# Patient Record
Sex: MALE | Race: WHITE | NOT HISPANIC OR LATINO | Employment: UNEMPLOYED | URBAN - METROPOLITAN AREA
[De-identification: names, ages, dates, MRNs, and addresses within clinical notes are randomized per-mention and may not be internally consistent; named-entity substitution may affect disease eponyms.]

---

## 2018-08-17 ENCOUNTER — HOSPITAL ENCOUNTER (INPATIENT)
Facility: HOSPITAL | Age: 53
LOS: 35 days | Discharge: HOME HEALTH | DRG: 116 | End: 2018-09-21
Payer: MEDICAID

## 2018-08-17 ENCOUNTER — OPHTH EXAM (OUTPATIENT)
Dept: OPHTHALMOLOGY | Facility: HOSPITAL | Age: 53
End: 2018-08-17

## 2018-08-17 DIAGNOSIS — A41.02 SEPSIS DUE TO METHICILLIN RESISTANT STAPHYLOCOCCUS AUREUS (MRSA): ICD-10-CM

## 2018-08-17 DIAGNOSIS — M54.9 CHRONIC BILATERAL BACK PAIN, UNSPECIFIED BACK LOCATION: ICD-10-CM

## 2018-08-17 DIAGNOSIS — H53.8 BLURRED VISION, RIGHT EYE: ICD-10-CM

## 2018-08-17 DIAGNOSIS — B95.62 MRSA BACTEREMIA: ICD-10-CM

## 2018-08-17 DIAGNOSIS — F41.9 ANXIETY: ICD-10-CM

## 2018-08-17 DIAGNOSIS — G89.29 CHRONIC BILATERAL BACK PAIN, UNSPECIFIED BACK LOCATION: ICD-10-CM

## 2018-08-17 DIAGNOSIS — Z79.4 TYPE 2 DIABETES MELLITUS WITH CHRONIC KIDNEY DISEASE ON CHRONIC DIALYSIS, WITH LONG-TERM CURRENT USE OF INSULIN: ICD-10-CM

## 2018-08-17 DIAGNOSIS — Z91.89 AT RISK FOR LONG QT SYNDROME: ICD-10-CM

## 2018-08-17 DIAGNOSIS — Z99.2 ESRD ON HEMODIALYSIS: ICD-10-CM

## 2018-08-17 DIAGNOSIS — L02.91 ABSCESS: ICD-10-CM

## 2018-08-17 DIAGNOSIS — I48.91 ATRIAL FIBRILLATION: ICD-10-CM

## 2018-08-17 DIAGNOSIS — I20.0 UNSTABLE ANGINA: ICD-10-CM

## 2018-08-17 DIAGNOSIS — R78.81 MRSA BACTEREMIA: ICD-10-CM

## 2018-08-17 DIAGNOSIS — M86.9 OSTEOMYELITIS, UNSPECIFIED SITE, UNSPECIFIED TYPE: ICD-10-CM

## 2018-08-17 DIAGNOSIS — E11.22 TYPE 2 DIABETES MELLITUS WITH CHRONIC KIDNEY DISEASE ON CHRONIC DIALYSIS, WITH LONG-TERM CURRENT USE OF INSULIN: ICD-10-CM

## 2018-08-17 DIAGNOSIS — R06.03 ACUTE RESPIRATORY DISTRESS: ICD-10-CM

## 2018-08-17 DIAGNOSIS — I21.4 NSTEMI (NON-ST ELEVATED MYOCARDIAL INFARCTION): ICD-10-CM

## 2018-08-17 DIAGNOSIS — I48.20 CHRONIC ATRIAL FIBRILLATION: ICD-10-CM

## 2018-08-17 DIAGNOSIS — Z99.2 TYPE 2 DIABETES MELLITUS WITH CHRONIC KIDNEY DISEASE ON CHRONIC DIALYSIS, WITH LONG-TERM CURRENT USE OF INSULIN: ICD-10-CM

## 2018-08-17 DIAGNOSIS — H33.001 MACULA-OFF RHEGMATOGENOUS RETINAL DETACHMENT, RIGHT: Primary | ICD-10-CM

## 2018-08-17 DIAGNOSIS — N18.6 TYPE 2 DIABETES MELLITUS WITH CHRONIC KIDNEY DISEASE ON CHRONIC DIALYSIS, WITH LONG-TERM CURRENT USE OF INSULIN: ICD-10-CM

## 2018-08-17 DIAGNOSIS — E87.5 HYPERKALEMIA: ICD-10-CM

## 2018-08-17 DIAGNOSIS — R07.9 CHEST PAIN: ICD-10-CM

## 2018-08-17 DIAGNOSIS — F11.90 CHRONIC, CONTINUOUS USE OF OPIOIDS: ICD-10-CM

## 2018-08-17 DIAGNOSIS — N18.6 ESRD ON HEMODIALYSIS: ICD-10-CM

## 2018-08-17 DIAGNOSIS — F11.20 METHADONE DEPENDENCE: ICD-10-CM

## 2018-08-17 DIAGNOSIS — R53.81 DEBILITY: ICD-10-CM

## 2018-08-17 DIAGNOSIS — I15.0 RENOVASCULAR HYPERTENSION: ICD-10-CM

## 2018-08-17 DIAGNOSIS — M86.072 ACUTE HEMATOGENOUS OSTEOMYELITIS OF LEFT FOOT: ICD-10-CM

## 2018-08-17 PROBLEM — I15.2 HYPERTENSION DUE TO ENDOCRINE DISORDER: Status: ACTIVE | Noted: 2018-08-17

## 2018-08-17 LAB
ABO + RH BLD: NORMAL
ALBUMIN SERPL BCP-MCNC: 2.1 G/DL
ALP SERPL-CCNC: 198 U/L
ALT SERPL W/O P-5'-P-CCNC: 20 U/L
ANION GAP SERPL CALC-SCNC: 9 MMOL/L
AST SERPL-CCNC: 30 U/L
BASOPHILS # BLD AUTO: 0.03 K/UL
BASOPHILS NFR BLD: 0.3 %
BILIRUB SERPL-MCNC: 0.7 MG/DL
BLD GP AB SCN CELLS X3 SERPL QL: NORMAL
BUN SERPL-MCNC: 47 MG/DL
CALCIUM SERPL-MCNC: 7.7 MG/DL
CHLORIDE SERPL-SCNC: 101 MMOL/L
CO2 SERPL-SCNC: 21 MMOL/L
CREAT SERPL-MCNC: 5.6 MG/DL
CRP SERPL-MCNC: 69 MG/L
DIFFERENTIAL METHOD: ABNORMAL
EOSINOPHIL # BLD AUTO: 0.7 K/UL
EOSINOPHIL NFR BLD: 6.2 %
ERYTHROCYTE [DISTWIDTH] IN BLOOD BY AUTOMATED COUNT: 16.1 %
ERYTHROCYTE [SEDIMENTATION RATE] IN BLOOD BY WESTERGREN METHOD: 58 MM/HR
EST. GFR  (AFRICAN AMERICAN): 12.3 ML/MIN/1.73 M^2
EST. GFR  (NON AFRICAN AMERICAN): 10.7 ML/MIN/1.73 M^2
ESTIMATED AVG GLUCOSE: 189 MG/DL
GLUCOSE SERPL-MCNC: 140 MG/DL
HBA1C MFR BLD HPLC: 8.2 %
HCT VFR BLD AUTO: 28.7 %
HGB BLD-MCNC: 9.2 G/DL
IMM GRANULOCYTES # BLD AUTO: 0.56 K/UL
IMM GRANULOCYTES NFR BLD AUTO: 4.8 %
INR PPP: 1
LACTATE SERPL-SCNC: 1.1 MMOL/L
LACTATE SERPL-SCNC: 1.1 MMOL/L
LYMPHOCYTES # BLD AUTO: 1.7 K/UL
LYMPHOCYTES NFR BLD: 14.3 %
MCH RBC QN AUTO: 29 PG
MCHC RBC AUTO-ENTMCNC: 32.1 G/DL
MCV RBC AUTO: 91 FL
MONOCYTES # BLD AUTO: 0.7 K/UL
MONOCYTES NFR BLD: 6.1 %
NEUTROPHILS # BLD AUTO: 8.1 K/UL
NEUTROPHILS NFR BLD: 68.3 %
NRBC BLD-RTO: 0 /100 WBC
PLATELET # BLD AUTO: 57 K/UL
PMV BLD AUTO: 12.9 FL
POCT GLUCOSE: 154 MG/DL (ref 70–110)
POTASSIUM SERPL-SCNC: 4.9 MMOL/L
PROCALCITONIN SERPL IA-MCNC: 22.6 NG/ML
PROT SERPL-MCNC: 5.8 G/DL
PROTHROMBIN TIME: 10.6 SEC
RBC # BLD AUTO: 3.17 M/UL
SODIUM SERPL-SCNC: 131 MMOL/L
WBC # BLD AUTO: 11.78 K/UL

## 2018-08-17 PROCEDURE — 96372 THER/PROPH/DIAG INJ SC/IM: CPT

## 2018-08-17 PROCEDURE — 25000003 PHARM REV CODE 250: Performed by: STUDENT IN AN ORGANIZED HEALTH CARE EDUCATION/TRAINING PROGRAM

## 2018-08-17 PROCEDURE — 83605 ASSAY OF LACTIC ACID: CPT

## 2018-08-17 PROCEDURE — 84145 PROCALCITONIN (PCT): CPT

## 2018-08-17 PROCEDURE — 86140 C-REACTIVE PROTEIN: CPT

## 2018-08-17 PROCEDURE — 85025 COMPLETE CBC W/AUTO DIFF WBC: CPT

## 2018-08-17 PROCEDURE — 82962 GLUCOSE BLOOD TEST: CPT

## 2018-08-17 PROCEDURE — 99285 EMERGENCY DEPT VISIT HI MDM: CPT | Mod: ,,, | Performed by: PHYSICIAN ASSISTANT

## 2018-08-17 PROCEDURE — 86850 RBC ANTIBODY SCREEN: CPT

## 2018-08-17 PROCEDURE — 99285 EMERGENCY DEPT VISIT HI MDM: CPT | Mod: 25

## 2018-08-17 PROCEDURE — 80053 COMPREHEN METABOLIC PANEL: CPT

## 2018-08-17 PROCEDURE — 83036 HEMOGLOBIN GLYCOSYLATED A1C: CPT

## 2018-08-17 PROCEDURE — 63600175 PHARM REV CODE 636 W HCPCS: Performed by: STUDENT IN AN ORGANIZED HEALTH CARE EDUCATION/TRAINING PROGRAM

## 2018-08-17 PROCEDURE — A4216 STERILE WATER/SALINE, 10 ML: HCPCS | Performed by: STUDENT IN AN ORGANIZED HEALTH CARE EDUCATION/TRAINING PROGRAM

## 2018-08-17 PROCEDURE — 85610 PROTHROMBIN TIME: CPT

## 2018-08-17 PROCEDURE — 63600175 PHARM REV CODE 636 W HCPCS: Mod: JG | Performed by: STUDENT IN AN ORGANIZED HEALTH CARE EDUCATION/TRAINING PROGRAM

## 2018-08-17 PROCEDURE — 12000002 HC ACUTE/MED SURGE SEMI-PRIVATE ROOM

## 2018-08-17 PROCEDURE — 96365 THER/PROPH/DIAG IV INF INIT: CPT

## 2018-08-17 PROCEDURE — 85652 RBC SED RATE AUTOMATED: CPT

## 2018-08-17 PROCEDURE — 87040 BLOOD CULTURE FOR BACTERIA: CPT | Mod: 59

## 2018-08-17 RX ORDER — RAMELTEON 8 MG/1
8 TABLET ORAL NIGHTLY PRN
Status: DISCONTINUED | OUTPATIENT
Start: 2018-08-17 | End: 2018-09-05

## 2018-08-17 RX ORDER — POLYETHYLENE GLYCOL 3350 17 G/17G
17 POWDER, FOR SOLUTION ORAL DAILY
Status: DISCONTINUED | OUTPATIENT
Start: 2018-08-18 | End: 2018-08-24

## 2018-08-17 RX ORDER — LOSARTAN POTASSIUM 100 MG/1
100 TABLET ORAL DAILY
Status: ON HOLD | COMMUNITY
End: 2018-09-05 | Stop reason: HOSPADM

## 2018-08-17 RX ORDER — GLUCAGON 1 MG
1 KIT INJECTION
Status: DISCONTINUED | OUTPATIENT
Start: 2018-08-17 | End: 2018-08-25

## 2018-08-17 RX ORDER — HEPARIN SODIUM 5000 [USP'U]/ML
5000 INJECTION, SOLUTION INTRAVENOUS; SUBCUTANEOUS EVERY 8 HOURS
Status: DISCONTINUED | OUTPATIENT
Start: 2018-08-17 | End: 2018-09-17

## 2018-08-17 RX ORDER — SODIUM CHLORIDE 0.9 % (FLUSH) 0.9 %
5 SYRINGE (ML) INJECTION
Status: DISCONTINUED | OUTPATIENT
Start: 2018-08-17 | End: 2018-09-05

## 2018-08-17 RX ORDER — IBUPROFEN 200 MG
24 TABLET ORAL
Status: DISCONTINUED | OUTPATIENT
Start: 2018-08-17 | End: 2018-08-25

## 2018-08-17 RX ORDER — HYDROCODONE BITARTRATE AND ACETAMINOPHEN 7.5; 325 MG/1; MG/1
1 TABLET ORAL EVERY 6 HOURS PRN
Status: ON HOLD | COMMUNITY
End: 2018-08-30 | Stop reason: HOSPADM

## 2018-08-17 RX ORDER — CALCIUM ACETATE 667 MG/1
667 CAPSULE ORAL
Status: DISCONTINUED | OUTPATIENT
Start: 2018-08-18 | End: 2018-08-20

## 2018-08-17 RX ORDER — METHADONE HYDROCHLORIDE 10 MG/1
10 TABLET ORAL 2 TIMES DAILY
Status: ON HOLD | COMMUNITY
End: 2018-08-30 | Stop reason: HOSPADM

## 2018-08-17 RX ORDER — DILTIAZEM HYDROCHLORIDE 300 MG/1
300 CAPSULE, COATED, EXTENDED RELEASE ORAL DAILY
Status: DISCONTINUED | OUTPATIENT
Start: 2018-08-18 | End: 2018-08-18

## 2018-08-17 RX ORDER — ACETAMINOPHEN 325 MG/1
650 TABLET ORAL EVERY 4 HOURS PRN
Status: DISCONTINUED | OUTPATIENT
Start: 2018-08-17 | End: 2018-09-18 | Stop reason: SDUPTHER

## 2018-08-17 RX ORDER — CLONAZEPAM 0.5 MG/1
0.5 TABLET ORAL 2 TIMES DAILY PRN
COMMUNITY

## 2018-08-17 RX ORDER — ONDANSETRON 2 MG/ML
4 INJECTION INTRAMUSCULAR; INTRAVENOUS EVERY 8 HOURS PRN
Status: DISCONTINUED | OUTPATIENT
Start: 2018-08-17 | End: 2018-09-21 | Stop reason: HOSPADM

## 2018-08-17 RX ORDER — CLONAZEPAM 0.5 MG/1
0.5 TABLET ORAL 2 TIMES DAILY PRN
Status: DISCONTINUED | OUTPATIENT
Start: 2018-08-17 | End: 2018-09-21 | Stop reason: HOSPADM

## 2018-08-17 RX ORDER — METHADONE HYDROCHLORIDE 10 MG/1
10 TABLET ORAL 2 TIMES DAILY
Status: DISCONTINUED | OUTPATIENT
Start: 2018-08-17 | End: 2018-09-21 | Stop reason: HOSPADM

## 2018-08-17 RX ORDER — IPRATROPIUM BROMIDE AND ALBUTEROL SULFATE 2.5; .5 MG/3ML; MG/3ML
3 SOLUTION RESPIRATORY (INHALATION) EVERY 4 HOURS PRN
Status: DISCONTINUED | OUTPATIENT
Start: 2018-08-17 | End: 2018-09-21 | Stop reason: HOSPADM

## 2018-08-17 RX ORDER — DILTIAZEM HYDROCHLORIDE 300 MG/1
300 CAPSULE, COATED, EXTENDED RELEASE ORAL DAILY
Status: ON HOLD | COMMUNITY
End: 2018-09-05 | Stop reason: HOSPADM

## 2018-08-17 RX ORDER — HYDROCODONE BITARTRATE AND ACETAMINOPHEN 7.5; 325 MG/1; MG/1
1 TABLET ORAL EVERY 6 HOURS PRN
Status: DISCONTINUED | OUTPATIENT
Start: 2018-08-17 | End: 2018-09-21 | Stop reason: HOSPADM

## 2018-08-17 RX ORDER — IBUPROFEN 200 MG
16 TABLET ORAL
Status: DISCONTINUED | OUTPATIENT
Start: 2018-08-17 | End: 2018-08-25

## 2018-08-17 RX ORDER — OMEPRAZOLE 20 MG/1
20 CAPSULE, DELAYED RELEASE ORAL DAILY
COMMUNITY

## 2018-08-17 RX ORDER — LOSARTAN POTASSIUM 50 MG/1
100 TABLET ORAL DAILY
Status: DISCONTINUED | OUTPATIENT
Start: 2018-08-18 | End: 2018-08-28

## 2018-08-17 RX ORDER — SERTRALINE HYDROCHLORIDE 25 MG/1
25 TABLET, FILM COATED ORAL DAILY
Status: DISCONTINUED | OUTPATIENT
Start: 2018-08-18 | End: 2018-09-21 | Stop reason: HOSPADM

## 2018-08-17 RX ORDER — INSULIN GLARGINE 100 [IU]/ML
40 INJECTION, SOLUTION SUBCUTANEOUS DAILY
Status: ON HOLD | COMMUNITY
End: 2018-08-30 | Stop reason: SDUPTHER

## 2018-08-17 RX ORDER — CALCIUM ACETATE 667 MG/1
667 CAPSULE ORAL
COMMUNITY

## 2018-08-17 RX ORDER — NALOXONE HCL 0.4 MG/ML
0.4 VIAL (ML) INJECTION
Status: DISCONTINUED | OUTPATIENT
Start: 2018-08-17 | End: 2018-09-21 | Stop reason: HOSPADM

## 2018-08-17 RX ORDER — DILTIAZEM HYDROCHLORIDE 180 MG/1
180 CAPSULE, COATED, EXTENDED RELEASE ORAL DAILY
COMMUNITY
End: 2018-08-17 | Stop reason: CLARIF

## 2018-08-17 RX ORDER — ONDANSETRON 8 MG/1
8 TABLET, ORALLY DISINTEGRATING ORAL EVERY 8 HOURS PRN
Status: DISCONTINUED | OUTPATIENT
Start: 2018-08-17 | End: 2018-09-21 | Stop reason: HOSPADM

## 2018-08-17 RX ORDER — INSULIN ASPART 100 [IU]/ML
0-5 INJECTION, SOLUTION INTRAVENOUS; SUBCUTANEOUS
Status: DISCONTINUED | OUTPATIENT
Start: 2018-08-17 | End: 2018-08-20

## 2018-08-17 RX ORDER — SERTRALINE HYDROCHLORIDE 25 MG/1
25 TABLET, FILM COATED ORAL NIGHTLY
COMMUNITY

## 2018-08-17 RX ADMIN — CEFTAZIDIME 6.75 MG: 100 INJECTION, POWDER, FOR SOLUTION INTRAMUSCULAR; INTRAVENOUS at 09:08

## 2018-08-17 RX ADMIN — HEPARIN SODIUM 5000 UNITS: 5000 INJECTION, SOLUTION INTRAVENOUS; SUBCUTANEOUS at 10:08

## 2018-08-17 RX ADMIN — VANCOMYCIN HYDROCHLORIDE 3 MG: 500 INJECTION, POWDER, LYOPHILIZED, FOR SOLUTION INTRAVENOUS at 09:08

## 2018-08-17 RX ADMIN — METHADONE HYDROCHLORIDE 10 MG: 5 TABLET ORAL at 10:08

## 2018-08-17 RX ADMIN — DAPTOMYCIN 905 MG: 500 INJECTION, POWDER, LYOPHILIZED, FOR SOLUTION INTRAVENOUS at 10:08
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
## 2018-08-17 NOTE — ED NOTES
Patient transferred from [facility] for ophthalmology consult after c/o right eye pain and visual changes that started about 2-3 days ago.  Patient has wound to left foot/ankle. Had surgery for I&D on Tuesday for wound to left ankle and had wound vac placed.  Wound vac in place upon arrival.
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
## 2018-08-18 PROBLEM — I15.0 RENOVASCULAR HYPERTENSION: Status: ACTIVE | Noted: 2018-08-17

## 2018-08-18 PROBLEM — F11.20 METHADONE DEPENDENCE: Status: ACTIVE | Noted: 2018-08-18

## 2018-08-18 PROBLEM — M86.072 ACUTE HEMATOGENOUS OSTEOMYELITIS OF LEFT FOOT: Status: ACTIVE | Noted: 2018-08-17

## 2018-08-18 PROBLEM — Z99.2 ESRD ON HEMODIALYSIS: Status: ACTIVE | Noted: 2018-08-17

## 2018-08-18 PROBLEM — A41.02 SEPSIS DUE TO METHICILLIN RESISTANT STAPHYLOCOCCUS AUREUS (MRSA): Status: ACTIVE | Noted: 2018-08-17

## 2018-08-18 LAB
ALBUMIN SERPL BCP-MCNC: 2.1 G/DL
ALP SERPL-CCNC: 296 U/L
ALT SERPL W/O P-5'-P-CCNC: 43 U/L
ANION GAP SERPL CALC-SCNC: 10 MMOL/L
AST SERPL-CCNC: 68 U/L
BILIRUB SERPL-MCNC: 0.8 MG/DL
BUN SERPL-MCNC: 54 MG/DL
CALCIUM SERPL-MCNC: 7.7 MG/DL
CHLORIDE SERPL-SCNC: 98 MMOL/L
CHOLEST SERPL-MCNC: 126 MG/DL
CHOLEST/HDLC SERPL: 7.9 {RATIO}
CO2 SERPL-SCNC: 21 MMOL/L
CREAT SERPL-MCNC: 6.3 MG/DL
EST. GFR  (AFRICAN AMERICAN): 10.7 ML/MIN/1.73 M^2
EST. GFR  (NON AFRICAN AMERICAN): 9.2 ML/MIN/1.73 M^2
GLUCOSE SERPL-MCNC: 227 MG/DL
HDLC SERPL-MCNC: 16 MG/DL
HDLC SERPL: 12.7 %
LDLC SERPL CALC-MCNC: 64 MG/DL
MAGNESIUM SERPL-MCNC: 1.7 MG/DL
NONHDLC SERPL-MCNC: 110 MG/DL
PHOSPHATE SERPL-MCNC: 3.8 MG/DL
POCT GLUCOSE: 265 MG/DL (ref 70–110)
POCT GLUCOSE: 395 MG/DL (ref 70–110)
POCT GLUCOSE: 420 MG/DL (ref 70–110)
POTASSIUM SERPL-SCNC: 4.2 MMOL/L
PROT SERPL-MCNC: 5.4 G/DL
SODIUM SERPL-SCNC: 129 MMOL/L
TRIGL SERPL-MCNC: 230 MG/DL

## 2018-08-18 PROCEDURE — 36415 COLL VENOUS BLD VENIPUNCTURE: CPT

## 2018-08-18 PROCEDURE — 84100 ASSAY OF PHOSPHORUS: CPT

## 2018-08-18 PROCEDURE — 25000003 PHARM REV CODE 250: Performed by: STUDENT IN AN ORGANIZED HEALTH CARE EDUCATION/TRAINING PROGRAM

## 2018-08-18 PROCEDURE — 99233 SBSQ HOSP IP/OBS HIGH 50: CPT | Mod: ,,, | Performed by: INTERNAL MEDICINE

## 2018-08-18 PROCEDURE — 99233 SBSQ HOSP IP/OBS HIGH 50: CPT | Mod: ,,, | Performed by: PODIATRIST

## 2018-08-18 PROCEDURE — 80061 LIPID PANEL: CPT

## 2018-08-18 PROCEDURE — 83735 ASSAY OF MAGNESIUM: CPT

## 2018-08-18 PROCEDURE — 63600175 PHARM REV CODE 636 W HCPCS: Performed by: STUDENT IN AN ORGANIZED HEALTH CARE EDUCATION/TRAINING PROGRAM

## 2018-08-18 PROCEDURE — 80053 COMPREHEN METABOLIC PANEL: CPT

## 2018-08-18 PROCEDURE — 11000001 HC ACUTE MED/SURG PRIVATE ROOM

## 2018-08-18 PROCEDURE — 99223 1ST HOSP IP/OBS HIGH 75: CPT | Mod: ,,, | Performed by: INTERNAL MEDICINE

## 2018-08-18 RX ORDER — PREDNISOLONE ACETATE 10 MG/ML
1 SUSPENSION/ DROPS OPHTHALMIC 4 TIMES DAILY
Status: DISCONTINUED | OUTPATIENT
Start: 2018-08-18 | End: 2018-09-21 | Stop reason: HOSPADM

## 2018-08-18 RX ORDER — HYDROXYZINE HYDROCHLORIDE 25 MG/1
25 TABLET, FILM COATED ORAL 3 TIMES DAILY PRN
Status: DISCONTINUED | OUTPATIENT
Start: 2018-08-18 | End: 2018-09-21 | Stop reason: HOSPADM

## 2018-08-18 RX ADMIN — SERTRALINE HYDROCHLORIDE 25 MG: 25 TABLET ORAL at 09:08

## 2018-08-18 RX ADMIN — HEPARIN SODIUM 5000 UNITS: 5000 INJECTION, SOLUTION INTRAVENOUS; SUBCUTANEOUS at 03:08

## 2018-08-18 RX ADMIN — INSULIN DETEMIR 15 UNITS: 100 INJECTION, SOLUTION SUBCUTANEOUS at 10:08

## 2018-08-18 RX ADMIN — PREDNISOLONE ACETATE 1 DROP: 10 SUSPENSION/ DROPS OPHTHALMIC at 06:08

## 2018-08-18 RX ADMIN — HYDROXYZINE HYDROCHLORIDE 25 MG: 25 TABLET, FILM COATED ORAL at 04:08

## 2018-08-18 RX ADMIN — DILTIAZEM HYDROCHLORIDE 300 MG: 300 CAPSULE, COATED, EXTENDED RELEASE ORAL at 09:08

## 2018-08-18 RX ADMIN — LOSARTAN POTASSIUM 100 MG: 50 TABLET, FILM COATED ORAL at 09:08

## 2018-08-18 RX ADMIN — PREDNISOLONE ACETATE 1 DROP: 10 SUSPENSION/ DROPS OPHTHALMIC at 03:08

## 2018-08-18 RX ADMIN — INSULIN DETEMIR 15 UNITS: 100 INJECTION, SOLUTION SUBCUTANEOUS at 09:08

## 2018-08-18 RX ADMIN — CLONAZEPAM 0.5 MG: 0.5 TABLET ORAL at 04:08

## 2018-08-18 RX ADMIN — INSULIN ASPART 1 UNITS: 100 INJECTION, SOLUTION INTRAVENOUS; SUBCUTANEOUS at 10:08

## 2018-08-18 RX ADMIN — METHADONE HYDROCHLORIDE 10 MG: 5 TABLET ORAL at 09:08

## 2018-08-18 RX ADMIN — METHADONE HYDROCHLORIDE 10 MG: 5 TABLET ORAL at 10:08

## 2018-08-18 NOTE — PHARMACY MED REC
"Admission Medication Reconciliation - Pharmacy Consult Note    The home medication history was taken by Calista Taylor, Pharmacist.  Based on information gathered and subsequent review by the clinical pharmacist, the items below may need attention.     You may go to "Admission" then "Reconcile Home Medications" tabs to review and/or act upon these items.       Potential issues to be addressed PRIOR TO DISCHARGE  o Patient prescribed insulin aspart 8 units three times daily with meals per outside hospital records but has not filled since May 2018.     Please address this information as you see fit.  Feel free to contact us if you have any questions or require assistance.    Calista Taylor, PharmD, PGY-1 Pharmacy Resident  EXT: [id number]            .    .            "

## 2018-08-18 NOTE — ASSESSMENT & PLAN NOTE
54 yo monocular M with osteomyelitis transferred for possible subretinal abscess    - VA 20/100 OD  - DFE remarkable for elevated, white lesion concerning for abscess  - intravitreal vancomycin and ceftazidime injection given in the ED  - ID consult  - will follow pt closely for further management    Seen with Dr. Corrales    Update 8/18/18  - mild improvement. Lesion with more defined border and less overlying haze  - pred-forte QID OD  - antibiotics per ID recommendations  - guarded prognosis. Will follow pt closely

## 2018-08-18 NOTE — ED NOTES
Charge nurse on 11th floor made aware of unsuccessful attempts to call report; will have nurse call me

## 2018-08-18 NOTE — SUBJECTIVE & OBJECTIVE
Scheduled Meds:   calcium acetate  667 mg Oral TID WM    DAPTOmycin (CUBICIN)  IV  8 mg/kg Intravenous Q48H    diltiaZEM  300 mg Oral Daily    heparin (porcine)  5,000 Units Subcutaneous Q8H    insulin detemir U-100  15 Units Subcutaneous BID    losartan  100 mg Oral Daily    methadone  10 mg Oral BID    polyethylene glycol  17 g Oral Daily    sertraline  25 mg Oral Daily     Continuous Infusions:  PRN Meds:acetaminophen, albuterol-ipratropium, clonazePAM, dextrose 50%, dextrose 50%, glucagon (human recombinant), glucose, glucose, HYDROcodone-acetaminophen, hydrOXYzine HCl, insulin aspart U-100, naloxone, ondansetron, ondansetron, ramelteon, sodium chloride 0.9%    Review of patient's allergies indicates:   Allergen Reactions    Vancomycin analogues Rash     Red Man Syndrome    Penicillins         Past Medical History:   Diagnosis Date    Arthritis     Blind left eye     Charcot's joint of foot     Diabetes mellitus     GERD (gastroesophageal reflux disease)     Glaucoma     Hypertension     MRSA (methicillin resistant staph aureus) culture positive     Renal disorder      Past Surgical History:   Procedure Laterality Date    AVF left upper arm      left ankle surgery      lumbar back surgery         Family History     Problem Relation (Age of Onset)    Pneumonia Mother        Tobacco Use    Smoking status: Never Smoker   Substance and Sexual Activity    Alcohol use: No     Frequency: Never    Drug use: No    Sexual activity: Not Currently     Review of Systems   Constitutional: Negative for chills and fever.   Gastrointestinal: Negative for nausea and vomiting.   Musculoskeletal:        Left ankle deformity.   Skin: Positive for color change and wound (Surgical incisions to the medial and lateral ankle).     Objective:     Vital Signs (Most Recent):  Temp: 98.2 °F (36.8 °C) (08/18/18 0300)  Pulse: 98 (08/18/18 0830)  Resp: 12 (08/18/18 0830)  BP: (!) 164/77 (08/18/18 0830)  SpO2: 96 %  (08/18/18 0830) Vital Signs (24h Range):  Temp:  [97.8 °F (36.6 °C)-98.2 °F (36.8 °C)] 98.2 °F (36.8 °C)  Pulse:  [68-98] 98  Resp:  [7-20] 12  SpO2:  [91 %-98 %] 96 %  BP: (147-189)/(69-86) 164/77     Weight: 113.3 kg (249 lb 12.5 oz)  Body mass index is 34.84 kg/m².    Foot Exam    General  General Appearance: appears stated age and healthy   Orientation: alert and oriented to person, place, and time       Left Foot/Ankle      Inspection and Palpation  Swelling: (Diffuse edema to the left LE, non pitting.)  Skin Exam: erythema (To the medial ankle incision); skin not intact (Surgical incisions to the medial and lateral ankle.)     Neurovascular  Dorsalis pedis: absent  Posterior tibial: absent  Saphenous nerve sensation: diminished  Tibial nerve sensation: diminished  Superficial peroneal nerve sensation: diminished  Deep peroneal nerve sensation: diminished  Sural nerve sensation: diminished    Comments  Ortho: Severe non reducible varus deformity of the left ankle.    Lateral Left ankle: Incision present with sutures in tact. No signs of acute infection. Skin edges well coapted.     Medial Left ankle: Surgical incision left open with granular wound beds to edges. Mild erythema, no drainage no purulence, no malodor, no streaking. Positive pain to palpation.     See clinic images below.      Laboratory:  A1C:   Recent Labs   Lab  08/17/18 2159   HGBA1C  8.2*     Blood Cultures:   Recent Labs   Lab  08/17/18 2201   LABBLOO  No Growth to date  No Growth to date     CBC:   Recent Labs   Lab  08/17/18 2159   WBC  11.78   RBC  3.17*   HGB  9.2*   HCT  28.7*   PLT  57*   MCV  91   MCH  29.0   MCHC  32.1     CMP:   Recent Labs   Lab  08/18/18   0613   GLU  227*   CALCIUM  7.7*   ALBUMIN  2.1*   PROT  5.4*   NA  129*   K  4.2   CO2  21*   CL  98   BUN  54*   CREATININE  6.3*   ALKPHOS  296*   ALT  43   AST  68*   BILITOT  0.8     CRP:   Recent Labs   Lab  08/17/18 2159   CRP  69.0*     ESR:   Recent Labs   Lab   08/17/18 2159   SEDRATE  58*     Microbiology Results (last 7 days)     Procedure Component Value Units Date/Time    Blood culture (site 1) [[id number]] Collected:  08/17/18 2201    Order Status:  Completed Specimen:  Blood from Peripheral, Hand, Right Updated:  08/18/18 0545     Blood Culture, Routine No Growth to date    Narrative:       Site # 1, aerobic and anaerobic    Blood culture (site 2) [[id number]] Collected:  08/17/18 2201    Order Status:  Completed Specimen:  Blood from Peripheral, Antecubital, Right Updated:  08/18/18 0545     Blood Culture, Routine No Growth to date    Narrative:       Site # 2, aerobic only    Culture, Anaerobe [[id number]]     Order Status:  No result Specimen:  Eye from Conjunctiva, Right     Aerobic culture [[id number]]     Order Status:  No result Specimen:  Eye from Conjunctiva, Right     Gram stain [[id number]]     Order Status:  No result Specimen:  Eye from Conjunctiva, Right     Blood culture #2 **CANNOT BE ORDERED STAT** [[id number]]     Order Status:  Canceled Specimen:  Blood     Blood culture #1 **CANNOT BE ORDERED STAT** [[id number]]     Order Status:  Canceled Specimen:  Blood         Specimen (12h ago, onward)    None          Diagnostic Results:  X-Ray: I have reviewed all pertinent results/findings within the past 24 hours.  Ordered

## 2018-08-18 NOTE — ASSESSMENT & PLAN NOTE
52 yo monocular M with osteomyelitis transferred for possible subretinal abscess    - VA 20/100 OD  - DFE remarkable for elevated, white lesion concerning for abscess  - intravitreal vancomycin and ceftazidime injection given in the ED  - ID consult  - will follow pt closely for further management    Seen with Dr. Corrales
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
## 2018-08-18 NOTE — ASSESSMENT & PLAN NOTE
-patient with diabetic nephropathy and concomitant ESRD, HD Monday Wednesday Friday last dialyzed today, 8/17  -CMP drawn in ED, no major electrolyte derangements or need for emergent dialysis  -nephrology consultation services notified and will see patient in the morning, likely next dialysis session to be Monday
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
## 2018-08-18 NOTE — ASSESSMENT & PLAN NOTE
-Last A1c reviewed-   Lab Results   Component Value Date    HGBA1C 8.2 (H) 08/17/2018       Home Antihyperglycemic Regiment:  -glargine 30 units p.m. Nightly  -pharmacy reporting patient takes aspirin 8 units t.i.d. however has not filled this prescription since May    Inpatient Antihyperglycemic Regiment:   Antihyperglycemics (From admission, onward)    Start     Stop Route Frequency Ordered    08/17/18 2215  insulin detemir U-100 pen 15 Units      -- SubQ 2 times daily 08/17/18 2119 08/17/18 2213  insulin aspart U-100 pen 0-5 Units      -- SubQ Before meals & nightly PRN 08/17/18 2119        -LSSI  -ACCU Checks ACHS  -NPO at this time, we will initiate Diabetic Diet 2000 patricia if patient does not go to the OR tomorrow  -Diabetic nutritional counseling given     Blood Sugars (AccuCheck):  Recent Labs      08/17/18   2246   POCTGLUCOSE  154*
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
## 2018-08-18 NOTE — PT/OT/SLP PROGRESS
Occupational Therapy      Patient Name:  Mickey Ross   MRN:  [id number]    Patient not seen today secondary to recent eye dilation. Will follow up for evaluation.    DARRYL Pena  8/18/2018  Pager: [phone]
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
## 2018-08-18 NOTE — ASSESSMENT & PLAN NOTE
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
## 2018-08-18 NOTE — PROGRESS NOTES
[facility]  Ophthalmology  Progress Note    Patient Name: Mickey Ross  MRN: [id number]  Admission Date: 8/17/2018  Hospital Length of Stay: 1 days  Attending Provider: Kyle Cruz MD   Primary Care Physician: Prosper Monterroso MD  Principal Problem:Sepsis due to methicillin resistant Staphylococcus aureus (MRSA)    Subjective:     Interval History: Pt thinks there is improvement in pain and vision since the intravitreal injection yesterday.    Base Eye Exam     Visual Acuity (Snellen - Linear)       Right Left    Dist sc 20/100           Tonometry (Applanation, 1:59 PM)       Right Left    Pressure 26           Pupils       Dark Light Shape React    Right 4 3 Round Brisk    Left       monocular           Extraocular Movement       Right Left     Full Full          Neuro/Psych     Oriented x3:  Yes          Dilation     Both eyes:  2.5% Phenylephrine, 1% Mydriacyl @ 2:44 PM            Slit Lamp and Fundus Exam     External Exam       Right Left    External Normal prosthesis          Slit Lamp Exam       Right Left    Lids/Lashes Normal     Conjunctiva/Sclera White and quiet     Cornea Clear     Lens Posterior chamber intraocular lens           Fundus Exam       Right Left    Disc Normal     Macula Normal     Vessels Normal     Periphery ST arcade elevated white lesion with surrounding fluid and thin cuff of SR heme, approx 10 mm, spares central macula               Assessment and Plan:     Subretinal abscess    54 yo monocular M with osteomyelitis transferred for possible subretinal abscess    - VA 20/100 OD  - DFE remarkable for elevated, white lesion concerning for abscess  - intravitreal vancomycin and ceftazidime injection given in the ED  - ID consult  - will follow pt closely for further management    Seen with Dr. Corrales    Update 8/18/18  - mild improvement. Lesion with more defined border and less overlying haze  - pred-forte QID OD  - antibiotics per ID recommendations  - guarded  prognosis. Will follow pt closely        Seen with Dr. Antoni Hemphill MD  Ophthalmology  [facility]
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
## 2018-08-18 NOTE — SUBJECTIVE & OBJECTIVE
Past Medical History:   Diagnosis Date    Arthritis     Blind left eye     Charcot's joint of foot     Diabetes mellitus     GERD (gastroesophageal reflux disease)     Glaucoma     Hypertension     MRSA (methicillin resistant staph aureus) culture positive     Renal disorder        Past Surgical History:   Procedure Laterality Date    AVF left upper arm      left ankle surgery      lumbar back surgery         Review of patient's allergies indicates:   Allergen Reactions    Penicillins     Vancomycin analogues        Medications:  Medications Prior to Admission   Medication Sig    calcium acetate (PHOSLO) 667 mg capsule Take 667 mg by mouth 3 (three) times daily with meals.    clonazePAM (KLONOPIN) 0.5 MG tablet Take 0.5 mg by mouth 2 (two) times daily as needed for Anxiety.    diltiaZEM (CARDIZEM CD) 300 MG 24 hr capsule Take 300 mg by mouth once daily.    HYDROcodone-acetaminophen (NORCO) 7.5-325 mg per tablet Take 1 tablet by mouth every 6 (six) hours as needed for Pain.    insulin glargine (BASAGLAR KWIKPEN U-100 INSULIN) 100 unit/mL (3 mL) InPn pen Inject 40 Units into the skin once daily.    losartan (COZAAR) 100 MG tablet Take 100 mg by mouth once daily.    methadone (DOLOPHINE) 10 MG tablet Take 10 mg by mouth 2 (two) times daily.     omeprazole (PRILOSEC) 20 MG capsule Take 20 mg by mouth once daily.    sertraline (ZOLOFT) 25 MG tablet Take 25 mg by mouth once daily.     Antibiotics (From admission, onward)    Start     Stop Route Frequency Ordered    08/17/18 2139  DAPTOmycin (CUBICIN) 905 mg in sodium chloride 0.9% IVPB      -- IV Every 48 hours (non-standard times) 08/17/18 2142        Antifungals (From admission, onward)    None        Antivirals (From admission, onward)    None             There is no immunization history on file for this patient.    Family History     Problem Relation (Age of Onset)    Pneumonia Mother        Social History     Socioeconomic History    Marital  status: Single     Spouse name: None    Number of children: None    Years of education: None    Highest education level: None   Social Needs    Financial resource strain: None    Food insecurity - worry: None    Food insecurity - inability: None    Transportation needs - medical: None    Transportation needs - non-medical: None   Occupational History    None   Tobacco Use    Smoking status: Never Smoker   Substance and Sexual Activity    Alcohol use: No     Frequency: Never    Drug use: No    Sexual activity: Not Currently   Other Topics Concern    None   Social History Narrative    None     Review of Systems   Constitutional: Negative for chills, diaphoresis, fatigue and fever.   Eyes: Positive for pain, redness and visual disturbance.   Respiratory: Negative for cough and shortness of breath.    Cardiovascular: Negative for chest pain.   Gastrointestinal: Negative for abdominal pain, diarrhea, nausea and vomiting.   Genitourinary: Negative for dysuria.   Skin: Positive for rash and wound.   Neurological: Negative for dizziness and headaches.   All other systems reviewed and are negative.    Objective:     Vital Signs (Most Recent):  Temp: 98.2 °F (36.8 °C) (08/18/18 0300)  Pulse: 98 (08/18/18 0830)  Resp: 12 (08/18/18 0830)  BP: (!) 164/77 (08/18/18 0830)  SpO2: 96 % (08/18/18 0830) Vital Signs (24h Range):  Temp:  [97.8 °F (36.6 °C)-98.2 °F (36.8 °C)] 98.2 °F (36.8 °C)  Pulse:  [68-98] 98  Resp:  [7-20] 12  SpO2:  [91 %-98 %] 96 %  BP: (147-189)/(69-86) 164/77     Weight: 113.3 kg (249 lb 12.5 oz)  Body mass index is 34.84 kg/m².    Estimated Creatinine Clearance: 17.4 mL/min (A) (based on SCr of 6.3 mg/dL (H)).    Physical Exam   Constitutional: He is oriented to person, place, and time. He appears well-developed and well-nourished. No distress.   HENT:   Head: Normocephalic.   Eyes: Right eye exhibits discharge. No scleral icterus.   Left prosthetic eye  Irritation of right eye  +photosensitivty     Cardiovascular: Normal rate, regular rhythm and normal heart sounds. Exam reveals no gallop and no friction rub.   No murmur heard.  Pulmonary/Chest: Effort normal. No stridor. No respiratory distress. He has no wheezes.   Abdominal: Soft. He exhibits no distension and no mass. There is no tenderness. There is no rebound and no guarding.   Musculoskeletal: He exhibits no edema, tenderness or deformity.   LUE AVF   Neurological: He is alert and oriented to person, place, and time.   Skin: Skin is warm and dry. Rash (extremities) noted. He is not diaphoretic. No erythema.   Right foot with deformity. No open wounds or ulcers noted  Left foot dressed at this time   Vitals reviewed.      Significant Labs:   Blood Culture:   Recent Labs   Lab  08/17/18   2201   LABBLOO  No Growth to date  No Growth to date     CBC:   Recent Labs   Lab  08/17/18 2159   WBC  11.78   HGB  9.2*   HCT  28.7*   PLT  57*     CMP:   Recent Labs   Lab  08/17/18 2159 08/18/18   0613   NA  131*  129*   K  4.9  4.2   CL  101  98   CO2  21*  21*   GLU  140*  227*   BUN  47*  54*   CREATININE  5.6*  6.3*   CALCIUM  7.7*  7.7*   PROT  5.8*  5.4*   ALBUMIN  2.1*  2.1*   BILITOT  0.7  0.8   ALKPHOS  198*  296*   AST  30  68*   ALT  20  43   ANIONGAP  9  10   EGFRNONAA  10.7*  9.2*     Wound Culture: No results for input(s): LABAERO in the last 4320 hours.  All pertinent labs within the past 24 hours have been reviewed.    Significant Imaging: I have reviewed all pertinent imaging results/findings within the past 24 hours.

## 2018-08-18 NOTE — ASSESSMENT & PLAN NOTE
iHD TTS  (last HD was yesterday 8/17/18)  [place] at [address]  Followed by Dr. Cruz  Duration 3 hrs with 15 minutes  Accesss on MIESHA AVF  No residual renal function    Plan:  - No need for dialysis treatment today, will anticipate next dialysis treatment on Monday   - Blood pressures: with hypertension which could be started on second hypertensive medication, could start on amlodipine 5 mg q D  - Anemia chronic renal disease: With a Hgb 9.5 which will continue with EPO during dialysis treatment once blood pressures are better controlled.   - Mineral bone disease: with Ca 7.7 corrected with albumin normal at 9.2, PO4 normal range would continue with binders   - Diet: Renal (low sodium, low phosphate and low potassium)

## 2018-08-18 NOTE — ASSESSMENT & PLAN NOTE
-Ankle assessed and examined. Stable incisions with no signs of abscess.  -Wound vac to medial ankle changed today. 125 mmHg medium continuous to continue change every 3 days.   -Discussed options with patient including amputation vs iv antibiotics for 6-8 weeks.  -Patient adamantly refusing amputation at this time.   -He will require picc line and IV antibiotics per ID.  -Follow cultures from [place].  -If new cultures needed, will obtain at next vac change.  -He will require close follow up upon discharged on the [facility] at the Wound clinic.  -X-ray ordered to assess left ankle.   -Podiatry will continue to follow.

## 2018-08-18 NOTE — ASSESSMENT & PLAN NOTE
-osteomyelitis of left 5th metatarsal taken for washout by Orthopedic surgery at [facility] on 08/15  -patient being treated outside facility with Flagyl, linezolid, gentamicin with HD.  -patient repeatedly refusing amputation which would be curative  -after consultation with Infectious Disease specialists, will proceed with daptomycin as patient is allergic to vancomycin currently has vancomycin red man reaction  -repeat cultures drawn  -sed rate elevated at 58 upon admission  -MRI and chart, will need to be delivered to Radiology in the morning to be scanned into system      Antibiotics (From admission, onward)    Start     Stop Route Frequency Ordered    08/17/18 2139  DAPTOmycin (CUBICIN) 905 mg in sodium chloride 0.9% IVPB      -- IV Every 48 hours (non-standard times) 08/17/18 9052
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
## 2018-08-18 NOTE — ASSESSMENT & PLAN NOTE
-patient with many days of positive MRSA blood cultures at [facility], previously being treated with Flagyl, linezolid, and gentamicin with hemodialysis  -likely source related to left foot osteomyelitis of 5th metatarsal  -patient with signs of septic embolization, right retro retinal abscess  -history of epidural abscess and associated IV drug use, reports discontinuation of IV drugs for over 5 years ago  - Repeat cultures drawn, repeat until clearance  -patient with allergy to vancomycin, full body rash with associated pruritus      -spoke with Infectious Disease consultation service, continue with daptomycin    Antibiotics (From admission, onward)    Start     Stop Route Frequency Ordered    08/17/18 2139  DAPTOmycin (CUBICIN) 905 mg in sodium chloride 0.9% IVPB      -- IV Every 48 hours (non-standard times) 08/17/18 6331
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
## 2018-08-18 NOTE — ASSESSMENT & PLAN NOTE
-patient PMPed in the emergency room  -home pain regimen:  Methadone 10 mg b.i.d., hydrocodone 7.5-325 p.r.n. for breakthrough pain t.i.d.  -IV Narcan ordered in chart for p.r.n. use
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
## 2018-08-18 NOTE — ED NOTES
Pt identity confirmed; bed in lowest position & locked; rails up; call-light in reach & pt educated on its use; pt informed to press call if they should need anything, as well as to not get out of bed w/o nurse or tech present; pt verbalized understanding    Pt resting comfortably; no current complaints; pt instructed to call w/ any needs

## 2018-08-18 NOTE — PROGRESS NOTES
Assessment /Plan     For exam results, see Encounter Report.    There are no diagnoses linked to this encounter.    Subretinal abscess OD.  Pt with MRSA bacteremia.  Observed to worsen over 1 day observation on IV Abx at [facility]    Recommend intravit Abx today in ED.  RBA discussed.  Guarded prognosis discussed.  Will need surgery if worsens with current management.  IV Abx per ID/primary team    Patient identified.  Timeout performed.    Risks, benefits, and alternatives to treatment were discussed in detail with the patient, including bleeding/infection (endophthalmitis)/etc.  The patient voiced understanding and wished to proceed with the procedure.  See separate consent form.    Tap and Injection Procedure Note:  Diagnosis: Subretinal abscess Right Eye    Topical Proparacaine drop placed then topical 10% Betadine swabs to lids, lashes, and conjunctiva.  Sterile gloves used, and sterile lid speculum placed.  10% Betadine swabbed at injection site again prior to injection.  4.0 mm post to limbus vit tap performed with 27 ga needle.  Tap was dry.  Vanco 1 mg in 0.1cc and Ceftaz 2.25 mg in 0.1 cc Injected at 6:00 position 3.5-4mm posterior to the limbus.  AC tap performed at limbus with 30 ga needle  Complications: None  Va at least CF at 5 feet post injection.  Retina, ONH, IOP normal after injection.    Followup tomorrow.  Retinal Detachment and Endophthalmitis precautions given.

## 2018-08-19 PROBLEM — L27.0 ALLERGIC DRUG RASH: Status: ACTIVE | Noted: 2018-08-19

## 2018-08-19 LAB
ALBUMIN SERPL BCP-MCNC: 2.1 G/DL
ALP SERPL-CCNC: 315 U/L
ALT SERPL W/O P-5'-P-CCNC: 44 U/L
ANION GAP SERPL CALC-SCNC: 11 MMOL/L
AST SERPL-CCNC: 27 U/L
BASOPHILS # BLD AUTO: 0.01 K/UL
BASOPHILS NFR BLD: 0.1 %
BILIRUB SERPL-MCNC: 0.7 MG/DL
BUN SERPL-MCNC: 68 MG/DL
CALCIUM SERPL-MCNC: 7.4 MG/DL
CHLORIDE SERPL-SCNC: 98 MMOL/L
CK SERPL-CCNC: <7 U/L
CO2 SERPL-SCNC: 20 MMOL/L
CREAT SERPL-MCNC: 8.8 MG/DL
DIASTOLIC DYSFUNCTION: NO
DIFFERENTIAL METHOD: ABNORMAL
EOSINOPHIL # BLD AUTO: 0.6 K/UL
EOSINOPHIL NFR BLD: 5.6 %
ERYTHROCYTE [DISTWIDTH] IN BLOOD BY AUTOMATED COUNT: 16.1 %
EST. GFR  (AFRICAN AMERICAN): 7.1 ML/MIN/1.73 M^2
EST. GFR  (NON AFRICAN AMERICAN): 6.2 ML/MIN/1.73 M^2
ESTIMATED PA SYSTOLIC PRESSURE: 25.28
GLUCOSE SERPL-MCNC: 235 MG/DL
HCT VFR BLD AUTO: 28.1 %
HGB BLD-MCNC: 9 G/DL
IMM GRANULOCYTES # BLD AUTO: 0.49 K/UL
IMM GRANULOCYTES NFR BLD AUTO: 4.9 %
LYMPHOCYTES # BLD AUTO: 1.9 K/UL
LYMPHOCYTES NFR BLD: 18.8 %
MAGNESIUM SERPL-MCNC: 2 MG/DL
MCH RBC QN AUTO: 29.1 PG
MCHC RBC AUTO-ENTMCNC: 32 G/DL
MCV RBC AUTO: 91 FL
MITRAL VALVE REGURGITATION: NORMAL
MONOCYTES # BLD AUTO: 0.5 K/UL
MONOCYTES NFR BLD: 5.3 %
NEUTROPHILS # BLD AUTO: 6.6 K/UL
NEUTROPHILS NFR BLD: 65.3 %
NRBC BLD-RTO: 0 /100 WBC
PHOSPHATE SERPL-MCNC: 5.3 MG/DL
PLATELET # BLD AUTO: 105 K/UL
PMV BLD AUTO: 11.2 FL
POCT GLUCOSE: 173 MG/DL (ref 70–110)
POCT GLUCOSE: 198 MG/DL (ref 70–110)
POCT GLUCOSE: 219 MG/DL (ref 70–110)
POCT GLUCOSE: 245 MG/DL (ref 70–110)
POCT GLUCOSE: 299 MG/DL (ref 70–110)
POTASSIUM SERPL-SCNC: 4.3 MMOL/L
PROT SERPL-MCNC: 5.6 G/DL
RBC # BLD AUTO: 3.09 M/UL
RETIRED EF AND QEF - SEE NOTES: 60 (ref 55–65)
SODIUM SERPL-SCNC: 129 MMOL/L
TRICUSPID VALVE REGURGITATION: NORMAL
WBC # BLD AUTO: 10.07 K/UL

## 2018-08-19 PROCEDURE — 97162 PT EVAL MOD COMPLEX 30 MIN: CPT

## 2018-08-19 PROCEDURE — 25000003 PHARM REV CODE 250: Performed by: STUDENT IN AN ORGANIZED HEALTH CARE EDUCATION/TRAINING PROGRAM

## 2018-08-19 PROCEDURE — 97530 THERAPEUTIC ACTIVITIES: CPT

## 2018-08-19 PROCEDURE — 97165 OT EVAL LOW COMPLEX 30 MIN: CPT

## 2018-08-19 PROCEDURE — 63600175 PHARM REV CODE 636 W HCPCS: Performed by: STUDENT IN AN ORGANIZED HEALTH CARE EDUCATION/TRAINING PROGRAM

## 2018-08-19 PROCEDURE — 99233 SBSQ HOSP IP/OBS HIGH 50: CPT | Mod: ,,, | Performed by: INTERNAL MEDICINE

## 2018-08-19 PROCEDURE — 11000001 HC ACUTE MED/SURG PRIVATE ROOM

## 2018-08-19 PROCEDURE — 93306 TTE W/DOPPLER COMPLETE: CPT | Mod: 26,,, | Performed by: INTERNAL MEDICINE

## 2018-08-19 PROCEDURE — 84100 ASSAY OF PHOSPHORUS: CPT

## 2018-08-19 PROCEDURE — 83735 ASSAY OF MAGNESIUM: CPT

## 2018-08-19 PROCEDURE — G8978 MOBILITY CURRENT STATUS: HCPCS | Mod: CK

## 2018-08-19 PROCEDURE — 97802 MEDICAL NUTRITION INDIV IN: CPT

## 2018-08-19 PROCEDURE — G8979 MOBILITY GOAL STATUS: HCPCS | Mod: CJ

## 2018-08-19 PROCEDURE — 80053 COMPREHEN METABOLIC PANEL: CPT

## 2018-08-19 PROCEDURE — 93306 TTE W/DOPPLER COMPLETE: CPT

## 2018-08-19 PROCEDURE — 82550 ASSAY OF CK (CPK): CPT

## 2018-08-19 PROCEDURE — 85025 COMPLETE CBC W/AUTO DIFF WBC: CPT

## 2018-08-19 PROCEDURE — 36415 COLL VENOUS BLD VENIPUNCTURE: CPT

## 2018-08-19 RX ORDER — SODIUM CHLORIDE 9 MG/ML
INJECTION, SOLUTION INTRAVENOUS
Status: DISCONTINUED | OUTPATIENT
Start: 2018-08-20 | End: 2018-08-29

## 2018-08-19 RX ORDER — DEXAMETHASONE SODIUM PHOSPHATE 4 MG/ML
4 INJECTION, SOLUTION INTRA-ARTICULAR; INTRALESIONAL; INTRAMUSCULAR; INTRAVENOUS; SOFT TISSUE EVERY 8 HOURS
Status: DISCONTINUED | OUTPATIENT
Start: 2018-08-19 | End: 2018-08-20

## 2018-08-19 RX ORDER — INSULIN ASPART 100 [IU]/ML
5 INJECTION, SOLUTION INTRAVENOUS; SUBCUTANEOUS
Status: DISCONTINUED | OUTPATIENT
Start: 2018-08-19 | End: 2018-08-20

## 2018-08-19 RX ORDER — CETIRIZINE HYDROCHLORIDE 10 MG/1
10 TABLET ORAL DAILY
Status: DISCONTINUED | OUTPATIENT
Start: 2018-08-19 | End: 2018-08-25

## 2018-08-19 RX ORDER — SODIUM CHLORIDE 9 MG/ML
INJECTION, SOLUTION INTRAVENOUS ONCE
Status: COMPLETED | OUTPATIENT
Start: 2018-08-20 | End: 2018-08-20

## 2018-08-19 RX ORDER — CETIRIZINE HYDROCHLORIDE 5 MG/1
10 TABLET ORAL DAILY
Status: DISCONTINUED | OUTPATIENT
Start: 2018-08-19 | End: 2018-08-19

## 2018-08-19 RX ADMIN — LOSARTAN POTASSIUM 100 MG: 50 TABLET, FILM COATED ORAL at 09:08

## 2018-08-19 RX ADMIN — DEXAMETHASONE SODIUM PHOSPHATE 4 MG: 4 INJECTION, SOLUTION INTRAMUSCULAR; INTRAVENOUS at 04:08

## 2018-08-19 RX ADMIN — METHADONE HYDROCHLORIDE 10 MG: 5 TABLET ORAL at 10:08

## 2018-08-19 RX ADMIN — PREDNISOLONE ACETATE 1 DROP: 10 SUSPENSION/ DROPS OPHTHALMIC at 04:08

## 2018-08-19 RX ADMIN — PREDNISOLONE ACETATE 1 DROP: 10 SUSPENSION/ DROPS OPHTHALMIC at 09:08

## 2018-08-19 RX ADMIN — HEPARIN SODIUM 5000 UNITS: 5000 INJECTION, SOLUTION INTRAVENOUS; SUBCUTANEOUS at 06:08

## 2018-08-19 RX ADMIN — HEPARIN SODIUM 5000 UNITS: 5000 INJECTION, SOLUTION INTRAVENOUS; SUBCUTANEOUS at 10:08

## 2018-08-19 RX ADMIN — METHADONE HYDROCHLORIDE 10 MG: 5 TABLET ORAL at 09:08

## 2018-08-19 RX ADMIN — INSULIN ASPART 1 UNITS: 100 INJECTION, SOLUTION INTRAVENOUS; SUBCUTANEOUS at 10:08

## 2018-08-19 RX ADMIN — DEXAMETHASONE SODIUM PHOSPHATE 4 MG: 4 INJECTION, SOLUTION INTRAMUSCULAR; INTRAVENOUS at 10:08

## 2018-08-19 RX ADMIN — PREDNISOLONE ACETATE 1 DROP: 10 SUSPENSION/ DROPS OPHTHALMIC at 10:08

## 2018-08-19 RX ADMIN — INSULIN DETEMIR 15 UNITS: 100 INJECTION, SOLUTION SUBCUTANEOUS at 10:08

## 2018-08-19 RX ADMIN — HYDROXYZINE HYDROCHLORIDE 25 MG: 25 TABLET, FILM COATED ORAL at 09:08

## 2018-08-19 RX ADMIN — PREDNISOLONE ACETATE 1 DROP: 10 SUSPENSION/ DROPS OPHTHALMIC at 12:08

## 2018-08-19 RX ADMIN — HEPARIN SODIUM 5000 UNITS: 5000 INJECTION, SOLUTION INTRAVENOUS; SUBCUTANEOUS at 03:08

## 2018-08-19 RX ADMIN — SERTRALINE HYDROCHLORIDE 25 MG: 25 TABLET ORAL at 09:08

## 2018-08-19 RX ADMIN — HEPARIN SODIUM 5000 UNITS: 5000 INJECTION, SOLUTION INTRAVENOUS; SUBCUTANEOUS at 12:08

## 2018-08-19 RX ADMIN — CETIRIZINE HYDROCHLORIDE 10 MG: 10 TABLET, FILM COATED ORAL at 09:08

## 2018-08-19 RX ADMIN — DILTIAZEM HYDROCHLORIDE 300 MG: 300 CAPSULE, COATED, EXTENDED RELEASE ORAL at 09:08

## 2018-08-19 RX ADMIN — INSULIN DETEMIR 15 UNITS: 100 INJECTION, SOLUTION SUBCUTANEOUS at 09:08

## 2018-08-19 RX ADMIN — INSULIN ASPART 5 UNITS: 100 INJECTION, SOLUTION INTRAVENOUS; SUBCUTANEOUS at 05:08

## 2018-08-19 RX ADMIN — HYDROXYZINE HYDROCHLORIDE 25 MG: 25 TABLET, FILM COATED ORAL at 04:08

## 2018-08-19 RX ADMIN — PREDNISOLONE ACETATE 1 DROP: 10 SUSPENSION/ DROPS OPHTHALMIC at 02:08

## 2018-08-19 RX ADMIN — INSULIN ASPART 5 UNITS: 100 INJECTION, SOLUTION INTRAVENOUS; SUBCUTANEOUS at 12:08

## 2018-08-19 RX ADMIN — INSULIN ASPART 5 UNITS: 100 INJECTION, SOLUTION INTRAVENOUS; SUBCUTANEOUS at 09:08
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
## 2018-08-19 NOTE — ASSESSMENT & PLAN NOTE
- Patient reports having an epidural abscess several years ago status post surgical pain  - PT OT ordered however this is a chronic issue

## 2018-08-19 NOTE — PLAN OF CARE
Problem: Patient Care Overview  Goal: Plan of Care Review  Recommendations     Recommendation/Intervention:   1. Continue w/ Renal diet.   2.Encourage PO intake >/= 75% EEN/EPN   3. If PO intake is <50% consider Novasource BID.   4.RD to follow  Goals: nutrient intake >/= 85% EEN/EPN   Nutrition Goal Status: new  Communication of RD Recs: (POC)

## 2018-08-19 NOTE — ASSESSMENT & PLAN NOTE
-Last A1c reviewed-   Lab Results   Component Value Date    HGBA1C 8.2 (H) 08/17/2018       Home Antihyperglycemic Regiment:  -glargine 30 units p.m. Nightly  -pharmacy reporting patient takes aspart 8 units t.i.d. however has not filled this prescription since May    Inpatient Antihyperglycemic Regiment:   Antihyperglycemics (From admission, onward)    Start     Stop Route Frequency Ordered    08/19/18 0815  insulin aspart U-100 pen 5 Units      -- SubQ 3 times daily with meals 08/19/18 0713 08/17/18 2215  insulin detemir U-100 pen 15 Units      -- SubQ 2 times daily 08/17/18 2119 08/17/18 2213  insulin aspart U-100 pen 0-5 Units      -- SubQ Before meals & nightly PRN 08/17/18 2119        -LSSI  -ACCU Checks ACHS  -NPO at this time, we will initiate Diabetic Diet 2000 patricia if patient does not go to the OR tomorrow  -Diabetic nutritional counseling given     Blood Sugars (AccuCheck):    Recent Labs      08/17/18   2246  08/18/18   0831  08/18/18   1231  08/18/18   1720  08/18/18   2248  08/19/18   0744   POCTGLUCOSE  154*  265*  395*  420*  299*  245*

## 2018-08-19 NOTE — PROGRESS NOTES
08/19/18 0100   Vitals   BP (!) 174/80     Notified Yon Mcgrath MD regarding BP. MD states continue to monitor patient. No new orders given. Will continue to monitor patient.
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
## 2018-08-19 NOTE — HOSPITAL COURSE
Opthamology, ID, and nephrology involved in patient's care following his transfer. Opthalmology treated patient with intravitreal vancomycin x 3 with improvement of patient's vision; recommended twice weekly outpatient follow-up upon discharge. ID recommended daptomycin 8mg/kg q 48 hours for his osteomyelitis; was not bacteremic during his admission here. General surgery was consulted on 9/1 who placed Jama catheter on patient for outpatient antibiotics. Of note, patient's glucose was very difficult to control during his admission; reports of dietary indiscretion during his stay here. Endocrinology was consulted for better management. On 9/10, glucose did go >600 but without gap acidosis; insulin gtt was started and he was transitioned back to subq insulin the next morning. Also of note, patient occasionally needed intermittent supplemental oxygen; CXR demonstrable for pulmonary congestion with likely cause as non-cardiogenic pulmonary edema due to ESRD. Following dialysis session on morning of 9/12, back returned back to . Patient with worsening vision overnight from 9/16-9/17. Opthalmology brought patient back into clinic later in the afternoon and found retinal detachment. Repaired on 9/18. Discharged on 9/21.
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
## 2018-08-19 NOTE — CONSULTS
"  [facility]  Adult Nutrition  Consult Note    SUMMARY     Recommendations    Recommendation/Intervention:   1. Continue w/ Renal diet.   2.Encourage PO intake >/= 75% EEN/EPN   3. If PO intake is <50% consider Novasource BID.   4.RD to follow  Goals: nutrient intake >/= 85% EEN/EPN   Nutrition Goal Status: new  Communication of RD Recs: (POC)    Reason for Assessment    Reason for Assessment: consult  Diagnosis: (Sepsis due to MRSA)  Relevant Medical History: T2DM, GERD, HTN, MRSA, Renal disorder  Interdisciplinary Rounds: did not attend  General Information Comments: Pt eating 100 % denies NVD. states wt stable for >1 yr. currently receives HD Tu, Th, Sat.   Nutrition Discharge Planning: Renal diet w/ po intake > 75%    Nutrition Risk Screen    Nutrition Risk Screen: no indicators present    Nutrition/Diet History    Patient Reported Diet/Restrictions/Preferences: renal  Typical Food/Fluid Intake: adequate PTA  Do you have any cultural, spiritual, Bahai conflicts, given your current situation?: none  Food Allergies: NKFA  Factors Affecting Nutritional Intake: None identified at this time    Anthropometrics    Temp: 97.7 °F (36.5 °C)  Height Method: Stated  Height: 5' 11" (180.3 cm)  Height (inches): 71 in  Weight Method: Standard Scale  Weight: 113.3 kg (249 lb 12.5 oz)  Weight (lb): 249.78 lb  Ideal Body Weight (IBW), Male: 172 lb  % Ideal Body Weight, Male (lb): 145.22 lb  BMI (Calculated): 34.9  BMI Grade: 30 - 34.9- obesity - grade I       Lab/Procedures/Meds    Pertinent Labs Reviewed: reviewed  Pertinent Labs Comments: Phos-5.3, Na-129, Glu-235, A1c -8.5, Ca-7.4, Alb-2.1, Alk Phos-315,  GFR-7.1  Pertinent Medications Reviewed: reviewed  Pertinent Medications Comments: heparin, insulin, methadone, prednisolone    Physical Findings/Assessment    Overall Physical Appearance: obese  Oral/Mouth Cavity: WDL  Skin: intact    Estimated/Assessed Needs    Weight Used For Calorie Calculations: " 113.3 kg (249 lb 12.5 oz)  Energy Calorie Requirements (kcal): 2400 kcal/d  Energy Need Method: Cassia-St Jeor(x 1.2)  Protein Requirements: 113-151g/d  (1.5-2.0g/kg IBW) BMI-30-39.9  Weight Used For Protein Calculations: 75.3 kg (166 lb 0.1 oz)     Fluid Need Method: RDA Method(PER MD)  RDA Method (mL): 2400  CHO Requirement: 45-50% total intake      Nutrition Prescription Ordered    Current Diet Order: Renal    Evaluation of Received Nutrient/Fluid Intake    I/O: -100ml since admit  Energy Calories Required: meeting needs  Protein Required: meeting needs  Fluid Required: meeting needs  % Intake of Estimated Energy Needs: 75 - 100 %  % Meal Intake: 75 - 100 %    Nutrition Risk    Level of Risk/Frequency of Follow-up: low     Assessment and Plan  Nutrition Problem  Altered nutritional labs    Related to (etiology):   ESRD on HD w/ T2DM    Signs and Symptoms (as evidenced by):   Phos-5.3, Na-129, Glu-235, A1c -8.5, Ca-7.4, GFR-7.1    Interventions/Recommendations (treatment strategy):  1. Continue w/ Renal diet.   2. Encourage PO intake >/= 75% EEN/EPN   3. If PO intake is <50% consider Novasource BID.   4. RD to follow    Nutrition Diagnosis Status:   New        Monitor and Evaluation    Food and Nutrient Intake: energy intake, food and beverage intake  Food and Nutrient Adminstration: diet order  Physical Activity and Function: nutrition-related ADLs and IADLs  Anthropometric Measurements: weight, weight change  Biochemical Data, Medical Tests and Procedures: (All labs)  Nutrition-Focused Physical Findings: overall appearance     Nutrition Follow-Up    RD Follow-up?: Yes

## 2018-08-19 NOTE — ASSESSMENT & PLAN NOTE
- Home pain regimen:  Methadone 10 mg b.i.d., hydrocodone 7.5-325 p.r.n. for breakthrough pain t.i.d.  - IV Narcan ordered in chart for p.r.n. use

## 2018-08-19 NOTE — PLAN OF CARE
Problem: Occupational Therapy Goal  Goal: Occupational Therapy Goal  Goals to be met by: 9/15    Patient will increase functional independence with ADLs by performing:    UE Dressing with [place].  LE Dressing with [place] and Contact Guard Assistance.  Grooming while seated with Modified [place].  Toileting from toilet with Contact Guard Assistance for hygiene and clothing management.     Outcome: Ongoing (interventions implemented as appropriate)  Evaluation complete and goals set.  Cont with POC  Dominique Mars OT  8/19/2018

## 2018-08-19 NOTE — ASSESSMENT & PLAN NOTE
- Osteomyelitis of left 5th metatarsal taken for washout by Orthopedic surgery at [facility] on 08/15  - Patient being treated outside facility with Flagyl, linezolid, gentamicin with HD.  - Refusing amputation which would be curative. Podiatry consulted. Advised BKA but patient refused. Recommended CAM boot and abx per ID  - Started on Daptomycin per ID  - ESR elevated at 58 upon admission  - X-ray left foot and ankle shows partial amputation the 1st, 2nd, 3rd, and 4th digits with fusion of the 2nd and 3rd MTP joints and throughout the midfoot, severe deformity and joint space loss the tibiotalar joint with a lapse of the talus likely 2/2 to chronic osteomyelitis, arthritis, or chronic Charcot foot.

## 2018-08-19 NOTE — ASSESSMENT & PLAN NOTE
- Patient with diabetic nephropathy and concomitant ESRD, HD (MWF via LUE AVF) last dialyzed 8/17  - CMP drawn in ED show no major electrolyte derangements or need for emergent dialysis  - Nephrology consulted, anticipate next HD on 8/20

## 2018-08-19 NOTE — PLAN OF CARE
"Problem: Physical Therapy Goal  Goal: Physical Therapy Goal  Goals to be met by: 18     Patient will increase functional independence with mobility by performin. Sit to stand transfer with Supervision  2. Bed to chair transfer with Minimal Assistance using Rolling Walker  3. Gait  x 50 feet with Contact Guard Assistance using Rolling Walker.   4. Ascend/Descend 1, 6"  inch curb step with Maximum Assistance using Rolling Walker.    Outcome: Ongoing (interventions implemented as appropriate)  Patient evaluated today. All goals established are appropriate for patient progression at this time.   Naif Brown PT, DPT  2018  Pager: [phone]           "

## 2018-08-19 NOTE — PROGRESS NOTES
[facility]  Infectious Disease  Progress Note    Patient Name: Mickey Ross  MRN: [id number]  Admission Date: 8/17/2018  Length of Stay: 2 days  Attending Physician: Kyle Cruz MD  Primary Care Provider: Prosper Monterroso MD    Isolation Status: No active isolations  Assessment/Plan:      * Sepsis due to methicillin resistant Staphylococcus aureus (MRSA)    54 y/o male with LLE wounds, ESRD ON HD MWF (LUE AVF) prosthetic left eye, DMII, transferred from [facility] for opthalmology evaluation concerning for right retinal abscess.  He was seen at [facility] for fever and left ankle osteomyelitis 2/2 MRSA bacteremia. Per chart review his blood cultures 8/10, 8/15 were positive. He was on vancomycin but developed significant rash. He was placed on flagyl, zyvox, and gentamicin dose with HD. On 8/13 he began to have acute visual changes of his right eye with pain. He was seen by optho and suspected pt to have large retinal mass. He was transferred here for further evaluation. His fevers have resolved. His blood cultures here are NGTD.    1.Continue daptomycin 8mg/kg after dialysis. CPK <7. Follow weekly while on daptomcyin. Discussed with microlab from OSH. Blood cultures 8/10, 8/15 +MRSA daptomycin sensitive.  2.Podiatry following, options for left foot/ankle including amputation (likely BKA) vs long term Abx. He would like to continue IV Abx at this time.   3.Continue medical management per primary team   4.Opthalmolgoy following closely.   4.Repeat blood cultures today are NGTD. 2D echo pending.ID will follow with you                Thank you for your consult. I will follow-up with patient. Please contact us if you have any additional questions.    Pasquale Rothman PA-C  Infectious Disease  [facility]  [phone]    Subjective:     Principal Problem:Sepsis due to methicillin resistant Staphylococcus aureus (MRSA)    HPI: 54 y/o male with LLE wounds, ESRD ON HD MWF (LUE AVF) prosthetic left eye,  DMII, transferred from [facility] for opthalmology evaluation concerning for right retinal abscess.  He was seen at [facility] for fever and left ankle osteomyelitis 2/2 MRSA bacteremia. Per chart review his blood cultures 8/10, 8/15 were positive. He was on vancomycin but developed significant rash. He was placed on flagyl, zyvox, and gentamicin dose with HD. On 8/13 he began to have acute visual changes of his right eye with pain. He was seen by optho and suspected pt to have large retinal mass. He was transferred here for further evaluation. His fevers have resolved. His blood cultures here are NGTD. He was seen by ophthalmology and cultures were obtained and sent.   Interval History:   ROSSY  Continues to have right eye pain   Seen by podiatry, no acute intervention  Ophthalmology following closely  Currently on daptomycin  Rash of extremities improving.     Review of Systems   Constitutional: Negative for chills, diaphoresis, fatigue and fever.   Eyes: Positive for pain, redness and visual disturbance.   Respiratory: Negative for cough and shortness of breath.    Cardiovascular: Negative for chest pain.   Gastrointestinal: Negative for abdominal pain, diarrhea, nausea and vomiting.   Genitourinary: Negative for dysuria.   Skin: Positive for rash and wound.   Neurological: Negative for dizziness and headaches.   All other systems reviewed and are negative.    Objective:     Vital Signs (Most Recent):  Temp: 97.7 °F (36.5 °C) (08/18/18 2241)  Pulse: 86 (08/19/18 0900)  Resp: 17 (08/19/18 0900)  BP: (!) 147/70 (08/19/18 0900)  SpO2: 96 % (08/19/18 0900) Vital Signs (24h Range):  Temp:  [97.7 °F (36.5 °C)-98.3 °F (36.8 °C)] 97.7 °F (36.5 °C)  Pulse:  [74-86] 86  Resp:  [12-21] 17  SpO2:  [92 %-96 %] 96 %  BP: (147-180)/(70-89) 147/70     Weight: 113.3 kg (249 lb 12.5 oz)  Body mass index is 34.84 kg/m².    Estimated Creatinine Clearance: 12.4 mL/min (A) (based on SCr of 8.8 mg/dL (H)).    Physical Exam   Constitutional: He is  oriented to person, place, and time. He appears well-developed and well-nourished. No distress.   HENT:   Head: Normocephalic.   Eyes: Right eye exhibits discharge. No scleral icterus.   Left prosthetic eye  Irritation of right eye  +photosensitivty    Cardiovascular: Normal rate, regular rhythm and normal heart sounds. Exam reveals no gallop and no friction rub.   No murmur heard.  Pulmonary/Chest: Effort normal. No stridor. No respiratory distress. He has no wheezes.   Abdominal: Soft. He exhibits no distension and no mass. There is no tenderness. There is no rebound and no guarding.   Musculoskeletal: He exhibits no edema, tenderness or deformity.   LUE AVF   Neurological: He is alert and oriented to person, place, and time.   Skin: Skin is warm and dry. Rash (extremities) noted. He is not diaphoretic. No erythema.   Right foot with deformity. No open wounds or ulcers noted  Left foot dressed at this time   Vitals reviewed.      Significant Labs:   Blood Culture:   Recent Labs   Lab  08/17/18   2201   LABBLOO  No Growth to date  No Growth to date  No Growth to date  No Growth to date     CBC:   Recent Labs   Lab  08/17/18 2159 08/19/18   0645   WBC  11.78  10.07   HGB  9.2*  9.0*   HCT  28.7*  28.1*   PLT  57*  105*     CMP:   Recent Labs   Lab  08/17/18 2159 08/18/18   0613  08/19/18   0645   NA  131*  129*  129*   K  4.9  4.2  4.3   CL  101  98  98   CO2  21*  21*  20*   GLU  140*  227*  235*   BUN  47*  54*  68*   CREATININE  5.6*  6.3*  8.8*   CALCIUM  7.7*  7.7*  7.4*   PROT  5.8*  5.4*  5.6*   ALBUMIN  2.1*  2.1*  2.1*   BILITOT  0.7  0.8  0.7   ALKPHOS  198*  296*  315*   AST  30  68*  27   ALT  20  43  44   ANIONGAP  9  10  11   EGFRNONAA  10.7*  9.2*  6.2*     Wound Culture: No results for input(s): LABAERO in the last 4320 hours.  All pertinent labs within the past 24 hours have been reviewed.    Significant Imaging: I have reviewed all pertinent imaging results/findings within the past 24  hours.

## 2018-08-19 NOTE — ASSESSMENT & PLAN NOTE
- Patient with many days of positive MRSA blood cultures at [facility], previously being treated with Flagyl, linezolid, and gentamicin with hemodialysis  - Likely source related to left foot osteomyelitis of 5th metatarsal. Will consider US of   - Patient with signs of septic embolization, right retro retinal abscess  - History of epidural abscess and associated IV drug use, reports discontinuation of IV drugs for over 5 years ago  - Repeat cultures drawn in the ED, shows NGTD.  - Patient with allergy to vancomycin, full body rash with associated pruritus. Will start him on Decradron 4mg q6 x 3 days for pruritis.  - ID consulted, started on Daptomycin 8mg/kg to be given after HD. Received first dose at admit, next dose on 8/20  - 2D ECHO pending.  - Will also obtain US LUE AVF to look for an abscess.  - Vitals stable, normal white count. Will continue to monitor.
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
## 2018-08-19 NOTE — PT/OT/SLP EVAL
Physical Therapy Evaluation    Patient Name:  Mickey Ross   MRN:  [id number]    Recommendations:     Discharge Recommendations:  (SS SNF ( possible HH with progress ))   Discharge Equipment Recommendations: walker, rolling   Barriers to discharge: Inaccessible home and Decreased caregiver support    Assessment:     Mickey Ross is a 53 y.o. male admitted with a medical diagnosis of Sepsis due to methicillin resistant Staphylococcus aureus (MRSA).  He presents with the following impairments/functional limitations:  weakness, impaired endurance, gait instability, impaired functional mobilty, decreased lower extremity function, impaired skin, impaired self care skills Patient tolerated evaluation/treatment  fairly well. Patient limited at this time by increased pain during gait trial and impaired vision ( pt states his eyes were recently dialted ).  Patient will continue to require skilled PT services to address the above impairments to return to prior level of function as independent as possible. Discharge recommendation  to [facility] skilled nursing facility  to maximize functional mobility, safety and decrease caregiver burden prior to returning home.     Verbal with MD orders placed as WBAT with cam boot LLE    .    Rehab Prognosis:  good; patient would benefit from acute skilled PT services to address these deficits and reach maximum level of function.      Recent Surgery: * No surgery found *      Plan:     During this hospitalization, patient to be seen 3 x/week to address the above listed problems via gait training, therapeutic activities, therapeutic exercises, neuromuscular re-education  · Plan of Care Expires:  09/18/18   Plan of Care Reviewed with: patient    Subjective     Communicated with RN prior to session.  Patient found supine upon PT entry to room, agreeable to evaluation.      Chief Complaint: pt states that he cant see very well  Patient comments/goals: to return home  Pain/Comfort:  · Pain Rating 1:  "(pain in L foot; not rated)  · Pain Addressed 1: Cessation of Activity  · Pain Rating Post-Intervention 1: (not rated)    Patients cultural, spiritual, Latter-day conflicts given the current situation: none stated    Living Environment:    · patient lives with girlfriend   · Pt lives in 1-story house/ trailer, number of outside stairs: 1, 6" step to enter  ·  Prior to admit patient was independent with ambulation, bathing and hygiene, feeding, continence, grooming, toileting and dressing with No Assistive Device  · Patient was primarily a home ambulatory.  · Equipment owned but not currently used none .   · Work/Hobbies:Part Time,  Not working currently   ·  At return home patient will have assistance ( if needed )from girlfriend .       Objective:     Patient found with: wound vac     General Precautions: Standard, fall   Orthopedic Precautions:(WBAT LLE with cam boot)   Braces: (cam boot)       Exams    Cognition:  Patient is Oriented X 4 and Alert  Patient Follows multistep  commands 100 % of the time.     Coordination  · WFL  · Other noted coordination deficits: none    Sensation  Patient's sensation was Impaired to light touch  left at foot    Skin integrity    · -       Skin integrity: rash at all extremities; L foot wrapped with wound vac     Posture  · -       Rounded shoulders  · -       Forward head    ROM and Strength   LLE MMT RLE MMT   Hip flexion 4/5 4/5   Knee ext 4/5 4/5   Knee flex 4/5 4/5   Ankle DF trace 4/5   Ankle PF 4/5 4/5   Other          LLE ROM RLE ROM   Hip flexion within normal limits within normal limits   Knee ext within normal limits within normal limits   Knee flex within normal limits within normal limits   Ankle PF severely limited within normal limits   Ankle DF severely limited within normal limits     Pt with equina varus postioning of L foot ( pt states this is chronic since Shruti )    Functional Mobilty    Bed Mobility:  Rolling Left:  stand by assistance  Scooting: stand by " assistance  Supine to Sit: stand by assistance  Sit to Supine: stand by assistance  Transfers:  Sit to Stand:  contact guard assistance with rolling walker      Gait    Patient ambulated PWB: left lower extremity 12  feet Moderate Assistance on level tile with Rolling Walker  .   Pdemonstarting a  2-point gait and partial weightbearing with decreased cyrus, increased time in double stance, decreased velocity of limb motion and decreased step length.Impairments contributing to gait deviations include pain and decreased strength    Sitting Balance Static  Dynamic     GOOD+: Takes MAXIMAL challenges from all directions.   GOOD+: Maintains balance through MAXIMAL excursions of active trunk motion   Standing Balance FAIR+: Takes MINIMAL challenges from all directions POOR+: Needs MIN (minimal ) assist during gait       AM-PAC 6 CLICK MOBILITY  Total Score:17       Therapeutic Activities and Exercises:  ·  Whiteboard updated in patients room to current assistance level  · All of patients questions were answered within the scope of PT    Patient education  · Patient educated on the role of PT and POC  · Patient educated on importance  activity while in the hosptial per tolerance for improved endurance and to limit deconditioning   · Patient educated on safe transfers with nursing as appropriate  · Patient educated on energy conservation, pursed lip breathing  · Patient educated on proper transfer mechanics and safety      Patient left HOB elevated with all lines intact and call button in reach.    GOALS:   Multidisciplinary Problems     Physical Therapy Goals        Problem: Physical Therapy Goal    Goal Priority Disciplines Outcome Goal Variances Interventions   Physical Therapy Goal     PT, PT/OT Ongoing (interventions implemented as appropriate)     Description:  Goals to be met by: 18     Patient will increase functional independence with mobility by performin. Sit to stand transfer with Supervision  2.  "Bed to chair transfer with Minimal Assistance using Rolling Walker  3. Gait  x 50 feet with Contact Guard Assistance using Rolling Walker.   4. Ascend/Descend 1, 6"  inch curb step with Maximum Assistance using Rolling Walker.                      History:     Past Medical History:   Diagnosis Date    Arthritis     Blind left eye     Charcot's joint of foot     Diabetes mellitus     GERD (gastroesophageal reflux disease)     Glaucoma     Hypertension     MRSA (methicillin resistant staph aureus) culture positive     Renal disorder        Past Surgical History:   Procedure Laterality Date    AVF left upper arm      left ankle surgery      lumbar back surgery         Clinical Decision Making:     History  Co-morbidities and personal factors that may impact the plan of care Examination  Body Structures and Functions, activity limitations and participation restrictions that may impact the plan of care Clinical Presentation   Decision Making/ Complexity Score   Co-morbidities:   [] Time since onset of injury / illness / exacerbation  [] Status of current condition  []Patient's cognitive status and safety concerns    [] Multiple Medical Problems (see med hx)  Personal Factors:   [] Patient's age  [] Prior Level of function   [] Patient's home situation (environment and family support)  [] Patient's level of motivation  [] Expected progression of patient      HISTORY:(criteria)    [] 35973 - no personal factors/history    [] 25228 - has 1-2 personal factor/comorbidity     [] 13872 - has >3 personal factor/comorbidity     Body Regions:  [] Objective examination findings  [] Head     []  Neck  [] Trunk   [] Upper Extremity  [] Lower Extremity    Body Systems:  [] For communication ability, affect, cognition, language, and learning style: the assessment of the ability to make needs known, consciousness, orientation (person, place, and time), expected emotional /behavioral responses, and learning preferences (eg, " learning barriers, education  needs)  [] For the neuromuscular system: a general assessment of gross coordinated movement (eg, balance, gait, locomotion, transfers, and transitions) and motor function  (motor control and motor learning)  [] For the musculoskeletal system: the assessment of gross symmetry, gross range of motion, gross strength, height, and weight  [] For the integumentary system: the assessment of pliability(texture), presence of scar formation, skin color, and skin integrity  [] For cardiovascular/pulmonary system: the assessment of heart rate, respiratory rate, blood pressure, and edema     Activity limitations:    [] Patient's cognitive status and saf ety concerns          [] Status of current condition      [] Weight bearing restriction  [] Cardiopulmunary Restriction    Participation Restrictions:   [] Goals and goal agreement with the patient     [] Rehab potential (prognosis) and probable outcome      Examination of Body System: (criteria)    [] 99702 - addressing 1-2 elements    [] 66896 - addressing a total of 3 or more elements     [] 14747 -  Addressing a total of 4 or more elements         Clinical Presentation: (criteria)  Choose one     On examination of body system using standardized tests and measures patient presents with (CHOOSE ONE) elements from any of the following: body structures and functions, activity limitations, and/or participation restrictions.  Leading to a clinical presentation that is considered (CHOOSE ONE)                              Clinical Decision Making  (Eval Complexity):  Choose One     Time Tracking:     PT Received On: 08/19/18  PT Start Time: 0849     PT Stop Time: 0913  PT Total Time (min): 24 min     Billable Minutes: Evaluation x10 min and Gait Training x10 min      Naif Brown, PT  08/19/2018

## 2018-08-19 NOTE — SUBJECTIVE & OBJECTIVE
Interval History:   ROSSY  Continues to have right eye pain   Seen by podiatry, no acute intervention  Ophthalmology following closely  Currently on daptomycin  Rash of extremities improving.     Review of Systems   Constitutional: Negative for chills, diaphoresis, fatigue and fever.   Eyes: Positive for pain, redness and visual disturbance.   Respiratory: Negative for cough and shortness of breath.    Cardiovascular: Negative for chest pain.   Gastrointestinal: Negative for abdominal pain, diarrhea, nausea and vomiting.   Genitourinary: Negative for dysuria.   Skin: Positive for rash and wound.   Neurological: Negative for dizziness and headaches.   All other systems reviewed and are negative.    Objective:     Vital Signs (Most Recent):  Temp: 97.7 °F (36.5 °C) (08/18/18 2241)  Pulse: 86 (08/19/18 0900)  Resp: 17 (08/19/18 0900)  BP: (!) 147/70 (08/19/18 0900)  SpO2: 96 % (08/19/18 0900) Vital Signs (24h Range):  Temp:  [97.7 °F (36.5 °C)-98.3 °F (36.8 °C)] 97.7 °F (36.5 °C)  Pulse:  [74-86] 86  Resp:  [12-21] 17  SpO2:  [92 %-96 %] 96 %  BP: (147-180)/(70-89) 147/70     Weight: 113.3 kg (249 lb 12.5 oz)  Body mass index is 34.84 kg/m².    Estimated Creatinine Clearance: 12.4 mL/min (A) (based on SCr of 8.8 mg/dL (H)).    Physical Exam   Constitutional: He is oriented to person, place, and time. He appears well-developed and well-nourished. No distress.   HENT:   Head: Normocephalic.   Eyes: Right eye exhibits discharge. No scleral icterus.   Left prosthetic eye  Irritation of right eye  +photosensitivty    Cardiovascular: Normal rate, regular rhythm and normal heart sounds. Exam reveals no gallop and no friction rub.   No murmur heard.  Pulmonary/Chest: Effort normal. No stridor. No respiratory distress. He has no wheezes.   Abdominal: Soft. He exhibits no distension and no mass. There is no tenderness. There is no rebound and no guarding.   Musculoskeletal: He exhibits no edema, tenderness or deformity.   LESTER  AVF   Neurological: He is alert and oriented to person, place, and time.   Skin: Skin is warm and dry. Rash (extremities) noted. He is not diaphoretic. No erythema.   Right foot with deformity. No open wounds or ulcers noted  Left foot dressed at this time   Vitals reviewed.      Significant Labs:   Blood Culture:   Recent Labs   Lab  08/17/18   2201   LABBLOO  No Growth to date  No Growth to date  No Growth to date  No Growth to date     CBC:   Recent Labs   Lab  08/17/18 2159 08/19/18   0645   WBC  11.78  10.07   HGB  9.2*  9.0*   HCT  28.7*  28.1*   PLT  57*  105*     CMP:   Recent Labs   Lab  08/17/18 2159 08/18/18   0613  08/19/18   0645   NA  131*  129*  129*   K  4.9  4.2  4.3   CL  101  98  98   CO2  21*  21*  20*   GLU  140*  227*  235*   BUN  47*  54*  68*   CREATININE  5.6*  6.3*  8.8*   CALCIUM  7.7*  7.7*  7.4*   PROT  5.8*  5.4*  5.6*   ALBUMIN  2.1*  2.1*  2.1*   BILITOT  0.7  0.8  0.7   ALKPHOS  198*  296*  315*   AST  30  68*  27   ALT  20  43  44   ANIONGAP  9  10  11   EGFRNONAA  10.7*  9.2*  6.2*     Wound Culture: No results for input(s): LABAERO in the last 4320 hours.  All pertinent labs within the past 24 hours have been reviewed.    Significant Imaging: I have reviewed all pertinent imaging results/findings within the past 24 hours.

## 2018-08-19 NOTE — PT/OT/SLP EVAL
Occupational Therapy   Evaluation    Name: Mickey Ross  MRN: [id number]  Admitting Diagnosis:  Sepsis due to methicillin resistant Staphylococcus aureus (MRSA)      Recommendations:     Discharge Recommendations: nursing facility, skilled  Discharge Equipment Recommendations:  none  Barriers to discharge:  None    History:     Occupational Profile:  Living Environment: Pt lives with significant other in 1 story house with no steps to enter   Previous level of function: Pt reports I with self care prior to admission   Roles and Routines:  but unable to work   Equipment Used at Home:  walker, rolling, bedside commode, bath bench  Assistance upon Discharge: significant other available to assist     Past Medical History:   Diagnosis Date    Arthritis     Blind left eye     Charcot's joint of foot     Diabetes mellitus     GERD (gastroesophageal reflux disease)     Glaucoma     Hypertension     MRSA (methicillin resistant staph aureus) culture positive     Renal disorder        Past Surgical History:   Procedure Laterality Date    AVF left upper arm      left ankle surgery      lumbar back surgery         Subjective     Chief Complaint: fearful of mobility   Patient/Family Comments/goals: return to home     Pain/Comfort:  · Pain Rating 1: 0/10    Patients cultural, spiritual, Druze conflicts given the current situation: none stated     Objective:     Communicated with: RN prior to session.  Patient found all lines intact and (lines intact ) upon OT entry to room.    General Precautions: Standard, fall   Orthopedic Precautions:N/A   Braces: N/A     Occupational Performance:    Bed Mobility:    · Pt supervision for bed mobility     Functional Mobility/Transfers:  · Functional Mobility: Pt with CGA for supine to sit and sit to stand with rolling walker     Activities of Daily Living:  · Pt with decreased self care performance due to decreased vision and dilation of eyes    Cognitive/Visual  "Perceptual:  Cognitive/Psychosocial Skills:     -       Oriented to: Person, Place, Time and Situation   -       Follows Commands/attention:Follows two-step commands  -       Communication: clear/fluent  -       Memory: No Deficits noted  -       Safety awareness/insight to disability: intact   -       Mood/Affect/Coping skills/emotional control: Appropriate to situation    Physical Exam:  Upper Extremity Range of Motion:     -       Right Upper Extremity: WFL    -       Left Upper Extremity: WFL      Upper Extremity Strength:    -       Right Upper Extremity: WFL  -       Left Upper Extremity: WFL      AMPAC 6 Click ADL:  AMPAC Total Score: 12    Treatment & Education:  Evaluation complete.  Pt educated on safety, role of OT, importance of increased participation in self care for gains , expectations for participation, expectations for gains, POC, energy conservation, caregiver strain. White board updated.   - max a for lower extremity dressing and donning boot    Education:    Patient left supine with all lines intact    Assessment:     Mickey Ross is a 53 y.o. male with a medical diagnosis of Sepsis due to methicillin resistant Staphylococcus aureus (MRSA).  He presents with the following performance deficits affecting function: weakness, impaired endurance, impaired self care skills, impaired functional mobilty, gait instability, impaired balance, visual deficits, decreased upper extremity function, decreased lower extremity function.      Rehab Prognosis: Fair; patient would benefit from acute skilled OT services to address these deficits and reach maximum level of function.         Clinical Decision Makin.  OT Low:  "Pt evaluation falls under low complexity for evaluation coding due to performance deficits noted in 1-3 areas as stated above and 0 co-morbities affecting current functional status. Data obtained from problem focused assessments. No modifications or assistance was required for completion " "of evaluation. Only brief occupational profile and history review completed."     Plan:     Patient to be seen 4 x/week to address the above listed problems via self-care/home management, therapeutic activities, therapeutic exercises  · Plan of Care Expires: 09/19/18  · Plan of Care Reviewed with: patient    This Plan of care has been discussed with the patient who was involved in its development and understands and is in agreement with the identified goals and treatment plan    GOALS:   Multidisciplinary Problems     Occupational Therapy Goals        Problem: Occupational Therapy Goal    Goal Priority Disciplines Outcome Interventions   Occupational Therapy Goal     OT, PT/OT Ongoing (interventions implemented as appropriate)    Description:  Goals to be met by: 9/15    Patient will increase functional independence with ADLs by performing:    UE Dressing with Crawford.  LE Dressing with Crawford and Contact Guard Assistance.  Grooming while seated with Modified Crawford.  Toileting from toilet with Contact Guard Assistance for hygiene and clothing management.                       Time Tracking:     OT Date of Treatment: 08/19/18  OT Start Time: 0850  OT Stop Time: 0915  OT Total Time (min): 25 min    Billable Minutes:Evaluation 10  Therapeutic Activity 15    Dominique Mars OT  8/19/2018    "

## 2018-08-19 NOTE — PROGRESS NOTES
[facility]  Ophthalmology  Progress Note    Patient Name: Mickey Ross  MRN: [id number]  Admission Date: 8/17/2018  Hospital Length of Stay: 2 days  Attending Provider: Kyle Cruz MD   Primary Care Physician: Prosper Monterroso MD  Principal Problem:Sepsis due to methicillin resistant Staphylococcus aureus (MRSA)    Subjective:     Interval History: Pt reports improvement in pain and vision is about the same.      Base Eye Exam     Visual Acuity (Snellen - Linear)       Right Left    Dist cc 20/100     Near cc 20/100     Correction:  Glasses          Tonometry (8:06 AM)       Right Left    Pressure 24           Extraocular Movement       Right Left     Full Full          Neuro/Psych     Oriented x3:  Yes    Mood/Affect:  Normal          Dilation     Both eyes:  2.5% Phenylephrine, 1.0% Mydriacyl @ 8:03 AM            Slit Lamp and Fundus Exam     External Exam       Right Left    External Normal prosthesis          Slit Lamp Exam       Right Left    Lids/Lashes Normal     Conjunctiva/Sclera White and quiet     Lens Posterior chamber intraocular lens           Fundus Exam       Right Left    Disc Normal     Macula Normal     Vessels Normal     Periphery Stable ST arcade elevated white lesion with surrounding fluid and thin cuff of SR heme, approx 10 mm, spares central macula.      Hazy view              Assessment and Plan:     Subretinal abscess    52 yo monocular M with osteomyelitis transferred for possible subretinal abscess    - VA 20/100 OD  - DFE remarkable for elevated, white lesion concerning for abscess  - intravitreal vancomycin and ceftazidime injection given in the ED  - ID consult  - will follow pt closely for further management    Seen with Dr. Corrales    Update 8/18/18  - mild improvement. Lesion with more defined border and less overlying haze  - pred-forte QID OD  - antibiotics per ID recommendations  - guarded prognosis. Will follow pt closely    Update 8/19/18  - VA 20/100.Exam  stable from yesterday. Hazy view of fundus  - daptomycin as per ID recommendations  - contine pred-forte QID  - will arrange pt to brought up to clinic for further evaluation/imaging            Jose Hemphill MD  Ophthalmology  [facility]

## 2018-08-19 NOTE — ASSESSMENT & PLAN NOTE
54 yo monocular M with osteomyelitis transferred for possible subretinal abscess    - VA 20/100 OD  - DFE remarkable for elevated, white lesion concerning for abscess  - intravitreal vancomycin and ceftazidime injection given in the ED  - ID consult  - will follow pt closely for further management    Seen with Dr. Corrales    Update 8/18/18  - mild improvement. Lesion with more defined border and less overlying haze  - pred-forte QID OD  - antibiotics per ID recommendations  - guarded prognosis. Will follow pt closely    Update 8/19/18  - VA 20/100.Exam stable from yesterday. Hazy view of fundus  - daptomycin as per ID recommendations  - contine pred-forte QID  - will arrange pt to brought up to clinic for further evaluation/imaging

## 2018-08-20 ENCOUNTER — OPHTH EXAM (OUTPATIENT)
Dept: OPHTHALMOLOGY | Facility: CLINIC | Age: 53
DRG: 116 | End: 2018-08-20
Payer: MEDICAID

## 2018-08-20 DIAGNOSIS — L02.91 ABSCESS: Primary | ICD-10-CM

## 2018-08-20 LAB
ALBUMIN SERPL BCP-MCNC: 2.1 G/DL
ALP SERPL-CCNC: 280 U/L
ALT SERPL W/O P-5'-P-CCNC: 33 U/L
ANION GAP SERPL CALC-SCNC: 19 MMOL/L
ANISOCYTOSIS BLD QL SMEAR: SLIGHT
AST SERPL-CCNC: 13 U/L
BASOPHILS # BLD AUTO: ABNORMAL K/UL
BASOPHILS NFR BLD: 0 %
BILIRUB SERPL-MCNC: 0.4 MG/DL
BUN SERPL-MCNC: 88 MG/DL
CALCIUM SERPL-MCNC: 6.9 MG/DL
CHLORIDE SERPL-SCNC: 96 MMOL/L
CO2 SERPL-SCNC: 13 MMOL/L
CREAT SERPL-MCNC: 10.3 MG/DL
DIFFERENTIAL METHOD: ABNORMAL
EOSINOPHIL # BLD AUTO: ABNORMAL K/UL
EOSINOPHIL NFR BLD: 1 %
ERYTHROCYTE [DISTWIDTH] IN BLOOD BY AUTOMATED COUNT: 16.1 %
EST. GFR  (AFRICAN AMERICAN): 5.9 ML/MIN/1.73 M^2
EST. GFR  (NON AFRICAN AMERICAN): 5.1 ML/MIN/1.73 M^2
GLUCOSE SERPL-MCNC: 496 MG/DL
HCT VFR BLD AUTO: 27.4 %
HGB BLD-MCNC: 8.7 G/DL
IMM GRANULOCYTES # BLD AUTO: ABNORMAL K/UL
IMM GRANULOCYTES NFR BLD AUTO: ABNORMAL %
LYMPHOCYTES # BLD AUTO: ABNORMAL K/UL
LYMPHOCYTES NFR BLD: 4 %
MAGNESIUM SERPL-MCNC: 2.1 MG/DL
MCH RBC QN AUTO: 29.1 PG
MCHC RBC AUTO-ENTMCNC: 31.8 G/DL
MCV RBC AUTO: 92 FL
MONOCYTES # BLD AUTO: ABNORMAL K/UL
MONOCYTES NFR BLD: 1 %
MYELOCYTES NFR BLD MANUAL: 3 %
NEUTROPHILS NFR BLD: 91 %
NRBC BLD-RTO: 0 /100 WBC
PHOSPHATE SERPL-MCNC: 8.3 MG/DL
PLATELET # BLD AUTO: 131 K/UL
PLATELET BLD QL SMEAR: ABNORMAL
PMV BLD AUTO: 11.3 FL
POCT GLUCOSE: 393 MG/DL (ref 70–110)
POLYCHROMASIA BLD QL SMEAR: ABNORMAL
POTASSIUM SERPL-SCNC: 5.9 MMOL/L
PROT SERPL-MCNC: 5.9 G/DL
RBC # BLD AUTO: 2.99 M/UL
SODIUM SERPL-SCNC: 128 MMOL/L
WBC # BLD AUTO: 9.5 K/UL

## 2018-08-20 PROCEDURE — 63600175 PHARM REV CODE 636 W HCPCS: Performed by: STUDENT IN AN ORGANIZED HEALTH CARE EDUCATION/TRAINING PROGRAM

## 2018-08-20 PROCEDURE — 80053 COMPREHEN METABOLIC PANEL: CPT

## 2018-08-20 PROCEDURE — 25000003 PHARM REV CODE 250: Performed by: STUDENT IN AN ORGANIZED HEALTH CARE EDUCATION/TRAINING PROGRAM

## 2018-08-20 PROCEDURE — 84100 ASSAY OF PHOSPHORUS: CPT

## 2018-08-20 PROCEDURE — 90935 HEMODIALYSIS ONE EVALUATION: CPT | Mod: ,,, | Performed by: NURSE PRACTITIONER

## 2018-08-20 PROCEDURE — 90935 HEMODIALYSIS ONE EVALUATION: CPT

## 2018-08-20 PROCEDURE — 99233 SBSQ HOSP IP/OBS HIGH 50: CPT | Mod: ,,, | Performed by: INTERNAL MEDICINE

## 2018-08-20 PROCEDURE — 83735 ASSAY OF MAGNESIUM: CPT

## 2018-08-20 PROCEDURE — A4216 STERILE WATER/SALINE, 10 ML: HCPCS | Performed by: STUDENT IN AN ORGANIZED HEALTH CARE EDUCATION/TRAINING PROGRAM

## 2018-08-20 PROCEDURE — 25000003 PHARM REV CODE 250: Performed by: INTERNAL MEDICINE

## 2018-08-20 PROCEDURE — 27000207 HC ISOLATION

## 2018-08-20 PROCEDURE — 11000001 HC ACUTE MED/SURG PRIVATE ROOM

## 2018-08-20 PROCEDURE — 85007 BL SMEAR W/DIFF WBC COUNT: CPT

## 2018-08-20 PROCEDURE — 36415 COLL VENOUS BLD VENIPUNCTURE: CPT

## 2018-08-20 PROCEDURE — 85027 COMPLETE CBC AUTOMATED: CPT

## 2018-08-20 RX ORDER — CALCIUM ACETATE 667 MG/1
1334 CAPSULE ORAL
Status: DISCONTINUED | OUTPATIENT
Start: 2018-08-20 | End: 2018-09-21 | Stop reason: HOSPADM

## 2018-08-20 RX ORDER — INSULIN ASPART 100 [IU]/ML
10 INJECTION, SOLUTION INTRAVENOUS; SUBCUTANEOUS
Status: DISCONTINUED | OUTPATIENT
Start: 2018-08-21 | End: 2018-08-21

## 2018-08-20 RX ORDER — INSULIN ASPART 100 [IU]/ML
1-10 INJECTION, SOLUTION INTRAVENOUS; SUBCUTANEOUS
Status: DISCONTINUED | OUTPATIENT
Start: 2018-08-20 | End: 2018-08-21

## 2018-08-20 RX ADMIN — HEPARIN SODIUM 5000 UNITS: 5000 INJECTION, SOLUTION INTRAVENOUS; SUBCUTANEOUS at 02:08

## 2018-08-20 RX ADMIN — METHADONE HYDROCHLORIDE 10 MG: 5 TABLET ORAL at 08:08

## 2018-08-20 RX ADMIN — METHADONE HYDROCHLORIDE 10 MG: 5 TABLET ORAL at 03:08

## 2018-08-20 RX ADMIN — PREDNISOLONE ACETATE 1 DROP: 10 SUSPENSION/ DROPS OPHTHALMIC at 03:08

## 2018-08-20 RX ADMIN — INSULIN ASPART 5 UNITS: 100 INJECTION, SOLUTION INTRAVENOUS; SUBCUTANEOUS at 08:08

## 2018-08-20 RX ADMIN — HYDROCODONE BITARTRATE AND ACETAMINOPHEN 1 TABLET: 7.5; 325 TABLET ORAL at 03:08

## 2018-08-20 RX ADMIN — HYDROXYZINE HYDROCHLORIDE 25 MG: 25 TABLET, FILM COATED ORAL at 08:08

## 2018-08-20 RX ADMIN — DILTIAZEM HYDROCHLORIDE 300 MG: 300 CAPSULE, COATED, EXTENDED RELEASE ORAL at 03:08

## 2018-08-20 RX ADMIN — WATER 1 MG: 1 INJECTION INTRAMUSCULAR; INTRAVENOUS; SUBCUTANEOUS at 04:08

## 2018-08-20 RX ADMIN — HEPARIN SODIUM 5000 UNITS: 5000 INJECTION, SOLUTION INTRAVENOUS; SUBCUTANEOUS at 06:08

## 2018-08-20 RX ADMIN — LOSARTAN POTASSIUM 100 MG: 50 TABLET, FILM COATED ORAL at 03:08

## 2018-08-20 RX ADMIN — CALCIUM ACETATE 1334 MG: 667 CAPSULE ORAL at 05:08

## 2018-08-20 RX ADMIN — INSULIN DETEMIR 15 UNITS: 100 INJECTION, SOLUTION SUBCUTANEOUS at 09:08

## 2018-08-20 RX ADMIN — INSULIN ASPART 5 UNITS: 100 INJECTION, SOLUTION INTRAVENOUS; SUBCUTANEOUS at 05:08

## 2018-08-20 RX ADMIN — INSULIN ASPART 5 UNITS: 100 INJECTION, SOLUTION INTRAVENOUS; SUBCUTANEOUS at 09:08

## 2018-08-20 RX ADMIN — INSULIN DETEMIR 15 UNITS: 100 INJECTION, SOLUTION SUBCUTANEOUS at 08:08

## 2018-08-20 RX ADMIN — DEXAMETHASONE SODIUM PHOSPHATE 4 MG: 4 INJECTION, SOLUTION INTRAMUSCULAR; INTRAVENOUS at 06:08

## 2018-08-20 RX ADMIN — DAPTOMYCIN 905 MG: 500 INJECTION, POWDER, LYOPHILIZED, FOR SOLUTION INTRAVENOUS at 09:08

## 2018-08-20 RX ADMIN — HYDROCODONE BITARTRATE AND ACETAMINOPHEN 1 TABLET: 7.5; 325 TABLET ORAL at 06:08

## 2018-08-20 RX ADMIN — SODIUM CHLORIDE: 0.9 INJECTION, SOLUTION INTRAVENOUS at 09:08

## 2018-08-20 RX ADMIN — CETIRIZINE HYDROCHLORIDE 10 MG: 10 TABLET, FILM COATED ORAL at 02:08

## 2018-08-20 RX ADMIN — SERTRALINE HYDROCHLORIDE 25 MG: 25 TABLET ORAL at 03:08

## 2018-08-20 RX ADMIN — PREDNISOLONE ACETATE 1 DROP: 10 SUSPENSION/ DROPS OPHTHALMIC at 08:08

## 2018-08-20 NOTE — ASSESSMENT & PLAN NOTE
54 y/o male with chronic LLE wounds/osteo, ESRD ON HD MWF (LUE AVF),  prosthetic left eye, and DMII, who was transferred from [facility] for opthalmology evaluation concerning for right retinal abscess.   He was first seen at [facility] for fevers and recurrent left ankle/5th metatarsal osteo (s/p I&D and wound vac placement by orthopedics) and MRSA bacteremia.   Per  records his blood cultures 8/10, 8/15 were positive with MRSA (daptomycin sensitive).  We do not have wound cultures from .   He was initially on vancomycin but developed significant rash.  On 8/13 he began to have acute visual changes of his right eye with pain. He was seen by opthalmology and suspected  to have large retinal abscess. He was transferred here for further evaluation by Ophthalmology.  His blood cultures here are NGTD.  2D echo negative for vegetation.     Opthalmology following -  Patient given intravitreal ceftazidime and vancomycin. No culture data.  No recent note. ? Appears to be improving, though vision still impaired.    He has been evaluated by Podiatry here and, though BKA advised, he wishes to proceed with IV abx.  Currently on IV daptomycin    1.  Continue daptomycin 8mg/kg q 48 hours while inpatient.   This should also have adequate vitreal penetration.   2.  At discharge, may transition to 10 mg/kg (may round to 1 gram) Daptomycin after each dialysis on M, W, Fri.   3.  Anticipate 6 weeks of IV antibiotics.  Estimated end date 9/28/18  4.  Will need weekly cbc, cmp, crp, esr and cpk and fax results to ID at [phone]  5.  ID follow up 10-14 days.  Coordinate with Podiatry visits if possible   6.  Have tried multiple times to retrieve wound cultures from [facility], but as yet not obtained.  Will continue to try to obtain these.   7.  Will sign off.  Please call or re-consult as needed.     Patient seen by, and plan discussed with, ID staff  Discussed with Primary Team

## 2018-08-20 NOTE — ASSESSMENT & PLAN NOTE
- Patient with many days of positive MRSA blood cultures at [facility], previously being treated with Flagyl, linezolid, and gentamicin with hemodialysis  - Likely source related to left foot osteomyelitis of 5th metatarsal. Will consider US of   - Patient with signs of septic embolization, right retro retinal abscess  - History of epidural abscess and associated IV drug use, reports discontinuation of IV drugs for over 5 years ago  - Repeat cultures drawn in the ED, shows NGTD.  - Patient with allergy to vancomycin, full body rash with associated pruritus. Will start him on Decradron 4mg q6 x 3 days for pruritis.  - ID consulted, started on Daptomycin 8mg/kg to be given after HD. Received first dose at admit, next dose on 8/20  - 2D ECHO pending.  - Will also obtain US LUE AVF to look for an abscess.  - Vitals stable, normal white count. Will continue to monitor.

## 2018-08-20 NOTE — ASSESSMENT & PLAN NOTE
-Last A1c reviewed-   Lab Results   Component Value Date    HGBA1C 8.2 (H) 08/17/2018       Home Antihyperglycemic Regiment:  -glargine 30 units p.m. Nightly  -pharmacy reporting patient takes aspart 8 units t.i.d. however has not filled this prescription since May    Inpatient Antihyperglycemic Regiment:   Antihyperglycemics (From admission, onward)    Start     Stop Route Frequency Ordered    08/19/18 0815  insulin aspart U-100 pen 5 Units      -- SubQ 3 times daily with meals 08/19/18 0713 08/17/18 2215  insulin detemir U-100 pen 15 Units      -- SubQ 2 times daily 08/17/18 2119 08/17/18 2213  insulin aspart U-100 pen 0-5 Units      -- SubQ Before meals & nightly PRN 08/17/18 2119        -LSSI  -ACCU Checks ACHS  -NPO at this time, we will initiate Diabetic Diet 2000 patricia if patient does not go to the OR tomorrow  -Diabetic nutritional counseling given     Blood Sugars (AccuCheck):    Recent Labs      08/18/18   2248  08/19/18   0744  08/19/18   1158  08/19/18   1716  08/19/18   2216  08/20/18   0811   POCTGLUCOSE  299*  245*  173*  198*  219*  393*

## 2018-08-20 NOTE — PROGRESS NOTES
RECEIVED REPORT FOR TIA RN, PT ARRIVED VIA STRETCHER ,AMBULATORY W/ASSISTANCE LEFT WOUND VAC TO FOOT. AAOX4. DENIES PAIN. INOFRMED PT OF GOAL TO REMOVE 2L OF FLUID OVER 3.5HRS OF HD VERBALIZED UNDERSTANDING. BGS CHECKED 393 AWAITING CALL BACK FROM NURSE TO TUBE INSULIN FOR ADMINISTRATION

## 2018-08-20 NOTE — NURSING
Notified IM 2 in regards to patients glucose above 500. Informed MD of administration of 10units novoLog. MD instructed to recheck glucose in 1 hour. Will continue to monitor patient.

## 2018-08-20 NOTE — PLAN OF CARE
ID Follow Up:  Attempted X 3 to see patient today but missed morning and afternoon in room and missed in dialysis -? In Ophthalmology?   2D echo negative for vegetation  Blood cultures here NGTD.   Afebrile, no leukocytosis.     Continue IV daptomycin 8 mg /kg q 48 hours while in patient (administer after dialysis on dialysis days).  Will follow up tomorrow with final recommendations.     Thank you.   Please call for any questions or concerns.  FALLON Stephens, ANP-C  [phone]

## 2018-08-20 NOTE — PROGRESS NOTES
HD completed @1130 blood returned 15g needles removed w/o difficulty, pressuer held until hemostasis achieved, gauze and tape applied. ANTELMO fistula +bruit/thrill. 2l of fluid removed over 3.5hrs, pt tolerated well, BP WNL, pt off unit via wheelchair to eye clnic. Report called to ronak

## 2018-08-20 NOTE — ASSESSMENT & PLAN NOTE
- Osteomyelitis of left 5th metatarsal taken for washout by Orthopedic surgery at [facility] on 08/15  - Patient being treated outside facility with Flagyl, linezolid, gentamicin with HD.  - Refusing amputation which would be curative. Podiatry consulted. Advised BKA but patient refused. Recommended CAM boot and abx per ID  - Started on Daptomycin per ID  - ESR elevated at 58 upon admission  - X-ray left foot and ankle shows partial amputation the 1st, 2nd, 3rd, and 4th digits with fusion of the 2nd and 3rd MTP joints and throughout the midfoot, severe deformity and joint space loss the tibiotalar joint with a lapse of the talus likely 2/2 to chronic osteomyelitis, arthritis, or chronic Charcot foot.

## 2018-08-20 NOTE — SUBJECTIVE & OBJECTIVE
Interval History:  Afebrile.  No leukocytosis.  Blood sugars elevated.   US LUE AVF - negative for fluid collection      Review of Systems   Constitutional: Negative for chills, diaphoresis, fatigue and fever.   Eyes: Positive for pain, redness and visual disturbance.   Respiratory: Negative for cough and shortness of breath.    Cardiovascular: Negative for chest pain.   Gastrointestinal: Negative for abdominal pain, diarrhea, nausea and vomiting.   Genitourinary: Negative for dysuria.   Skin: Positive for rash and wound.   Neurological: Negative for dizziness and headaches.   All other systems reviewed and are negative.    Objective:     Vital Signs (Most Recent):  Temp: 97.7 °F (36.5 °C) (08/20/18 0633)  Pulse: 93 (08/20/18 0414)  Resp: 16 (08/20/18 0414)  BP: (!) 160/78 (08/20/18 0915)  SpO2: 97 % (08/20/18 0414) Vital Signs (24h Range):  Temp:  [97.7 °F (36.5 °C)-97.9 °F (36.6 °C)] 97.7 °F (36.5 °C)  Pulse:  [80-93] 93  Resp:  [6-24] 16  SpO2:  [90 %-97 %] 97 %  BP: (146-166)/(74-85) 160/78     Weight: 113.3 kg (249 lb 12.5 oz)  Body mass index is 34.84 kg/m².    Estimated Creatinine Clearance: 12.4 mL/min (A) (based on SCr of 8.8 mg/dL (H)).    Physical Exam   Constitutional: He is oriented to person, place, and time. He appears well-developed and well-nourished. No distress.   HENT:   Head: Normocephalic.   Eyes: Right eye exhibits discharge. No scleral icterus.   Left prosthetic eye  Irritation of right eye  +photosensitivty    Cardiovascular: Normal rate, regular rhythm and normal heart sounds. Exam reveals no gallop and no friction rub.   No murmur heard.  Pulmonary/Chest: Effort normal. No stridor. No respiratory distress. He has no wheezes.   Abdominal: Soft. He exhibits no distension and no mass. There is no tenderness. There is no rebound and no guarding.   Musculoskeletal: He exhibits no edema, tenderness or deformity.   LUE AVF   Neurological: He is alert and oriented to person, place, and time.    Skin: Skin is warm and dry. Rash (extremities) noted. He is not diaphoretic. No erythema.   Right foot with deformity. No open wounds or ulcers noted  Left foot dressed at this time   Vitals reviewed.      Significant Labs:   Blood Culture:   Recent Labs   Lab  08/17/18   2201   LABBLOO  No Growth to date  No Growth to date  No Growth to date  No Growth to date  No Growth to date  No Growth to date     CBC:   Recent Labs   Lab  08/19/18   0645   WBC  10.07   HGB  9.0*   HCT  28.1*   PLT  105*     CMP:   Recent Labs   Lab  08/19/18   0645   NA  129*   K  4.3   CL  98   CO2  20*   GLU  235*   BUN  68*   CREATININE  8.8*   CALCIUM  7.4*   PROT  5.6*   ALBUMIN  2.1*   BILITOT  0.7   ALKPHOS  315*   AST  27   ALT  44   ANIONGAP  11   EGFRNONAA  6.2*     Wound Culture: No results for input(s): LABAERO in the last 4320 hours.  All pertinent labs within the past 24 hours have been reviewed.    Significant Imaging: I have reviewed all pertinent imaging results/findings within the past 24 hours.

## 2018-08-20 NOTE — SUBJECTIVE & OBJECTIVE
Interval History: NAEON.    Review of Systems   Constitutional: Negative for chills and fever.   HENT: Negative for trouble swallowing.    Eyes: Negative for visual disturbance.   Respiratory: Negative for cough and shortness of breath.    Cardiovascular: Negative for chest pain and leg swelling.   Gastrointestinal: Negative for abdominal distention, abdominal pain, constipation, diarrhea, nausea and vomiting.   Genitourinary: Negative for decreased urine volume, difficulty urinating and dysuria.   Musculoskeletal: Negative for arthralgias.        Left foot deformity.   Skin: Positive for rash.   Neurological: Negative for dizziness, light-headedness and headaches.   Psychiatric/Behavioral: Negative for agitation and confusion.     Objective:     Vital Signs (Most Recent):  Temp: 97.7 °F (36.5 °C) (08/20/18 0633)  Pulse: 93 (08/20/18 0414)  Resp: 16 (08/20/18 0414)  BP: (!) 146/77 (08/20/18 0414)  SpO2: 97 % (08/20/18 0414) Vital Signs (24h Range):  Temp:  [97.7 °F (36.5 °C)-97.9 °F (36.6 °C)] 97.7 °F (36.5 °C)  Pulse:  [80-93] 93  Resp:  [6-24] 16  SpO2:  [90 %-97 %] 97 %  BP: (146-160)/(70-85) 146/77     Weight: 113.3 kg (249 lb 12.5 oz)  Body mass index is 34.84 kg/m².  No intake or output data in the 24 hours ending 08/20/18 0802   Physical Exam   Constitutional: No distress.   HENT:   Head: Normocephalic.   Eyes: EOM are normal. Right eye exhibits no discharge.   Neck: Normal range of motion.   Cardiovascular: Normal rate and regular rhythm.   Pulmonary/Chest: Effort normal and breath sounds normal.   Abdominal: Soft. Bowel sounds are normal.   Musculoskeletal: Normal range of motion. He exhibits no edema.   Neurological: He is alert.   Skin: Rash noted.   Erythematous rash over both UE and torso.   Nursing note and vitals reviewed.      Significant Labs:   A1C:   Recent Labs   Lab  08/17/18   2159   HGBA1C  8.2*     ABGs: No results for input(s): PH, PCO2, HCO3, POCSATURATED, BE, TOTALHB, COHB, METHB, O2HB,  POCFIO2 in the last 48 hours.  Bilirubin:   Recent Labs   Lab  08/17/18   2159  08/18/18   0613  08/19/18   0645   BILITOT  0.7  0.8  0.7     Blood Culture: No results for input(s): LABBLOO in the last 48 hours.  BMP:   Recent Labs   Lab  08/19/18   0645   GLU  235*   NA  129*   K  4.3   CL  98   CO2  20*   BUN  68*   CREATININE  8.8*   CALCIUM  7.4*   MG  2.0     CBC:   Recent Labs   Lab  08/19/18   0645   WBC  10.07   HGB  9.0*   HCT  28.1*   PLT  105*     CMP:   Recent Labs   Lab  08/19/18   0645   NA  129*   K  4.3   CL  98   CO2  20*   GLU  235*   BUN  68*   CREATININE  8.8*   CALCIUM  7.4*   PROT  5.6*   ALBUMIN  2.1*   BILITOT  0.7   ALKPHOS  315*   AST  27   ALT  44   ANIONGAP  11   EGFRNONAA  6.2*     Cardiac Markers: No results for input(s): CKMB, MYOGLOBIN, BNP, TROPISTAT in the last 48 hours.  Coagulation: No results for input(s): PT, INR, APTT in the last 48 hours.  Lactic Acid: No results for input(s): LACTATE in the last 48 hours.  Lipase: No results for input(s): LIPASE in the last 48 hours.  Lipid Panel: No results for input(s): CHOL, HDL, LDLCALC, TRIG, CHOLHDL in the last 48 hours.  Magnesium:   Recent Labs   Lab  08/19/18   0645   MG  2.0     Pathology Results  (Last 10 years)    None        POCT Glucose:   Recent Labs   Lab  08/19/18   1158  08/19/18   1716  08/19/18   2216   POCTGLUCOSE  173*  198*  219*     Prealbumin: No results for input(s): PREALBUMIN in the last 48 hours.  Respiratory Culture: No results for input(s): GSRESP, RESPIRATORYC in the last 48 hours.  Troponin: No results for input(s): TROPONINI in the last 48 hours.  TSH: No results for input(s): TSH in the last 4320 hours.  Urine Culture: No results for input(s): LABURIN in the last 48 hours.  Urine Studies: No results for input(s): COLORU, APPEARANCEUA, PHUR, SPECGRAV, PROTEINUA, GLUCUA, KETONESU, BILIRUBINUA, OCCULTUA, NITRITE, UROBILINOGEN, LEUKOCYTESUR, RBCUA, WBCUA, BACTERIA, SQUAMEPITHEL, HYALINECASTS in the last 48  hours.    Invalid input(s): LISA  All pertinent labs within the past 24 hours have been reviewed.    Significant Imaging: I have reviewed all pertinent imaging results/findings within the past 24 hours.
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
## 2018-08-20 NOTE — PLAN OF CARE
08/20/18 1617   Discharge Assessment   Assessment Type Discharge Planning Assessment   Confirmed/corrected address and phone number on facesheet? Yes   Assessment information obtained from? Patient   Communicated expected length of stay with patient/caregiver no  (MD will discuss )   Prior to hospitilization cognitive status: Alert/Oriented;No Deficits   Prior to hospitalization functional status: Assistive Equipment   Current cognitive status: Alert/Oriented;No Deficits   Current Functional Status: Assistive Equipment   Facility Arrived From: N/A   Lives With other (see comments)   Able to Return to Prior Arrangements unable to determine at this time (comments)   Is patient able to care for self after discharge? Unable to determine at this time (comments)   Who are your caregiver(s) and their phone number(s)? Pat Herber- [place]- [phone]   Patient's perception of discharge disposition home or selfcare   Readmission Within The Last 30 Days no previous admission in last 30 days   Patient currently being followed by outpatient case management? No   Patient currently receives any other outside agency services? No   Equipment Currently Used at Home walker, rolling;glucometer;wheelchair;orthotic device   Do you have any problems affording any of your prescribed medications? No   Is the patient taking medications as prescribed? yes   Does the patient have transportation home? Yes   Transportation Available car;family or friend will provide   Dialysis Name and Scheduled days [facility] - T/TH/Sat   Does the patient receive services at the Coumadin Clinic? No   Discharge Plan A Long-term acute care facility (LTAC)   Discharge Plan B Home Health   Patient/Family In Agreement With Plan yes     On Discharge planning assessment patient is in bed watching tv,  Introduced myself and CM role in patients care plan, patient verbalized understanding.     Pt lives in a 1 story home with 1 large step with no rails to get into the  home, patient lives with his friend joseluis  Who can take him home upon discharge if needed. Patient goes to [facility] in [place] on T/Th/Sat, denies coumadin. Pt has had Home Health in the past when he did infusions at home, but patient does not know the company name. Patient has a wheelchair, rolling walker, glucometer, and ortho boot at home. Verified Pts Address, Emergency Contact, Pharmacy and PCP.     Pt denies any concerns for this visit, CM will continue to follow. CM name and number placed on patients white board and Health Packet given to the patient with a brief overview of the information provided patient verbalized understanding.     Extended Emergency Contact Information  Primary Emergency Contact: HerberKeniay  Address: [address] [place] [address] [place]  Home Phone: [phone]  Mobile Phone: [phone]  Relation: Friend

## 2018-08-20 NOTE — PROGRESS NOTES
[facility] NOTE     Patient currently on hemodialysis for removal of uremic toxins and volume.     Patient seen and evaluated on hemodialysis, tolerating treatment, see HD flowsheet for vitals and assessments.      Ultrafiltration goal is 2L     Labs have been reviewed and the dialysate bath has been adjusted.     Assessment/Plan:  Seen on dialysis this morning, tolerating well.  UF goal of 2L as tolerated  TESS with HD  Renal diet.  Calcium Acetate 1334 mg po TID with meals    FALLON Rojas, FNP-BC  Nephrology  Pager:  [phone]

## 2018-08-20 NOTE — PLAN OF CARE
Problem: Patient Care Overview  Goal: Plan of Care Review  Outcome: Ongoing (interventions implemented as appropriate)  INFORMED OF DURATION OF HD AND AMT OF FLUID TO BE REMOVED AND CHECKING OF BLOOD SUGAR

## 2018-08-21 PROBLEM — R73.9 HYPERGLYCEMIA: Status: ACTIVE | Noted: 2018-08-21

## 2018-08-21 LAB
ALBUMIN SERPL BCP-MCNC: 2.2 G/DL
ALBUMIN SERPL BCP-MCNC: 2.2 G/DL
ALLENS TEST: ABNORMAL
ALP SERPL-CCNC: 270 U/L
ALT SERPL W/O P-5'-P-CCNC: 26 U/L
ANION GAP SERPL CALC-SCNC: 10 MMOL/L
ANION GAP SERPL CALC-SCNC: 12 MMOL/L
ANION GAP SERPL CALC-SCNC: 13 MMOL/L
ANION GAP SERPL CALC-SCNC: 14 MMOL/L
ANION GAP SERPL CALC-SCNC: 15 MMOL/L
ANION GAP SERPL CALC-SCNC: 15 MMOL/L
ANION GAP SERPL CALC-SCNC: 17 MMOL/L
ANISOCYTOSIS BLD QL SMEAR: SLIGHT
AST SERPL-CCNC: 8 U/L
BASOPHILS # BLD AUTO: ABNORMAL K/UL
BASOPHILS NFR BLD: 0 %
BILIRUB SERPL-MCNC: 0.5 MG/DL
BUN SERPL-MCNC: 65 MG/DL
BUN SERPL-MCNC: 66 MG/DL
BUN SERPL-MCNC: 70 MG/DL
BUN SERPL-MCNC: 70 MG/DL
BUN SERPL-MCNC: 76 MG/DL
CALCIUM SERPL-MCNC: 7 MG/DL
CALCIUM SERPL-MCNC: 7.2 MG/DL
CALCIUM SERPL-MCNC: 7.2 MG/DL
CALCIUM SERPL-MCNC: 7.5 MG/DL
CALCIUM SERPL-MCNC: 7.5 MG/DL
CALCIUM SERPL-MCNC: 7.6 MG/DL
CALCIUM SERPL-MCNC: 7.7 MG/DL
CHLORIDE SERPL-SCNC: 100 MMOL/L
CHLORIDE SERPL-SCNC: 100 MMOL/L
CHLORIDE SERPL-SCNC: 95 MMOL/L
CHLORIDE SERPL-SCNC: 96 MMOL/L
CHLORIDE SERPL-SCNC: 98 MMOL/L
CO2 SERPL-SCNC: 17 MMOL/L
CO2 SERPL-SCNC: 19 MMOL/L
CO2 SERPL-SCNC: 19 MMOL/L
CO2 SERPL-SCNC: 20 MMOL/L
CO2 SERPL-SCNC: 20 MMOL/L
CO2 SERPL-SCNC: 21 MMOL/L
CO2 SERPL-SCNC: 21 MMOL/L
CREAT SERPL-MCNC: 6.5 MG/DL
CREAT SERPL-MCNC: 6.9 MG/DL
CREAT SERPL-MCNC: 7.2 MG/DL
CREAT SERPL-MCNC: 7.5 MG/DL
DIFFERENTIAL METHOD: ABNORMAL
EOSINOPHIL # BLD AUTO: ABNORMAL K/UL
EOSINOPHIL NFR BLD: 0 %
ERYTHROCYTE [DISTWIDTH] IN BLOOD BY AUTOMATED COUNT: 16.3 %
EST. GFR  (AFRICAN AMERICAN): 10.3 ML/MIN/1.73 M^2
EST. GFR  (AFRICAN AMERICAN): 8.7 ML/MIN/1.73 M^2
EST. GFR  (AFRICAN AMERICAN): 9.1 ML/MIN/1.73 M^2
EST. GFR  (AFRICAN AMERICAN): 9.6 ML/MIN/1.73 M^2
EST. GFR  (NON AFRICAN AMERICAN): 7.5 ML/MIN/1.73 M^2
EST. GFR  (NON AFRICAN AMERICAN): 7.9 ML/MIN/1.73 M^2
EST. GFR  (NON AFRICAN AMERICAN): 8.3 ML/MIN/1.73 M^2
EST. GFR  (NON AFRICAN AMERICAN): 8.9 ML/MIN/1.73 M^2
GLUCOSE SERPL-MCNC: 151 MG/DL
GLUCOSE SERPL-MCNC: 283 MG/DL
GLUCOSE SERPL-MCNC: 343 MG/DL
GLUCOSE SERPL-MCNC: 422 MG/DL
GLUCOSE SERPL-MCNC: 422 MG/DL
GLUCOSE SERPL-MCNC: 758 MG/DL
GLUCOSE SERPL-MCNC: 760 MG/DL
HCO3 UR-SCNC: 20 MMOL/L (ref 24–28)
HCT VFR BLD AUTO: 25.6 %
HGB BLD-MCNC: 7.9 G/DL
HYPOCHROMIA BLD QL SMEAR: ABNORMAL
IMM GRANULOCYTES # BLD AUTO: ABNORMAL K/UL
IMM GRANULOCYTES NFR BLD AUTO: ABNORMAL %
LYMPHOCYTES # BLD AUTO: ABNORMAL K/UL
LYMPHOCYTES NFR BLD: 3 %
MAGNESIUM SERPL-MCNC: 2.1 MG/DL
MCH RBC QN AUTO: 28.6 PG
MCHC RBC AUTO-ENTMCNC: 30.9 G/DL
MCV RBC AUTO: 93 FL
METAMYELOCYTES NFR BLD MANUAL: 1 %
MONOCYTES # BLD AUTO: ABNORMAL K/UL
MONOCYTES NFR BLD: 2 %
NEUTROPHILS NFR BLD: 94 %
NRBC BLD-RTO: 0 /100 WBC
OVALOCYTES BLD QL SMEAR: ABNORMAL
PCO2 BLDA: 38.3 MMHG (ref 35–45)
PH SMN: 7.33 [PH] (ref 7.35–7.45)
PHOSPHATE SERPL-MCNC: 6.2 MG/DL
PHOSPHATE SERPL-MCNC: 6.2 MG/DL
PLATELET # BLD AUTO: 115 K/UL
PMV BLD AUTO: 11.2 FL
PO2 BLDA: 57 MMHG (ref 40–60)
POC BE: -6 MMOL/L
POC SATURATED O2: 87 % (ref 95–100)
POC TCO2: 21 MMOL/L (ref 24–29)
POCT GLUCOSE: 150 MG/DL (ref 70–110)
POCT GLUCOSE: 162 MG/DL (ref 70–110)
POCT GLUCOSE: 224 MG/DL (ref 70–110)
POCT GLUCOSE: 257 MG/DL (ref 70–110)
POCT GLUCOSE: 330 MG/DL (ref 70–110)
POCT GLUCOSE: 343 MG/DL (ref 70–110)
POCT GLUCOSE: 343 MG/DL (ref 70–110)
POCT GLUCOSE: >500 MG/DL (ref 70–110)
POIKILOCYTOSIS BLD QL SMEAR: SLIGHT
POLYCHROMASIA BLD QL SMEAR: ABNORMAL
POTASSIUM SERPL-SCNC: 4.4 MMOL/L
POTASSIUM SERPL-SCNC: 4.4 MMOL/L
POTASSIUM SERPL-SCNC: 4.6 MMOL/L
POTASSIUM SERPL-SCNC: 4.7 MMOL/L
POTASSIUM SERPL-SCNC: 5 MMOL/L
POTASSIUM SERPL-SCNC: 5.2 MMOL/L
POTASSIUM SERPL-SCNC: 5.3 MMOL/L
PROT SERPL-MCNC: 5.5 G/DL
RBC # BLD AUTO: 2.76 M/UL
SAMPLE: ABNORMAL
SITE: ABNORMAL
SODIUM SERPL-SCNC: 126 MMOL/L
SODIUM SERPL-SCNC: 128 MMOL/L
SODIUM SERPL-SCNC: 132 MMOL/L
SODIUM SERPL-SCNC: 132 MMOL/L
SODIUM SERPL-SCNC: 133 MMOL/L
SODIUM SERPL-SCNC: 133 MMOL/L
SODIUM SERPL-SCNC: 134 MMOL/L
WBC # BLD AUTO: 9.56 K/UL

## 2018-08-21 PROCEDURE — 25000003 PHARM REV CODE 250: Performed by: STUDENT IN AN ORGANIZED HEALTH CARE EDUCATION/TRAINING PROGRAM

## 2018-08-21 PROCEDURE — 83735 ASSAY OF MAGNESIUM: CPT

## 2018-08-21 PROCEDURE — 99233 SBSQ HOSP IP/OBS HIGH 50: CPT | Mod: ,,, | Performed by: INTERNAL MEDICINE

## 2018-08-21 PROCEDURE — 85007 BL SMEAR W/DIFF WBC COUNT: CPT

## 2018-08-21 PROCEDURE — 20600001 HC STEP DOWN PRIVATE ROOM

## 2018-08-21 PROCEDURE — 63600175 PHARM REV CODE 636 W HCPCS: Performed by: STUDENT IN AN ORGANIZED HEALTH CARE EDUCATION/TRAINING PROGRAM

## 2018-08-21 PROCEDURE — 80053 COMPREHEN METABOLIC PANEL: CPT

## 2018-08-21 PROCEDURE — 84100 ASSAY OF PHOSPHORUS: CPT

## 2018-08-21 PROCEDURE — 99232 SBSQ HOSP IP/OBS MODERATE 35: CPT | Mod: ,,, | Performed by: INTERNAL MEDICINE

## 2018-08-21 PROCEDURE — 99900035 HC TECH TIME PER 15 MIN (STAT)

## 2018-08-21 PROCEDURE — 80069 RENAL FUNCTION PANEL: CPT

## 2018-08-21 PROCEDURE — 82803 BLOOD GASES ANY COMBINATION: CPT

## 2018-08-21 PROCEDURE — 80048 BASIC METABOLIC PNL TOTAL CA: CPT | Mod: 91

## 2018-08-21 PROCEDURE — 36415 COLL VENOUS BLD VENIPUNCTURE: CPT

## 2018-08-21 PROCEDURE — 85027 COMPLETE CBC AUTOMATED: CPT

## 2018-08-21 RX ORDER — INSULIN ASPART 100 [IU]/ML
7 INJECTION, SOLUTION INTRAVENOUS; SUBCUTANEOUS
Status: DISCONTINUED | OUTPATIENT
Start: 2018-08-21 | End: 2018-08-22

## 2018-08-21 RX ORDER — IBUPROFEN 200 MG
24 TABLET ORAL
Status: DISCONTINUED | OUTPATIENT
Start: 2018-08-21 | End: 2018-08-25

## 2018-08-21 RX ORDER — IBUPROFEN 200 MG
16 TABLET ORAL
Status: DISCONTINUED | OUTPATIENT
Start: 2018-08-21 | End: 2018-08-25

## 2018-08-21 RX ORDER — INSULIN ASPART 100 [IU]/ML
0-5 INJECTION, SOLUTION INTRAVENOUS; SUBCUTANEOUS
Status: DISCONTINUED | OUTPATIENT
Start: 2018-08-21 | End: 2018-08-22

## 2018-08-21 RX ORDER — INSULIN ASPART 100 [IU]/ML
15 INJECTION, SOLUTION INTRAVENOUS; SUBCUTANEOUS
Status: DISCONTINUED | OUTPATIENT
Start: 2018-08-21 | End: 2018-08-21

## 2018-08-21 RX ORDER — INSULIN ASPART 100 [IU]/ML
5 INJECTION, SOLUTION INTRAVENOUS; SUBCUTANEOUS ONCE
Status: COMPLETED | OUTPATIENT
Start: 2018-08-21 | End: 2018-08-21

## 2018-08-21 RX ORDER — GLUCAGON 1 MG
1 KIT INJECTION
Status: DISCONTINUED | OUTPATIENT
Start: 2018-08-21 | End: 2018-08-25

## 2018-08-21 RX ADMIN — CALCIUM ACETATE 1334 MG: 667 CAPSULE ORAL at 04:08

## 2018-08-21 RX ADMIN — DILTIAZEM HYDROCHLORIDE 300 MG: 300 CAPSULE, COATED, EXTENDED RELEASE ORAL at 09:08

## 2018-08-21 RX ADMIN — HEPARIN SODIUM 5000 UNITS: 5000 INJECTION, SOLUTION INTRAVENOUS; SUBCUTANEOUS at 10:08

## 2018-08-21 RX ADMIN — LOSARTAN POTASSIUM 100 MG: 50 TABLET, FILM COATED ORAL at 09:08

## 2018-08-21 RX ADMIN — PREDNISOLONE ACETATE 1 DROP: 10 SUSPENSION/ DROPS OPHTHALMIC at 11:08

## 2018-08-21 RX ADMIN — SERTRALINE HYDROCHLORIDE 25 MG: 25 TABLET ORAL at 09:08

## 2018-08-21 RX ADMIN — INSULIN ASPART 4 UNITS: 100 INJECTION, SOLUTION INTRAVENOUS; SUBCUTANEOUS at 10:08

## 2018-08-21 RX ADMIN — HYDROXYZINE HYDROCHLORIDE 25 MG: 25 TABLET, FILM COATED ORAL at 10:08

## 2018-08-21 RX ADMIN — HEPARIN SODIUM 5000 UNITS: 5000 INJECTION, SOLUTION INTRAVENOUS; SUBCUTANEOUS at 12:08

## 2018-08-21 RX ADMIN — SODIUM CHLORIDE 10 UNITS/HR: 9 INJECTION, SOLUTION INTRAVENOUS at 06:08

## 2018-08-21 RX ADMIN — HEPARIN SODIUM 5000 UNITS: 5000 INJECTION, SOLUTION INTRAVENOUS; SUBCUTANEOUS at 01:08

## 2018-08-21 RX ADMIN — HEPARIN SODIUM 5000 UNITS: 5000 INJECTION, SOLUTION INTRAVENOUS; SUBCUTANEOUS at 06:08

## 2018-08-21 RX ADMIN — METHADONE HYDROCHLORIDE 10 MG: 5 TABLET ORAL at 09:08

## 2018-08-21 RX ADMIN — INSULIN ASPART 5 UNITS: 100 INJECTION, SOLUTION INTRAVENOUS; SUBCUTANEOUS at 01:08

## 2018-08-21 RX ADMIN — CETIRIZINE HYDROCHLORIDE 10 MG: 10 TABLET, FILM COATED ORAL at 09:08

## 2018-08-21 RX ADMIN — SODIUM CHLORIDE 4 UNITS/HR: 9 INJECTION, SOLUTION INTRAVENOUS at 10:08

## 2018-08-21 RX ADMIN — HYDROCODONE BITARTRATE AND ACETAMINOPHEN 1 TABLET: 7.5; 325 TABLET ORAL at 01:08

## 2018-08-21 RX ADMIN — PREDNISOLONE ACETATE 1 DROP: 10 SUSPENSION/ DROPS OPHTHALMIC at 01:08

## 2018-08-21 RX ADMIN — SODIUM CHLORIDE 3 UNITS/HR: 9 INJECTION, SOLUTION INTRAVENOUS at 03:08

## 2018-08-21 RX ADMIN — INSULIN ASPART 8 UNITS: 100 INJECTION, SOLUTION INTRAVENOUS; SUBCUTANEOUS at 04:08

## 2018-08-21 RX ADMIN — PREDNISOLONE ACETATE 1 DROP: 10 SUSPENSION/ DROPS OPHTHALMIC at 04:08

## 2018-08-21 RX ADMIN — PREDNISOLONE ACETATE 1 DROP: 10 SUSPENSION/ DROPS OPHTHALMIC at 10:08

## 2018-08-21 RX ADMIN — METHADONE HYDROCHLORIDE 10 MG: 5 TABLET ORAL at 10:08

## 2018-08-21 RX ADMIN — CALCIUM ACETATE 1334 MG: 667 CAPSULE ORAL at 12:08

## 2018-08-21 NOTE — PLAN OF CARE
Problem: Patient Care Overview  Goal: Plan of Care Review  Outcome: Ongoing (interventions implemented as appropriate)  Patient remains free from falls and injury this shift. Bed in low, locked position with call light in reach.  Patient encouraged to call for assistance when needed. Patient verbalized understanding. Continuous titratable insulin drip, q hour blood glucose monitoring. Pt to go to US or dialysis graft site, transport is on way.  Pt had no complaints of pain or discomfort. All belongings within reach will continue to monitor.'

## 2018-08-21 NOTE — ASSESSMENT & PLAN NOTE
- Poor glucose control during hospital course originally secondary to stress response/cortisol surge due to ongoing infection (osteomyelitis/MRSA/retinal abscess?). Although has been afebrile and hemodynamically stable 48h+ with appropriate antibiotics. BCx last positive on 8/15.     - Recent worsening blood glucose coincides with IV dexamethasone use starting on 8/19 and continuing to morning of 8/20. BG on morning of 8/20 was ~500 which continued throughout day. Received 25U of aspart during day and remained on levemir 15U twice daily --> BG was 760 on morning of 8/21 and started on insulin gtt at this time.    Recommendations:   - Patient on transition drip of 1.2 U/hr (= appx 30U basal insulin) with scheduled insulin 7U TIDWM with stable glucose 300-350.  - Transitioned to levemir 20U BID, aspart to 10 TIDWM, and MDSSI.  - Will continue to titrate insulin per BG trends.

## 2018-08-21 NOTE — ASSESSMENT & PLAN NOTE
- Patient with diabetic nephropathy and concomitant ESRD, HD (MWF via LUE AVF) last dialyzed 8/17  - CMP drawn in ED show no major electrolyte derangements or need for emergent dialysis  - Nephrology consulted, appreciate recs.

## 2018-08-21 NOTE — PROGRESS NOTES
Received critical glucose level from lab of 758. Dr. Canela with IM2 notified. Orders given to start insulin drip. Awaiting bed on stepdown unit. Will cont to monitor pt.

## 2018-08-21 NOTE — ASSESSMENT & PLAN NOTE
- Long term diabetic, poorly controlled. Takes Levemir 30U at home.  - A1C 8.2  - Started on Lev 15U q12 with Asp 5U TID and MDSSI at admission.  - BG was in the 200s until yesterday when he received 3 doses of decadron 4mg for pruritis 2/2 vancomycin allergy, which spiked his BG upto 500  - Was given his basal dose along with 25U of aspart but his BG continued to spike upto 700  - Was started on insulin gtt overnight, which was gradually increased to 10U/hr  - No appreciable AG, K 5.3, Bicarb 21  - pH 7.32  - BG dropped to 150 after which drip was d/tyler. Held off on giving any s/c insulin due to concern for insulin stacking.  - Endocrinology consulted, appreciate recs. Recommend basal gtt @ 1.2U/hr and aspart 7U TID.  - POC q2 for the next 24 hrs.  - BMP q 4 for now ,will transition to qdaily tomorrow.

## 2018-08-21 NOTE — NURSING
Notified Dr Roberts of critical glucose of 760, verbal order to increase insulin drip from 8 units/hr to 10 units/hr

## 2018-08-21 NOTE — NURSING
MD notified of pt glucose check >500. Instructed to give nighttime insulin early along with sliding scale. Accuchecks Q4Hs. Will cont' to monitor patient.

## 2018-08-21 NOTE — PT/OT/SLP PROGRESS
"Occupational Therapy      Patient Name:  Mickey Ross   MRN:  [id number]    Attempted to see pt for therapy session in AM however politely declined. " Please come back tomorrow.. I can't do it today". Pt complaints of not being able to see and currently receiving a drip. Pt educated on the importance of OOB activity and participation with therapy. Will follow-up as scheduled.    Tamia smith, OT  8/21/2018  "
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
## 2018-08-21 NOTE — PROGRESS NOTES
CC: Subretinal abscess OD    VA decreased since last eval. No pain.       Past Medical History:   Diagnosis Date    Allergic drug rash - due to Vancomycin 8/19/2018    Arthritis     Blind left eye     Charcot's joint of foot     Diabetes mellitus     GERD (gastroesophageal reflux disease)     Glaucoma     Hypertension     MRSA (methicillin resistant staph aureus) culture positive     Renal disorder          Family History   Problem Relation Age of Onset    Pneumonia Mother            Current Facility-Administered Medications:     0.9%  NaCl infusion, , Intravenous, PRN, Farrukh Hernandez MD    acetaminophen tablet 650 mg, 650 mg, Oral, Q4H PRN, Freddie Huston MD    albuterol-ipratropium 2.5 mg-0.5 mg/3 mL nebulizer solution 3 mL, 3 mL, Nebulization, Q4H PRN, Freddie Huston MD    calcium acetate capsule 1,334 mg, 1,334 mg, Oral, TID WM, Ralph Tomas MD, 1,334 mg at 08/21/18 1208    cetirizine tablet 10 mg, 10 mg, Oral, Daily, Armando Madrid MD, 10 mg at 08/21/18 0903    clonazePAM tablet 0.5 mg, 0.5 mg, Oral, BID PRN, Freddie Huston MD, 0.5 mg at 08/18/18 1621    DAPTOmycin (CUBICIN) 905 mg in sodium chloride 0.9% IVPB, 8 mg/kg, Intravenous, Q48H, Armando Madrid MD, Last Rate: 100 mL/hr at 08/20/18 2145, 905 mg at 08/20/18 2145    dextrose 50% injection 12.5 g, 12.5 g, Intravenous, PRN, Freddie Huston MD    dextrose 50% injection 12.5 g, 12.5 g, Intravenous, PRN, Osiris Cnaela MD    dextrose 50% injection 12.5 g, 12.5 g, Intravenous, PRN, Francisco J Jeong MD    dextrose 50% injection 25 g, 25 g, Intravenous, PRN, Freddie Huston MD    dextrose 50% injection 25 g, 25 g, Intravenous, PRN, Osiris Canela MD    dextrose 50% injection 25 g, 25 g, Intravenous, PRN, Francisco J Jeong MD    diltiaZEM 24 hr capsule 300 mg, 300 mg, Oral, Daily, Ralph Tomas MD, 300 mg at 08/21/18 0904    epoetin umer (PROCRIT) injection 6,000 units kit,  6,000 Units, Intravenous, Every Mon, Wed, Fri, Gorge Diamond, NP, Stopped at 08/20/18 1100    glucagon (human recombinant) injection 1 mg, 1 mg, Intramuscular, PRN, Freddei Huston MD    glucagon (human recombinant) injection 1 mg, 1 mg, Intramuscular, PRN, Francisco J Jeong MD    glucose chewable tablet 16 g, 16 g, Oral, PRN, Freddie Huston MD    glucose chewable tablet 16 g, 16 g, Oral, PRN, Francisco J Jeong MD    glucose chewable tablet 24 g, 24 g, Oral, PRN, Freddie Huston MD    glucose chewable tablet 24 g, 24 g, Oral, PRN, Francisco J Jeong MD    heparin (porcine) injection 5,000 Units, 5,000 Units, Subcutaneous, Q8H, Freddie Huston MD, 5,000 Units at 08/21/18 1355    HYDROcodone-acetaminophen 7.5-325 mg per tablet 1 tablet, 1 tablet, Oral, Q6H PRN, Freddie Huston MD, 1 tablet at 08/21/18 0147    hydrOXYzine HCl tablet 25 mg, 25 mg, Oral, TID PRN, Osiris Canela MD, 25 mg at 08/21/18 1004    insulin aspart U-100 pen 0-5 Units, 0-5 Units, Subcutaneous, QID (AC + HS) PRN, Francisco J Jeong MD    insulin aspart U-100 pen 7 Units, 7 Units, Subcutaneous, TIDWM, Francisco J Jeong MD    insulin regular (Humulin R) 100 Units in sodium chloride 0.9% 100 mL infusion, 1.2 Units/hr, Intravenous, Continuous, Francisco J Jeong MD, Last Rate: 1.2 mL/hr at 08/21/18 1426, 1.2 Units/hr at 08/21/18 1426    losartan tablet 100 mg, 100 mg, Oral, Daily, Freddie Huston MD, 100 mg at 08/21/18 0903    methadone tablet 10 mg, 10 mg, Oral, BID, Freddie Huston MD, 10 mg at 08/21/18 0903    naloxone 0.4 mg/mL injection 0.4 mg, 0.4 mg, Intravenous, PRN, Freddie Huston MD    ondansetron disintegrating tablet 8 mg, 8 mg, Oral, Q8H PRN, Freddie Huston MD    ondansetron injection 4 mg, 4 mg, Intravenous, Q8H PRN, Freddie Huston MD    polyethylene glycol packet 17 g, 17 g, Oral, Daily, Freddie Huston MD    prednisoLONE acetate 1 % ophthalmic suspension 1 drop,  1 drop, Right Eye, QID, Jose Hemphill MD, 1 drop at 08/21/18 1355    ramelteon tablet 8 mg, 8 mg, Oral, Nightly PRN, Freddie Huston MD    sertraline tablet 25 mg, 25 mg, Oral, Daily, Freddie Huston MD, 25 mg at 08/21/18 0903    sodium chloride 0.9% flush 5 mL, 5 mL, Intravenous, PRN, Freddie Huston MD      Review of patient's allergies indicates:   Allergen Reactions    Vancomycin analogues Rash     Red Man Syndrome    Penicillins          Social History     Tobacco Use    Smoking status: Never Smoker   Substance Use Topics    Alcohol use: No     Frequency: Never    Drug use: No         Base Eye Exam     Visual Acuity (Snellen - Linear)       Right Left    Dist cc 20/100     Near cc 20/100     Correction:  Glasses          Tonometry (8:06 AM)       Right Left    Pressure 24           Extraocular Movement       Right Left     Full Full          Neuro/Psych     Oriented x3:  Yes    Mood/Affect:  Normal          Dilation     Both eyes:  2.5% Phenylephrine, 1.0% Mydriacyl @ 8:03 AM            Slit Lamp and Fundus Exam     External Exam       Right Left    External Normal prosthesis          Slit Lamp Exam       Right Left    Lids/Lashes Normal     Conjunctiva/Sclera White and quiet     Lens Posterior chamber intraocular lens           Fundus Exam       Right Left    Disc Normal     Macula Normal     Vessels Normal     Periphery Stable ST arcade elevated white lesion with surrounding fluid and thin cuff of SR heme, approx 10 mm, spares central macula.      Hazy view                  Plan   Subretinal abscess     52 yo monocular M with osteomyelitis transferred for possible subretinal abscess     - VA 20/100 OD  - DFE remarkable for elevated, white lesion concerning for abscess  - intravitreal vancomycin and ceftazidime injection given in the ED  - ID consult  - will follow pt closely for further management     Seen with Dr. Corrales     Update 8/18/18  - mild improvement. Lesion with more  defined border and less overlying haze  - pred-forte QID OD  - antibiotics per ID recommendations  - guarded prognosis. Will follow pt closely     Update 8/19/18  - VA 20/100.Exam stable from yesterday. Hazy view of fundus  - daptomycin as per ID recommendations  - contine pred-forte QID  - will arrange pt to brought up to clinic for further evaluation/imaging       UPDATE 8/20/18  Slight increased vitritis at apex of mass. Will re-inject Vanc today. Will follow daily.     Risks, benefits, and alternatives to treatment discussed in detail with the patient.  The patient voiced understanding and wished to proceed with the procedure    Proc Note:   Vancomycin Right eye  Topical proparacaine was used for anesthesia.  Topical Betadine was used for antisepsis.  0.1 cc of 1mg/0.1cc injected 3.5-4.0 mm from corneal limbus @ 6:00 position.  Following injection the IOP was less than thirty (<30) by tonopen.  The eye was then thoroughly irrigated with BSS.  Patient tolerated procedure well.  No complications were observed.  The Patient was educated that mild irritation tonight was normal secondary to topical Betadine use.  The Patient was further educated that if significant pain or vision loss in occurred within 2-10 days, they should return immediately.      Sal Villanueva, DO  Fellow, Retina & Vitreous Surgery

## 2018-08-21 NOTE — ASSESSMENT & PLAN NOTE
- Patient with many days of positive MRSA blood cultures at [facility], previously being treated with Flagyl, linezolid, and gentamicin with hemodialysis  - Likely source related to left foot osteomyelitis of 5th metatarsal. Will consider US of   - Patient with signs of septic embolization, right retro retinal abscess  - History of epidural abscess and associated IV drug use, reports discontinuation of IV drugs for over 5 years ago  - Repeat cultures drawn in the ED, shows NGTD.  - Patient with allergy to vancomycin, full body rash with associated pruritus.   - ID consulted, appreciate recs. Started on Daptomycin 8mg/kg to be given after HD. Will likely need 6-8 weeks of antibiotics.  - Will coordinate with ID and Nephro is securing antibiotic delivery to his HD center.  - 2D ECHO pending.  - US LUE AVF shows no signs of abscess or fluid collection.  - Vitals stable, normal white count. Will continue to monitor.

## 2018-08-21 NOTE — SUBJECTIVE & OBJECTIVE
Interval History: BG elevated to 700 overnight, dropped to 150 after being started on insulin gtt  Vitals stable.    Review of Systems   Constitutional: Negative for chills, fatigue and fever.   HENT: Negative for trouble swallowing.    Eyes: Positive for pain and visual disturbance.        Eye pain and visual disturbance are both improving.   Respiratory: Negative for cough and shortness of breath.    Cardiovascular: Negative for chest pain.   Gastrointestinal: Negative for abdominal pain, diarrhea, nausea and vomiting.   Genitourinary: Negative for difficulty urinating.   Musculoskeletal: Negative for arthralgias.   Skin: Negative for color change.   Neurological: Negative for dizziness, light-headedness and headaches.   Psychiatric/Behavioral: Negative for agitation and confusion.     Objective:     Vital Signs (Most Recent):  Temp: 97.8 °F (36.6 °C) (08/21/18 1113)  Pulse: 77 (08/21/18 1113)  Resp: 18 (08/21/18 1113)  BP: (!) 166/74 (08/21/18 1113)  SpO2: 97 % (08/21/18 1113) Vital Signs (24h Range):  Temp:  [97.4 °F (36.3 °C)-98 °F (36.7 °C)] 97.8 °F (36.6 °C)  Pulse:  [73-95] 77  Resp:  [12-20] 18  SpO2:  [93 %-97 %] 97 %  BP: (154-180)/(66-93) 166/74     Weight: 113.3 kg (249 lb 12.5 oz)  Body mass index is 34.84 kg/m².    Intake/Output Summary (Last 24 hours) at 8/21/2018 1311  Last data filed at 8/21/2018 0508  Gross per 24 hour   Intake 57.05 ml   Output 200 ml   Net -142.95 ml      Physical Exam   Constitutional: No distress.   HENT:   Head: Normocephalic.   Eyes: Pupils are equal, round, and reactive to light.   Neck: Normal range of motion.   Cardiovascular: Normal rate and regular rhythm.   Pulmonary/Chest: Effort normal and breath sounds normal.   Abdominal: Soft. Bowel sounds are normal.   Musculoskeletal: He exhibits deformity. He exhibits no edema.   Left foot deformity.   Neurological: He is alert.       Significant Labs:   A1C:   Recent Labs   Lab  08/17/18   2159   HGBA1C  8.2*     ABGs:    Recent Labs   Lab  08/21/18   0948   PH  7.326*   PCO2  38.3   HCO3  20.0*   POCSATURATED  87*   BE  -6     Bilirubin:   Recent Labs   Lab  08/17/18   2159  08/18/18   0613  08/19/18   0645  08/20/18   0826  08/21/18   0456   BILITOT  0.7  0.8  0.7  0.4  0.5     Blood Culture: No results for input(s): LABBLOO in the last 48 hours.  BMP:   Recent Labs   Lab  08/21/18   0456  08/21/18   0825   GLU  760*  422*  422*   NA  126*  132*  132*   K  5.2*  4.4  4.4   CL  96  98  98   CO2  20*  19*  19*   BUN  65*  65*  65*   CREATININE  6.9*  6.9*  6.9*   CALCIUM  7.2*  7.5*  7.5*   MG  2.1   --      CBC:   Recent Labs   Lab  08/20/18   0826  08/21/18   0456   WBC  9.50  9.56   HGB  8.7*  7.9*   HCT  27.4*  25.6*   PLT  131*  115*     CMP:   Recent Labs   Lab  08/20/18   0826  08/21/18   0124  08/21/18   0456  08/21/18   0825   NA  128*  128*  126*  132*  132*   K  5.9*  5.3*  5.2*  4.4  4.4   CL  96  95  96  98  98   CO2  13*  21*  20*  19*  19*   GLU  496*  758*  760*  422*  422*   BUN  88*  66*  65*  65*  65*   CREATININE  10.3*  6.5*  6.9*  6.9*  6.9*   CALCIUM  6.9*  7.0*  7.2*  7.5*  7.5*   PROT  5.9*   --   5.5*   --    ALBUMIN  2.1*  2.2*  2.2*   --    BILITOT  0.4   --   0.5   --    ALKPHOS  280*   --   270*   --    AST  13   --   8*   --    ALT  33   --   26   --    ANIONGAP  19*  12  10  15  15   EGFRNONAA  5.1*  8.9*  8.3*  8.3*  8.3*     Cardiac Markers: No results for input(s): CKMB, MYOGLOBIN, BNP, TROPISTAT in the last 48 hours.  Coagulation: No results for input(s): PT, INR, APTT in the last 48 hours.  Lactic Acid: No results for input(s): LACTATE in the last 48 hours.  Lipase: No results for input(s): LIPASE in the last 48 hours.  Lipid Panel: No results for input(s): CHOL, HDL, LDLCALC, TRIG, CHOLHDL in the last 48 hours.  Magnesium:   Recent Labs   Lab  08/20/18   0826  08/21/18   0456   MG  2.1  2.1     Pathology Results  (Last 10 years)    None        POCT Glucose:   Recent Labs   Lab   08/21/18   1006  08/21/18   1101  08/21/18   1206   POCTGLUCOSE  224*  150*  162*     Prealbumin: No results for input(s): PREALBUMIN in the last 48 hours.  Respiratory Culture: No results for input(s): GSRESP, RESPIRATORYC in the last 48 hours.  Troponin: No results for input(s): TROPONINI in the last 48 hours.  TSH: No results for input(s): TSH in the last 4320 hours.  Urine Culture: No results for input(s): LABURIN in the last 48 hours.  Urine Studies: No results for input(s): COLORU, APPEARANCEUA, PHUR, SPECGRAV, PROTEINUA, GLUCUA, KETONESU, BILIRUBINUA, OCCULTUA, NITRITE, UROBILINOGEN, LEUKOCYTESUR, RBCUA, WBCUA, BACTERIA, SQUAMEPITHEL, HYALINECASTS in the last 48 hours.    Invalid input(s): WRIGHTSUR  All pertinent labs within the past 24 hours have been reviewed.    Significant Imaging: I have reviewed all pertinent imaging results/findings within the past 24 hours.

## 2018-08-21 NOTE — ASSESSMENT & PLAN NOTE
- Poor glucose control during hospital course originally secondary to stress response/cortisol surge due to ongoing infection (osteomyelitis/MRSA/retinal abscess?). Although has been afebrile and hemodynamically stable 48h+ with appropriate antibiotics. BCx last positive on 8/15.     - Recent worsening blood glucose coincides with IV dexamethasone use starting on 8/19 and continuing to morning of 8/20. BG on morning of 8/20 was ~500 which continued throughout day. Received 25U of aspart during day and remained on levemir 15U twice daily --> BG was 760 on morning of 8/21 and started on insulin gtt at this time.    Recommendations:   - Insulin stopped at 1100 due to profound drop of glucose from 760 to 150 after several hours on , likely from insulin stacking in setting of ESRD  - Patient without appreciable anion gap or acidosis in general, and remains hemodynamically stable despite elevated blood glucose and significant drop  - Continue hourly glucose checks with MDSSI.   - Holding scheduled insulin for now as concern for previous stacking. Will reassess nightly levemir dose this evening and consider scheduled mealtime aspart after following blood glucose trends.   - Can space to BMP back to daily after next recheck; judicious prn potassium replenishment in setting of ESRD.
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
## 2018-08-21 NOTE — ASSESSMENT & PLAN NOTE
- HgA1c 8.2.  - On glargine 30U BID at home with aspart 10U TIDWM  - Blood glucose has been labile here, see section on hyperglycemia.

## 2018-08-21 NOTE — PLAN OF CARE
Problem: Patient Care Overview  Goal: Plan of Care Review  Outcome: Ongoing (interventions implemented as appropriate)  Stepped down at 1500 on insulin gtt. BG elevated at 1400 check. Endocrine to adjust insulin orders. Glucose checks now ordered AC/HS and 0200. Gtt currently infusing at 1.2 ml/hr. Patient is AAOx4. Patient has synthetic left eye, and vision is severely limited in right eye. Wound vac to L foot, Patient is assist X1. Urinal at bedside. VSS, NAD. Will continue to monitor.

## 2018-08-21 NOTE — CONSULTS
[facility]  Endocrinology  Diabetes Consult Note    Consult Requested by: Bhargav Chacon MD   Reason for admit: Sepsis due to methicillin resistant Staphylococcus aureus (MRSA)    HISTORY OF PRESENT ILLNESS:  Mickey Ross is a 53 year old male with a medical history significant for type II DM, ESRD on HD MWF, prosthetic left eye, past history of IVDU, and left ankle osteomyelitis with MRSA bacteremia who was a transfer from [facility] [place] on 8/18 for evaluation of possibly right retinal abscess. Patient originally presented to OSH on 8/10 with fever and worsening LLE wound. Ortho performed debridement there. Blood cultures have been positive 8/10 and 8/15; no negative cultures thus far. He was being treated with Flagyl and Zyvox with Gentamicin dosed with HD; pt had a rash with Vancomycin.  Both ID and Ortho have recommended amputation of LLE but pt is refused. On 8/13, pt reported right vision change, describing a band that I cant see through. No imaging performed but Ophtho consulted and suspected large right retinal mass with ddx including hemorrhage or abscess; they [place] [facility] [place] specialist (Dr. Alexander Corrales) who agreed with this plan. By transfer, patient's fever and leukocytosis had resolved. Patient has been followed by podiatry, ID, nephrology, and opthalmology here. Currently on Daptomycin 8mg/kg to be given after HD. BKA was again advised by podiatry but patient refused. Ophthalmology following for right retinal abscess.    Endocrinology was consulted on 8/21 for worsening hyperglycemia despite insulin gtt. In regards to patient's diabetes, he takes 30U levemir nightly at home. Although med rec states he is also on 8U aspart TIDWM, he has not picked up aspart since 5/2018.     Blood glucose have been labile here. Significantly, glucose was 200-250 range with regimen of 15 U levemir BID and MDSSI, up until morning of 8/20, in which BG was  ~500. (Of note, for surrounding pruritus from right retinal abscess, patient was started on decadron 4 mg q8h on . She received 3 doses of this medication (twice on  and once on morning of )). On , patient received 25U of aspart along with her 30 U levemir. Patient was finally placed on insulin gtt early in the morning of , where her blood glucose on chem panel was 760.         Interval HPI:   Overnight events: Started on insulin gtt. See HPI  Eatin%  Nausea: No  Hypoglycemia and intervention: No  Fever: No  TPN and/or TF: No    PMH, PSH, FH, SH updated and reviewed.    Review of Systems   Constitutional: Negative for chills and fever.   HENT: Negative for trouble swallowing.    Eyes: Negative for visual disturbance.   Respiratory: Negative for cough and shortness of breath.    Cardiovascular: Negative for chest pain and leg swelling.   Gastrointestinal: Negative for abdominal distention, abdominal pain, constipation, diarrhea, nausea and vomiting.   Genitourinary: Negative for decreased urine volume, difficulty urinating and dysuria.   Musculoskeletal: Negative for arthralgias.        Left foot deformity.   Skin: Positive for rash.   Neurological: Negative for dizziness, light-headedness and headaches.   Psychiatric/Behavioral: Negative for agitation and confusion.       Current Medications and/or Treatments Impacting Glycemic Control  Immunotherapy:    Immunosuppressants     None        Steroids:   Hormones (From admission, onward)    None        Pressors:    Autonomic Drugs (From admission, onward)    None        Hyperglycemia/Diabetes Medications:   Antihyperglycemics (From admission, onward)    Start     Stop Route Frequency Ordered    18 0915  insulin detemir U-100 pen 30 Units      -- SubQ Nightly 18 0914    18 0430  insulin regular (Humulin R) 100 Units in sodium chloride 0.9% 100 mL infusion     Question:  Insulin Rate Adjustment (DO NOT MODIFY ANSWER)  Answer:   [email or web address]\Images\[email or web address]\InsulinInfusions\InsulinRegAdj [id number]    -- IV Continuous 08/21/18 0324    08/20/18 1852  insulin aspart U-100 pen 1-10 Units      -- SubQ Before meals & nightly PRN 08/20/18 1753             PHYSICAL EXAMINATION:  Vitals:    08/21/18 0806   BP: (!) 180/80   Pulse:    Resp:    Temp:      Body mass index is 34.84 kg/m².    Physical Exam   Constitutional: He is oriented to person, place, and time. He appears well-developed and well-nourished. No distress.   HENT:   Head: Normocephalic.   Eyes: Right eye exhibits discharge. No scleral icterus.   Left prosthetic eye  Irritation of right eye  +photosensitivty    Cardiovascular: Normal rate, regular rhythm and normal heart sounds. Exam reveals no gallop and no friction rub.   No murmur heard.  Pulmonary/Chest: Effort normal. No stridor. No respiratory distress. He has no wheezes.   Abdominal: Soft. He exhibits no distension and no mass. There is no tenderness. There is no rebound and no guarding.   Musculoskeletal: He exhibits no edema, tenderness or deformity.   LUE AVF   Neurological: He is alert and oriented to person, place, and time.   Skin: Skin is warm and dry. Rash (extremities) noted. He is not diaphoretic. No erythema.   Right foot with deformity. No open wounds or ulcers noted  Left foot dressed at this time   Vitals reviewed.        Labs Reviewed and Include   Recent Labs   Lab  08/21/18   0456  08/21/18   0825   GLU  760*  422*  422*   CALCIUM  7.2*  7.5*  7.5*   ALBUMIN  2.2*   --    PROT  5.5*   --    NA  126*  132*  132*   K  5.2*  4.4  4.4   CO2  20*  19*  19*   CL  96  98  98   BUN  65*  65*  65*   CREATININE  6.9*  6.9*  6.9*   ALKPHOS  270*   --    ALT  26   --    AST  8*   --    BILITOT  0.5   --      Lab Results   Component Value Date    WBC 9.56 08/21/2018    HGB 7.9 (L) 08/21/2018    HCT 25.6 (L) 08/21/2018    MCV 93 08/21/2018     (L) 08/21/2018     No results for input(s): TSH, FREET4 in the  last 168 hours.  Lab Results   Component Value Date    HGBA1C 8.2 (H) 08/17/2018       Nutritional status:   Body mass index is 34.84 kg/m².  Lab Results   Component Value Date    ALBUMIN 2.2 (L) 08/21/2018    ALBUMIN 2.2 (L) 08/21/2018    ALBUMIN 2.1 (L) 08/20/2018     No results found for: PREALBUMIN    Estimated Creatinine Clearance: 15.8 mL/min (A) (based on SCr of 6.9 mg/dL (H)).    Accu-Checks  Recent Labs      08/20/18   1943  08/21/18   0012  08/21/18   0339  08/21/18   0406  08/21/18   0506  08/21/18   0600  08/21/18   0703  08/21/18   0758  08/21/18   0902  08/21/18   1006   POCTGLUCOSE  >500*  >500*  >500*  >500*  >500*  >500*  >500*  >500*  343*  224*        ASSESSMENT and PLAN    * Sepsis due to methicillin resistant Staphylococcus aureus (MRSA)    - On daptomycin per ID.  - Per primary team.           Hyperglycemia    - Poor glucose control during hospital course originally secondary to stress response/cortisol surge due to ongoing infection (osteomyelitis/MRSA/retinal abscess?). Although has been afebrile and hemodynamically stable 48h+ with appropriate antibiotics. BCx last positive on 8/15.     - Recent worsening blood glucose coincides with IV dexamethasone use starting on 8/19 and continuing to morning of 8/20. BG on morning of 8/20 was ~500 which continued throughout day. Received 25U of aspart during day and remained on levemir 15U twice daily --> BG was 760 on morning of 8/21 and started on insulin gtt at this time.    Recommendations:   - Insulin stopped at 1100 due to profound drop of glucose from 760 to 150 after several hours on , likely from insulin stacking in setting of ESRD  - Patient without appreciable anion gap or acidosis in general, and remains hemodynamically stable despite elevated blood glucose and significant drop  - Continue hourly glucose checks.  - We have transitioned to a transition insulin drip to act as the basal insulin for interim- will change to basal insulin in  morning.  - Decreased scheduled mealtime insulin to aspart 7U TIDWM.   - Can space to BMP back to daily after next recheck; judicious prn potassium replenishment in setting of ESRD.          Type 2 diabetes mellitus with chronic kidney disease on chronic dialysis, with long-term current use of insulin    - HgA1c 8.2.  - On levemir 30U nightly at home.  - Blood glucose has been labile here, see section on hyperglycemia.           Subretinal abscess    - Followed by opthalmology.           Renovascular hypertension    - On losartan, per primary team.           ESRD on hemodialysis    - MWF HD.  - Nephrology is following.           Acute hematogenous osteomyelitis of left foot    - Refuses BKA; being followed by podiatry.  - See sepsis 2/2 to MRSA.               Plan discussed with patient, family, and RN at bedside.     Francisco J Jeong MD  PGY-3 Internal Medicine  [phone]    Endocrinology  [facility]

## 2018-08-21 NOTE — NURSING
Report given to JOSÉ MIGUEL Stone . Insulin gtt initiated. Pt BP hypertensive, all other vitals stable on room air. Pt transferred via bed with belongings in tow.

## 2018-08-21 NOTE — PROGRESS NOTES
Pt with accuchecks noted to be >500 more than once. Pt is asymptomatic and MD is aware. Orders given and nurse carried them out. Spoke with the rapid response RN just to give a heads up regarding pt accuchecks. Will cont to monitor pt.

## 2018-08-21 NOTE — PT/OT/SLP PROGRESS
"Physical Therapy      Patient Name:  Mickey Ross   MRN:  [id number]    Patient not seen today secondary to pt refusal.  Attempted to see pt in AM - pt refused stating "I can't see. They put a needle in my eye." "I'm getting a drip right now so I can't move." "Not today please. Maybe tomorrow" Will follow-up next scheduled date.    Terry Bob, PT   8/21/2018      "

## 2018-08-21 NOTE — ASSESSMENT & PLAN NOTE
- Osteomyelitis of left 5th metatarsal taken for washout by Orthopedic surgery at [facility] on 08/15  - Patient being treated outside facility with Flagyl, linezolid, gentamicin with HD.  - Refusing amputation which would be curative.   - Podiatry consulted, appreciate recs. Advised BKA but patient refused. Recommended CAM boot and abx per ID  - Started on Daptomycin per ID  - ESR elevated at 58 upon admission  - X-ray left foot and ankle shows partial amputation the 1st, 2nd, 3rd, and 4th digits with fusion of the 2nd and 3rd MTP joints and throughout the midfoot, severe deformity and joint space loss the tibiotalar joint with a lapse of the talus likely 2/2 to chronic osteomyelitis, arthritis, or chronic Charcot foot.

## 2018-08-21 NOTE — PROGRESS NOTES
Spoke with MD with IM2 and notified of pt potassium level of 5.9. Order given for a stat renal function panel to be drawn. Will cont to monitor pt.

## 2018-08-21 NOTE — PROGRESS NOTES
CC: Subretinal abscess OD     VA stable since last eval yesterday, pt denies pain, flashes or floaters since intravitreal Vancomycin injection yesterday afternoon.          Past Medical History:   Diagnosis Date    Allergic drug rash - due to Vancomycin 8/19/2018    Arthritis      Blind left eye      Charcot's joint of foot      Diabetes mellitus      GERD (gastroesophageal reflux disease)      Glaucoma      Hypertension      MRSA (methicillin resistant staph aureus) culture positive      Renal disorder              Family History   Problem Relation Age of Onset    Pneumonia Mother        Current Facility-Administered Medications:     0.9%  NaCl infusion, , Intravenous, PRN, Farrukh Hernandez MD    acetaminophen tablet 650 mg, 650 mg, Oral, Q4H PRN, Freddie Huston MD    albuterol-ipratropium 2.5 mg-0.5 mg/3 mL nebulizer solution 3 mL, 3 mL, Nebulization, Q4H PRN, Freddie Huston MD    calcium acetate capsule 1,334 mg, 1,334 mg, Oral, TID WM, Ralph Tomas MD, 1,334 mg at 08/21/18 1208    cetirizine tablet 10 mg, 10 mg, Oral, Daily, Armando Madrid MD, 10 mg at 08/21/18 0903    clonazePAM tablet 0.5 mg, 0.5 mg, Oral, BID PRN, Freddie Huston MD, 0.5 mg at 08/18/18 1621    DAPTOmycin (CUBICIN) 905 mg in sodium chloride 0.9% IVPB, 8 mg/kg, Intravenous, Q48H, Armando Madrid MD, Last Rate: 100 mL/hr at 08/20/18 2145, 905 mg at 08/20/18 2145    dextrose 50% injection 12.5 g, 12.5 g, Intravenous, PRN, Freddie Huston MD    dextrose 50% injection 12.5 g, 12.5 g, Intravenous, PRN, Osiris Canela MD    dextrose 50% injection 12.5 g, 12.5 g, Intravenous, PRN, Francisco J Jeong MD    dextrose 50% injection 25 g, 25 g, Intravenous, PRN, Freddie Huston MD    dextrose 50% injection 25 g, 25 g, Intravenous, PRN, Osiris Canela MD    dextrose 50% injection 25 g, 25 g, Intravenous, PRN, Francisco J Jeong MD    diltiaZEM 24 hr capsule 300 mg, 300 mg,  Oral, Daily, Ralph Tomas MD, 300 mg at 08/21/18 0904    epoetin umer (PROCRIT) injection 6,000 units kit, 6,000 Units, Intravenous, Every Mon, Wed, Fri, Gorge Diamond NP, Stopped at 08/20/18 1100    glucagon (human recombinant) injection 1 mg, 1 mg, Intramuscular, PRN, Freddie Huston MD    glucagon (human recombinant) injection 1 mg, 1 mg, Intramuscular, PRN, Francisco J Jeong MD    glucose chewable tablet 16 g, 16 g, Oral, PRN, Freddie Huston MD    glucose chewable tablet 16 g, 16 g, Oral, PRN, Francisco J Jeong MD    glucose chewable tablet 24 g, 24 g, Oral, PRN, Freddie Huston MD    glucose chewable tablet 24 g, 24 g, Oral, PRN, Francisco J Jeong MD    heparin (porcine) injection 5,000 Units, 5,000 Units, Subcutaneous, Q8H, Freddie Huston MD, 5,000 Units at 08/21/18 1355    HYDROcodone-acetaminophen 7.5-325 mg per tablet 1 tablet, 1 tablet, Oral, Q6H PRN, Freddie Huston MD, 1 tablet at 08/21/18 0147    hydrOXYzine HCl tablet 25 mg, 25 mg, Oral, TID PRN, Osiris Canela MD, 25 mg at 08/21/18 1004    insulin aspart U-100 pen 0-5 Units, 0-5 Units, Subcutaneous, QID (AC + HS) PRN, Francisco J Jeong MD    insulin aspart U-100 pen 7 Units, 7 Units, Subcutaneous, TIDWM, Francisco J Jeong MD    insulin regular (Humulin R) 100 Units in sodium chloride 0.9% 100 mL infusion, 1.2 Units/hr, Intravenous, Continuous, Francisco J Jeong MD, Last Rate: 1.2 mL/hr at 08/21/18 1426, 1.2 Units/hr at 08/21/18 1426    losartan tablet 100 mg, 100 mg, Oral, Daily, Freddie Huston MD, 100 mg at 08/21/18 0903    methadone tablet 10 mg, 10 mg, Oral, BID, Freddie Huston MD, 10 mg at 08/21/18 0903    naloxone 0.4 mg/mL injection 0.4 mg, 0.4 mg, Intravenous, PRN, Freddie Huston MD    ondansetron disintegrating tablet 8 mg, 8 mg, Oral, Q8H PRN, Freddie Huston MD    ondansetron injection 4 mg, 4 mg, Intravenous, Q8H PRN, Freddie Huston MD    polyethylene glycol packet  17 g, 17 g, Oral, Daily, Freddie Huston MD    prednisoLONE acetate 1 % ophthalmic suspension 1 drop, 1 drop, Right Eye, QID, Jose Hemphill MD, 1 drop at 08/21/18 1355    ramelteon tablet 8 mg, 8 mg, Oral, Nightly PRN, Freddie Huston MD    sertraline tablet 25 mg, 25 mg, Oral, Daily, Freddie Huston MD, 25 mg at 08/21/18 0903    sodium chloride 0.9% flush 5 mL, 5 mL, Intravenous, PRN, Freddie Huston MD           Review of patient's allergies indicates:   Allergen Reactions    Vancomycin analogues Rash       Red Man Syndrome    Penicillins        Social History            Tobacco Use    Smoking status: Never Smoker   Substance Use Topics    Alcohol use: No       Frequency: Never    Drug use: No          Base Eye Exam              Visual Acuity (Snellen - Linear)                Right Left       Dist cc 20/100         Near cc 20/100              Correction:  Glasses                    Tonometry (8:06 AM)        Right Left      Pressure 24                      Extraocular Movement               Right Left        Full Full                  Neuro/Psych           Oriented x3:  Yes      Mood/Affect:  Normal                  Dilation           Both eyes:  2.5% Phenylephrine, 1.0% Mydriacyl @ 8:03 AM                       Slit Lamp and Fundus Exam             External Exam               Right Left      External Normal prosthesis                    Slit Lamp Exam               Right Left      Lids/Lashes Normal        Conjunctiva/Sclera White and quiet        Lens Posterior chamber intraocular lens                      Fundus Exam               Right Left      Disc Normal        Macula Normal        Vessels Normal        Periphery Stable ST arcade elevated white lesion with surrounding fluid and thin cuff of SR heme, approx 10 mm, spares central macula.         Hazy view                  Plan       Subretinal abscess     54 yo monocular M with osteomyelitis transferred for possible subretinal  abscess     - VA 20/100 OD  - DFE remarkable for elevated, white lesion concerning for abscess  - intravitreal vancomycin and ceftazidime injection given in the ED  - ID consult  - will follow pt closely for further management     Seen with Dr. Corrales     Update 8/18/18  - mild improvement. Lesion with more defined border and less overlying haze  - pred-forte QID OD  - antibiotics per ID recommendations  - guarded prognosis. Will follow pt closely     Update 8/19/18  - VA 20/100.Exam stable from yesterday. Hazy view of fundus  - daptomycin as per ID recommendations  - contine pred-forte QID  - will arrange pt to brought up to clinic for further evaluation/imaging      UPDATE 8/20/18  Slight increased vitritis at apex of mass. Will re-inject Vanc today. Will follow daily.     UPDATE 8/21/18  Apical Mass/Abscess appears stable on exam today and does NOT appear to be spreading and involving more vitreous after Intravitreal Vanc yesterday  Patient with no new visual complaints   DFE tomorrow to evaluate for any progression    Dwight oBwers, PGY-II

## 2018-08-21 NOTE — ASSESSMENT & PLAN NOTE
- HgA1c 8.2.  - On levemir 30U nightly at home.  - Blood glucose has been labile here, see section on hyperglycemia.

## 2018-08-21 NOTE — SUBJECTIVE & OBJECTIVE
Interval HPI:   Overnight events: Started on insulin gtt. See HPI  Eatin%  Nausea: No  Hypoglycemia and intervention: No  Fever: No  TPN and/or TF: No    PMH, PSH, FH, SH updated and reviewed.    Review of Systems   Constitutional: Negative for chills and fever.   HENT: Negative for trouble swallowing.    Eyes: Negative for visual disturbance.   Respiratory: Negative for cough and shortness of breath.    Cardiovascular: Negative for chest pain and leg swelling.   Gastrointestinal: Negative for abdominal distention, abdominal pain, constipation, diarrhea, nausea and vomiting.   Genitourinary: Negative for decreased urine volume, difficulty urinating and dysuria.   Musculoskeletal: Negative for arthralgias.        Left foot deformity.   Skin: Positive for rash.   Neurological: Negative for dizziness, light-headedness and headaches.   Psychiatric/Behavioral: Negative for agitation and confusion.       Current Medications and/or Treatments Impacting Glycemic Control  Immunotherapy:    Immunosuppressants     None        Steroids:   Hormones (From admission, onward)    None        Pressors:    Autonomic Drugs (From admission, onward)    None        Hyperglycemia/Diabetes Medications:   Antihyperglycemics (From admission, onward)    Start     Stop Route Frequency Ordered    18 0915  insulin detemir U-100 pen 30 Units      -- SubQ Nightly 18 0914    18 0430  insulin regular (Humulin R) 100 Units in sodium chloride 0.9% 100 mL infusion     Question:  Insulin Rate Adjustment (DO NOT MODIFY ANSWER)  Answer:  [email or web address] [id number]    -- IV Continuous 18 0324    18 1852  insulin aspart U-100 pen 1-10 Units      -- SubQ Before meals & nightly PRN 18 1753             PHYSICAL EXAMINATION:  Vitals:    18 0806   BP: (!) 180/80   Pulse:    Resp:    Temp:      Body mass index is 34.84 kg/m².    Physical Exam   Constitutional: He is  oriented to person, place, and time. He appears well-developed and well-nourished. No distress.   HENT:   Head: Normocephalic.   Eyes: Right eye exhibits discharge. No scleral icterus.   Left prosthetic eye  Irritation of right eye  +photosensitivty    Cardiovascular: Normal rate, regular rhythm and normal heart sounds. Exam reveals no gallop and no friction rub.   No murmur heard.  Pulmonary/Chest: Effort normal. No stridor. No respiratory distress. He has no wheezes.   Abdominal: Soft. He exhibits no distension and no mass. There is no tenderness. There is no rebound and no guarding.   Musculoskeletal: He exhibits no edema, tenderness or deformity.   LUE AVF   Neurological: He is alert and oriented to person, place, and time.   Skin: Skin is warm and dry. Rash (extremities) noted. He is not diaphoretic. No erythema.   Right foot with deformity. No open wounds or ulcers noted  Left foot dressed at this time   Vitals reviewed.

## 2018-08-22 LAB
ANION GAP SERPL CALC-SCNC: 11 MMOL/L
ANION GAP SERPL CALC-SCNC: 15 MMOL/L
ANION GAP SERPL CALC-SCNC: 15 MMOL/L
ANION GAP SERPL CALC-SCNC: 8 MMOL/L
ANION GAP SERPL CALC-SCNC: 9 MMOL/L
BASOPHILS # BLD AUTO: 0.05 K/UL
BASOPHILS NFR BLD: 0.4 %
BUN SERPL-MCNC: 35 MG/DL
BUN SERPL-MCNC: 39 MG/DL
BUN SERPL-MCNC: 81 MG/DL
BUN SERPL-MCNC: 82 MG/DL
BUN SERPL-MCNC: 83 MG/DL
CALCIUM SERPL-MCNC: 7 MG/DL
CALCIUM SERPL-MCNC: 7.2 MG/DL
CALCIUM SERPL-MCNC: 7.4 MG/DL
CALCIUM SERPL-MCNC: 7.4 MG/DL
CALCIUM SERPL-MCNC: 7.5 MG/DL
CHLORIDE SERPL-SCNC: 100 MMOL/L
CHLORIDE SERPL-SCNC: 101 MMOL/L
CO2 SERPL-SCNC: 18 MMOL/L
CO2 SERPL-SCNC: 19 MMOL/L
CO2 SERPL-SCNC: 21 MMOL/L
CO2 SERPL-SCNC: 25 MMOL/L
CO2 SERPL-SCNC: 26 MMOL/L
CREAT SERPL-MCNC: 4.6 MG/DL
CREAT SERPL-MCNC: 5.2 MG/DL
CREAT SERPL-MCNC: 7.9 MG/DL
CREAT SERPL-MCNC: 8.1 MG/DL
CREAT SERPL-MCNC: 8.5 MG/DL
DIFFERENTIAL METHOD: ABNORMAL
EOSINOPHIL # BLD AUTO: 0.4 K/UL
EOSINOPHIL NFR BLD: 3.4 %
ERYTHROCYTE [DISTWIDTH] IN BLOOD BY AUTOMATED COUNT: 16.5 %
EST. GFR  (AFRICAN AMERICAN): 13.5 ML/MIN/1.73 M^2
EST. GFR  (AFRICAN AMERICAN): 15.6 ML/MIN/1.73 M^2
EST. GFR  (AFRICAN AMERICAN): 7.4 ML/MIN/1.73 M^2
EST. GFR  (AFRICAN AMERICAN): 7.9 ML/MIN/1.73 M^2
EST. GFR  (AFRICAN AMERICAN): 8.1 ML/MIN/1.73 M^2
EST. GFR  (NON AFRICAN AMERICAN): 11.7 ML/MIN/1.73 M^2
EST. GFR  (NON AFRICAN AMERICAN): 13.5 ML/MIN/1.73 M^2
EST. GFR  (NON AFRICAN AMERICAN): 6.4 ML/MIN/1.73 M^2
EST. GFR  (NON AFRICAN AMERICAN): 6.8 ML/MIN/1.73 M^2
EST. GFR  (NON AFRICAN AMERICAN): 7 ML/MIN/1.73 M^2
GLUCOSE SERPL-MCNC: 150 MG/DL
GLUCOSE SERPL-MCNC: 312 MG/DL
GLUCOSE SERPL-MCNC: 319 MG/DL
GLUCOSE SERPL-MCNC: 327 MG/DL
GLUCOSE SERPL-MCNC: 360 MG/DL
HCT VFR BLD AUTO: 27.7 %
HGB BLD-MCNC: 8.5 G/DL
IMM GRANULOCYTES # BLD AUTO: 0.37 K/UL
IMM GRANULOCYTES NFR BLD AUTO: 3.2 %
LYMPHOCYTES # BLD AUTO: 1.3 K/UL
LYMPHOCYTES NFR BLD: 11.1 %
MAGNESIUM SERPL-MCNC: 2.2 MG/DL
MCH RBC QN AUTO: 28.4 PG
MCHC RBC AUTO-ENTMCNC: 30.7 G/DL
MCV RBC AUTO: 93 FL
MONOCYTES # BLD AUTO: 0.8 K/UL
MONOCYTES NFR BLD: 7.2 %
NEUTROPHILS # BLD AUTO: 8.7 K/UL
NEUTROPHILS NFR BLD: 74.7 %
NRBC BLD-RTO: 0 /100 WBC
PHOSPHATE SERPL-MCNC: 7.4 MG/DL
PLATELET # BLD AUTO: 201 K/UL
PMV BLD AUTO: 9.9 FL
POCT GLUCOSE: 143 MG/DL (ref 70–110)
POCT GLUCOSE: 166 MG/DL (ref 70–110)
POCT GLUCOSE: 279 MG/DL (ref 70–110)
POCT GLUCOSE: 294 MG/DL (ref 70–110)
POTASSIUM SERPL-SCNC: 4.2 MMOL/L
POTASSIUM SERPL-SCNC: 4.3 MMOL/L
POTASSIUM SERPL-SCNC: 5.2 MMOL/L
POTASSIUM SERPL-SCNC: 5.3 MMOL/L
POTASSIUM SERPL-SCNC: 5.3 MMOL/L
RBC # BLD AUTO: 2.99 M/UL
SODIUM SERPL-SCNC: 132 MMOL/L
SODIUM SERPL-SCNC: 133 MMOL/L
SODIUM SERPL-SCNC: 134 MMOL/L
SODIUM SERPL-SCNC: 134 MMOL/L
SODIUM SERPL-SCNC: 135 MMOL/L
WBC # BLD AUTO: 11.61 K/UL

## 2018-08-22 PROCEDURE — 25000003 PHARM REV CODE 250: Performed by: STUDENT IN AN ORGANIZED HEALTH CARE EDUCATION/TRAINING PROGRAM

## 2018-08-22 PROCEDURE — 83735 ASSAY OF MAGNESIUM: CPT

## 2018-08-22 PROCEDURE — 20600001 HC STEP DOWN PRIVATE ROOM

## 2018-08-22 PROCEDURE — 99232 SBSQ HOSP IP/OBS MODERATE 35: CPT | Mod: ,,, | Performed by: INTERNAL MEDICINE

## 2018-08-22 PROCEDURE — 25000003 PHARM REV CODE 250: Performed by: NURSE PRACTITIONER

## 2018-08-22 PROCEDURE — 85025 COMPLETE CBC W/AUTO DIFF WBC: CPT

## 2018-08-22 PROCEDURE — 27000207 HC ISOLATION

## 2018-08-22 PROCEDURE — 99231 SBSQ HOSP IP/OBS SF/LOW 25: CPT | Mod: ,,, | Performed by: INTERNAL MEDICINE

## 2018-08-22 PROCEDURE — 80048 BASIC METABOLIC PNL TOTAL CA: CPT

## 2018-08-22 PROCEDURE — 80048 BASIC METABOLIC PNL TOTAL CA: CPT | Mod: 91

## 2018-08-22 PROCEDURE — 84100 ASSAY OF PHOSPHORUS: CPT

## 2018-08-22 PROCEDURE — 63600175 PHARM REV CODE 636 W HCPCS: Performed by: STUDENT IN AN ORGANIZED HEALTH CARE EDUCATION/TRAINING PROGRAM

## 2018-08-22 PROCEDURE — 90935 HEMODIALYSIS ONE EVALUATION: CPT

## 2018-08-22 PROCEDURE — 90935 HEMODIALYSIS ONE EVALUATION: CPT | Mod: ,,, | Performed by: INTERNAL MEDICINE

## 2018-08-22 PROCEDURE — 63600175 PHARM REV CODE 636 W HCPCS: Mod: JG | Performed by: NURSE PRACTITIONER

## 2018-08-22 PROCEDURE — 36415 COLL VENOUS BLD VENIPUNCTURE: CPT

## 2018-08-22 RX ORDER — INSULIN ASPART 100 [IU]/ML
0-10 INJECTION, SOLUTION INTRAVENOUS; SUBCUTANEOUS
Status: DISCONTINUED | OUTPATIENT
Start: 2018-08-22 | End: 2018-08-23

## 2018-08-22 RX ORDER — INSULIN ASPART 100 [IU]/ML
10 INJECTION, SOLUTION INTRAVENOUS; SUBCUTANEOUS
Status: DISCONTINUED | OUTPATIENT
Start: 2018-08-22 | End: 2018-08-23

## 2018-08-22 RX ORDER — SODIUM CHLORIDE 9 MG/ML
INJECTION, SOLUTION INTRAVENOUS ONCE
Status: COMPLETED | OUTPATIENT
Start: 2018-08-22 | End: 2018-08-22

## 2018-08-22 RX ORDER — HYDRALAZINE HYDROCHLORIDE 25 MG/1
25 TABLET, FILM COATED ORAL EVERY 8 HOURS PRN
Status: DISCONTINUED | OUTPATIENT
Start: 2018-08-22 | End: 2018-08-30

## 2018-08-22 RX ORDER — ATROPINE SULFATE 10 MG/ML
1 SOLUTION/ DROPS OPHTHALMIC ONCE
Status: COMPLETED | OUTPATIENT
Start: 2018-08-22 | End: 2018-08-22

## 2018-08-22 RX ADMIN — CETIRIZINE HYDROCHLORIDE 10 MG: 10 TABLET, FILM COATED ORAL at 08:08

## 2018-08-22 RX ADMIN — INSULIN ASPART 6 UNITS: 100 INJECTION, SOLUTION INTRAVENOUS; SUBCUTANEOUS at 08:08

## 2018-08-22 RX ADMIN — SODIUM CHLORIDE 300 ML: 0.9 INJECTION, SOLUTION INTRAVENOUS at 10:08

## 2018-08-22 RX ADMIN — LOSARTAN POTASSIUM 100 MG: 50 TABLET, FILM COATED ORAL at 08:08

## 2018-08-22 RX ADMIN — ERYTHROPOIETIN 6000 UNITS: 4000 INJECTION, SOLUTION INTRAVENOUS; SUBCUTANEOUS at 10:08

## 2018-08-22 RX ADMIN — HEPARIN SODIUM 5000 UNITS: 5000 INJECTION, SOLUTION INTRAVENOUS; SUBCUTANEOUS at 02:08

## 2018-08-22 RX ADMIN — CALCIUM ACETATE 1334 MG: 667 CAPSULE ORAL at 05:08

## 2018-08-22 RX ADMIN — CALCIUM ACETATE 1334 MG: 667 CAPSULE ORAL at 08:08

## 2018-08-22 RX ADMIN — HYDRALAZINE HYDROCHLORIDE 25 MG: 25 TABLET ORAL at 10:08

## 2018-08-22 RX ADMIN — DILTIAZEM HYDROCHLORIDE 300 MG: 300 CAPSULE, COATED, EXTENDED RELEASE ORAL at 08:08

## 2018-08-22 RX ADMIN — INSULIN ASPART 6 UNITS: 100 INJECTION, SOLUTION INTRAVENOUS; SUBCUTANEOUS at 04:08

## 2018-08-22 RX ADMIN — INSULIN ASPART 10 UNITS: 100 INJECTION, SOLUTION INTRAVENOUS; SUBCUTANEOUS at 04:08

## 2018-08-22 RX ADMIN — METHADONE HYDROCHLORIDE 10 MG: 5 TABLET ORAL at 08:08

## 2018-08-22 RX ADMIN — SERTRALINE HYDROCHLORIDE 25 MG: 25 TABLET ORAL at 08:08

## 2018-08-22 RX ADMIN — HYDROCODONE BITARTRATE AND ACETAMINOPHEN 1 TABLET: 7.5; 325 TABLET ORAL at 10:08

## 2018-08-22 RX ADMIN — INSULIN DETEMIR 20 UNITS: 100 INJECTION, SOLUTION SUBCUTANEOUS at 10:08

## 2018-08-22 RX ADMIN — HEPARIN SODIUM 5000 UNITS: 5000 INJECTION, SOLUTION INTRAVENOUS; SUBCUTANEOUS at 06:08

## 2018-08-22 RX ADMIN — HYDROCODONE BITARTRATE AND ACETAMINOPHEN 1 TABLET: 7.5; 325 TABLET ORAL at 04:08

## 2018-08-22 RX ADMIN — ATROPINE SULFATE 1 DROP: 10 SOLUTION/ DROPS OPHTHALMIC at 08:08

## 2018-08-22 RX ADMIN — METHADONE HYDROCHLORIDE 10 MG: 5 TABLET ORAL at 10:08

## 2018-08-22 RX ADMIN — PREDNISOLONE ACETATE 1 DROP: 10 SUSPENSION/ DROPS OPHTHALMIC at 04:08

## 2018-08-22 RX ADMIN — PREDNISOLONE ACETATE 1 DROP: 10 SUSPENSION/ DROPS OPHTHALMIC at 10:08

## 2018-08-22 RX ADMIN — HEPARIN SODIUM 5000 UNITS: 5000 INJECTION, SOLUTION INTRAVENOUS; SUBCUTANEOUS at 10:08

## 2018-08-22 RX ADMIN — PREDNISOLONE ACETATE 1 DROP: 10 SUSPENSION/ DROPS OPHTHALMIC at 08:08

## 2018-08-22 RX ADMIN — INSULIN ASPART 7 UNITS: 100 INJECTION, SOLUTION INTRAVENOUS; SUBCUTANEOUS at 08:08

## 2018-08-22 NOTE — PLAN OF CARE
LTAC referrals made to [facility] ([facility]) and [facility] in [place]. Sw to follow with acceptance.

## 2018-08-22 NOTE — SUBJECTIVE & OBJECTIVE
"Interval HPI:   Overnight events: Patient's glucose remained stable at ~300 on scheduled 7U TIDWM, transition gtt of 1.2U/hr, and MDSSI . Glucose 294 this morning. No acute events overnight. Patient seen at HD this morning and feeling well.   Eatin%  Nausea: No  Hypoglycemia and intervention: No  Fever: No  TPN and/or TF: No      BP (!) 177/78 (BP Location: Left arm, Patient Position: Lying)   Pulse 78   Temp 97.6 °F (36.4 °C) (Oral)   Resp 16   Ht 5' 11" (1.803 m)   Wt 113.3 kg (249 lb 12.5 oz)   SpO2 95%   BMI 34.84 kg/m²     Labs Reviewed and Include    Recent Labs   Lab  18   0810   GLU  319*   CALCIUM  7.4*   NA  134*   K  5.3*   CO2  19*   CL  100   BUN  82*   CREATININE  8.1*     Lab Results   Component Value Date    WBC 11.61 2018    HGB 8.5 (L) 2018    HCT 27.7 (L) 2018    MCV 93 2018     2018     No results for input(s): TSH, FREET4 in the last 168 hours.  Lab Results   Component Value Date    HGBA1C 8.2 (H) 2018       Nutritional status:   Body mass index is 34.84 kg/m².  Lab Results   Component Value Date    ALBUMIN 2.2 (L) 2018    ALBUMIN 2.2 (L) 2018    ALBUMIN 2.1 (L) 2018     No results found for: PREALBUMIN    Estimated Creatinine Clearance: 13.5 mL/min (A) (based on SCr of 8.1 mg/dL (H)).    Accu-Checks  Recent Labs      18   0703  18   0758  18   0902  18   1006  18   1101  18   1206  18   1358  18   1606  18   2234  18   0727   POCTGLUCOSE  >500*  >500*  343*  224*  150*  162*  257*  330*  343*  294*       Current Medications and/or Treatments Impacting Glycemic Control  Immunotherapy:    Immunosuppressants     None        Steroids:   Hormones (From admission, onward)    None        Pressors:    Autonomic Drugs (From admission, onward)    None        Hyperglycemia/Diabetes Medications:   Antihyperglycemics (From admission, onward)    Start     Stop Route " Frequency Ordered    08/22/18 1130  insulin aspart U-100 pen 10 Units      -- SubQ 3 times daily with meals 08/22/18 0907    08/22/18 0915  insulin detemir U-100 pen 20 Units      -- SubQ 2 times daily 08/22/18 0907    08/22/18 0906  insulin aspart U-100 pen 0-10 Units      -- SubQ Before meals & nightly PRN 08/22/18 0907

## 2018-08-22 NOTE — PROGRESS NOTES
[facility]  Endocrinology  Progress Note    Admit Date: 8/17/2018     Mickey Ross is a 53 year old male with a medical history significant for type II DM, ESRD on HD MWF, prosthetic left eye, past history of IVDU, and left ankle osteomyelitis with MRSA bacteremia who was a transfer from [facility] on 8/18 for evaluation of possibly right retinal abscess. Patient originally presented to OSH on 8/10 with fever and worsening LLE wound. Ortho performed debridement there. Blood cultures have been positive 8/10 and 8/15; no negative cultures thus far. He was being treated with Flagyl and Zyvox with Gentamicin dosed with HD; pt had a rash with Vancomycin.  Both ID and Ortho have recommended amputation of LLE but pt is refused. On 8/13, pt reported right vision change, describing a band that I cant see through. No imaging performed but Ophtho consulted and suspected large right retinal mass with ddx including hemorrhage or abscess; they recommend emergent transfer to [facility] for evaluation by retinal specialist (Dr. Alexander Corrales) who agreed with this plan. By transfer, patient's fever and leukocytosis had resolved. Patient has been followed by podiatry, ID, nephrology, and opthalmology here. Currently on Daptomycin 8mg/kg to be given after HD. BKA was again advised by podiatry but patient refused. Ophthalmology following for right retinal abscess.    Endocrinology was consulted on 8/21 for worsening hyperglycemia despite insulin gtt. In regards to patient's diabetes, he takes 30U levemir nightly at home. Although med rec states he is also on 8U aspart TIDWM, he has not picked up aspart since 5/2018.     Blood glucose have been labile here. Significantly, glucose was 200-250 range with regimen of 15 U levemir BID and MDSSI, up until morning of 8/20, in which BG was ~500. (Of note, for surrounding pruritus from right retinal abscess, patient was started on decadron 4 mg q8h on 8/19. She received 3 doses  "of this medication (twice on  and once on morning of )). On , patient received 25U of aspart along with her 30 U levemir. Patient was finally placed on insulin gtt early in the morning of , where her blood glucose on chem panel was 760.         Interval HPI:   Overnight events: Patient's glucose remained stable at ~300 on scheduled 7U TIDWM, transition gtt of 1.2U/hr, and MDSSI . Glucose 294 this morning. No acute events overnight. Patient seen at HD this morning and feeling well.   Eatin%  Nausea: No  Hypoglycemia and intervention: No  Fever: No  TPN and/or TF: No      BP (!) 177/78 (BP Location: Left arm, Patient Position: Lying)   Pulse 78   Temp 97.6 °F (36.4 °C) (Oral)   Resp 16   Ht 5' 11" (1.803 m)   Wt 113.3 kg (249 lb 12.5 oz)   SpO2 95%   BMI 34.84 kg/m²      Labs Reviewed and Include    Recent Labs   Lab  18   0810   GLU  319*   CALCIUM  7.4*   NA  134*   K  5.3*   CO2  19*   CL  100   BUN  82*   CREATININE  8.1*     Lab Results   Component Value Date    WBC 11.61 2018    HGB 8.5 (L) 2018    HCT 27.7 (L) 2018    MCV 93 2018     2018     No results for input(s): TSH, FREET4 in the last 168 hours.  Lab Results   Component Value Date    HGBA1C 8.2 (H) 2018       Nutritional status:   Body mass index is 34.84 kg/m².  Lab Results   Component Value Date    ALBUMIN 2.2 (L) 2018    ALBUMIN 2.2 (L) 2018    ALBUMIN 2.1 (L) 2018     No results found for: PREALBUMIN    Estimated Creatinine Clearance: 13.5 mL/min (A) (based on SCr of 8.1 mg/dL (H)).    Accu-Checks  Recent Labs      18   0703  18   0758  18   0902  18   1006  18   1101  18   1206  18   1358  18   1606  18   2234  18   0727   POCTGLUCOSE  >500*  >500*  343*  224*  150*  162*  257*  330*  343*  294*       Current Medications and/or Treatments Impacting Glycemic Control  Immunotherapy:  "   Immunosuppressants     None        Steroids:   Hormones (From admission, onward)    None        Pressors:    Autonomic Drugs (From admission, onward)    None        Hyperglycemia/Diabetes Medications:   Antihyperglycemics (From admission, onward)    Start     Stop Route Frequency Ordered    08/22/18 1130  insulin aspart U-100 pen 10 Units      -- SubQ 3 times daily with meals 08/22/18 0907    08/22/18 0915  insulin detemir U-100 pen 20 Units      -- SubQ 2 times daily 08/22/18 0907    08/22/18 0906  insulin aspart U-100 pen 0-10 Units      -- SubQ Before meals & nightly PRN 08/22/18 0907          ASSESSMENT and PLAN    * Sepsis due to methicillin resistant Staphylococcus aureus (MRSA)    - On daptomycin per ID.  - Per primary team.           Hyperglycemia    - Poor glucose control during hospital course originally secondary to stress response/cortisol surge due to ongoing infection (osteomyelitis/MRSA/retinal abscess?). Although has been afebrile and hemodynamically stable 48h+ with appropriate antibiotics. BCx last positive on 8/15.     - Recent worsening blood glucose coincides with IV dexamethasone use starting on 8/19 and continuing to morning of 8/20. BG on morning of 8/20 was ~500 which continued throughout day. Received 25U of aspart during day and remained on levemir 15U twice daily --> BG was 760 on morning of 8/21 and started on insulin gtt at this time.    Recommendations:   - Patient on transition drip of 1.2 U/hr (= appx 30U basal insulin) with scheduled insulin 7U TIDWM with stable glucose 300-350.  - Transitioned to levemir 20U BID, aspart to 10 TIDWM, and MDSSI.  - Will continue to titrate insulin per BG trends.          Type 2 diabetes mellitus with chronic kidney disease on chronic dialysis, with long-term current use of insulin    - HgA1c 8.2.  - On glargine 30U BID at home with aspart 10U TIDWM  - Blood glucose has been labile here, see section on hyperglycemia.           Subretinal abscess     - Followed by opthalmology.           Anxiety              Renovascular hypertension    - On losartan, per primary team.               Francisco J Jeong MD  PGY-3 Internal Medicine  [phone]    Endocrinology  [facility]

## 2018-08-22 NOTE — PT/OT/SLP PROGRESS
Physical Therapy      Patient Name:  Mickey Ross   MRN:  [id number]    Patient not seen today secondary to pt away at dialysis  . Will follow-up next scheduled treatment per PT POC    Valeriy Green, PTA 8/22/2018

## 2018-08-22 NOTE — PROGRESS NOTES
[facility] H[facility]ODIAL[facility]IS NOTE     Patient currently on hemodialysis for removal of uremic toxins and volume.    Patient seen and evaluated on hemodialysis, tolerating treatment, see HD flowsheet for vitals and assessments.      Ultrafiltration goal is 3L     Labs have been reviewed and the dialysate bath has been adjusted.     Assessment/Plan:     HD today for removal of uremic toxins and volume.  Serum K consistently above 5, diet changed to renal diet   please add novasource renal , and benaprotein powder daily

## 2018-08-22 NOTE — PROGRESS NOTES
[facility]  Podiatry  Progress Note    Patient Name: Mickey Ross  MRN: [id number]  Admission Date: 8/17/2018  Hospital Length of Stay: 4 days  Attending Physician: Bhargav Chacon MD  Primary Care Provider: Rosalina Moncada MD   Interval Hx:  Patient Seen at bedside for wound vac change.  Patient denies F/C/N/V.  Dressings clean, dry, and intact.    Scheduled Meds:   calcium acetate  1,334 mg Oral TID WM    cetirizine  10 mg Oral Daily    DAPTOmycin (CUBICIN)  IV  8 mg/kg Intravenous Q48H    diltiaZEM  300 mg Oral Daily    epoetin umer (PROCRIT) injection  6,000 Units Intravenous Every Mon, Wed, Fri    heparin (porcine)  5,000 Units Subcutaneous Q8H    insulin aspart U-100  7 Units Subcutaneous TIDWM    losartan  100 mg Oral Daily    methadone  10 mg Oral BID    polyethylene glycol  17 g Oral Daily    prednisoLONE acetate  1 drop Right Eye QID    sertraline  25 mg Oral Daily     Continuous Infusions:   insulin (HUMAN R) infusion (adults) 1.2 Units/hr (08/21/18 1426)     PRN Meds:sodium chloride 0.9%, acetaminophen, albuterol-ipratropium, clonazePAM, dextrose 50%, dextrose 50%, dextrose 50%, dextrose 50%, dextrose 50%, dextrose 50%, glucagon (human recombinant), glucagon (human recombinant), glucose, glucose, glucose, glucose, HYDROcodone-acetaminophen, hydrOXYzine HCl, insulin aspart U-100, naloxone, ondansetron, ondansetron, ramelteon, sodium chloride 0.9%    Review of patient's allergies indicates:   Allergen Reactions    Vancomycin analogues Rash     Red Man Syndrome    Penicillins         Past Medical History:   Diagnosis Date    Allergic drug rash - due to Vancomycin 8/19/2018    Arthritis     Blind left eye     Charcot's joint of foot     Diabetes mellitus     GERD (gastroesophageal reflux disease)     Glaucoma     Hypertension     MRSA (methicillin resistant staph aureus) culture positive     Renal disorder      Past Surgical History:   Procedure Laterality Date     AVF left upper arm      left ankle surgery      lumbar back surgery         Family History     Problem Relation (Age of Onset)    Pneumonia Mother        Tobacco Use    Smoking status: Never Smoker   Substance and Sexual Activity    Alcohol use: No     Frequency: Never    Drug use: No    Sexual activity: Not Currently     Review of Systems   Constitutional: Negative for chills and fever.   Gastrointestinal: Negative for nausea and vomiting.   Musculoskeletal:        Left ankle deformity.   Skin: Positive for color change and wound (Surgical incisions to the medial and lateral ankle).     Objective:     Vital Signs (Most Recent):  Temp: 97.9 °F (36.6 °C) (08/21/18 1514)  Pulse: 73 (08/21/18 1514)  Resp: 18 (08/21/18 1514)  BP: (!) 154/69 (08/21/18 1514)  SpO2: 98 % (08/21/18 1514) Vital Signs (24h Range):  Temp:  [97.4 °F (36.3 °C)-98 °F (36.7 °C)] 97.9 °F (36.6 °C)  Pulse:  [73-81] 73  Resp:  [12-18] 18  SpO2:  [93 %-98 %] 98 %  BP: (154-180)/(66-93) 154/69     Weight: 113.3 kg (249 lb 12.5 oz)  Body mass index is 34.84 kg/m².    Foot Exam    General  General Appearance: appears stated age and healthy   Orientation: alert and oriented to person, place, and time       Left Foot/Ankle      Inspection and Palpation  Swelling: (Diffuse edema to the left LE, non pitting.)  Skin Exam: erythema (To the medial ankle incision); skin not intact (Surgical incisions to the medial and lateral ankle.)     Neurovascular  Dorsalis pedis: absent  Posterior tibial: absent  Saphenous nerve sensation: diminished  Tibial nerve sensation: diminished  Superficial peroneal nerve sensation: diminished  Deep peroneal nerve sensation: diminished  Sural nerve sensation: diminished    Comments  Ortho: Severe non reducible varus deformity of the left ankle.    Lateral Left ankle: Incision present with sutures in tact. No signs of acute infection. Skin edges well coapted.     Medial Left ankle: Surgical incision left open with granular  wound beds to edges. Mild erythema, no drainage no purulence, no malodor, no streaking. Positive pain to palpation.     See clinic images below.      Laboratory:  A1C:   Recent Labs   Lab  08/17/18 2159   HGBA1C  8.2*     Blood Cultures:   No results for input(s): LABBLOO in the last 48 hours.  CBC:   Recent Labs   Lab  08/21/18 0456   WBC  9.56   RBC  2.76*   HGB  7.9*   HCT  25.6*   PLT  115*   MCV  93   MCH  28.6   MCHC  30.9*     CMP:   Recent Labs   Lab  08/21/18 0456 08/21/18   1606   GLU  760*   < >  343*   CALCIUM  7.2*   < >  7.2*   ALBUMIN  2.2*   --    --    PROT  5.5*   --    --    NA  126*   < >  133*   K  5.2*   < >  5.0   CO2  20*   < >  21*   CL  96   < >  98   BUN  65*   < >  70*   CREATININE  6.9*   < >  7.2*   ALKPHOS  270*   --    --    ALT  26   --    --    AST  8*   --    --    BILITOT  0.5   --    --     < > = values in this interval not displayed.     CRP:   Recent Labs   Lab  08/17/18 2159   CRP  69.0*     ESR:   Recent Labs   Lab  08/17/18 2159   SEDRATE  58*     Microbiology Results (last 7 days)     Procedure Component Value Units Date/Time    Blood culture (site 1) [[id number]] Collected:  08/17/18 2201    Order Status:  Completed Specimen:  Blood from Peripheral, Hand, Right Updated:  08/21/18 0612     Blood Culture, Routine No Growth to date     Blood Culture, Routine No Growth to date     Blood Culture, Routine No Growth to date     Blood Culture, Routine No Growth to date    Narrative:       Site # 1, aerobic and anaerobic    Blood culture (site 2) [[id number]] Collected:  08/17/18 2201    Order Status:  Completed Specimen:  Blood from Peripheral, Antecubital, Right Updated:  08/21/18 0612     Blood Culture, Routine No Growth to date     Blood Culture, Routine No Growth to date     Blood Culture, Routine No Growth to date     Blood Culture, Routine No Growth to date    Narrative:       Site # 2, aerobic only    Culture, Anaerobe [[id number]]     Order Status:  No result  Specimen:  Eye from Conjunctiva, Right     Aerobic culture [[id number]]     Order Status:  No result Specimen:  Eye from Conjunctiva, Right     Gram stain [[id number]]     Order Status:  No result Specimen:  Eye from Conjunctiva, Right     Blood culture #2 **CANNOT BE ORDERED STAT** [[id number]]     Order Status:  Canceled Specimen:  Blood     Blood culture #1 **CANNOT BE ORDERED STAT** [[id number]]     Order Status:  Canceled Specimen:  Blood         Specimen (12h ago, onward)    None          Diagnostic Results:  X-ray:  Bony destruction, impaction and probable osseous fusion involving the hindfoot and midfoot.  There appears to be medial angulation and displacement of the hindfoot with respect to the adjacent tibia and fibula.  Comparison to prior films and correlation with clinical findings will be needed.                      Assessment/Plan:     ESRD on hemodialysis    Per primary        Acute hematogenous osteomyelitis of left foot    Mickey Ross is a 53 y.o. male with Right ankle wound, stable  -Wound vac to medial ankle changed today. 125 mmHg medium continuous to continue.  Wound healing well.  Likely will DC vac next visit if wound is too small to place foam and continue with local wound care.   -ID on board, appreciate recs.  Talked to ID, no need for new wound cultures especially in the setting of antibiotic therapy that likely will skew results.  -Patient to follow up with [facility] podiatry within one week of discharge.  -Podiatry will continue to follow.                    Romeo Hoyt MD  Podiatry  [facility]

## 2018-08-22 NOTE — PROGRESS NOTES
Dialysis tx completed. 3.5 ours. 3 liters removed. Needles pulled from left arm AVF. Manual pressure held. Hemostasis achieved. Gauze and tape to sites. Tolerated tx well Report given to primary RN.

## 2018-08-22 NOTE — PLAN OF CARE
Problem: Infection, Risk/Actual (Adult)  Intervention: Prevent Infection/Maximize Resistance  Pt is diabetic. Has insulin drip & blood sugar is also controlled by BG monitoring & sliding scale coverage.

## 2018-08-22 NOTE — PLAN OF CARE
Problem: Patient Care Overview  Goal: Plan of Care Review  Pt went to dialysis today, 3L removed.

## 2018-08-22 NOTE — SUBJECTIVE & OBJECTIVE
Interval Hx:  Patient Seen at bedside for wound vac change.  Patient denies F/C/N/V.  Dressings clean, dry, and intact.    Scheduled Meds:   calcium acetate  1,334 mg Oral TID WM    cetirizine  10 mg Oral Daily    DAPTOmycin (CUBICIN)  IV  8 mg/kg Intravenous Q48H    diltiaZEM  300 mg Oral Daily    epoetin umer (PROCRIT) injection  6,000 Units Intravenous Every Mon, Wed, Fri    heparin (porcine)  5,000 Units Subcutaneous Q8H    insulin aspart U-100  7 Units Subcutaneous TIDWM    losartan  100 mg Oral Daily    methadone  10 mg Oral BID    polyethylene glycol  17 g Oral Daily    prednisoLONE acetate  1 drop Right Eye QID    sertraline  25 mg Oral Daily     Continuous Infusions:   insulin (HUMAN R) infusion (adults) 1.2 Units/hr (08/21/18 1426)     PRN Meds:sodium chloride 0.9%, acetaminophen, albuterol-ipratropium, clonazePAM, dextrose 50%, dextrose 50%, dextrose 50%, dextrose 50%, dextrose 50%, dextrose 50%, glucagon (human recombinant), glucagon (human recombinant), glucose, glucose, glucose, glucose, HYDROcodone-acetaminophen, hydrOXYzine HCl, insulin aspart U-100, naloxone, ondansetron, ondansetron, ramelteon, sodium chloride 0.9%    Review of patient's allergies indicates:   Allergen Reactions    Vancomycin analogues Rash     Red Man Syndrome    Penicillins         Past Medical History:   Diagnosis Date    Allergic drug rash - due to Vancomycin 8/19/2018    Arthritis     Blind left eye     Charcot's joint of foot     Diabetes mellitus     GERD (gastroesophageal reflux disease)     Glaucoma     Hypertension     MRSA (methicillin resistant staph aureus) culture positive     Renal disorder      Past Surgical History:   Procedure Laterality Date    AVF left upper arm      left ankle surgery      lumbar back surgery         Family History     Problem Relation (Age of Onset)    Pneumonia Mother        Tobacco Use    Smoking status: Never Smoker   Substance and Sexual Activity    Alcohol  use: No     Frequency: Never    Drug use: No    Sexual activity: Not Currently     Review of Systems   Constitutional: Negative for chills and fever.   Gastrointestinal: Negative for nausea and vomiting.   Musculoskeletal:        Left ankle deformity.   Skin: Positive for color change and wound (Surgical incisions to the medial and lateral ankle).     Objective:     Vital Signs (Most Recent):  Temp: 97.9 °F (36.6 °C) (08/21/18 1514)  Pulse: 73 (08/21/18 1514)  Resp: 18 (08/21/18 1514)  BP: (!) 154/69 (08/21/18 1514)  SpO2: 98 % (08/21/18 1514) Vital Signs (24h Range):  Temp:  [97.4 °F (36.3 °C)-98 °F (36.7 °C)] 97.9 °F (36.6 °C)  Pulse:  [73-81] 73  Resp:  [12-18] 18  SpO2:  [93 %-98 %] 98 %  BP: (154-180)/(66-93) 154/69     Weight: 113.3 kg (249 lb 12.5 oz)  Body mass index is 34.84 kg/m².    Foot Exam    General  General Appearance: appears stated age and healthy   Orientation: alert and oriented to person, place, and time       Left Foot/Ankle      Inspection and Palpation  Swelling: (Diffuse edema to the left LE, non pitting.)  Skin Exam: erythema (To the medial ankle incision); skin not intact (Surgical incisions to the medial and lateral ankle.)     Neurovascular  Dorsalis pedis: absent  Posterior tibial: absent  Saphenous nerve sensation: diminished  Tibial nerve sensation: diminished  Superficial peroneal nerve sensation: diminished  Deep peroneal nerve sensation: diminished  Sural nerve sensation: diminished    Comments  Ortho: Severe non reducible varus deformity of the left ankle.    Lateral Left ankle: Incision present with sutures in tact. No signs of acute infection. Skin edges well coapted.     Medial Left ankle: Surgical incision left open with granular wound beds to edges. Mild erythema, no drainage no purulence, no malodor, no streaking. Positive pain to palpation.     See clinic images below.      Laboratory:  A1C:   Recent Labs   Lab  08/17/18   2159   HGBA1C  8.2*     Blood Cultures:   No  results for input(s): LABBLOO in the last 48 hours.  CBC:   Recent Labs   Lab  08/21/18 0456   WBC  9.56   RBC  2.76*   HGB  7.9*   HCT  25.6*   PLT  115*   MCV  93   MCH  28.6   MCHC  30.9*     CMP:   Recent Labs   Lab  08/21/18   0456   08/21/18   1606   GLU  760*   < >  343*   CALCIUM  7.2*   < >  7.2*   ALBUMIN  2.2*   --    --    PROT  5.5*   --    --    NA  126*   < >  133*   K  5.2*   < >  5.0   CO2  20*   < >  21*   CL  96   < >  98   BUN  65*   < >  70*   CREATININE  6.9*   < >  7.2*   ALKPHOS  270*   --    --    ALT  26   --    --    AST  8*   --    --    BILITOT  0.5   --    --     < > = values in this interval not displayed.     CRP:   Recent Labs   Lab  08/17/18 2159   CRP  69.0*     ESR:   Recent Labs   Lab  08/17/18 2159   SEDRATE  58*     Microbiology Results (last 7 days)     Procedure Component Value Units Date/Time    Blood culture (site 1) [[id number]] Collected:  08/17/18 2201    Order Status:  Completed Specimen:  Blood from Peripheral, Hand, Right Updated:  08/21/18 0612     Blood Culture, Routine No Growth to date     Blood Culture, Routine No Growth to date     Blood Culture, Routine No Growth to date     Blood Culture, Routine No Growth to date    Narrative:       Site # 1, aerobic and anaerobic    Blood culture (site 2) [[id number]] Collected:  08/17/18 2201    Order Status:  Completed Specimen:  Blood from Peripheral, Antecubital, Right Updated:  08/21/18 0612     Blood Culture, Routine No Growth to date     Blood Culture, Routine No Growth to date     Blood Culture, Routine No Growth to date     Blood Culture, Routine No Growth to date    Narrative:       Site # 2, aerobic only    Culture, Anaerobe [[id number]]     Order Status:  No result Specimen:  Eye from Conjunctiva, Right     Aerobic culture [[id number]]     Order Status:  No result Specimen:  Eye from Conjunctiva, Right     Gram stain [[id number]]     Order Status:  No result Specimen:  Eye from Conjunctiva, Right     Blood  culture #2 **CANNOT BE ORDERED STAT** [[id number]]     Order Status:  Canceled Specimen:  Blood     Blood culture #1 **CANNOT BE ORDERED STAT** [[id number]]     Order Status:  Canceled Specimen:  Blood         Specimen (12h ago, onward)    None          Diagnostic Results:  X-ray:  Bony destruction, impaction and probable osseous fusion involving the hindfoot and midfoot.  There appears to be medial angulation and displacement of the hindfoot with respect to the adjacent tibia and fibula.  Comparison to prior films and correlation with clinical findings will be needed.

## 2018-08-22 NOTE — PROGRESS NOTES
Patient arrived on unit via stretcher. Weight obtained in bed @ 108.1Kg. Dialysis initiated via left arm AVG with 15g needles x 2 without difficulty. Lines connected and secured. Orders and machine settings verified. Tx started. Good blood flows established.

## 2018-08-22 NOTE — PT/OT/SLP PROGRESS
Occupational Therapy      Patient Name:  Mickey Ross   MRN:  [id number]    Patient not seen today secondary to Unavailable (pt off floor for dialysis in AM). Unable to re-attempt in PM.  Will follow-up as scheduled.    Tamia smith, OT  8/22/2018

## 2018-08-22 NOTE — ASSESSMENT & PLAN NOTE
Mickey Ross is a 53 y.o. male with Right ankle wound, stable  -Wound vac to medial ankle changed today. 125 mmHg medium continuous to continue.  Wound healing well.  Likely will DC vac next visit if wound is too small to place foam and continue with local wound care.   -ID on board, appreciate recs.  Talked to ID, no need for new wound cultures especially in the setting of antibiotic therapy that likely will skew results.  -Patient to follow up with [facility] podiatry within one week of discharge.  -Podiatry will continue to follow.

## 2018-08-23 ENCOUNTER — OPHTH EXAM (OUTPATIENT)
Dept: OPHTHALMOLOGY | Facility: CLINIC | Age: 53
DRG: 116 | End: 2018-08-23
Payer: MEDICAID

## 2018-08-23 DIAGNOSIS — L02.91 ABSCESS: Primary | ICD-10-CM

## 2018-08-23 LAB
ANION GAP SERPL CALC-SCNC: 10 MMOL/L
ANION GAP SERPL CALC-SCNC: 11 MMOL/L
ANION GAP SERPL CALC-SCNC: 7 MMOL/L
ANION GAP SERPL CALC-SCNC: 9 MMOL/L
ANION GAP SERPL CALC-SCNC: 9 MMOL/L
ANISOCYTOSIS BLD QL SMEAR: SLIGHT
BACTERIA BLD CULT: NORMAL
BACTERIA BLD CULT: NORMAL
BASOPHILS # BLD AUTO: 0.05 K/UL
BASOPHILS NFR BLD: 0.6 %
BUN SERPL-MCNC: 43 MG/DL
BUN SERPL-MCNC: 46 MG/DL
BUN SERPL-MCNC: 46 MG/DL
BUN SERPL-MCNC: 50 MG/DL
BUN SERPL-MCNC: 54 MG/DL
CALCIUM SERPL-MCNC: 7.4 MG/DL
CALCIUM SERPL-MCNC: 7.5 MG/DL
CALCIUM SERPL-MCNC: 7.6 MG/DL
CALCIUM SERPL-MCNC: 7.7 MG/DL
CALCIUM SERPL-MCNC: 7.7 MG/DL
CHLORIDE SERPL-SCNC: 100 MMOL/L
CHLORIDE SERPL-SCNC: 101 MMOL/L
CHLORIDE SERPL-SCNC: 102 MMOL/L
CO2 SERPL-SCNC: 22 MMOL/L
CO2 SERPL-SCNC: 23 MMOL/L
CO2 SERPL-SCNC: 24 MMOL/L
CO2 SERPL-SCNC: 24 MMOL/L
CO2 SERPL-SCNC: 25 MMOL/L
CREAT SERPL-MCNC: 5.5 MG/DL
CREAT SERPL-MCNC: 5.9 MG/DL
CREAT SERPL-MCNC: 6.1 MG/DL
CREAT SERPL-MCNC: 6.5 MG/DL
CREAT SERPL-MCNC: 7.1 MG/DL
DIFFERENTIAL METHOD: ABNORMAL
EOSINOPHIL # BLD AUTO: 0.3 K/UL
EOSINOPHIL NFR BLD: 2.8 %
ERYTHROCYTE [DISTWIDTH] IN BLOOD BY AUTOMATED COUNT: 16.3 %
EST. GFR  (AFRICAN AMERICAN): 10.3 ML/MIN/1.73 M^2
EST. GFR  (AFRICAN AMERICAN): 11.1 ML/MIN/1.73 M^2
EST. GFR  (AFRICAN AMERICAN): 11.6 ML/MIN/1.73 M^2
EST. GFR  (AFRICAN AMERICAN): 12.6 ML/MIN/1.73 M^2
EST. GFR  (AFRICAN AMERICAN): 9.2 ML/MIN/1.73 M^2
EST. GFR  (NON AFRICAN AMERICAN): 10 ML/MIN/1.73 M^2
EST. GFR  (NON AFRICAN AMERICAN): 10.9 ML/MIN/1.73 M^2
EST. GFR  (NON AFRICAN AMERICAN): 8 ML/MIN/1.73 M^2
EST. GFR  (NON AFRICAN AMERICAN): 8.9 ML/MIN/1.73 M^2
EST. GFR  (NON AFRICAN AMERICAN): 9.6 ML/MIN/1.73 M^2
GLUCOSE SERPL-MCNC: 154 MG/DL
GLUCOSE SERPL-MCNC: 169 MG/DL
GLUCOSE SERPL-MCNC: 202 MG/DL
GLUCOSE SERPL-MCNC: 248 MG/DL
GLUCOSE SERPL-MCNC: 346 MG/DL
HCT VFR BLD AUTO: 26.2 %
HGB BLD-MCNC: 8.5 G/DL
HYPOCHROMIA BLD QL SMEAR: ABNORMAL
IMM GRANULOCYTES # BLD AUTO: 0.26 K/UL
IMM GRANULOCYTES NFR BLD AUTO: 2.9 %
LYMPHOCYTES # BLD AUTO: 1.7 K/UL
LYMPHOCYTES NFR BLD: 19.1 %
MAGNESIUM SERPL-MCNC: 1.9 MG/DL
MCH RBC QN AUTO: 29.3 PG
MCHC RBC AUTO-ENTMCNC: 32.4 G/DL
MCV RBC AUTO: 90 FL
MONOCYTES # BLD AUTO: 0.8 K/UL
MONOCYTES NFR BLD: 8.8 %
MPV, BLUE TOP: NORMAL FL
NEUTROPHILS # BLD AUTO: 5.9 K/UL
NEUTROPHILS NFR BLD: 65.8 %
NRBC BLD-RTO: 0 /100 WBC
OVALOCYTES BLD QL SMEAR: ABNORMAL
PHOSPHATE SERPL-MCNC: 4.8 MG/DL
PLATELET # BLD AUTO: ABNORMAL K/UL
PLATELET BLD QL SMEAR: ABNORMAL
PLATELET, BLUE TOP: 164 K/UL
PMV BLD AUTO: 11.8 FL
POCT GLUCOSE: 209 MG/DL (ref 70–110)
POCT GLUCOSE: 214 MG/DL (ref 70–110)
POCT GLUCOSE: 247 MG/DL (ref 70–110)
POCT GLUCOSE: 351 MG/DL (ref 70–110)
POCT GLUCOSE: 391 MG/DL (ref 70–110)
POIKILOCYTOSIS BLD QL SMEAR: SLIGHT
POLYCHROMASIA BLD QL SMEAR: ABNORMAL
POTASSIUM SERPL-SCNC: 5 MMOL/L
POTASSIUM SERPL-SCNC: 5.2 MMOL/L
POTASSIUM SERPL-SCNC: 5.4 MMOL/L
RBC # BLD AUTO: 2.9 M/UL
SODIUM SERPL-SCNC: 133 MMOL/L
SODIUM SERPL-SCNC: 133 MMOL/L
SODIUM SERPL-SCNC: 135 MMOL/L
WBC # BLD AUTO: 8.99 K/UL

## 2018-08-23 PROCEDURE — 80048 BASIC METABOLIC PNL TOTAL CA: CPT | Mod: 91

## 2018-08-23 PROCEDURE — 99999 PR PBB SHADOW E&M-EST. PATIENT-LVL I: CPT | Mod: PBBFAC,,, | Performed by: OPHTHALMOLOGY

## 2018-08-23 PROCEDURE — 25000003 PHARM REV CODE 250: Performed by: STUDENT IN AN ORGANIZED HEALTH CARE EDUCATION/TRAINING PROGRAM

## 2018-08-23 PROCEDURE — 83735 ASSAY OF MAGNESIUM: CPT

## 2018-08-23 PROCEDURE — 63600175 PHARM REV CODE 636 W HCPCS: Performed by: STUDENT IN AN ORGANIZED HEALTH CARE EDUCATION/TRAINING PROGRAM

## 2018-08-23 PROCEDURE — 63600175 PHARM REV CODE 636 W HCPCS: Mod: JG | Performed by: STUDENT IN AN ORGANIZED HEALTH CARE EDUCATION/TRAINING PROGRAM

## 2018-08-23 PROCEDURE — 63600175 PHARM REV CODE 636 W HCPCS: Performed by: INTERNAL MEDICINE

## 2018-08-23 PROCEDURE — 99231 SBSQ HOSP IP/OBS SF/LOW 25: CPT | Mod: ,,, | Performed by: INTERNAL MEDICINE

## 2018-08-23 PROCEDURE — 85049 AUTOMATED PLATELET COUNT: CPT

## 2018-08-23 PROCEDURE — 99233 SBSQ HOSP IP/OBS HIGH 50: CPT | Mod: ,,, | Performed by: PODIATRIST

## 2018-08-23 PROCEDURE — 84100 ASSAY OF PHOSPHORUS: CPT

## 2018-08-23 PROCEDURE — 20600001 HC STEP DOWN PRIVATE ROOM

## 2018-08-23 PROCEDURE — 85025 COMPLETE CBC W/AUTO DIFF WBC: CPT

## 2018-08-23 PROCEDURE — 36415 COLL VENOUS BLD VENIPUNCTURE: CPT

## 2018-08-23 PROCEDURE — 99211 OFF/OP EST MAY X REQ PHY/QHP: CPT | Mod: PBBFAC | Performed by: OPHTHALMOLOGY

## 2018-08-23 RX ORDER — INSULIN ASPART 100 [IU]/ML
8 INJECTION, SOLUTION INTRAVENOUS; SUBCUTANEOUS
Status: DISCONTINUED | OUTPATIENT
Start: 2018-08-23 | End: 2018-08-24

## 2018-08-23 RX ORDER — INSULIN ASPART 100 [IU]/ML
0-5 INJECTION, SOLUTION INTRAVENOUS; SUBCUTANEOUS
Status: DISCONTINUED | OUTPATIENT
Start: 2018-08-23 | End: 2018-08-25

## 2018-08-23 RX ADMIN — PREDNISOLONE ACETATE 1 DROP: 10 SUSPENSION/ DROPS OPHTHALMIC at 05:08

## 2018-08-23 RX ADMIN — INSULIN ASPART 2 UNITS: 100 INJECTION, SOLUTION INTRAVENOUS; SUBCUTANEOUS at 02:08

## 2018-08-23 RX ADMIN — INSULIN ASPART 2 UNITS: 100 INJECTION, SOLUTION INTRAVENOUS; SUBCUTANEOUS at 12:08

## 2018-08-23 RX ADMIN — INSULIN ASPART 8 UNITS: 100 INJECTION, SOLUTION INTRAVENOUS; SUBCUTANEOUS at 08:08

## 2018-08-23 RX ADMIN — INSULIN ASPART 3 UNITS: 100 INJECTION, SOLUTION INTRAVENOUS; SUBCUTANEOUS at 08:08

## 2018-08-23 RX ADMIN — METHADONE HYDROCHLORIDE 10 MG: 5 TABLET ORAL at 08:08

## 2018-08-23 RX ADMIN — DILTIAZEM HYDROCHLORIDE 300 MG: 300 CAPSULE, COATED, EXTENDED RELEASE ORAL at 08:08

## 2018-08-23 RX ADMIN — HEPARIN SODIUM 5000 UNITS: 5000 INJECTION, SOLUTION INTRAVENOUS; SUBCUTANEOUS at 06:08

## 2018-08-23 RX ADMIN — INSULIN ASPART 8 UNITS: 100 INJECTION, SOLUTION INTRAVENOUS; SUBCUTANEOUS at 05:08

## 2018-08-23 RX ADMIN — INSULIN ASPART 5 UNITS: 100 INJECTION, SOLUTION INTRAVENOUS; SUBCUTANEOUS at 05:08

## 2018-08-23 RX ADMIN — CALCIUM ACETATE 1334 MG: 667 CAPSULE ORAL at 12:08

## 2018-08-23 RX ADMIN — LOSARTAN POTASSIUM 100 MG: 50 TABLET, FILM COATED ORAL at 08:08

## 2018-08-23 RX ADMIN — HEPARIN SODIUM 5000 UNITS: 5000 INJECTION, SOLUTION INTRAVENOUS; SUBCUTANEOUS at 02:08

## 2018-08-23 RX ADMIN — DAPTOMYCIN 905 MG: 500 INJECTION, POWDER, LYOPHILIZED, FOR SOLUTION INTRAVENOUS at 12:08

## 2018-08-23 RX ADMIN — HYDRALAZINE HYDROCHLORIDE 25 MG: 25 TABLET ORAL at 02:08

## 2018-08-23 RX ADMIN — CETIRIZINE HYDROCHLORIDE 10 MG: 10 TABLET, FILM COATED ORAL at 08:08

## 2018-08-23 RX ADMIN — INSULIN ASPART 2 UNITS: 100 INJECTION, SOLUTION INTRAVENOUS; SUBCUTANEOUS at 08:08

## 2018-08-23 RX ADMIN — HYDROCODONE BITARTRATE AND ACETAMINOPHEN 1 TABLET: 7.5; 325 TABLET ORAL at 02:08

## 2018-08-23 RX ADMIN — SERTRALINE HYDROCHLORIDE 25 MG: 25 TABLET ORAL at 08:08

## 2018-08-23 RX ADMIN — CALCIUM ACETATE 1334 MG: 667 CAPSULE ORAL at 05:08

## 2018-08-23 RX ADMIN — CALCIUM ACETATE 1334 MG: 667 CAPSULE ORAL at 08:08

## 2018-08-23 RX ADMIN — INSULIN ASPART 8 UNITS: 100 INJECTION, SOLUTION INTRAVENOUS; SUBCUTANEOUS at 12:08

## 2018-08-23 RX ADMIN — PREDNISOLONE ACETATE 1 DROP: 10 SUSPENSION/ DROPS OPHTHALMIC at 02:08

## 2018-08-23 RX ADMIN — HEPARIN SODIUM 5000 UNITS: 5000 INJECTION, SOLUTION INTRAVENOUS; SUBCUTANEOUS at 08:08

## 2018-08-23 RX ADMIN — PREDNISOLONE ACETATE 1 DROP: 10 SUSPENSION/ DROPS OPHTHALMIC at 08:08
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
## 2018-08-23 NOTE — ASSESSMENT & PLAN NOTE
- Long term diabetic, poorly controlled. Takes Levemir 30U at home.  - A1C 8.2  - Currently on home dose Basaglar 20 units BID and NOvolog Premeal 10 units and sliding scale  - tolerating oral diet

## 2018-08-23 NOTE — ASSESSMENT & PLAN NOTE
- Patient with many days of positive MRSA blood cultures at [facility], previously being treated with Flagyl, linezolid, and gentamicin with hemodialysis  - Likely source related to left foot osteomyelitis of 5th metatarsal. Will consider US of   - Patient with signs of septic embolization, right retro retinal abscess  - History of epidural abscess and associated IV drug use, reports discontinuation of IV drugs for over 5 years ago  - Repeat cultures drawn in the ED, shows NGTD.  - Patient with allergy to vancomycin, full body rash with associated pruritus.   - ID consulted, appreciate recs. Started on Daptomycin 8mg/kg to be given after HD.  * Recommended 1gm IV to be given after HD once discharged  - US LUE AVF shows no signs of abscess or fluid collection.  - Vitals stable, normal white count. Will continue to monitor.

## 2018-08-23 NOTE — PLAN OF CARE
Problem: Patient Care Overview  Goal: Plan of Care Review  Outcome: Ongoing (interventions implemented as appropriate)  Patient is alert and oriented, able to verbalized needs and wants. POC ongoing and reviewed with the patient. Patient went to the eye clinic this morning.Podiatry change the dressing to the Left foot and discontinued the wound vac. Questions and concerns addressed. Safety precautions maintained, call light and personal belongings at reach.

## 2018-08-23 NOTE — PT/OT/SLP PROGRESS
Occupational Therapy      Patient Name:  Mickey Ross   MRN:  [id number]    Patient not seen today secondary to declining due to just having had his pupils dilated and can't see. Will follow-up per schedule.    DARRYL Soto  8/23/2018

## 2018-08-23 NOTE — ASSESSMENT & PLAN NOTE
Mickey Ross is a 53 y.o. male with Right ankle wound, stable  -discontinued wound vac because wound is too small to put foam in.  Dressed with estefania, xeroform, 4x4's, kerlix, cast padding, and ace.  -ID on board, appreciate recs.    -Patient to follow up with [facility] podiatry within one week of discharge.    -Podiatry will continue to follow.

## 2018-08-23 NOTE — ASSESSMENT & PLAN NOTE
- Osteomyelitis of left 5th metatarsal taken for washout by Orthopedic surgery at [facility] on 08/15  - Patient being treated outside facility with Flagyl, linezolid, gentamicin with HD.  - Refusing amputation which would be curative.   - Podiatry consulted, appreciate recs. Advised BKA but patient refused. Recommended CAM boot and abx per ID. Follow up with [facility] pod[facility]ry  - Started on Daptomycin per ID  - ESR elevated at 58 upon admission  - X-ray left foot and ankle shows partial amputation the 1st, 2nd, 3rd, and 4th digits with fusion of the 2nd and 3rd MTP joints and throughout the midfoot, severe deformity and joint space loss the tibiotalar joint with a lapse of the talus likely 2/2 to chronic osteomyelitis, arthritis, or chronic Charcot foot.

## 2018-08-23 NOTE — ASSESSMENT & PLAN NOTE
- Vision improving  - Opthmology following, fundoscopic exams daily and assessment made daily to administer vitreous abx  - Currently received Vanc 2x (08/17) and (08/20)  - will monitor closely and follow recs
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
## 2018-08-23 NOTE — ASSESSMENT & PLAN NOTE
- Long term diabetic, poorly controlled. Takes Levemir 30U at home.  - A1C 8.2  - Currently on Lev 24 qdaily and Novolog 8 TID with LDSSI  - diabetic and renal diet  - AC/HS accuchecks.

## 2018-08-23 NOTE — SUBJECTIVE & OBJECTIVE
Interval History: NAEON.    Review of Systems   Constitutional: Negative for chills and fever.   HENT: Negative for trouble swallowing.    Eyes: Positive for visual disturbance.   Respiratory: Negative for cough and shortness of breath.    Cardiovascular: Negative for chest pain and leg swelling.   Gastrointestinal: Negative for abdominal pain, constipation and diarrhea.   Genitourinary: Positive for decreased urine volume.   Musculoskeletal: Negative for arthralgias.   Skin: Negative for color change.   Neurological: Negative for dizziness and light-headedness.   Psychiatric/Behavioral: Negative for agitation and confusion.     Objective:     Vital Signs (Most Recent):  Temp: 98.2 °F (36.8 °C) (08/23/18 1146)  Pulse: 82 (08/23/18 1231)  Resp: 18 (08/23/18 1146)  BP: (!) 188/86 (08/23/18 1146)  SpO2: (!) 94 % (08/23/18 1146) Vital Signs (24h Range):  Temp:  [97.8 °F (36.6 °C)-98.6 °F (37 °C)] 98.2 °F (36.8 °C)  Pulse:  [76-92] 82  Resp:  [16-20] 18  SpO2:  [90 %-96 %] 94 %  BP: (170-196)/(77-88) 188/86     Weight: 113.3 kg (249 lb 12.5 oz)  Body mass index is 34.84 kg/m².    Intake/Output Summary (Last 24 hours) at 8/23/2018 1355  Last data filed at 8/23/2018 1252  Gross per 24 hour   Intake 480 ml   Output --   Net 480 ml      Physical Exam   Constitutional: He is oriented to person, place, and time. No distress.   HENT:   Head: Normocephalic.   Eyes: EOM are normal.   Neck: Normal range of motion.   Cardiovascular: Normal rate, regular rhythm, normal heart sounds and intact distal pulses.   Pulmonary/Chest: Effort normal and breath sounds normal.   Abdominal: Soft. Bowel sounds are normal.   Musculoskeletal: He exhibits edema and deformity.   Neurological: He is alert and oriented to person, place, and time.   Skin: Skin is warm.   Nursing note and vitals reviewed.      Significant Labs:   A1C:   Recent Labs   Lab  08/17/18   2159   HGBA1C  8.2*     ABGs: No results for input(s): PH, PCO2, HCO3, POCSATURATED, BE,  TOTALHB, COHB, METHB, O2HB, POCFIO2 in the last 48 hours.  Bilirubin:   Recent Labs   Lab  08/17/18   2159  08/18/18   0613  08/19/18   0645  08/20/18   0826  08/21/18   0456   BILITOT  0.7  0.8  0.7  0.4  0.5     Blood Culture: No results for input(s): LABBLOO in the last 48 hours.  BMP:   Recent Labs   Lab  08/23/18   0431   08/23/18   1151   GLU  154*   < >  248*   NA  135*   < >  135*   K  5.4*   < >  5.0   CL  102   < >  101   CO2  22*   < >  25   BUN  46*   < >  50*   CREATININE  5.9*   < >  6.5*   CALCIUM  7.6*   < >  7.7*   MG  1.9   --    --     < > = values in this interval not displayed.     CBC:   Recent Labs   Lab  08/22/18   0556  08/23/18   0431   WBC  11.61  8.99   HGB  8.5*  8.5*   HCT  27.7*  26.2*   PLT  201  SEE COMMENT     CMP:   Recent Labs   Lab  08/23/18   0431  08/23/18   0825  08/23/18   1151   NA  135*  135*  135*   K  5.4*  5.2*  5.0   CL  102  102  101   CO2  22*  24  25   GLU  154*  202*  248*   BUN  46*  46*  50*   CREATININE  5.9*  6.1*  6.5*   CALCIUM  7.6*  7.7*  7.7*   ANIONGAP  11  9  9   EGFRNONAA  10.0*  9.6*  8.9*     Cardiac Markers: No results for input(s): CKMB, MYOGLOBIN, BNP, TROPISTAT in the last 48 hours.  Coagulation: No results for input(s): PT, INR, APTT in the last 48 hours.  Lactic Acid: No results for input(s): LACTATE in the last 48 hours.  Lipase: No results for input(s): LIPASE in the last 48 hours.  Lipid Panel: No results for input(s): CHOL, HDL, LDLCALC, TRIG, CHOLHDL in the last 48 hours.  Magnesium:   Recent Labs   Lab  08/22/18   0556  08/23/18   0431   MG  2.2  1.9     Pathology Results  (Last 10 years)    None        POCT Glucose:   Recent Labs   Lab  08/23/18   0219  08/23/18   0748  08/23/18   1135   POCTGLUCOSE  214*  209*  247*     Prealbumin: No results for input(s): PREALBUMIN in the last 48 hours.  Respiratory Culture: No results for input(s): GSRESP, RESPIRATORYC in the last 48 hours.  Troponin: No results for input(s): TROPONINI in the last 48  hours.  TSH: No results for input(s): TSH in the last 4320 hours.  Urine Culture: No results for input(s): LABURIN in the last 48 hours.  Urine Studies: No results for input(s): COLORU, APPEARANCEUA, PHUR, SPECGRAV, PROTEINUA, GLUCUA, KETONESU, BILIRUBINUA, OCCULTUA, NITRITE, UROBILINOGEN, LEUKOCYTESUR, RBCUA, WBCUA, BACTERIA, SQUAMEPITHEL, HYALINECASTS in the last 48 hours.    Invalid input(s): LISA  All pertinent labs within the past 24 hours have been reviewed.    Significant Imaging: I have reviewed all pertinent imaging results/findings within the past 24 hours.

## 2018-08-23 NOTE — PLAN OF CARE
Reviewed insulin regimen and CBG.     Glucose improving.    Increase levemir 20 > 24 units nightly  Change novolog to 8 units qAC  POC AC/HS  Low dose correction     Will continue to follow along; please call with any questions.

## 2018-08-23 NOTE — PROGRESS NOTES
[facility]  Podiatry  Progress Note    Patient Name: Mickey Ross  MRN: [id number]  Admission Date: 8/17/2018  Hospital Length of Stay: 6 days  Attending Physician: Bhargav Chacon MD  Primary Care Provider: Rosalina Moncada MD   Interval Hx:  Patient Seen at bedside for wound vac change.  Patient denies F/C/N/V.  Dressings clean, dry, and intact, vac with good seal.    Scheduled Meds:   calcium acetate  1,334 mg Oral TID WM    cetirizine  10 mg Oral Daily    DAPTOmycin (CUBICIN)  IV  8 mg/kg Intravenous Q48H    diltiaZEM  300 mg Oral Daily    epoetin umer (PROCRIT) injection  6,000 Units Intravenous Every Mon, Wed, Fri    heparin (porcine)  5,000 Units Subcutaneous Q8H    insulin aspart U-100  8 Units Subcutaneous TIDWM    insulin detemir U-100  24 Units Subcutaneous QHS    losartan  100 mg Oral Daily    methadone  10 mg Oral BID    polyethylene glycol  17 g Oral Daily    prednisoLONE acetate  1 drop Right Eye QID    sertraline  25 mg Oral Daily     Continuous Infusions:    PRN Meds:sodium chloride 0.9%, acetaminophen, albuterol-ipratropium, clonazePAM, dextrose 50%, dextrose 50%, dextrose 50%, dextrose 50%, dextrose 50%, dextrose 50%, glucagon (human recombinant), glucagon (human recombinant), glucose, glucose, glucose, glucose, hydrALAZINE, HYDROcodone-acetaminophen, hydrOXYzine HCl, insulin aspart U-100, naloxone, ondansetron, ondansetron, ramelteon, sodium chloride 0.9%    Review of patient's allergies indicates:   Allergen Reactions    Vancomycin analogues Rash     Red Man Syndrome    Penicillins         Past Medical History:   Diagnosis Date    Allergic drug rash - due to Vancomycin 8/19/2018    Arthritis     Blind left eye     Charcot's joint of foot     Diabetes mellitus     GERD (gastroesophageal reflux disease)     Glaucoma     Hypertension     MRSA (methicillin resistant staph aureus) culture positive     Renal disorder      Past Surgical History:   Procedure  Laterality Date    AVF left upper arm      left ankle surgery      lumbar back surgery         Family History     Problem Relation (Age of Onset)    Pneumonia Mother        Tobacco Use    Smoking status: Never Smoker   Substance and Sexual Activity    Alcohol use: No     Frequency: Never    Drug use: No    Sexual activity: Not Currently     Review of Systems   Constitutional: Negative for chills and fever.   Gastrointestinal: Negative for nausea and vomiting.   Musculoskeletal:        Left ankle deformity.   Skin: Positive for color change and wound (Surgical incisions to the medial and lateral ankle).     Objective:     Vital Signs (Most Recent):  Temp: 98.2 °F (36.8 °C) (08/23/18 1146)  Pulse: 82 (08/23/18 1231)  Resp: 18 (08/23/18 1146)  BP: (!) 188/86 (08/23/18 1146)  SpO2: (!) 94 % (08/23/18 1146) Vital Signs (24h Range):  Temp:  [97.8 °F (36.6 °C)-98.6 °F (37 °C)] 98.2 °F (36.8 °C)  Pulse:  [76-92] 82  Resp:  [16-20] 18  SpO2:  [90 %-96 %] 94 %  BP: (170-196)/(77-88) 188/86     Weight: 113.3 kg (249 lb 12.5 oz)  Body mass index is 34.84 kg/m².    Foot Exam    General  General Appearance: appears stated age and healthy   Orientation: alert and oriented to person, place, and time       Left Foot/Ankle      Inspection and Palpation  Swelling: (Diffuse edema to the left LE, non pitting.)  Skin Exam: erythema (To the medial ankle incision); skin not intact (Surgical incisions to the medial and lateral ankle.)     Neurovascular  Dorsalis pedis: absent  Posterior tibial: absent  Saphenous nerve sensation: diminished  Tibial nerve sensation: diminished  Superficial peroneal nerve sensation: diminished  Deep peroneal nerve sensation: diminished  Sural nerve sensation: diminished    Comments  Ortho: Severe non reducible varus deformity of the left ankle.    Lateral Left ankle: Incision present with sutures in tact. No signs of acute infection. Skin edges well coapted.     Medial Left ankle: Surgical incision left  open with granular wound beds to edges. Mild erythema, no drainage no purulence, no malodor, no streaking. Positive pain to palpation.     See clinic images below.      Laboratory:  A1C:   Recent Labs   Lab  08/17/18 2159   HGBA1C  8.2*     Blood Cultures:   No results for input(s): LABBLOO in the last 48 hours.  CBC:   Recent Labs   Lab  08/23/18   0431   WBC  8.99   RBC  2.90*   HGB  8.5*   HCT  26.2*   PLT  SEE COMMENT   MCV  90   MCH  29.3   MCHC  32.4     CMP:   Recent Labs   Lab  08/21/18   0456   08/23/18   1151   GLU  760*   < >  248*   CALCIUM  7.2*   < >  7.7*   ALBUMIN  2.2*   --    --    PROT  5.5*   --    --    NA  126*   < >  135*   K  5.2*   < >  5.0   CO2  20*   < >  25   CL  96   < >  101   BUN  65*   < >  50*   CREATININE  6.9*   < >  6.5*   ALKPHOS  270*   --    --    ALT  26   --    --    AST  8*   --    --    BILITOT  0.5   --    --     < > = values in this interval not displayed.     CRP:   Recent Labs   Lab  08/17/18 2159   CRP  69.0*     ESR:   Recent Labs   Lab  08/17/18 2159   SEDRATE  58*     Microbiology Results (last 7 days)     Procedure Component Value Units Date/Time    Blood culture (site 1) [[id number]] Collected:  08/17/18 2201    Order Status:  Completed Specimen:  Blood from Peripheral, Hand, Right Updated:  08/23/18 0612     Blood Culture, Routine No growth after 5 days.    Narrative:       Site # 1, aerobic and anaerobic    Blood culture (site 2) [[id number]] Collected:  08/17/18 2201    Order Status:  Completed Specimen:  Blood from Peripheral, Antecubital, Right Updated:  08/23/18 0612     Blood Culture, Routine No growth after 5 days.    Narrative:       Site # 2, aerobic only    Culture, Anaerobe [[id number]]     Order Status:  No result Specimen:  Eye from Conjunctiva, Right     Aerobic culture [[id number]]     Order Status:  No result Specimen:  Eye from Conjunctiva, Right     Gram stain [[id number]]     Order Status:  No result Specimen:  Eye from Conjunctiva, Right      Blood culture #2 **CANNOT BE ORDERED STAT** [[id number]]     Order Status:  Canceled Specimen:  Blood     Blood culture #1 **CANNOT BE ORDERED STAT** [[id number]]     Order Status:  Canceled Specimen:  Blood         Specimen (12h ago, onward)    None          Diagnostic Results:  X-ray:  Bony destruction, impaction and probable osseous fusion involving the hindfoot and midfoot.  There appears to be medial angulation and displacement of the hindfoot with respect to the adjacent tibia and fibula.  Comparison to prior films and correlation with clinical findings will be needed.                            Assessment/Plan:     ESRD on hemodialysis    Per primary        Acute hematogenous osteomyelitis of left foot    Mickey Ross is a 53 y.o. male with Right ankle wound, stable  -discontinued wound vac because wound is too small to put foam in.  Dressed with estefania, xeroform, 4x4's, kerlix, cast padding, and ace.  -ID on board, appreciate recs.    -Patient to follow up with [facility] podiatry within one week of discharge.    -Podiatry will continue to follow.                    Romeo Hoyt MD  Podiatry  [facility]

## 2018-08-23 NOTE — ASSESSMENT & PLAN NOTE
- Patient with many days of positive MRSA blood cultures at [facility], previously being treated with Flagyl, linezolid, and gentamicin with hemodialysis  - Likely source related to left foot osteomyelitis of 5th metatarsal. Will consider US of   - Patient with signs of septic embolization, right retro retinal abscess  - History of epidural abscess and associated IV drug use, reports discontinuation of IV drugs for over 5 years ago  - Repeat cultures drawn in the ED, shows NGTD.  - Patient with allergy to vancomycin, full body rash with associated pruritus.   - ID consulted, appreciate recs. Started on Daptomycin 8mg/kg to be given after HD. Final reccs 6 weeks with EOT 09/28/18  - US LUE AVF shows no signs of abscess or fluid collection.  - Vitals stable, normal white count. Will continue to monitor.

## 2018-08-23 NOTE — SUBJECTIVE & OBJECTIVE
Interval History: 08/22/18: No acute concerns. Patient currently on sub q insulin. Tolerating oral diet. HD today and tolerated well. Will monitor, await LTAC placement    Review of Systems   Constitutional: Negative for chills, fatigue and fever.   HENT: Negative for trouble swallowing.    Eyes: Positive for pain and visual disturbance.        Eye pain and visual disturbance are both improving.   Respiratory: Negative for cough and shortness of breath.    Cardiovascular: Negative for chest pain.   Gastrointestinal: Negative for abdominal pain, diarrhea, nausea and vomiting.   Genitourinary: Negative for difficulty urinating.   Musculoskeletal: Negative for arthralgias.   Skin: Negative for color change.   Neurological: Negative for dizziness, light-headedness and headaches.   Psychiatric/Behavioral: Negative for agitation and confusion.       Objective:     Vital Signs (Most Recent):  Temp: 98.5 °F (36.9 °C) (08/22/18 2010)  Pulse: 82 (08/22/18 2010)  Resp: 20 (08/22/18 2010)  BP: (!) 172/80 (08/22/18 2010)  SpO2: 95 % (08/22/18 2010) Vital Signs (24h Range):  Temp:  [97.5 °F (36.4 °C)-98.5 °F (36.9 °C)] 98.5 °F (36.9 °C)  Pulse:  [72-82] 82  Resp:  [16-20] 20  SpO2:  [94 %-96 %] 95 %  BP: (158-189)/(70-89) 172/80     Weight: 113.3 kg (249 lb 12.5 oz)  Body mass index is 34.84 kg/m².    Intake/Output Summary (Last 24 hours) at 8/22/2018 2211  Last data filed at 8/22/2018 1300  Gross per 24 hour   Intake 600 ml   Output 3600 ml   Net -3000 ml      Physical Exam   Constitutional: No distress.   HENT:   Head: Normocephalic.   Eyes: Pupils are equal, round, and reactive to light.   Neck: Normal range of motion.   Cardiovascular: Normal rate and regular rhythm.   Pulmonary/Chest: Effort normal and breath sounds normal.   Abdominal: Soft. Bowel sounds are normal.   Musculoskeletal: He exhibits deformity. He exhibits no edema.   Left foot deformity.   Neurological: He is alert.     Significant Labs:   CBC:   Recent Labs    Lab  08/21/18   0456  08/22/18   0556   WBC  9.56  11.61   HGB  7.9*  8.5*   HCT  25.6*  27.7*   PLT  115*  201     CMP:   Recent Labs   Lab  08/21/18   0124  08/21/18   0456   08/22/18   0810  08/22/18   1507  08/22/18   1928   NA  128*  126*   < >  134*  134*  135*   K  5.3*  5.2*   < >  5.3*  4.2  4.3   CL  95  96   < >  100  100  101   CO2  21*  20*   < >  19*  25  26   GLU  758*  760*   < >  319*  312*  150*   BUN  66*  65*   < >  82*  35*  39*   CREATININE  6.5*  6.9*   < >  8.1*  4.6*  5.2*   CALCIUM  7.0*  7.2*   < >  7.4*  7.4*  7.5*   PROT   --   5.5*   --    --    --    --    ALBUMIN  2.2*  2.2*   --    --    --    --    BILITOT   --   0.5   --    --    --    --    ALKPHOS   --   270*   --    --    --    --    AST   --   8*   --    --    --    --    ALT   --   26   --    --    --    --    ANIONGAP  12  10   < >  15  9  8   EGFRNONAA  8.9*  8.3*   < >  6.8*  13.5*  11.7*    < > = values in this interval not displayed.       Significant Imaging: I have reviewed all pertinent imaging results/findings within the past 24 hours.

## 2018-08-23 NOTE — PROGRESS NOTES
CC:    HPI: Mickey Ross is a 53 y.o. male      POH:    Gtts:      Past Medical History:   Diagnosis Date    Allergic drug rash - due to Vancomycin 8/19/2018    Arthritis     Blind left eye     Charcot's joint of foot     Diabetes mellitus     GERD (gastroesophageal reflux disease)     Glaucoma     Hypertension     MRSA (methicillin resistant staph aureus) culture positive     Renal disorder          Family History   Problem Relation Age of Onset    Pneumonia Mother            Current Facility-Administered Medications:     0.9%  NaCl infusion, , Intravenous, PRN, Farrukh Hernandez MD    acetaminophen tablet 650 mg, 650 mg, Oral, Q4H PRN, Freddie Huston MD    albuterol-ipratropium 2.5 mg-0.5 mg/3 mL nebulizer solution 3 mL, 3 mL, Nebulization, Q4H PRN, Freddie Huston MD    atropine 1% ophthalmic solution 1 drop, 1 drop, Both Eyes, Once, Dwight Bowers MD    calcium acetate capsule 1,334 mg, 1,334 mg, Oral, TID WM, Ralph Tomas MD, 1,334 mg at 08/22/18 1704    cetirizine tablet 10 mg, 10 mg, Oral, Daily, Armando Madrid MD, 10 mg at 08/22/18 0829    clonazePAM tablet 0.5 mg, 0.5 mg, Oral, BID PRN, Freddie Huston MD, 0.5 mg at 08/18/18 1621    DAPTOmycin (CUBICIN) 905 mg in sodium chloride 0.9% IVPB, 8 mg/kg, Intravenous, Q48H, Armando Madrid MD, Last Rate: 100 mL/hr at 08/20/18 2145, 905 mg at 08/20/18 2145    dextrose 50% injection 12.5 g, 12.5 g, Intravenous, PRN, Freddie Huston MD    dextrose 50% injection 12.5 g, 12.5 g, Intravenous, PRN, Osiris Canela MD    dextrose 50% injection 12.5 g, 12.5 g, Intravenous, PRN, Francisco J Jeong MD    dextrose 50% injection 25 g, 25 g, Intravenous, PRN, Freddie Huston MD    dextrose 50% injection 25 g, 25 g, Intravenous, PRN, Osiris Canela MD    dextrose 50% injection 25 g, 25 g, Intravenous, PRN, Francisco J Jeong MD    diltiaZEM 24 hr capsule 300 mg, 300 mg, Oral, Daily, Ralph  MD Thom, 300 mg at 08/22/18 0829    epoetin umer (PROCRIT) injection 6,000 units kit, 6,000 Units, Intravenous, Every Mon, Wed, Fri, Gorge Diamond NP, 6,000 Units at 08/22/18 1039    glucagon (human recombinant) injection 1 mg, 1 mg, Intramuscular, PRN, Freddie Huston MD    glucagon (human recombinant) injection 1 mg, 1 mg, Intramuscular, PRN, Francisco J Jeong MD    glucose chewable tablet 16 g, 16 g, Oral, PRN, Freddie Huston MD    glucose chewable tablet 16 g, 16 g, Oral, PRN, Francisco J Jeong MD    glucose chewable tablet 24 g, 24 g, Oral, PRN, Freddie Huston MD    glucose chewable tablet 24 g, 24 g, Oral, PRN, Francisco J Jeong MD    heparin (porcine) injection 5,000 Units, 5,000 Units, Subcutaneous, Q8H, Freddie Huston MD, 5,000 Units at 08/22/18 1426    hydrALAZINE tablet 25 mg, 25 mg, Oral, Q8H PRN, Ralph Tomas MD    HYDROcodone-acetaminophen 7.5-325 mg per tablet 1 tablet, 1 tablet, Oral, Q6H PRN, Freddie Huston MD, 1 tablet at 08/22/18 0429    hydrOXYzine HCl tablet 25 mg, 25 mg, Oral, TID PRN, Osiris Canela MD, 25 mg at 08/21/18 1004    insulin aspart U-100 pen 0-10 Units, 0-10 Units, Subcutaneous, QID (AC + HS) PRN, Farncisco J Jeong MD, 6 Units at 08/22/18 1657    insulin aspart U-100 pen 10 Units, 10 Units, Subcutaneous, TIDWM, Francisco J Jeong MD, 10 Units at 08/22/18 1657    insulin detemir U-100 pen 20 Units, 20 Units, Subcutaneous, BID, Francisco J Jeong MD    losartan tablet 100 mg, 100 mg, Oral, Daily, Ferddie Huston MD, 100 mg at 08/22/18 0829    methadone tablet 10 mg, 10 mg, Oral, BID, Freddie Huston MD, 10 mg at 08/22/18 0829    naloxone 0.4 mg/mL injection 0.4 mg, 0.4 mg, Intravenous, PRN, Freddie Huston MD    ondansetron disintegrating tablet 8 mg, 8 mg, Oral, Q8H PRN, Freddie Huston MD    ondansetron injection 4 mg, 4 mg, Intravenous, Q8H PRN, Freddie Huston MD    polyethylene glycol packet 17 g, 17 g,  Oral, Daily, Freddie Huston MD    prednisoLONE acetate 1 % ophthalmic suspension 1 drop, 1 drop, Right Eye, QID, Jose Hemphill MD, 1 drop at 08/22/18 1659    ramelteon tablet 8 mg, 8 mg, Oral, Nightly PRN, Freddie Huston MD    sertraline tablet 25 mg, 25 mg, Oral, Daily, Freddie Huston MD, 25 mg at 08/22/18 0829    sodium chloride 0.9% flush 5 mL, 5 mL, Intravenous, PRN, Freddie Huston MD      Review of patient's allergies indicates:   Allergen Reactions    Vancomycin analogues Rash     Red Man Syndrome    Penicillins          Social History     Tobacco Use    Smoking status: Never Smoker   Substance Use Topics    Alcohol use: No     Frequency: Never    Drug use: No         Base Eye Exam     Visual Acuity (Snellen - Linear)       Right Left    Dist sc 20/400     Correction:  Glasses          Tonometry (8:06 AM)       Right Left    Pressure NTP           Extraocular Movement       Right Left     Full Full          Neuro/Psych     Oriented x3:  Yes    Mood/Affect:  Normal          Dilation     Both eyes:  2.5% Phenylephrine, 1.0% Mydriacyl @ 8:03 AM            Slit Lamp and Fundus Exam     External Exam       Right Left    External Normal prosthesis          Slit Lamp Exam       Right Left    Lids/Lashes Normal     Conjunctiva/Sclera White and quiet     Lens Posterior chamber intraocular lens           Fundus Exam       Right Left    Disc Normal     Macula Normal     Vessels Normal     Periphery Stable ST arcade elevated white lesion with surrounding fluid and thin cuff of SR heme, approx 10 mm, spares central macula. Appears slightly consolidated along anterior margin as vessels more visible. Roughly same diameter.      Hazy view                   Subretinal abscess     54 yo monocular M with osteomyelitis transferred for possible subretinal abscess     - VA 20/100 OD  - DFE remarkable for elevated, white lesion concerning for abscess  - intravitreal vancomycin and ceftazidime  injection given in the ED  - ID consult  - will follow pt closely for further management     Seen with Dr. Corrales     Update 8/18/18  - mild improvement. Lesion with more defined border and less overlying haze  - pred-forte QID OD  - antibiotics per ID recommendations  - guarded prognosis. Will follow pt closely     Update 8/19/18  - VA 20/100.Exam stable from yesterday. Hazy view of fundus  - daptomycin as per ID recommendations  - contine pred-forte QID  - will arrange pt to brought up to clinic for further evaluation/imaging      UPDATE 8/20/18  Slight increased vitritis at apex of mass. Will re-inject Vanc today. Will follow daily.      UPDATE 8/21/18  Apical Mass/Abscess appears stable on exam today and does NOT appear to be spreading and involving more vitreous after Intravitreal Vanc yesterday  Patient with no new visual complaints   DFE tomorrow to evaluate for any progression     UPDATE 8/22/18  VA slightly worse though abscess appears to be consolidating anteriorly as vessels are more visible. Will do comparison photos in AM (8/23/18) in clinic, OCT of Macula OD as well.   Last vanc injection was 8/20.   Continue current care and recs per treatment team.      Sal Villanueva D.O.  Fellow, Retina & Vitreous Surgery

## 2018-08-24 LAB
ANION GAP SERPL CALC-SCNC: 10 MMOL/L
ANION GAP SERPL CALC-SCNC: 13 MMOL/L
BASOPHILS # BLD AUTO: 0.05 K/UL
BASOPHILS NFR BLD: 0.6 %
BUN SERPL-MCNC: 57 MG/DL
BUN SERPL-MCNC: 61 MG/DL
CALCIUM SERPL-MCNC: 7.8 MG/DL
CALCIUM SERPL-MCNC: 7.9 MG/DL
CHLORIDE SERPL-SCNC: 100 MMOL/L
CHLORIDE SERPL-SCNC: 101 MMOL/L
CO2 SERPL-SCNC: 21 MMOL/L
CO2 SERPL-SCNC: 23 MMOL/L
CREAT SERPL-MCNC: 7.5 MG/DL
CREAT SERPL-MCNC: 8.1 MG/DL
DIFFERENTIAL METHOD: ABNORMAL
EOSINOPHIL # BLD AUTO: 0.3 K/UL
EOSINOPHIL NFR BLD: 3.9 %
ERYTHROCYTE [DISTWIDTH] IN BLOOD BY AUTOMATED COUNT: 15.9 %
EST. GFR  (AFRICAN AMERICAN): 7.9 ML/MIN/1.73 M^2
EST. GFR  (AFRICAN AMERICAN): 8.7 ML/MIN/1.73 M^2
EST. GFR  (NON AFRICAN AMERICAN): 6.8 ML/MIN/1.73 M^2
EST. GFR  (NON AFRICAN AMERICAN): 7.5 ML/MIN/1.73 M^2
GLUCOSE SERPL-MCNC: 215 MG/DL
GLUCOSE SERPL-MCNC: 254 MG/DL
HCT VFR BLD AUTO: 25.7 %
HGB BLD-MCNC: 8.1 G/DL
IMM GRANULOCYTES # BLD AUTO: 0.11 K/UL
IMM GRANULOCYTES NFR BLD AUTO: 1.4 %
LYMPHOCYTES # BLD AUTO: 0.9 K/UL
LYMPHOCYTES NFR BLD: 11.9 %
MAGNESIUM SERPL-MCNC: 2.1 MG/DL
MCH RBC QN AUTO: 29.3 PG
MCHC RBC AUTO-ENTMCNC: 31.5 G/DL
MCV RBC AUTO: 93 FL
MONOCYTES # BLD AUTO: 0.7 K/UL
MONOCYTES NFR BLD: 8.5 %
NEUTROPHILS # BLD AUTO: 5.7 K/UL
NEUTROPHILS NFR BLD: 73.7 %
NRBC BLD-RTO: 0 /100 WBC
PHOSPHATE SERPL-MCNC: 5.5 MG/DL
PLATELET # BLD AUTO: 135 K/UL
PMV BLD AUTO: 10 FL
POCT GLUCOSE: 165 MG/DL (ref 70–110)
POCT GLUCOSE: 203 MG/DL (ref 70–110)
POCT GLUCOSE: 238 MG/DL (ref 70–110)
POCT GLUCOSE: 254 MG/DL (ref 70–110)
POCT GLUCOSE: 289 MG/DL (ref 70–110)
POTASSIUM SERPL-SCNC: 5.1 MMOL/L
POTASSIUM SERPL-SCNC: 5.3 MMOL/L
RBC # BLD AUTO: 2.76 M/UL
SODIUM SERPL-SCNC: 133 MMOL/L
SODIUM SERPL-SCNC: 135 MMOL/L
WBC # BLD AUTO: 7.76 K/UL

## 2018-08-24 PROCEDURE — 85025 COMPLETE CBC W/AUTO DIFF WBC: CPT

## 2018-08-24 PROCEDURE — 36415 COLL VENOUS BLD VENIPUNCTURE: CPT

## 2018-08-24 PROCEDURE — 63600175 PHARM REV CODE 636 W HCPCS: Mod: JG | Performed by: STUDENT IN AN ORGANIZED HEALTH CARE EDUCATION/TRAINING PROGRAM

## 2018-08-24 PROCEDURE — 63600175 PHARM REV CODE 636 W HCPCS: Performed by: OPHTHALMOLOGY

## 2018-08-24 PROCEDURE — 97530 THERAPEUTIC ACTIVITIES: CPT

## 2018-08-24 PROCEDURE — 99232 SBSQ HOSP IP/OBS MODERATE 35: CPT | Mod: ,,, | Performed by: INTERNAL MEDICINE

## 2018-08-24 PROCEDURE — 63600175 PHARM REV CODE 636 W HCPCS: Performed by: STUDENT IN AN ORGANIZED HEALTH CARE EDUCATION/TRAINING PROGRAM

## 2018-08-24 PROCEDURE — 84100 ASSAY OF PHOSPHORUS: CPT

## 2018-08-24 PROCEDURE — 25000003 PHARM REV CODE 250: Performed by: STUDENT IN AN ORGANIZED HEALTH CARE EDUCATION/TRAINING PROGRAM

## 2018-08-24 PROCEDURE — 80048 BASIC METABOLIC PNL TOTAL CA: CPT | Mod: 91

## 2018-08-24 PROCEDURE — 25000003 PHARM REV CODE 250: Performed by: NURSE PRACTITIONER

## 2018-08-24 PROCEDURE — 90935 HEMODIALYSIS ONE EVALUATION: CPT

## 2018-08-24 PROCEDURE — 27000207 HC ISOLATION

## 2018-08-24 PROCEDURE — 83735 ASSAY OF MAGNESIUM: CPT

## 2018-08-24 PROCEDURE — 25000003 PHARM REV CODE 250: Performed by: OPHTHALMOLOGY

## 2018-08-24 PROCEDURE — A4216 STERILE WATER/SALINE, 10 ML: HCPCS | Performed by: OPHTHALMOLOGY

## 2018-08-24 PROCEDURE — 20600001 HC STEP DOWN PRIVATE ROOM

## 2018-08-24 PROCEDURE — 90935 HEMODIALYSIS ONE EVALUATION: CPT | Mod: ,,, | Performed by: NURSE PRACTITIONER

## 2018-08-24 PROCEDURE — 63600175 PHARM REV CODE 636 W HCPCS: Mod: JG | Performed by: NURSE PRACTITIONER

## 2018-08-24 PROCEDURE — 80048 BASIC METABOLIC PNL TOTAL CA: CPT

## 2018-08-24 RX ORDER — POLYETHYLENE GLYCOL 3350 17 G/17G
17 POWDER, FOR SOLUTION ORAL DAILY PRN
Status: DISCONTINUED | OUTPATIENT
Start: 2018-08-24 | End: 2018-09-09

## 2018-08-24 RX ORDER — INSULIN ASPART 100 [IU]/ML
12 INJECTION, SOLUTION INTRAVENOUS; SUBCUTANEOUS
Status: DISCONTINUED | OUTPATIENT
Start: 2018-08-24 | End: 2018-08-26

## 2018-08-24 RX ORDER — LABETALOL 100 MG/1
100 TABLET, FILM COATED ORAL EVERY 12 HOURS
Status: DISCONTINUED | OUTPATIENT
Start: 2018-08-24 | End: 2018-08-31

## 2018-08-24 RX ORDER — SODIUM CHLORIDE 9 MG/ML
INJECTION, SOLUTION INTRAVENOUS ONCE
Status: COMPLETED | OUTPATIENT
Start: 2018-08-24 | End: 2018-08-24

## 2018-08-24 RX ADMIN — HYDRALAZINE HYDROCHLORIDE 25 MG: 25 TABLET ORAL at 12:08

## 2018-08-24 RX ADMIN — HEPARIN SODIUM 5000 UNITS: 5000 INJECTION, SOLUTION INTRAVENOUS; SUBCUTANEOUS at 01:08

## 2018-08-24 RX ADMIN — LABETALOL HCL 100 MG: 100 TABLET, FILM COATED ORAL at 09:08

## 2018-08-24 RX ADMIN — INSULIN ASPART 12 UNITS: 100 INJECTION, SOLUTION INTRAVENOUS; SUBCUTANEOUS at 01:08

## 2018-08-24 RX ADMIN — METHADONE HYDROCHLORIDE 10 MG: 5 TABLET ORAL at 01:08

## 2018-08-24 RX ADMIN — CALCIUM ACETATE 1334 MG: 667 CAPSULE ORAL at 01:08

## 2018-08-24 RX ADMIN — VANCOMYCIN HYDROCHLORIDE 3 MG: 500 INJECTION, POWDER, LYOPHILIZED, FOR SOLUTION INTRAVENOUS at 06:08

## 2018-08-24 RX ADMIN — DAPTOMYCIN 905 MG: 500 INJECTION, POWDER, LYOPHILIZED, FOR SOLUTION INTRAVENOUS at 09:08

## 2018-08-24 RX ADMIN — DILTIAZEM HYDROCHLORIDE 300 MG: 300 CAPSULE, COATED, EXTENDED RELEASE ORAL at 01:08

## 2018-08-24 RX ADMIN — ERYTHROPOIETIN 6000 UNITS: 4000 INJECTION, SOLUTION INTRAVENOUS; SUBCUTANEOUS at 11:08

## 2018-08-24 RX ADMIN — INSULIN ASPART 2 UNITS: 100 INJECTION, SOLUTION INTRAVENOUS; SUBCUTANEOUS at 01:08

## 2018-08-24 RX ADMIN — SODIUM CHLORIDE 300 ML: 0.9 INJECTION, SOLUTION INTRAVENOUS at 11:08

## 2018-08-24 RX ADMIN — LABETALOL HCL 100 MG: 100 TABLET, FILM COATED ORAL at 10:08

## 2018-08-24 RX ADMIN — HEPARIN SODIUM 5000 UNITS: 5000 INJECTION, SOLUTION INTRAVENOUS; SUBCUTANEOUS at 09:08

## 2018-08-24 RX ADMIN — HEPARIN SODIUM 5000 UNITS: 5000 INJECTION, SOLUTION INTRAVENOUS; SUBCUTANEOUS at 05:08

## 2018-08-24 RX ADMIN — HYDROCODONE BITARTRATE AND ACETAMINOPHEN 1 TABLET: 7.5; 325 TABLET ORAL at 05:08

## 2018-08-24 RX ADMIN — HYDROCODONE BITARTRATE AND ACETAMINOPHEN 1 TABLET: 7.5; 325 TABLET ORAL at 12:08

## 2018-08-24 RX ADMIN — INSULIN ASPART 8 UNITS: 100 INJECTION, SOLUTION INTRAVENOUS; SUBCUTANEOUS at 06:08

## 2018-08-24 RX ADMIN — INSULIN ASPART 8 UNITS: 100 INJECTION, SOLUTION INTRAVENOUS; SUBCUTANEOUS at 09:08

## 2018-08-24 RX ADMIN — LOSARTAN POTASSIUM 100 MG: 50 TABLET, FILM COATED ORAL at 01:08

## 2018-08-24 RX ADMIN — INSULIN ASPART 1 UNITS: 100 INJECTION, SOLUTION INTRAVENOUS; SUBCUTANEOUS at 09:08

## 2018-08-24 RX ADMIN — CETIRIZINE HYDROCHLORIDE 10 MG: 10 TABLET, FILM COATED ORAL at 01:08

## 2018-08-24 RX ADMIN — METHADONE HYDROCHLORIDE 10 MG: 5 TABLET ORAL at 09:08

## 2018-08-24 RX ADMIN — SERTRALINE HYDROCHLORIDE 25 MG: 25 TABLET ORAL at 10:08

## 2018-08-24 NOTE — PROGRESS NOTES
Dialysis tx completed. 3.5 hours. 3 liters removed. Needles pulled from left arm AVF. Hemostasis achieved. Gauze and tape to sites. Pt hypertensive throughout tx. BP med given. Denies any distress. Pt stated he is anxious about his eyes. Report given to Elif VALDEZ.

## 2018-08-24 NOTE — ASSESSMENT & PLAN NOTE
- Osteomyelitis of left 5th metatarsal taken for washout by Orthopedic surgery at [facility] on 08/15  - Patient being treated outside facility with Flagyl, linezolid, gentamicin with HD.  - Refusing amputation which would be curative.   - Podiatry consulted, appreciate recs. Advised BKA but patient refused. Recommended CAM boot and abx per ID. D/tyler wound vac. Follow up with [facility] podiatry at discharge  - Started on Daptomycin per ID  - ESR elevated at 58 upon admission  - X-ray left foot and ankle shows partial amputation the 1st, 2nd, 3rd, and 4th digits with fusion of the 2nd and 3rd MTP joints and throughout the midfoot, severe deformity and joint space loss the tibiotalar joint with a lapse of the talus likely 2/2 to chronic osteomyelitis, arthritis, or chronic Charcot foot.

## 2018-08-24 NOTE — PLAN OF CARE
08/24/18 1427   Discharge Reassessment   Assessment Type Discharge Planning Reassessment   Provided patient/caregiver education on the expected discharge date and the discharge plan Yes   Do you have any problems affording any of your prescribed medications? No   Discharge Plan A Long-term acute care facility (LTAC)   Discharge Plan B Long-term acute care facility (LTAC)   Patient choice form signed by patient/caregiver Yes   Can the patient answer the patient profile reliably? Yes, cognitively intact   How does the patient rate their overall health at the present time? Good   Describe the patient's ability to walk at the present time. Walks with the help of equipment   How often would a person be available to care for the patient? Often   Number of comorbid conditions (as recorded on the chart) Five or more   During the past month, has the patient often been bothered by feeling down, depressed or hopeless? No   During the past month, has the patient often been bothered by little interest or pleasure in doing things? No

## 2018-08-24 NOTE — SUBJECTIVE & OBJECTIVE
Interval History: NAEON. Vitals stable.    Review of Systems   Constitutional: Negative for chills and fever.   HENT: Negative for trouble swallowing.    Eyes: Positive for visual disturbance. Negative for pain.   Respiratory: Negative for cough and shortness of breath.    Gastrointestinal: Negative for abdominal pain, nausea and vomiting.   Genitourinary: Positive for decreased urine volume.   Musculoskeletal: Negative for arthralgias.   Skin: Negative for color change.   Neurological: Negative for dizziness, light-headedness and headaches.   Psychiatric/Behavioral: Negative for agitation and confusion.     Objective:     Vital Signs (Most Recent):  Temp: 98.1 °F (36.7 °C) (08/24/18 0749)  Pulse: 86 (08/24/18 0749)  Resp: 18 (08/24/18 0749)  BP: (!) 155/80 (08/24/18 0749)  SpO2: (!) 94 % (08/24/18 0749) Vital Signs (24h Range):  Temp:  [97.6 °F (36.4 °C)-98.6 °F (37 °C)] 98.1 °F (36.7 °C)  Pulse:  [80-92] 86  Resp:  [16-18] 18  SpO2:  [94 %-98 %] 94 %  BP: (155-195)/(74-89) 155/80     Weight: 113.3 kg (249 lb 12.5 oz)  Body mass index is 34.84 kg/m².    Intake/Output Summary (Last 24 hours) at 8/24/2018 0826  Last data filed at 8/23/2018 1900  Gross per 24 hour   Intake 720 ml   Output 130 ml   Net 590 ml      Physical Exam   Constitutional: He is oriented to person, place, and time. No distress.   HENT:   Head: Normocephalic.   Eyes: EOM are normal. Right eye exhibits no discharge.   Neck: Normal range of motion.   Cardiovascular: Normal rate and regular rhythm.   Pulmonary/Chest: Effort normal and breath sounds normal.   Abdominal: Soft. Bowel sounds are normal.   Musculoskeletal: He exhibits edema and deformity.   Neurological: He is alert and oriented to person, place, and time.   Skin: Skin is warm.   Nursing note and vitals reviewed.      Significant Labs:   A1C:   Recent Labs   Lab  08/17/18   2159   HGBA1C  8.2*     ABGs: No results for input(s): PH, PCO2, HCO3, POCSATURATED, BE, TOTALHB, COHB, METHB, O2HB,  POCFIO2 in the last 48 hours.  Bilirubin:   Recent Labs   Lab  08/17/18   2159  08/18/18   0613  08/19/18   0645  08/20/18   0826  08/21/18   0456   BILITOT  0.7  0.8  0.7  0.4  0.5     Blood Culture: No results for input(s): LABBLOO in the last 48 hours.  BMP:   Recent Labs   Lab  08/24/18   0658   GLU  215*   NA  135*   K  5.3*   CL  101   CO2  21*   BUN  61*   CREATININE  8.1*   CALCIUM  7.8*   MG  2.1     CBC:   Recent Labs   Lab  08/23/18   0431  08/24/18   0658   WBC  8.99  7.76   HGB  8.5*  8.1*   HCT  26.2*  25.7*   PLT  SEE COMMENT  135*     CMP:   Recent Labs   Lab  08/23/18   1619  08/24/18   0040  08/24/18   0658   NA  133*  133*  135*   K  5.0  5.1  5.3*   CL  100  100  101   CO2  23  23  21*   GLU  346*  254*  215*   BUN  54*  57*  61*   CREATININE  7.1*  7.5*  8.1*   CALCIUM  7.5*  7.9*  7.8*   ANIONGAP  10  10  13   EGFRNONAA  8.0*  7.5*  6.8*     Cardiac Markers: No results for input(s): CKMB, MYOGLOBIN, BNP, TROPISTAT in the last 48 hours.  Coagulation: No results for input(s): PT, INR, APTT in the last 48 hours.  Lactic Acid: No results for input(s): LACTATE in the last 48 hours.  Lipase: No results for input(s): LIPASE in the last 48 hours.  Lipid Panel: No results for input(s): CHOL, HDL, LDLCALC, TRIG, CHOLHDL in the last 48 hours.  Magnesium:   Recent Labs   Lab  08/23/18   0431  08/24/18   0658   MG  1.9  2.1     Pathology Results  (Last 10 years)    None        POCT Glucose:   Recent Labs   Lab  08/23/18   1135  08/23/18   1721  08/23/18   1955   POCTGLUCOSE  247*  391*  351*     Prealbumin: No results for input(s): PREALBUMIN in the last 48 hours.  Respiratory Culture: No results for input(s): GSRESP, RESPIRATORYC in the last 48 hours.  Troponin: No results for input(s): TROPONINI in the last 48 hours.  TSH: No results for input(s): TSH in the last 4320 hours.  Urine Culture: No results for input(s): LABURIN in the last 48 hours.  Urine Studies: No results for input(s): COLORU,  APPEARANCEUA, PHUR, SPECGRAV, PROTEINUA, GLUCUA, KETONESU, BILIRUBINUA, OCCULTUA, NITRITE, UROBILINOGEN, LEUKOCYTESUR, RBCUA, WBCUA, BACTERIA, SQUAMEPITHEL, HYALINECASTS in the last 48 hours.    Invalid input(s): LISA  All pertinent labs within the past 24 hours have been reviewed.    Significant Imaging: I have reviewed all pertinent imaging results/findings within the past 24 hours.

## 2018-08-24 NOTE — PROGRESS NOTES
Fundus photo OD with good quality documents consolidation of SR lesion  SD OCT --good quality-no fluid        Assessment /Plan     For exam results, see Encounter Report.    Subretinal abscess      CPM with IV Abx  Will reeval tomorrow  Will inject Vanco if any worsening

## 2018-08-24 NOTE — PT/OT/SLP PROGRESS
Physical Therapy      Patient Name:  Mickey Ross   MRN:  [id number]    Patient not seen today secondary to pt away at dialysis   . Will follow-up next scheduled treatment per PT POC.      Valeriy Green, PTA   8/24/2018

## 2018-08-24 NOTE — ASSESSMENT & PLAN NOTE
- Patient with diabetic nephropathy and concomitant ESRD, HD (MWF via LUE AVF) last dialyzed 8/17  - CMP drawn in ED show no major electrolyte derangements or need for emergent dialysis  - Nephrology consulted, appreciate recs. Undergoing HD on MWF

## 2018-08-24 NOTE — PLAN OF CARE
Problem: Patient Care Overview  Goal: Plan of Care Review  Outcome: Ongoing (interventions implemented as appropriate)  POC discussed with pt Dialysis done this am. Sandra well. Glucose elevated- correction insulin given. BP slightly elevated- meds given. Improved. No other changes.

## 2018-08-24 NOTE — ASSESSMENT & PLAN NOTE
- Patient with many days of positive MRSA blood cultures at [facility], previously being treated with Flagyl, linezolid, and gentamicin with hemodialysis  - Likely source related to left foot osteomyelitis of 5th metatarsal. Will consider US of   - Patient with signs of septic embolization, right retro retinal abscess  - History of epidural abscess and associated IV drug use, reports discontinuation of IV drugs for over 5 years ago  - Repeat cultures drawn in the ED, shows NGTD.  - Patient with allergy to vancomycin, full body rash with associated pruritus.   - ID consulted, appreciate recs. Started on Daptomycin 8mg/kg to be given after HD.  * Recommended 1gm IV to be given after HD once discharged  - US LUE AVF shows no signs of abscess or fluid collection.  - Vitals stable, normal white count. Will continue to monitor.
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
## 2018-08-24 NOTE — ASSESSMENT & PLAN NOTE
- Received intravitreal vancomycin and ceftazidime at admit.  - Opthmology following, daily assessments ongoing.  - Per Ophthalmology, lesion appears to be consolidating but no significant improvement in vision. Will need daily assessment for atleast 1 week from date of last intravitreal injection.  - Guarded prognosis for now.

## 2018-08-24 NOTE — PLAN OF CARE
Patient's glucose trends yesterday with rising mealtime glucose per each meal. Glucose at mealtime was 391. He was transitioned to nightly levemir 24U with aspart 8U TIDWM yesterday.     As trends have seen worsening blood glucose later in evening, may eventually need to reschedule morning basal insulin as well. For today will adjust mealtimes insulin to monitor effect.     Aspart to 12U TIDWM today.  Continue levemir 24U nightly  POC AC/HS with [facility]    Francisco J Jeong MD  PGY-3 Internal Medicine  [phone]

## 2018-08-24 NOTE — PROGRESS NOTES
[facility] HEMODIALYSIS NOTE     Patient currently on hemodialysis for removal of uremic toxins and volume.     Patient seen and evaluated on hemodialysis, tolerating treatment, see HD flowsheet for vitals and assessments.      Ultrafiltration goal is 3L     Labs have been reviewed and the dialysate bath has been adjusted.     Assessment/Plan:  Seen on dialysis this morning, tolerating well in NAD.  BP elevated, attempting a goal of net UF of 3L.  Continue TESS with HD  Renal diet. Continue phos binders with meals    FALLON Rojas, FNP-BC  Nephrology  Pager:  [phone]

## 2018-08-24 NOTE — PROGRESS NOTES
See examination note    More vitreous debris localized over abscess with improvement/consolidation of abscess itself    Continue intravenous Abx  Recommend injection of intravit Vancomycin as vit inflammation may represent intravitreal infectious process- last injection 4 days ago.  Pt agrees.    Risks, benefits, and alternatives to treatment discussed in detail with the patient.  The patient voiced understanding and wished to proceed with the procedure    Proc Note:   1mg/0.1 cc Vancomycin Right eye  Topical proparacaine was used for anesthesia.  Topical Betadine was used for antisepsis.  0.1 cc Vancomycin injected 3.5-4.0 mm from corneal limbus @ 6:00 position.  Following injection the IOP was less than thirty (<30) by tonopen.  The eye was then thoroughly irrigated with BSS.  Patient tolerated procedure well.  No complications were observed.  The Patient was educated that mild irritation tonight was normal secondary to topical Betadine use.  The Patient was further educated that if significant pain or vision loss in occurred within 2-10 days, they should return immediately.

## 2018-08-24 NOTE — ASSESSMENT & PLAN NOTE
- Endocrinology following, appreciate recs.  - Long term diabetic, poorly controlled. Takes Levemir 30U at home.  - A1C 8.2  -  yesterday night and AM check was 254  - Currently on Lev 24 qdaily and Novolog 8 TID with LDSSI. Novolog increased to 12U TIDWM  - diabetic and renal diet  - AC/HS accuchecks.

## 2018-08-24 NOTE — PROGRESS NOTES
Patient arrived on unit via stretcher. Weight obtained in bed @ 102.7kg. Dialysis initiated via left arm AVF with 15g needles x 2 without difficulty. Lines connected and secured. Orders and machine settings verified. Tx started. Good blood flows established.

## 2018-08-24 NOTE — PROGRESS NOTES
Assessment /Plan     For exam results, see Encounter Report.    Subretinal abscess    No progression of SR lesion  MRSA in other sites  Increasing vit debris OD  Will reinject Vanco on floor today

## 2018-08-24 NOTE — PT/OT/SLP PROGRESS
Occupational Therapy   Treatment    Name: Mickey Ross  MRN: [id number]  Admitting Diagnosis:  Sepsis due to methicillin resistant Staphylococcus aureus (MRSA)       Recommendations:     Discharge Recommendations: home with home health  Discharge Equipment Recommendations:     Barriers to discharge:       Subjective     Communicated with: RN prior to session.  Pain/Comfort:  · Pain Rating 1: 0/10    Patients cultural, spiritual, Christian conflicts given the current situation: none stated     Objective:     Patient found with:      General Precautions: Standard, fall   Orthopedic Precautions:N/A   Braces:       Occupational Performance:    Bed Mobility:    · Independent    Functional Mobility/Transfers:  · Patient completed Sit <> Stand Transfer with supervision  with  no assistive device   · Patient completed Toilet Transfer Step Transfer technique with supervision with  no AD  · Functional Mobility: (S) - (I) --- pt in bathroom alone upon arrival.    Activities of Daily Living:  · Toileting: independence     Patient left supine with all lines intact and call button in reach    [facility] 6 Click:  [facility] Total Score: 19    Treatment & Education:  Pt sat EOB and completed BUE/LE therex (x 15 reps each) with 3# dowel including:  - elbow flexion/extension  - shld presses  - hor Abd/Add  - knee flexion/extension  - hip flexion/extension  Education:    Assessment:     Mickey Ross is a 53 y.o. male with a medical diagnosis of Sepsis due to methicillin resistant Staphylococcus aureus (MRSA).  He presents with improved (I) with functional mobility as he ambulated to the bathroom w/o any assistance. Recommendation changed to  for this reason and frequency decreased to 2x/week.  Performance deficits affecting function are weakness, impaired endurance, impaired balance, impaired self care skills, impaired functional mobilty, decreased safety awareness, orthopedic precautions.      Rehab Prognosis:  Good; patient would benefit  from acute skilled OT services to address these deficits and reach maximum level of function.       Plan:     Patient to be seen 2 x/week to address the above listed problems via self-care/home management, therapeutic activities, therapeutic exercises  · Plan of Care Expires: 09/19/18  · Plan of Care Reviewed with: patient    This Plan of care has been discussed with the patient who was involved in its development and understands and is in agreement with the identified goals and treatment plan    GOALS:   Multidisciplinary Problems     Occupational Therapy Goals        Problem: Occupational Therapy Goal    Goal Priority Disciplines Outcome Interventions   Occupational Therapy Goal     OT, PT/OT Ongoing (interventions implemented as appropriate)    Description:  Goals to be met by: 9/15    Patient will increase functional independence with ADLs by performing:    UE Dressing with [place].  LE Dressing with [place] and Contact Guard Assistance.  Grooming while seated with Modified [place].  Toileting from toilet with Contact Guard Assistance for hygiene and clothing management.  MET 8/24                       Time Tracking:     OT Date of Treatment: 08/24/18  OT Start Time: 1320  OT Stop Time: 1333  OT Total Time (min): 13 min    Billable Minutes:Therapeutic Activity 13    DARRYL Soto  8/24/2018

## 2018-08-24 NOTE — ASSESSMENT & PLAN NOTE
- Continue with home dose losartan  - Added labetalol 100mg BID  - Hydralazine 25 mg p.r.n. for systolic blood pressure > 180

## 2018-08-24 NOTE — PLAN OF CARE
Problem: Occupational Therapy Goal  Goal: Occupational Therapy Goal  Goals to be met by: 9/15    Patient will increase functional independence with ADLs by performing:    UE Dressing with [place].  LE Dressing with [place] and Contact Guard Assistance.  Grooming while seated with Modified [place].  Toileting from toilet with Contact Guard Assistance for hygiene and clothing management.  MET 8/24     Outcome: Ongoing (interventions implemented as appropriate)  Con't POC.    DARRYL Soto

## 2018-08-25 LAB
ANION GAP SERPL CALC-SCNC: 9 MMOL/L
BASOPHILS # BLD AUTO: 0.05 K/UL
BASOPHILS NFR BLD: 0.7 %
BUN SERPL-MCNC: 37 MG/DL
CALCIUM SERPL-MCNC: 7.9 MG/DL
CHLORIDE SERPL-SCNC: 104 MMOL/L
CO2 SERPL-SCNC: 23 MMOL/L
CREAT SERPL-MCNC: 5.5 MG/DL
DIFFERENTIAL METHOD: ABNORMAL
EOSINOPHIL # BLD AUTO: 0.3 K/UL
EOSINOPHIL NFR BLD: 4.1 %
ERYTHROCYTE [DISTWIDTH] IN BLOOD BY AUTOMATED COUNT: 16.2 %
EST. GFR  (AFRICAN AMERICAN): 12.6 ML/MIN/1.73 M^2
EST. GFR  (NON AFRICAN AMERICAN): 10.9 ML/MIN/1.73 M^2
GLUCOSE SERPL-MCNC: 133 MG/DL
HCT VFR BLD AUTO: 25.8 %
HGB BLD-MCNC: 7.7 G/DL
IMM GRANULOCYTES # BLD AUTO: 0.12 K/UL
IMM GRANULOCYTES NFR BLD AUTO: 1.6 %
LYMPHOCYTES # BLD AUTO: 1 K/UL
LYMPHOCYTES NFR BLD: 13.6 %
MAGNESIUM SERPL-MCNC: 1.9 MG/DL
MCH RBC QN AUTO: 28.5 PG
MCHC RBC AUTO-ENTMCNC: 29.8 G/DL
MCV RBC AUTO: 96 FL
MONOCYTES # BLD AUTO: 0.7 K/UL
MONOCYTES NFR BLD: 8.8 %
NEUTROPHILS # BLD AUTO: 5.4 K/UL
NEUTROPHILS NFR BLD: 71.2 %
NRBC BLD-RTO: 0 /100 WBC
PHOSPHATE SERPL-MCNC: 5 MG/DL
PLATELET # BLD AUTO: 167 K/UL
PMV BLD AUTO: 9.8 FL
POCT GLUCOSE: 113 MG/DL (ref 70–110)
POCT GLUCOSE: 242 MG/DL (ref 70–110)
POCT GLUCOSE: 374 MG/DL (ref 70–110)
POCT GLUCOSE: 385 MG/DL (ref 70–110)
POCT GLUCOSE: 441 MG/DL (ref 70–110)
POTASSIUM SERPL-SCNC: 5.2 MMOL/L
RBC # BLD AUTO: 2.7 M/UL
SODIUM SERPL-SCNC: 136 MMOL/L
WBC # BLD AUTO: 7.52 K/UL

## 2018-08-25 PROCEDURE — 20600001 HC STEP DOWN PRIVATE ROOM

## 2018-08-25 PROCEDURE — 84100 ASSAY OF PHOSPHORUS: CPT

## 2018-08-25 PROCEDURE — 83735 ASSAY OF MAGNESIUM: CPT

## 2018-08-25 PROCEDURE — 85025 COMPLETE CBC W/AUTO DIFF WBC: CPT

## 2018-08-25 PROCEDURE — 63600175 PHARM REV CODE 636 W HCPCS: Performed by: STUDENT IN AN ORGANIZED HEALTH CARE EDUCATION/TRAINING PROGRAM

## 2018-08-25 PROCEDURE — 25000003 PHARM REV CODE 250: Performed by: STUDENT IN AN ORGANIZED HEALTH CARE EDUCATION/TRAINING PROGRAM

## 2018-08-25 PROCEDURE — 36415 COLL VENOUS BLD VENIPUNCTURE: CPT

## 2018-08-25 PROCEDURE — 99232 SBSQ HOSP IP/OBS MODERATE 35: CPT | Mod: ,,, | Performed by: INTERNAL MEDICINE

## 2018-08-25 PROCEDURE — 63600175 PHARM REV CODE 636 W HCPCS: Performed by: INTERNAL MEDICINE

## 2018-08-25 PROCEDURE — 80048 BASIC METABOLIC PNL TOTAL CA: CPT

## 2018-08-25 RX ORDER — IBUPROFEN 200 MG
24 TABLET ORAL
Status: DISCONTINUED | OUTPATIENT
Start: 2018-08-25 | End: 2018-09-10

## 2018-08-25 RX ORDER — INSULIN ASPART 100 [IU]/ML
1-10 INJECTION, SOLUTION INTRAVENOUS; SUBCUTANEOUS
Status: DISCONTINUED | OUTPATIENT
Start: 2018-08-25 | End: 2018-09-10

## 2018-08-25 RX ORDER — CETIRIZINE HYDROCHLORIDE 5 MG/1
5 TABLET ORAL DAILY
Status: DISCONTINUED | OUTPATIENT
Start: 2018-08-25 | End: 2018-08-25 | Stop reason: SDUPTHER

## 2018-08-25 RX ORDER — CETIRIZINE HYDROCHLORIDE 5 MG/1
5 TABLET ORAL DAILY
Status: DISCONTINUED | OUTPATIENT
Start: 2018-08-26 | End: 2018-09-21 | Stop reason: HOSPADM

## 2018-08-25 RX ORDER — GLUCAGON 1 MG
1 KIT INJECTION
Status: DISCONTINUED | OUTPATIENT
Start: 2018-08-25 | End: 2018-09-21 | Stop reason: HOSPADM

## 2018-08-25 RX ORDER — IBUPROFEN 200 MG
16 TABLET ORAL
Status: DISCONTINUED | OUTPATIENT
Start: 2018-08-25 | End: 2018-09-10

## 2018-08-25 RX ADMIN — INSULIN ASPART 3 UNITS: 100 INJECTION, SOLUTION INTRAVENOUS; SUBCUTANEOUS at 08:08

## 2018-08-25 RX ADMIN — HYDROCODONE BITARTRATE AND ACETAMINOPHEN 1 TABLET: 7.5; 325 TABLET ORAL at 03:08

## 2018-08-25 RX ADMIN — METHADONE HYDROCHLORIDE 10 MG: 5 TABLET ORAL at 08:08

## 2018-08-25 RX ADMIN — CALCIUM ACETATE 1334 MG: 667 CAPSULE ORAL at 05:08

## 2018-08-25 RX ADMIN — INSULIN ASPART 12 UNITS: 100 INJECTION, SOLUTION INTRAVENOUS; SUBCUTANEOUS at 09:08

## 2018-08-25 RX ADMIN — PREDNISOLONE ACETATE 1 DROP: 10 SUSPENSION/ DROPS OPHTHALMIC at 12:08

## 2018-08-25 RX ADMIN — HYDROCODONE BITARTRATE AND ACETAMINOPHEN 1 TABLET: 7.5; 325 TABLET ORAL at 08:08

## 2018-08-25 RX ADMIN — CLONAZEPAM 0.5 MG: 0.5 TABLET ORAL at 08:08

## 2018-08-25 RX ADMIN — INSULIN ASPART 2 UNITS: 100 INJECTION, SOLUTION INTRAVENOUS; SUBCUTANEOUS at 12:08

## 2018-08-25 RX ADMIN — PREDNISOLONE ACETATE 1 DROP: 10 SUSPENSION/ DROPS OPHTHALMIC at 09:08

## 2018-08-25 RX ADMIN — LABETALOL HCL 100 MG: 100 TABLET, FILM COATED ORAL at 08:08

## 2018-08-25 RX ADMIN — CALCIUM ACETATE 1334 MG: 667 CAPSULE ORAL at 08:08

## 2018-08-25 RX ADMIN — HEPARIN SODIUM 5000 UNITS: 5000 INJECTION, SOLUTION INTRAVENOUS; SUBCUTANEOUS at 08:08

## 2018-08-25 RX ADMIN — PREDNISOLONE ACETATE 1 DROP: 10 SUSPENSION/ DROPS OPHTHALMIC at 05:08

## 2018-08-25 RX ADMIN — CETIRIZINE HYDROCHLORIDE 10 MG: 10 TABLET, FILM COATED ORAL at 08:08

## 2018-08-25 RX ADMIN — LOSARTAN POTASSIUM 100 MG: 50 TABLET, FILM COATED ORAL at 08:08

## 2018-08-25 RX ADMIN — INSULIN ASPART 12 UNITS: 100 INJECTION, SOLUTION INTRAVENOUS; SUBCUTANEOUS at 05:08

## 2018-08-25 RX ADMIN — INSULIN ASPART 5 UNITS: 100 INJECTION, SOLUTION INTRAVENOUS; SUBCUTANEOUS at 09:08

## 2018-08-25 RX ADMIN — HEPARIN SODIUM 5000 UNITS: 5000 INJECTION, SOLUTION INTRAVENOUS; SUBCUTANEOUS at 05:08

## 2018-08-25 RX ADMIN — INSULIN ASPART 12 UNITS: 100 INJECTION, SOLUTION INTRAVENOUS; SUBCUTANEOUS at 12:08

## 2018-08-25 RX ADMIN — CLONAZEPAM 0.5 MG: 0.5 TABLET ORAL at 03:08

## 2018-08-25 RX ADMIN — HEPARIN SODIUM 5000 UNITS: 5000 INJECTION, SOLUTION INTRAVENOUS; SUBCUTANEOUS at 02:08

## 2018-08-25 RX ADMIN — INSULIN ASPART 5 UNITS: 100 INJECTION, SOLUTION INTRAVENOUS; SUBCUTANEOUS at 05:08

## 2018-08-25 RX ADMIN — SERTRALINE HYDROCHLORIDE 25 MG: 25 TABLET ORAL at 08:08

## 2018-08-25 RX ADMIN — DILTIAZEM HYDROCHLORIDE 300 MG: 300 CAPSULE, COATED, EXTENDED RELEASE ORAL at 08:08

## 2018-08-25 NOTE — PROGRESS NOTES
K 5.2 please enforce 2 gm potasisum diet and would   Start patiromer 8.4 gm on nondialysis days, order on nonformularymenu

## 2018-08-25 NOTE — SUBJECTIVE & OBJECTIVE
Interval History: Hypertensive yesterday during dialysis.    Review of Systems   Constitutional: Negative for chills and fever.   HENT: Negative for trouble swallowing.    Eyes: Positive for visual disturbance. Negative for pain.   Respiratory: Negative for cough and shortness of breath.    Cardiovascular: Negative for chest pain.   Gastrointestinal: Negative for abdominal pain.   Genitourinary: Positive for decreased urine volume.   Musculoskeletal: Negative for arthralgias.   Skin: Positive for wound.   Neurological: Negative for dizziness, light-headedness and headaches.   Psychiatric/Behavioral: Negative for agitation and confusion.     Objective:     Vital Signs (Most Recent):  Temp: 98.3 °F (36.8 °C) (08/25/18 0733)  Pulse: 74 (08/25/18 0741)  Resp: 16 (08/25/18 0733)  BP: (!) 179/83 (08/25/18 0733)  SpO2: 96 % (08/25/18 0733) Vital Signs (24h Range):  Temp:  [98.1 °F (36.7 °C)-98.3 °F (36.8 °C)] 98.3 °F (36.8 °C)  Pulse:  [73-92] 74  Resp:  [16-18] 16  SpO2:  [93 %-98 %] 96 %  BP: (140-201)/(66-84) 179/83     Weight: 113.3 kg (249 lb 12.5 oz)  Body mass index is 34.84 kg/m².    Intake/Output Summary (Last 24 hours) at 8/25/2018 0828  Last data filed at 8/25/2018 0500  Gross per 24 hour   Intake 900 ml   Output 3850 ml   Net -2950 ml      Physical Exam   Constitutional: He is oriented to person, place, and time. No distress.   HENT:   Head: Normocephalic.   Eyes: EOM are normal. Right eye exhibits no discharge.   Neck: Normal range of motion.   Cardiovascular: Normal rate and regular rhythm.   Pulmonary/Chest: Effort normal and breath sounds normal.   Abdominal: Soft. Bowel sounds are normal.   Musculoskeletal: He exhibits edema and deformity.   Neurological: He is alert and oriented to person, place, and time.   Skin: Skin is warm.       Significant Labs:   A1C:   Recent Labs   Lab  08/17/18   2159   HGBA1C  8.2*     ABGs: No results for input(s): PH, PCO2, HCO3, POCSATURATED, BE, TOTALHB, COHB, METHB, O2HB,  POCFIO2 in the last 48 hours.  Bilirubin:   Recent Labs   Lab  08/17/18   2159  08/18/18   0613  08/19/18   0645  08/20/18   0826  08/21/18   0456   BILITOT  0.7  0.8  0.7  0.4  0.5     Blood Culture: No results for input(s): LABBLOO in the last 48 hours.  BMP:   Recent Labs   Lab  08/25/18   0455   GLU  133*   NA  136   K  5.2*   CL  104   CO2  23   BUN  37*   CREATININE  5.5*   CALCIUM  7.9*   MG  1.9     CBC:   Recent Labs   Lab  08/24/18   0658  08/25/18   0455   WBC  7.76  7.52   HGB  8.1*  7.7*   HCT  25.7*  25.8*   PLT  135*  167     CMP:   Recent Labs   Lab  08/24/18   0040  08/24/18   0658  08/25/18   0455   NA  133*  135*  136   K  5.1  5.3*  5.2*   CL  100  101  104   CO2  23  21*  23   GLU  254*  215*  133*   BUN  57*  61*  37*   CREATININE  7.5*  8.1*  5.5*   CALCIUM  7.9*  7.8*  7.9*   ANIONGAP  10  13  9   EGFRNONAA  7.5*  6.8*  10.9*     Cardiac Markers: No results for input(s): CKMB, MYOGLOBIN, BNP, TROPISTAT in the last 48 hours.  Coagulation: No results for input(s): PT, INR, APTT in the last 48 hours.  Lactic Acid: No results for input(s): LACTATE in the last 48 hours.  Lipase: No results for input(s): LIPASE in the last 48 hours.  Lipid Panel: No results for input(s): CHOL, HDL, LDLCALC, TRIG, CHOLHDL in the last 48 hours.  Magnesium:   Recent Labs   Lab  08/24/18   0658  08/25/18   0455   MG  2.1  1.9     Pathology Results  (Last 10 years)    None        POCT Glucose:   Recent Labs   Lab  08/24/18   1750  08/24/18   2104  08/25/18   0758   POCTGLUCOSE  238*  289*  113*     Prealbumin: No results for input(s): PREALBUMIN in the last 48 hours.  Respiratory Culture: No results for input(s): GSRESP, RESPIRATORYC in the last 48 hours.  Troponin: No results for input(s): TROPONINI in the last 48 hours.  TSH: No results for input(s): TSH in the last 4320 hours.  Urine Culture: No results for input(s): LABURIN in the last 48 hours.  Urine Studies: No results for input(s): COLORU, APPEARANCEUA, PHUR,  SPECGRAV, PROTEINUA, GLUCUA, KETONESU, BILIRUBINUA, OCCULTUA, NITRITE, UROBILINOGEN, LEUKOCYTESUR, RBCUA, WBCUA, BACTERIA, SQUAMEPITHEL, HYALINECASTS in the last 48 hours.    Invalid input(s): LISA  All pertinent labs within the past 24 hours have been reviewed.    Significant Imaging: I have reviewed all pertinent imaging results/findings within the past 24 hours.

## 2018-08-25 NOTE — ASSESSMENT & PLAN NOTE
- Patient with diabetic nephropathy and concomitant ESRD, HD (MWF via LUE AVF) last dialyzed 8/17  - CMP drawn in ED show no major electrolyte derangements or need for emergent dialysis  - Nephrology consulted, appreciate recs. Undergoing HD on MWF  - low potassium diet and patiromer 8.4 gm on nondialysis days, per Nephrology recs.

## 2018-08-25 NOTE — PLAN OF CARE
Problem: Patient Care Overview  Goal: Plan of Care Review  Outcome: Ongoing (interventions implemented as appropriate)  Patient remains free from fall with injury.  Remains in bed most of the day per MD orders.  Reports improvement to vision in rt eye.  Denies needs at this time.

## 2018-08-25 NOTE — ASSESSMENT & PLAN NOTE
- Osteomyelitis of left 5th metatarsal taken for washout by Orthopedic surgery at [facility] on 08/15  - Patient being treated outside facility with Flagyl, linezolid, gentamicin with HD.  - Refusing amputation which would be curative.   - Podiatry consulted, appreciate recs. Advised BKA but patient refused. Recommended CAM boot and abx per ID. D/tyler wound vac. Follow up with [facility] podiatry at discharge  - Started on Daptomycin per ID  - ESR elevated at 58 upon admission  - X-ray left foot and ankle shows partial amputation the 1st, 2nd, 3rd, and 4th digits with fusion of the 2nd and 3rd MTP joints and throughout the midfoot, severe deformity and joint space loss the tibiotalar joint with a lapse of the talus likely 2/2 to chronic osteomyelitis, arthritis, or chronic Charcot foot.

## 2018-08-25 NOTE — ASSESSMENT & PLAN NOTE
- Patient with many days of positive MRSA blood cultures at [facility], previously being treated with Flagyl, linezolid, and gentamicin with hemodialysis  - Likely source related to left foot osteomyelitis of 5th metatarsal. Will consider US of   - Patient with signs of septic embolization, right retro retinal abscess  - History of epidural abscess and associated IV drug use, reports discontinuation of IV drugs for over 5 years ago  - Repeat cultures drawn in the ED, shows NGTD.  - Patient with allergy to vancomycin, full body rash with associated pruritus.   - ID consulted, appreciate recs. Started on Daptomycin 8mg/kg to be given after HD.  * Recommended 1gm IV to be given after HD once discharged. Will need a total of 6-8 weeks.  - US LUE AVF shows no signs of abscess or fluid collection.  - Vitals stable, normal white count. Will continue to monitor.

## 2018-08-25 NOTE — PLAN OF CARE
Reviewed insulin regimen and CBG.     Glucose improving. Patient refused full dose of novolog with dinner - hyperglycemic after. Fasting glucose at goal    Would continue the same regimen with daily titration of insulin. Will defer further titration to hospital medicine team.     Endocrine will sign off. Please call with any questions.

## 2018-08-25 NOTE — ASSESSMENT & PLAN NOTE
- Received intravitreal vancomycin and ceftazidime at admit.  - Opthmology following, daily assessments ongoing.  - Per Ophthalmology, lesion appears to be consolidating but no significant improvement in vision. Will need daily assessment for atleast 1 week from date of last intravitreal injection.  - Received 2nd dose of intravitreal vancomycin on 8/24  - Guarded prognosis for now.

## 2018-08-25 NOTE — ASSESSMENT & PLAN NOTE
- Endocrinology following, appreciate recs.  - Long term diabetic, poorly controlled. Takes Levemir 30U at home.  - A1C 8.2  - POC last night was >400 and AM glucose was >200  - Lev 27 qHS with Asp 14 TIDWM , LDSSI.   - diabetic and renal diet  - AC/HS accuchecks.

## 2018-08-25 NOTE — ASSESSMENT & PLAN NOTE
- Continue losartan 100mg qdaily and labetalol 100mg q12  - Hydralazine 25 mg p.r.n. for systolic blood pressure > 180
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
## 2018-08-25 NOTE — PLAN OF CARE
Problem: Patient Care Overview  Goal: Plan of Care Review  Outcome: Ongoing (interventions implemented as appropriate)  AAO x4, impaired vision ; rt eye hazy : MRSA in wound contact isolation : conts with iv abx; oral pain meds admin : dressing intact to rt foot : generalized edema : HD pt , 100cc of urine output : will cont to monitor.

## 2018-08-26 LAB
ANION GAP SERPL CALC-SCNC: 10 MMOL/L
BASOPHILS # BLD AUTO: 0.07 K/UL
BASOPHILS NFR BLD: 0.8 %
BUN SERPL-MCNC: 58 MG/DL
CALCIUM SERPL-MCNC: 8 MG/DL
CHLORIDE SERPL-SCNC: 100 MMOL/L
CK SERPL-CCNC: 9 U/L
CO2 SERPL-SCNC: 23 MMOL/L
CREAT SERPL-MCNC: 7.6 MG/DL
DIFFERENTIAL METHOD: ABNORMAL
EOSINOPHIL # BLD AUTO: 0.6 K/UL
EOSINOPHIL NFR BLD: 6.6 %
ERYTHROCYTE [DISTWIDTH] IN BLOOD BY AUTOMATED COUNT: 16.5 %
EST. GFR  (AFRICAN AMERICAN): 8.5 ML/MIN/1.73 M^2
EST. GFR  (NON AFRICAN AMERICAN): 7.4 ML/MIN/1.73 M^2
GLUCOSE SERPL-MCNC: 256 MG/DL
HCT VFR BLD AUTO: 26 %
HGB BLD-MCNC: 7.8 G/DL
IMM GRANULOCYTES # BLD AUTO: 0.1 K/UL
IMM GRANULOCYTES NFR BLD AUTO: 1.2 %
LYMPHOCYTES # BLD AUTO: 1.2 K/UL
LYMPHOCYTES NFR BLD: 14.8 %
MAGNESIUM SERPL-MCNC: 2.1 MG/DL
MCH RBC QN AUTO: 28.8 PG
MCHC RBC AUTO-ENTMCNC: 30 G/DL
MCV RBC AUTO: 96 FL
MONOCYTES # BLD AUTO: 0.7 K/UL
MONOCYTES NFR BLD: 8.8 %
NEUTROPHILS # BLD AUTO: 5.7 K/UL
NEUTROPHILS NFR BLD: 67.8 %
NRBC BLD-RTO: 0 /100 WBC
PHOSPHATE SERPL-MCNC: 5.7 MG/DL
PLATELET # BLD AUTO: 106 K/UL
PMV BLD AUTO: 10.9 FL
POCT GLUCOSE: 172 MG/DL (ref 70–110)
POCT GLUCOSE: 239 MG/DL (ref 70–110)
POCT GLUCOSE: 345 MG/DL (ref 70–110)
POCT GLUCOSE: 426 MG/DL (ref 70–110)
POCT GLUCOSE: 441 MG/DL (ref 70–110)
POCT GLUCOSE: 470 MG/DL (ref 70–110)
POTASSIUM SERPL-SCNC: 5.8 MMOL/L
RBC # BLD AUTO: 2.71 M/UL
SODIUM SERPL-SCNC: 133 MMOL/L
WBC # BLD AUTO: 8.38 K/UL

## 2018-08-26 PROCEDURE — 63600175 PHARM REV CODE 636 W HCPCS: Performed by: STUDENT IN AN ORGANIZED HEALTH CARE EDUCATION/TRAINING PROGRAM

## 2018-08-26 PROCEDURE — 25000003 PHARM REV CODE 250: Performed by: INTERNAL MEDICINE

## 2018-08-26 PROCEDURE — 82550 ASSAY OF CK (CPK): CPT

## 2018-08-26 PROCEDURE — 25000003 PHARM REV CODE 250: Performed by: STUDENT IN AN ORGANIZED HEALTH CARE EDUCATION/TRAINING PROGRAM

## 2018-08-26 PROCEDURE — 36415 COLL VENOUS BLD VENIPUNCTURE: CPT

## 2018-08-26 PROCEDURE — 85025 COMPLETE CBC W/AUTO DIFF WBC: CPT

## 2018-08-26 PROCEDURE — 20600001 HC STEP DOWN PRIVATE ROOM

## 2018-08-26 PROCEDURE — 63600175 PHARM REV CODE 636 W HCPCS: Mod: JG | Performed by: STUDENT IN AN ORGANIZED HEALTH CARE EDUCATION/TRAINING PROGRAM

## 2018-08-26 PROCEDURE — 90935 HEMODIALYSIS ONE EVALUATION: CPT | Mod: ,,, | Performed by: INTERNAL MEDICINE

## 2018-08-26 PROCEDURE — 83735 ASSAY OF MAGNESIUM: CPT

## 2018-08-26 PROCEDURE — 25000003 PHARM REV CODE 250: Performed by: OPHTHALMOLOGY

## 2018-08-26 PROCEDURE — 80100014 HC HEMODIALYSIS 1:1

## 2018-08-26 PROCEDURE — 80048 BASIC METABOLIC PNL TOTAL CA: CPT

## 2018-08-26 PROCEDURE — 84100 ASSAY OF PHOSPHORUS: CPT

## 2018-08-26 PROCEDURE — 99231 SBSQ HOSP IP/OBS SF/LOW 25: CPT | Mod: ,,, | Performed by: INTERNAL MEDICINE

## 2018-08-26 RX ORDER — SODIUM CHLORIDE 9 MG/ML
INJECTION, SOLUTION INTRAVENOUS ONCE
Status: COMPLETED | OUTPATIENT
Start: 2018-08-26 | End: 2018-08-26

## 2018-08-26 RX ORDER — INSULIN ASPART 100 [IU]/ML
14 INJECTION, SOLUTION INTRAVENOUS; SUBCUTANEOUS
Status: DISCONTINUED | OUTPATIENT
Start: 2018-08-26 | End: 2018-08-27

## 2018-08-26 RX ORDER — ATROPINE SULFATE 10 MG/ML
1 SOLUTION/ DROPS OPHTHALMIC DAILY
Status: DISCONTINUED | OUTPATIENT
Start: 2018-08-26 | End: 2018-09-19

## 2018-08-26 RX ORDER — SODIUM CHLORIDE 9 MG/ML
INJECTION, SOLUTION INTRAVENOUS
Status: DISCONTINUED | OUTPATIENT
Start: 2018-08-26 | End: 2018-08-29

## 2018-08-26 RX ADMIN — CALCIUM ACETATE 1334 MG: 667 CAPSULE ORAL at 04:08

## 2018-08-26 RX ADMIN — CALCIUM ACETATE 1334 MG: 667 CAPSULE ORAL at 08:08

## 2018-08-26 RX ADMIN — CALCIUM ACETATE 1334 MG: 667 CAPSULE ORAL at 12:08

## 2018-08-26 RX ADMIN — HEPARIN SODIUM 5000 UNITS: 5000 INJECTION, SOLUTION INTRAVENOUS; SUBCUTANEOUS at 02:08

## 2018-08-26 RX ADMIN — PREDNISOLONE ACETATE 1 DROP: 10 SUSPENSION/ DROPS OPHTHALMIC at 12:08

## 2018-08-26 RX ADMIN — LOSARTAN POTASSIUM 100 MG: 50 TABLET, FILM COATED ORAL at 08:08

## 2018-08-26 RX ADMIN — PREDNISOLONE ACETATE 1 DROP: 10 SUSPENSION/ DROPS OPHTHALMIC at 08:08

## 2018-08-26 RX ADMIN — DAPTOMYCIN 905 MG: 500 INJECTION, POWDER, LYOPHILIZED, FOR SOLUTION INTRAVENOUS at 11:08

## 2018-08-26 RX ADMIN — SERTRALINE HYDROCHLORIDE 25 MG: 25 TABLET ORAL at 08:08

## 2018-08-26 RX ADMIN — INSULIN ASPART 14 UNITS: 100 INJECTION, SOLUTION INTRAVENOUS; SUBCUTANEOUS at 04:08

## 2018-08-26 RX ADMIN — LABETALOL HCL 100 MG: 100 TABLET, FILM COATED ORAL at 08:08

## 2018-08-26 RX ADMIN — HYDROCODONE BITARTRATE AND ACETAMINOPHEN 1 TABLET: 7.5; 325 TABLET ORAL at 08:08

## 2018-08-26 RX ADMIN — DILTIAZEM HYDROCHLORIDE 300 MG: 300 CAPSULE, COATED, EXTENDED RELEASE ORAL at 08:08

## 2018-08-26 RX ADMIN — ATROPINE SULFATE 1 DROP: 10 SOLUTION/ DROPS OPHTHALMIC at 02:08

## 2018-08-26 RX ADMIN — METHADONE HYDROCHLORIDE 10 MG: 5 TABLET ORAL at 08:08

## 2018-08-26 RX ADMIN — INSULIN ASPART 10 UNITS: 100 INJECTION, SOLUTION INTRAVENOUS; SUBCUTANEOUS at 04:08

## 2018-08-26 RX ADMIN — PREDNISOLONE ACETATE 1 DROP: 10 SUSPENSION/ DROPS OPHTHALMIC at 05:08

## 2018-08-26 RX ADMIN — INSULIN ASPART 5 UNITS: 100 INJECTION, SOLUTION INTRAVENOUS; SUBCUTANEOUS at 08:08

## 2018-08-26 RX ADMIN — HEPARIN SODIUM 5000 UNITS: 5000 INJECTION, SOLUTION INTRAVENOUS; SUBCUTANEOUS at 05:08

## 2018-08-26 RX ADMIN — HEPARIN SODIUM 5000 UNITS: 5000 INJECTION, SOLUTION INTRAVENOUS; SUBCUTANEOUS at 11:08

## 2018-08-26 RX ADMIN — SODIUM CHLORIDE 300 ML: 0.9 INJECTION, SOLUTION INTRAVENOUS at 11:08

## 2018-08-26 RX ADMIN — INSULIN ASPART 12 UNITS: 100 INJECTION, SOLUTION INTRAVENOUS; SUBCUTANEOUS at 08:08

## 2018-08-26 RX ADMIN — CETIRIZINE HYDROCHLORIDE 5 MG: 5 TABLET ORAL at 08:08

## 2018-08-26 NOTE — PROGRESS NOTES
Elevated k 5.8 noted   dialysis 3 hrs ordered, dialysis nurse aware   patiromer 8.4 gm on non dialysis days 4 times weekly ordered

## 2018-08-26 NOTE — PROGRESS NOTES
CC: Retinal abscess OD    HPI: Mickey Ross is a 53 y.o. male    VA seems slightly improved since last injection 8/24/18, but no sig change from yesterday. No pain or novel complaints.       Past Medical History:   Diagnosis Date    Allergic drug rash - due to Vancomycin 8/19/2018    Arthritis     Blind left eye     Charcot's joint of foot     Diabetes mellitus     GERD (gastroesophageal reflux disease)     Glaucoma     Hypertension     MRSA (methicillin resistant staph aureus) culture positive     Renal disorder     Subretinal abscess 8/17/2018         Family History   Problem Relation Age of Onset    Pneumonia Mother            Current Facility-Administered Medications:     0.9%  NaCl infusion, , Intravenous, PRN, Farrukh Hernandez MD    0.9%  NaCl infusion, , Intravenous, PRN, Suri Carreon MD    0.9%  NaCl infusion, , Intravenous, Once, Suri Carreon MD, Last Rate: 100 mL/hr at 08/26/18 1129, 300 mL at 08/26/18 1129    acetaminophen tablet 650 mg, 650 mg, Oral, Q4H PRN, Freddie Huston MD    albuterol-ipratropium 2.5 mg-0.5 mg/3 mL nebulizer solution 3 mL, 3 mL, Nebulization, Q4H PRN, Freddie Huston MD    atropine 1% ophthalmic solution 1 drop, 1 drop, Right Eye, Daily, Sal Villanueva MD    calcium acetate capsule 1,334 mg, 1,334 mg, Oral, TID WM, Ralph Tomas MD, 1,334 mg at 08/26/18 1214    cetirizine tablet 5 mg, 5 mg, Oral, Daily, Bhargav Chacon MD, 5 mg at 08/26/18 0814    clonazePAM tablet 0.5 mg, 0.5 mg, Oral, BID PRN, Freddie Huston MD, 0.5 mg at 08/25/18 2034    DAPTOmycin (CUBICIN) 905 mg in sodium chloride 0.9% IVPB, 8 mg/kg, Intravenous, Q48H, Armando Madrid MD, Last Rate: 100 mL/hr at 08/24/18 2107, 905 mg at 08/24/18 2107    dextrose 50% injection 12.5 g, 12.5 g, Intravenous, PRN, Bhargav Chacon MD    dextrose 50% injection 25 g, 25 g, Intravenous, PRN, Bhargav Chacon MD    diltiaZEM 24 hr capsule 300 mg, 300 mg, Oral, Daily,  Ralph Tomas MD, 300 mg at 08/26/18 0801    epoetin umer (PROCRIT) injection 6,000 units kit, 6,000 Units, Intravenous, Every Mon, Wed, Fri, Gorge Diamond NP, 6,000 Units at 08/24/18 1142    glucagon (human recombinant) injection 1 mg, 1 mg, Intramuscular, PRN, Bhargav Chacon MD    glucose chewable tablet 16 g, 16 g, Oral, PRN, Bhargav Chacon MD    glucose chewable tablet 24 g, 24 g, Oral, PRN, Bhargav Chacon MD    heparin (porcine) injection 5,000 Units, 5,000 Units, Subcutaneous, Q8H, Freddie Huston MD, 5,000 Units at 08/26/18 0518    hydrALAZINE tablet 25 mg, 25 mg, Oral, Q8H PRN, Ralph Tomas MD, 25 mg at 08/24/18 0013    HYDROcodone-acetaminophen 7.5-325 mg per tablet 1 tablet, 1 tablet, Oral, Q6H PRN, Freddie Huston MD, 1 tablet at 08/26/18 0814    hydrOXYzine HCl tablet 25 mg, 25 mg, Oral, TID PRN, Osiris Canela MD, 25 mg at 08/21/18 1004    insulin aspart U-100 pen 1-10 Units, 1-10 Units, Subcutaneous, QID (AC + HS) PRN, Bhargav Chacon MD, 5 Units at 08/25/18 2114    insulin aspart U-100 pen 14 Units, 14 Units, Subcutaneous, TIDWM, Armando Madrid MD    insulin detemir U-100 pen 27 Units, 27 Units, Subcutaneous, QHS, Armando Madrid MD    labetalol tablet 100 mg, 100 mg, Oral, Q12H, Armando Madrid MD, 100 mg at 08/26/18 0801    losartan tablet 100 mg, 100 mg, Oral, Daily, Freddie Huston MD, 100 mg at 08/26/18 0801    methadone tablet 10 mg, 10 mg, Oral, BID, Freddie Huston MD, 10 mg at 08/26/18 0815    naloxone 0.4 mg/mL injection 0.4 mg, 0.4 mg, Intravenous, PRN, Freddie Huston MD    ondansetron disintegrating tablet 8 mg, 8 mg, Oral, Q8H PRN, Freddie Huston MD    ondansetron injection 4 mg, 4 mg, Intravenous, Q8H PRN, Freddie Huston MD    patiromer calcium sorbitex PwPk 8.4 g, 8.4 g, Oral, Once per day on Sun Tue Thu Sat, Suri Carreon MD    polyethylene glycol packet 17 g, 17 g, Oral, Daily PRN,  Bhargav Chacon MD    prednisoLONE acetate 1 % ophthalmic suspension 1 drop, 1 drop, Right Eye, QID, Jose Hemphill MD, 1 drop at 08/26/18 1214    ramelteon tablet 8 mg, 8 mg, Oral, Nightly PRN, Freddie Huston MD    sertraline tablet 25 mg, 25 mg, Oral, Daily, Freddie Huston MD, 25 mg at 08/26/18 0801    sodium chloride 0.9% flush 5 mL, 5 mL, Intravenous, PRN, Freddie Huston MD      Review of patient's allergies indicates:   Allergen Reactions    Vancomycin analogues Rash     Red Man Syndrome    Penicillins          Social History     Tobacco Use    Smoking status: Never Smoker   Substance Use Topics    Alcohol use: No     Frequency: Never    Drug use: No         Base Eye Exam     Visual Acuity       Right Left    Near sc 20/100           Tonometry (Palpation, 4:54 PM)       Right Left    Pressure nl           Pupils       APD    Right dilated pharmacologically    Left             Slit Lamp and Fundus Exam     Slit Lamp Exam       Right Left    Conjunctiva/Sclera White and quiet     Cornea Clear     Anterior Chamber Deep and quiet     Iris dilated     Lens clear     Vitreous inc vit debris over abscess           Fundus Exam       Right Left    Disc Normal     Macula flat, no fluid     Periphery ST arcade SR white elevated mass, edges continue to consolidate, less heme                 A/P  1. Retinal abscess OD   - only seeing eye   - Intravitreal vancomycin x 3, last 8/24/18   - Continues with slight improvement in size and consolidation today.    - No direct ophthalmologic intervention required today, can re-inject vanc 8/28 if improvement plateaus.   - RTC 1 day for OCT/Photos/Exam   - Continue current care and recs per treatment team. Call with any questions.    Sal Villanueva D.O.  Fellow, Retina & Vitreous Surgery

## 2018-08-26 NOTE — SUBJECTIVE & OBJECTIVE
Interval History: BG elevated overnight.    Review of Systems   Constitutional: Negative for chills, fatigue and fever.   HENT: Negative for trouble swallowing.    Eyes: Positive for visual disturbance. Negative for pain.   Respiratory: Negative for cough and shortness of breath.    Cardiovascular: Negative for chest pain and leg swelling.   Gastrointestinal: Negative for abdominal pain, constipation, diarrhea, nausea and vomiting.   Genitourinary: Positive for decreased urine volume.   Musculoskeletal: Negative for arthralgias.   Skin: Negative for color change.   Neurological: Negative for dizziness, light-headedness and headaches.   Psychiatric/Behavioral: Negative for agitation and confusion.     Objective:     Vital Signs (Most Recent):  Temp: 98.2 °F (36.8 °C) (08/26/18 1055)  Pulse: 75 (08/26/18 1300)  Resp: 18 (08/26/18 1055)  BP: (!) 106/42 (08/26/18 1300)  SpO2: 97 % (08/26/18 0900) Vital Signs (24h Range):  Temp:  [97.5 °F (36.4 °C)-98.2 °F (36.8 °C)] 98.2 °F (36.8 °C)  Pulse:  [64-79] 75  Resp:  [16-18] 18  SpO2:  [96 %-98 %] 97 %  BP: (106-169)/(42-74) 106/42     Weight: 113.3 kg (249 lb 12.5 oz)  Body mass index is 34.84 kg/m².    Intake/Output Summary (Last 24 hours) at 8/26/2018 1336  Last data filed at 8/26/2018 0500  Gross per 24 hour   Intake 120 ml   Output 100 ml   Net 20 ml      Physical Exam   Constitutional: He is oriented to person, place, and time. No distress.   HENT:   Head: Normocephalic.   Eyes: EOM are normal.   Neck: Normal range of motion.   Cardiovascular: Normal rate and regular rhythm.   Pulmonary/Chest: Effort normal and breath sounds normal.   Abdominal: Soft. Bowel sounds are normal.   Musculoskeletal: He exhibits edema and deformity.   Neurological: He is alert and oriented to person, place, and time.   Nursing note and vitals reviewed.      Significant Labs:   A1C:   Recent Labs   Lab  08/17/18   2159   HGBA1C  8.2*     ABGs: No results for input(s): PH, PCO2, HCO3,  POCSATURATED, BE, TOTALHB, COHB, METHB, O2HB, POCFIO2 in the last 48 hours.  Bilirubin:   Recent Labs   Lab  08/17/18   2159  08/18/18   0613  08/19/18   0645  08/20/18   0826  08/21/18   0456   BILITOT  0.7  0.8  0.7  0.4  0.5     Blood Culture: No results for input(s): LABBLOO in the last 48 hours.  BMP:   Recent Labs   Lab  08/26/18   0552   GLU  256*   NA  133*   K  5.8*   CL  100   CO2  23   BUN  58*   CREATININE  7.6*   CALCIUM  8.0*   MG  2.1     CBC:   Recent Labs   Lab  08/25/18   0455  08/26/18   0552   WBC  7.52  8.38   HGB  7.7*  7.8*   HCT  25.8*  26.0*   PLT  167  106*     CMP:   Recent Labs   Lab  08/25/18   0455  08/26/18   0552   NA  136  133*   K  5.2*  5.8*   CL  104  100   CO2  23  23   GLU  133*  256*   BUN  37*  58*   CREATININE  5.5*  7.6*   CALCIUM  7.9*  8.0*   ANIONGAP  9  10   EGFRNONAA  10.9*  7.4*     Cardiac Markers: No results for input(s): CKMB, MYOGLOBIN, BNP, TROPISTAT in the last 48 hours.  Coagulation: No results for input(s): PT, INR, APTT in the last 48 hours.  Lactic Acid: No results for input(s): LACTATE in the last 48 hours.  Lipase: No results for input(s): LIPASE in the last 48 hours.  Lipid Panel: No results for input(s): CHOL, HDL, LDLCALC, TRIG, CHOLHDL in the last 48 hours.  Magnesium:   Recent Labs   Lab  08/25/18   0455  08/26/18   0552   MG  1.9  2.1     Pathology Results  (Last 10 years)    None        POCT Glucose:   Recent Labs   Lab  08/26/18   0056  08/26/18   0728  08/26/18   1133   POCTGLUCOSE  345*  239*  172*     Prealbumin: No results for input(s): PREALBUMIN in the last 48 hours.  Respiratory Culture: No results for input(s): GSRESP, RESPIRATORYC in the last 48 hours.  Troponin: No results for input(s): TROPONINI in the last 48 hours.  TSH: No results for input(s): TSH in the last 4320 hours.  Urine Culture: No results for input(s): LABURIN in the last 48 hours.  Urine Studies: No results for input(s): COLORU, APPEARANCEUA, PHUR, SPECGRAV, PROTEINUA,  GLUCUA, KETONESU, BILIRUBINUA, OCCULTUA, NITRITE, UROBILINOGEN, LEUKOCYTESUR, RBCUA, WBCUA, BACTERIA, SQUAMEPITHEL, HYALINECASTS in the last 48 hours.    Invalid input(s): LISA  All pertinent labs within the past 24 hours have been reviewed.    Significant Imaging: I have reviewed all pertinent imaging results/findings within the past 24 hours.

## 2018-08-26 NOTE — PROGRESS NOTES
[facility] HEMODIALYSIS NOTE     Patient currently on hemodialysis for removal of uremic toxins and volume.    Patient seen and evaluated on hemodialysis, tolerating treatment, see HD flowsheet for vitals and assessments.      Ultrafiltration goal is 1L as tolerated     Labs have been reviewed and the dialysate bath has been adjusted.     Assessment/Plan:     HD today for removal of uremic toxins and volume.    patiromer 4 times weekly Non HD days  Low k diet
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
## 2018-08-26 NOTE — PROGRESS NOTES
3 hr hd completed, net fluid removed= 1100 mls, pt dipped his b/ps into low 100s from 140-150s, ultrafiltration turned off during the last hr of treatment, responded well to the intervention, with normal b/p readings in the 120 range.

## 2018-08-26 NOTE — PLAN OF CARE
Problem: Patient Care Overview  Goal: Plan of Care Review  Outcome: Ongoing (interventions implemented as appropriate)  AAO x4, impaired vision ; rt eye improved per pt  : MRSA in wound contact isolation : conts with iv abx; oral pain meds admin : dressing intact to foot : generalized edema : HD pt , blood glucose 441  To  345 , insulin sliding scale adjusted :   100cc of urine output : will cont to monitor.

## 2018-08-26 NOTE — PT/OT/SLP PROGRESS
Physical Therapy      Patient Name:  Mickey Ross   MRN:  [id number]    Patient not seen today secondary to on first attempt Patient was about to initiate with dialysis treatment and on second attempt Patient was still receiving Dialysis treatment.. Will follow-up on next scheduled visit.    Bassem Wall, PTA

## 2018-08-27 LAB
ANION GAP SERPL CALC-SCNC: 8 MMOL/L
BASOPHILS # BLD AUTO: 0.08 K/UL
BASOPHILS NFR BLD: 1.1 %
BUN SERPL-MCNC: 40 MG/DL
CALCIUM SERPL-MCNC: 8 MG/DL
CHLORIDE SERPL-SCNC: 104 MMOL/L
CO2 SERPL-SCNC: 24 MMOL/L
CREAT SERPL-MCNC: 5.7 MG/DL
DIFFERENTIAL METHOD: ABNORMAL
EOSINOPHIL # BLD AUTO: 0.3 K/UL
EOSINOPHIL NFR BLD: 4.5 %
ERYTHROCYTE [DISTWIDTH] IN BLOOD BY AUTOMATED COUNT: 16.6 %
EST. GFR  (AFRICAN AMERICAN): 12.1 ML/MIN/1.73 M^2
EST. GFR  (NON AFRICAN AMERICAN): 10.4 ML/MIN/1.73 M^2
GLUCOSE SERPL-MCNC: 298 MG/DL
HCT VFR BLD AUTO: 25.8 %
HGB BLD-MCNC: 7.6 G/DL
IMM GRANULOCYTES # BLD AUTO: 0.09 K/UL
IMM GRANULOCYTES NFR BLD AUTO: 1.3 %
LYMPHOCYTES # BLD AUTO: 1.1 K/UL
LYMPHOCYTES NFR BLD: 15.2 %
MAGNESIUM SERPL-MCNC: 2.2 MG/DL
MCH RBC QN AUTO: 28.8 PG
MCHC RBC AUTO-ENTMCNC: 29.5 G/DL
MCV RBC AUTO: 98 FL
MONOCYTES # BLD AUTO: 0.6 K/UL
MONOCYTES NFR BLD: 8.8 %
NEUTROPHILS # BLD AUTO: 4.9 K/UL
NEUTROPHILS NFR BLD: 69.1 %
NRBC BLD-RTO: 0 /100 WBC
PHOSPHATE SERPL-MCNC: 4.3 MG/DL
PLATELET # BLD AUTO: 145 K/UL
PMV BLD AUTO: 10.6 FL
POCT GLUCOSE: 166 MG/DL (ref 70–110)
POCT GLUCOSE: 211 MG/DL (ref 70–110)
POCT GLUCOSE: 262 MG/DL (ref 70–110)
POCT GLUCOSE: 332 MG/DL (ref 70–110)
POCT GLUCOSE: 353 MG/DL (ref 70–110)
POCT GLUCOSE: >500 MG/DL (ref 70–110)
POTASSIUM SERPL-SCNC: 5.3 MMOL/L
RBC # BLD AUTO: 2.64 M/UL
SODIUM SERPL-SCNC: 136 MMOL/L
WBC # BLD AUTO: 7.06 K/UL

## 2018-08-27 PROCEDURE — 80048 BASIC METABOLIC PNL TOTAL CA: CPT

## 2018-08-27 PROCEDURE — 84100 ASSAY OF PHOSPHORUS: CPT

## 2018-08-27 PROCEDURE — 25000003 PHARM REV CODE 250: Performed by: STUDENT IN AN ORGANIZED HEALTH CARE EDUCATION/TRAINING PROGRAM

## 2018-08-27 PROCEDURE — 85025 COMPLETE CBC W/AUTO DIFF WBC: CPT

## 2018-08-27 PROCEDURE — 20600001 HC STEP DOWN PRIVATE ROOM

## 2018-08-27 PROCEDURE — 25000003 PHARM REV CODE 250: Performed by: INTERNAL MEDICINE

## 2018-08-27 PROCEDURE — 63600175 PHARM REV CODE 636 W HCPCS: Performed by: STUDENT IN AN ORGANIZED HEALTH CARE EDUCATION/TRAINING PROGRAM

## 2018-08-27 PROCEDURE — 83735 ASSAY OF MAGNESIUM: CPT

## 2018-08-27 PROCEDURE — 99231 SBSQ HOSP IP/OBS SF/LOW 25: CPT | Mod: ,,, | Performed by: INTERNAL MEDICINE

## 2018-08-27 RX ORDER — INSULIN ASPART 100 [IU]/ML
18 INJECTION, SOLUTION INTRAVENOUS; SUBCUTANEOUS
Status: DISCONTINUED | OUTPATIENT
Start: 2018-08-27 | End: 2018-08-29

## 2018-08-27 RX ORDER — SODIUM CHLORIDE 9 MG/ML
INJECTION, SOLUTION INTRAVENOUS ONCE
Status: COMPLETED | OUTPATIENT
Start: 2018-08-28 | End: 2018-08-28

## 2018-08-27 RX ORDER — SODIUM CHLORIDE 9 MG/ML
INJECTION, SOLUTION INTRAVENOUS
Status: DISCONTINUED | OUTPATIENT
Start: 2018-08-28 | End: 2018-08-29

## 2018-08-27 RX ADMIN — CLONAZEPAM 0.5 MG: 0.5 TABLET ORAL at 09:08

## 2018-08-27 RX ADMIN — INSULIN ASPART 18 UNITS: 100 INJECTION, SOLUTION INTRAVENOUS; SUBCUTANEOUS at 11:08

## 2018-08-27 RX ADMIN — HYDROCODONE BITARTRATE AND ACETAMINOPHEN 1 TABLET: 7.5; 325 TABLET ORAL at 08:08

## 2018-08-27 RX ADMIN — SERTRALINE HYDROCHLORIDE 25 MG: 25 TABLET ORAL at 08:08

## 2018-08-27 RX ADMIN — ATROPINE SULFATE 1 DROP: 10 SOLUTION/ DROPS OPHTHALMIC at 08:08

## 2018-08-27 RX ADMIN — SODIUM POLYSTYRENE SULFONATE 15 G: 15 SUSPENSION ORAL; RECTAL at 11:08

## 2018-08-27 RX ADMIN — LOSARTAN POTASSIUM 100 MG: 50 TABLET, FILM COATED ORAL at 08:08

## 2018-08-27 RX ADMIN — HEPARIN SODIUM 5000 UNITS: 5000 INJECTION, SOLUTION INTRAVENOUS; SUBCUTANEOUS at 06:08

## 2018-08-27 RX ADMIN — INSULIN ASPART 14 UNITS: 100 INJECTION, SOLUTION INTRAVENOUS; SUBCUTANEOUS at 08:08

## 2018-08-27 RX ADMIN — CALCIUM ACETATE 1334 MG: 667 CAPSULE ORAL at 04:08

## 2018-08-27 RX ADMIN — INSULIN ASPART 3 UNITS: 100 INJECTION, SOLUTION INTRAVENOUS; SUBCUTANEOUS at 08:08

## 2018-08-27 RX ADMIN — CALCIUM ACETATE 1334 MG: 667 CAPSULE ORAL at 11:08

## 2018-08-27 RX ADMIN — PREDNISOLONE ACETATE 1 DROP: 10 SUSPENSION/ DROPS OPHTHALMIC at 01:08

## 2018-08-27 RX ADMIN — HEPARIN SODIUM 5000 UNITS: 5000 INJECTION, SOLUTION INTRAVENOUS; SUBCUTANEOUS at 01:08

## 2018-08-27 RX ADMIN — METHADONE HYDROCHLORIDE 10 MG: 5 TABLET ORAL at 09:08

## 2018-08-27 RX ADMIN — PREDNISOLONE ACETATE 1 DROP: 10 SUSPENSION/ DROPS OPHTHALMIC at 04:08

## 2018-08-27 RX ADMIN — CETIRIZINE HYDROCHLORIDE 5 MG: 5 TABLET ORAL at 08:08

## 2018-08-27 RX ADMIN — PREDNISOLONE ACETATE 1 DROP: 10 SUSPENSION/ DROPS OPHTHALMIC at 08:08

## 2018-08-27 RX ADMIN — CLONAZEPAM 0.5 MG: 0.5 TABLET ORAL at 08:08

## 2018-08-27 RX ADMIN — HEPARIN SODIUM 5000 UNITS: 5000 INJECTION, SOLUTION INTRAVENOUS; SUBCUTANEOUS at 08:08

## 2018-08-27 RX ADMIN — METHADONE HYDROCHLORIDE 10 MG: 5 TABLET ORAL at 08:08

## 2018-08-27 RX ADMIN — DILTIAZEM HYDROCHLORIDE 300 MG: 300 CAPSULE, COATED, EXTENDED RELEASE ORAL at 08:08

## 2018-08-27 RX ADMIN — INSULIN ASPART 8 UNITS: 100 INJECTION, SOLUTION INTRAVENOUS; SUBCUTANEOUS at 11:08

## 2018-08-27 RX ADMIN — INSULIN ASPART 4 UNITS: 100 INJECTION, SOLUTION INTRAVENOUS; SUBCUTANEOUS at 08:08

## 2018-08-27 RX ADMIN — INSULIN ASPART 18 UNITS: 100 INJECTION, SOLUTION INTRAVENOUS; SUBCUTANEOUS at 04:08

## 2018-08-27 RX ADMIN — LABETALOL HCL 100 MG: 100 TABLET, FILM COATED ORAL at 08:08

## 2018-08-27 RX ADMIN — PATIROMER 8.4 G: 8.4 POWDER, FOR SUSPENSION ORAL at 01:08

## 2018-08-27 NOTE — PROGRESS NOTES
K 5.3 today   Will change Hd schedule to his usual outpatient schedule   please change patiromer to be given MWF and Sunday and today asap  It may be related to sq heparin ( lovenox would have same effect) and losartan   suggest reducing losartan to 50 mg daily,

## 2018-08-27 NOTE — PLAN OF CARE
[place] did speak with Cheryl at [facility] at , reviewing case, provided HD days in [place] for TTS.

## 2018-08-27 NOTE — ASSESSMENT & PLAN NOTE
- Received intravitreal vancomycin and ceftazidime at admit.  - Opthmology following, daily assessments ongoing.  - Per Ophthalmology, lesion appears to be consolidating but no significant improvement in vision. Will need daily assessment for atleast 1 week from date of last intravitreal injection.  - Received 3rd dose of intravitreal vancomycin on 8/24  Will receive 4th dose on 08/24

## 2018-08-27 NOTE — NURSING
md called about blood sugar greater than 500, gave 24u of insulin and repeat was 441, md aware and no new orders received

## 2018-08-27 NOTE — PLAN OF CARE
Problem: Patient Care Overview  Goal: Plan of Care Review  Outcome: Ongoing (interventions implemented as appropriate)  AAO x4, impaired vision ; rt eye improved per pt  : MRSA in wound contact isolation : conts with iv abx; oral pain meds admin : dressing intact to foot to left foot noted with odor  : generalized edema : HD pt , blood glucose 470,426 & 353  insulin given per scale : Pt has supper tray  In room , eating bread pudding: MD aware and adjusted Diet to ADA 1800 patricia/Renal /Cardiac:  150cc of urine output : will cont to monitor.

## 2018-08-27 NOTE — PROGRESS NOTES
"  [facility]  Adult Nutrition  Consult Note    SUMMARY     Recommendations    1. Continue current 1500 kcal ADA, Renal, Cardiac diet + Novasource ONS.   2. RD to monitor & follow-up.    Goals: nutrient intake >/= 85% EEN/EPN   Nutrition Goal Status: goal met  Communication of RD Recs: reviewed with RN    Reason for Assessment    Reason for Assessment: RD follow-up  Diagnosis: (Sepsis due to MRSA)  Relevant Medical History: T2DM, GERD, HTN, MRSA, Renal disorder  Interdisciplinary Rounds: did not attend    General Information Comments: Pt continues w/ good appetite, consuming 100% of meals. Pt on HD.  Nutrition Discharge Planning: Renal diet w/ po intake > 75%    Nutrition/Diet History    Patient Reported Diet/Restrictions/Preferences: renal  Typical Food/Fluid Intake: adequate PTA  Do you have any cultural, spiritual, Sikh conflicts, given your current situation?: none stated   Food Allergies: NKFA  Factors Affecting Nutritional Intake: None identified at this time    Anthropometrics    Temp: 98.1 °F (36.7 °C)  Height Method: Stated  Height: 5' 11" (180.3 cm)  Height (inches): 71 in  Weight Method: Bed Scale  Weight: 117.4 kg (258 lb 13.1 oz)  Weight (lb): 258.82 lb  Ideal Body Weight (IBW), Male: 172 lb  % Ideal Body Weight, Male (lb): 150.48 lb  BMI (Calculated): 36.2  BMI Grade: 35 - 39.9 - obesity - grade II    Lab/Procedures/Meds    Pertinent Labs Reviewed: reviewed  Pertinent Labs Comments: K 5.3, BUN 40, Creat 5.7, GFR 12.1, Gluc 298  Pertinent Medications Reviewed: reviewed  Pertinent Medications Comments: -    Physical Findings/Assessment    Overall Physical Appearance: obese  Oral/Mouth Cavity: WDL  Skin: intact    Estimated/Assessed Needs    Weight Used For Calorie Calculations: 117.4 kg (258 lb 13.1 oz)     Energy Calorie Requirements (kcal): 2449 kcal/d  Energy Need Method: [place](x 1.2)     Protein Requirements: 117 g/d (1 g/kg)  Weight Used For Protein Calculations: " 117.4 kg (258 lb 13.1 oz)     Fluid Need Method: Per MD or 1 mL/kcal    CHO Requirement: 45-50% total intake    Nutrition Prescription Ordered    Current Diet Order: 1500 kcal ADA, Renal, Cardiac  Oral Nutrition Supplement: Novasource ONS    Evaluation of Received Nutrient/Fluid Intake    Comments: LBM: 8/26    Tolerance: tolerating    Nutrition Risk    Level of Risk/Frequency of Follow-up: (1x/week)     Assessment and Plan    Nutrition Problem  Altered nutritional labs     Related to (etiology):   ESRD on HD w/ T2DM     Signs and Symptoms (as evidenced by):   Phos-5.3, Na-129, Glu-235, A1c -8.5, Ca-7.4, GFR-7.1    Nutrition Diagnosis Status:   Continues      Monitor and Evaluation    Food and Nutrient Intake: energy intake, food and beverage intake  Food and Nutrient Adminstration: diet order  Physical Activity and Function: nutrition-related ADLs and IADLs  Anthropometric Measurements: weight, weight change  Biochemical Data, Medical Tests and Procedures: (All labs)  Nutrition-Focused Physical Findings: overall appearance     Nutrition Follow-Up    RD Follow-up?: Yes

## 2018-08-27 NOTE — PT/OT/SLP PROGRESS
"Physical Therapy      Patient Name:  Mickey Ross   MRN:  [id number]    Patient not seen today secondary to pt reporting "they just put drops in my eyes, I can't do it today, I will work with you tomorrow."  . Will follow-up next scheduled treatment per PT POC.    Valeriy Green, PTA  8/27/2018    "

## 2018-08-27 NOTE — ASSESSMENT & PLAN NOTE
- Endocrinology following, appreciate recs.  - Long term diabetic, poorly controlled. Takes Levemir 30U at home.  - A1C 8.2  - POC last night was >400 and AM glucose was >200  - Lev 27 qHS with Asp 18 TIDWM , LDSSI.   - diabetic and renal diet  - AC/HS accuchecks.

## 2018-08-27 NOTE — SUBJECTIVE & OBJECTIVE
Review of Systems   Constitutional: Negative for chills, fatigue and fever.   HENT: Negative for trouble swallowing.    Eyes: Positive for visual disturbance. Negative for pain.   Respiratory: Negative for cough and shortness of breath.    Cardiovascular: Negative for chest pain and leg swelling.   Gastrointestinal: Negative for abdominal pain, constipation, diarrhea, nausea and vomiting.   Genitourinary: Positive for decreased urine volume.   Musculoskeletal: Negative for arthralgias.   Skin: Negative for color change.   Neurological: Negative for dizziness, light-headedness and headaches.   Psychiatric/Behavioral: Negative for agitation and confusion.       Objective:     Vital Signs (Most Recent):  Temp: 98 °F (36.7 °C) (08/27/18 1717)  Pulse: 78 (08/27/18 1717)  Resp: 17 (08/27/18 1717)  BP: 132/63 (08/27/18 1717)  SpO2: (!) 92 % (08/27/18 1717) Vital Signs (24h Range):  Temp:  [97.6 °F (36.4 °C)-98.3 °F (36.8 °C)] 98 °F (36.7 °C)  Pulse:  [70-92] 78  Resp:  [16-18] 17  SpO2:  [92 %-96 %] 92 %  BP: (114-156)/(63-74) 132/63     Weight: 117.4 kg (258 lb 13.1 oz)  Body mass index is 36.1 kg/m².    Intake/Output Summary (Last 24 hours) at 8/27/2018 1853  Last data filed at 8/27/2018 0400  Gross per 24 hour   Intake 250 ml   Output 150 ml   Net 100 ml      Physical Exam   Constitutional: He is oriented to person, place, and time. No distress.   HENT:   Head: Normocephalic.   Eyes: EOM are normal.   Neck: Normal range of motion.   Cardiovascular: Normal rate and regular rhythm.   Pulmonary/Chest: Effort normal and breath sounds normal.   Abdominal: Soft. Bowel sounds are normal.   Musculoskeletal: He exhibits edema and deformity.   Neurological: He is alert and oriented to person, place, and time.   Nursing note and vitals       Significant Labs:   CBC:   Recent Labs   Lab  08/26/18   0552  08/27/18   0358   WBC  8.38  7.06   HGB  7.8*  7.6*   HCT  26.0*  25.8*   PLT  106*  145*     CMP:   Recent Labs   Lab   08/26/18   0552  08/27/18   0358   NA  133*  136   K  5.8*  5.3*   CL  100  104   CO2  23  24   GLU  256*  298*   BUN  58*  40*   CREATININE  7.6*  5.7*   CALCIUM  8.0*  8.0*   ANIONGAP  10  8   EGFRNONAA  7.4*  10.4*       Significant Imaging: I have reviewed all pertinent imaging results/findings within the past 24 hours.

## 2018-08-28 LAB
ANION GAP SERPL CALC-SCNC: 11 MMOL/L
ANION GAP SERPL CALC-SCNC: 8 MMOL/L
BASOPHILS # BLD AUTO: 0.1 K/UL
BASOPHILS NFR BLD: 1.3 %
BUN SERPL-MCNC: 50 MG/DL
BUN SERPL-MCNC: 54 MG/DL
CALCIUM SERPL-MCNC: 8.3 MG/DL
CALCIUM SERPL-MCNC: 8.5 MG/DL
CHLORIDE SERPL-SCNC: 106 MMOL/L
CHLORIDE SERPL-SCNC: 107 MMOL/L
CO2 SERPL-SCNC: 21 MMOL/L
CO2 SERPL-SCNC: 25 MMOL/L
CREAT SERPL-MCNC: 7.3 MG/DL
CREAT SERPL-MCNC: 7.4 MG/DL
DIFFERENTIAL METHOD: ABNORMAL
EOSINOPHIL # BLD AUTO: 0.5 K/UL
EOSINOPHIL NFR BLD: 5.9 %
ERYTHROCYTE [DISTWIDTH] IN BLOOD BY AUTOMATED COUNT: 16.8 %
EST. GFR  (AFRICAN AMERICAN): 8.8 ML/MIN/1.73 M^2
EST. GFR  (AFRICAN AMERICAN): 8.9 ML/MIN/1.73 M^2
EST. GFR  (NON AFRICAN AMERICAN): 7.6 ML/MIN/1.73 M^2
EST. GFR  (NON AFRICAN AMERICAN): 7.7 ML/MIN/1.73 M^2
GLUCOSE SERPL-MCNC: 183 MG/DL
GLUCOSE SERPL-MCNC: 86 MG/DL
HCT VFR BLD AUTO: 25.4 %
HGB BLD-MCNC: 7.4 G/DL
IMM GRANULOCYTES # BLD AUTO: 0.06 K/UL
IMM GRANULOCYTES NFR BLD AUTO: 0.8 %
LYMPHOCYTES # BLD AUTO: 1.4 K/UL
LYMPHOCYTES NFR BLD: 17.6 %
MAGNESIUM SERPL-MCNC: 2.3 MG/DL
MCH RBC QN AUTO: 28.8 PG
MCHC RBC AUTO-ENTMCNC: 29.1 G/DL
MCV RBC AUTO: 99 FL
MONOCYTES # BLD AUTO: 0.7 K/UL
MONOCYTES NFR BLD: 8.5 %
NEUTROPHILS # BLD AUTO: 5.3 K/UL
NEUTROPHILS NFR BLD: 65.9 %
NRBC BLD-RTO: 0 /100 WBC
PHOSPHATE SERPL-MCNC: 5.2 MG/DL
PLATELET # BLD AUTO: 96 K/UL
PMV BLD AUTO: 11.1 FL
POCT GLUCOSE: 232 MG/DL (ref 70–110)
POCT GLUCOSE: 296 MG/DL (ref 70–110)
POCT GLUCOSE: 406 MG/DL (ref 70–110)
POCT GLUCOSE: 418 MG/DL (ref 70–110)
POTASSIUM SERPL-SCNC: 5.2 MMOL/L
POTASSIUM SERPL-SCNC: 5.6 MMOL/L
RBC # BLD AUTO: 2.57 M/UL
SODIUM SERPL-SCNC: 139 MMOL/L
SODIUM SERPL-SCNC: 139 MMOL/L
WBC # BLD AUTO: 8 K/UL

## 2018-08-28 PROCEDURE — 63600175 PHARM REV CODE 636 W HCPCS: Mod: JG | Performed by: STUDENT IN AN ORGANIZED HEALTH CARE EDUCATION/TRAINING PROGRAM

## 2018-08-28 PROCEDURE — 90935 HEMODIALYSIS ONE EVALUATION: CPT

## 2018-08-28 PROCEDURE — 36415 COLL VENOUS BLD VENIPUNCTURE: CPT

## 2018-08-28 PROCEDURE — 84100 ASSAY OF PHOSPHORUS: CPT

## 2018-08-28 PROCEDURE — 94761 N-INVAS EAR/PLS OXIMETRY MLT: CPT

## 2018-08-28 PROCEDURE — 90935 HEMODIALYSIS ONE EVALUATION: CPT | Mod: ,,, | Performed by: INTERNAL MEDICINE

## 2018-08-28 PROCEDURE — 25000003 PHARM REV CODE 250: Performed by: STUDENT IN AN ORGANIZED HEALTH CARE EDUCATION/TRAINING PROGRAM

## 2018-08-28 PROCEDURE — 85025 COMPLETE CBC W/AUTO DIFF WBC: CPT

## 2018-08-28 PROCEDURE — 25000003 PHARM REV CODE 250: Performed by: INTERNAL MEDICINE

## 2018-08-28 PROCEDURE — 27000207 HC ISOLATION

## 2018-08-28 PROCEDURE — 25000003 PHARM REV CODE 250: Performed by: GENERAL PRACTICE

## 2018-08-28 PROCEDURE — 83735 ASSAY OF MAGNESIUM: CPT

## 2018-08-28 PROCEDURE — 80048 BASIC METABOLIC PNL TOTAL CA: CPT | Mod: 91

## 2018-08-28 PROCEDURE — 80048 BASIC METABOLIC PNL TOTAL CA: CPT

## 2018-08-28 PROCEDURE — 20600001 HC STEP DOWN PRIVATE ROOM

## 2018-08-28 PROCEDURE — 63600175 PHARM REV CODE 636 W HCPCS: Performed by: GENERAL PRACTICE

## 2018-08-28 PROCEDURE — 99232 SBSQ HOSP IP/OBS MODERATE 35: CPT | Mod: ,,, | Performed by: HOSPITALIST

## 2018-08-28 PROCEDURE — 63600175 PHARM REV CODE 636 W HCPCS: Performed by: STUDENT IN AN ORGANIZED HEALTH CARE EDUCATION/TRAINING PROGRAM

## 2018-08-28 PROCEDURE — 96372 THER/PROPH/DIAG INJ SC/IM: CPT

## 2018-08-28 RX ORDER — LOSARTAN POTASSIUM 50 MG/1
50 TABLET ORAL DAILY
Status: DISCONTINUED | OUTPATIENT
Start: 2018-08-28 | End: 2018-09-02

## 2018-08-28 RX ADMIN — INSULIN ASPART 18 UNITS: 100 INJECTION, SOLUTION INTRAVENOUS; SUBCUTANEOUS at 05:08

## 2018-08-28 RX ADMIN — INSULIN ASPART 2 UNITS: 100 INJECTION, SOLUTION INTRAVENOUS; SUBCUTANEOUS at 09:08

## 2018-08-28 RX ADMIN — METHADONE HYDROCHLORIDE 10 MG: 5 TABLET ORAL at 11:08

## 2018-08-28 RX ADMIN — SERTRALINE HYDROCHLORIDE 25 MG: 25 TABLET ORAL at 11:08

## 2018-08-28 RX ADMIN — HYDROCODONE BITARTRATE AND ACETAMINOPHEN 1 TABLET: 7.5; 325 TABLET ORAL at 12:08

## 2018-08-28 RX ADMIN — DILTIAZEM HYDROCHLORIDE 300 MG: 300 CAPSULE, COATED, EXTENDED RELEASE ORAL at 11:08

## 2018-08-28 RX ADMIN — ATROPINE SULFATE 1 DROP: 10 SOLUTION/ DROPS OPHTHALMIC at 11:08

## 2018-08-28 RX ADMIN — HEPARIN SODIUM 5000 UNITS: 5000 INJECTION, SOLUTION INTRAVENOUS; SUBCUTANEOUS at 02:08

## 2018-08-28 RX ADMIN — PREDNISOLONE ACETATE 1 DROP: 10 SUSPENSION/ DROPS OPHTHALMIC at 09:08

## 2018-08-28 RX ADMIN — LOSARTAN POTASSIUM 50 MG: 50 TABLET, FILM COATED ORAL at 11:08

## 2018-08-28 RX ADMIN — SODIUM CHLORIDE 300 ML: 0.9 INJECTION, SOLUTION INTRAVENOUS at 07:08

## 2018-08-28 RX ADMIN — LABETALOL HCL 100 MG: 100 TABLET, FILM COATED ORAL at 09:08

## 2018-08-28 RX ADMIN — HEPARIN SODIUM 5000 UNITS: 5000 INJECTION, SOLUTION INTRAVENOUS; SUBCUTANEOUS at 09:08

## 2018-08-28 RX ADMIN — PREDNISOLONE ACETATE 1 DROP: 10 SUSPENSION/ DROPS OPHTHALMIC at 12:08

## 2018-08-28 RX ADMIN — METHADONE HYDROCHLORIDE 10 MG: 5 TABLET ORAL at 09:08

## 2018-08-28 RX ADMIN — CETIRIZINE HYDROCHLORIDE 5 MG: 5 TABLET ORAL at 11:08

## 2018-08-28 RX ADMIN — CALCIUM ACETATE 1334 MG: 667 CAPSULE ORAL at 05:08

## 2018-08-28 RX ADMIN — INSULIN ASPART 18 UNITS: 100 INJECTION, SOLUTION INTRAVENOUS; SUBCUTANEOUS at 12:08

## 2018-08-28 RX ADMIN — DAPTOMYCIN 905 MG: 500 INJECTION, POWDER, LYOPHILIZED, FOR SOLUTION INTRAVENOUS at 09:08

## 2018-08-28 RX ADMIN — LABETALOL HCL 100 MG: 100 TABLET, FILM COATED ORAL at 11:08

## 2018-08-28 RX ADMIN — ERYTHROPOIETIN 8000 UNITS: 4000 INJECTION, SOLUTION INTRAVENOUS; SUBCUTANEOUS at 10:08

## 2018-08-28 RX ADMIN — INSULIN ASPART 10 UNITS: 100 INJECTION, SOLUTION INTRAVENOUS; SUBCUTANEOUS at 05:08

## 2018-08-28 RX ADMIN — PREDNISOLONE ACETATE 1 DROP: 10 SUSPENSION/ DROPS OPHTHALMIC at 05:08

## 2018-08-28 RX ADMIN — INSULIN ASPART 10 UNITS: 100 INJECTION, SOLUTION INTRAVENOUS; SUBCUTANEOUS at 12:08

## 2018-08-28 RX ADMIN — HEPARIN SODIUM 5000 UNITS: 5000 INJECTION, SOLUTION INTRAVENOUS; SUBCUTANEOUS at 05:08

## 2018-08-28 RX ADMIN — HYDROCODONE BITARTRATE AND ACETAMINOPHEN 1 TABLET: 7.5; 325 TABLET ORAL at 05:08

## 2018-08-28 RX ADMIN — CLONAZEPAM 0.5 MG: 0.5 TABLET ORAL at 09:08

## 2018-08-28 NOTE — ASSESSMENT & PLAN NOTE
- Received intravitreal vancomycin and ceftazidime at admit.  - Opthmology following, daily assessments ongoing.  - Per Ophthalmology, lesion appears to be consolidating but no significant improvement in vision. Will need daily assessment for atleast 1 week from date of last intravitreal injection.  - Received 3rd dose of intravitreal vancomycin on 8/24  - Planned for 4th dose on 08/24

## 2018-08-28 NOTE — PLAN OF CARE
Problem: Patient Care Overview  Goal: Plan of Care Review  Outcome: Ongoing (interventions implemented as appropriate)  No acute events overnight.  All vital signs stable.  Complaints of lower back pain administered po prn hydrocodone 7.5mg.  AAOx4.  Safety maintained.  WCTM. HD this am and iv abx after.   administered scheduled levemir 27 units sq and novolog 3 units sq per sliding scale for bedtime.  Prior to breakfast this am serum glucose 86.   Plan for opthomology to administer vanc intravitreal today and podiatry to change dressing to left lower extremity.  Patient refused for piv site to be changed due to being a hard stick and it working well.  Placed consult for picc team due to the need for iv antibiotics for 6-8 weeks.

## 2018-08-28 NOTE — PROGRESS NOTES
[facility]  Podiatry  Progress Note    Patient Name: Mickey Ross  MRN: [id number]  Admission Date: 8/17/2018  Hospital Length of Stay: 11 days  Attending Physician: Carrol García MD  Primary Care Provider: Rosalnia Moncada MD   Interval Hx:  Patient Seen at bedside for dressing change.  Patient denies F/C/N/V.  Dressings clean, dry, and intact, vac with good seal.    Scheduled Meds:   atropine 1%  1 drop Right Eye Daily    calcium acetate  1,334 mg Oral TID WM    cetirizine  5 mg Oral Daily    DAPTOmycin (CUBICIN)  IV  8 mg/kg Intravenous Q48H    diltiaZEM  300 mg Oral Daily    epoetin umer (PROCRIT) injection  8,000 Units Intravenous Every Tues, Thurs, Sat    heparin (porcine)  5,000 Units Subcutaneous Q8H    insulin aspart U-100  18 Units Subcutaneous TIDWM    insulin detemir U-100  27 Units Subcutaneous QHS    labetalol  100 mg Oral Q12H    losartan  50 mg Oral Daily    methadone  10 mg Oral BID    patiromer calcium sorbitex  8.4 g Oral Once per day on Sun Mon Wed Fri    prednisoLONE acetate  1 drop Right Eye QID    sertraline  25 mg Oral Daily     Continuous Infusions:    PRN Meds:sodium chloride 0.9%, sodium chloride 0.9%, sodium chloride 0.9%, acetaminophen, albuterol-ipratropium, clonazePAM, dextrose 50%, dextrose 50%, glucagon (human recombinant), glucose, glucose, hydrALAZINE, HYDROcodone-acetaminophen, hydrOXYzine HCl, insulin aspart U-100, naloxone, ondansetron, ondansetron, polyethylene glycol, ramelteon, sodium chloride 0.9%    Review of patient's allergies indicates:   Allergen Reactions    Vancomycin analogues Rash     Red Man Syndrome    Penicillins         Past Medical History:   Diagnosis Date    Allergic drug rash - due to Vancomycin 8/19/2018    Arthritis     Blind left eye     Charcot's joint of foot     Diabetes mellitus     GERD (gastroesophageal reflux disease)     Glaucoma     Hypertension     MRSA (methicillin resistant staph aureus) culture  positive     Renal disorder     Subretinal abscess 8/17/2018     Past Surgical History:   Procedure Laterality Date    AVF left upper arm      left ankle surgery      lumbar back surgery         Family History     Problem Relation (Age of Onset)    Pneumonia Mother        Tobacco Use    Smoking status: Never Smoker   Substance and Sexual Activity    Alcohol use: No     Frequency: Never    Drug use: No    Sexual activity: Not Currently     Review of Systems   Constitutional: Negative for chills and fever.   Gastrointestinal: Negative for nausea and vomiting.   Musculoskeletal:        Left ankle deformity.   Skin: Positive for color change and wound (Surgical incisions to the medial and lateral ankle).     Objective:     Vital Signs (Most Recent):  Temp: 98.3 °F (36.8 °C) (08/28/18 1145)  Pulse: 94 (08/28/18 1145)  Resp: 16 (08/28/18 1145)  BP: 134/60 (08/28/18 1145)  SpO2: 96 % (08/28/18 1145) Vital Signs (24h Range):  Temp:  [97.1 °F (36.2 °C)-98.5 °F (36.9 °C)] 98.3 °F (36.8 °C)  Pulse:  [70-94] 94  Resp:  [12-18] 16  SpO2:  [92 %-97 %] 96 %  BP: (132-171)/(60-80) 134/60     Weight: 101.6 kg (224 lb)  Body mass index is 31.24 kg/m².    Foot Exam    General  General Appearance: appears stated age and healthy   Orientation: alert and oriented to person, place, and time       Left Foot/Ankle      Inspection and Palpation  Swelling: (Diffuse edema to the left LE, non pitting.)  Skin Exam: erythema (To the medial ankle incision); skin not intact (Surgical incisions to the medial and lateral ankle.)     Neurovascular  Dorsalis pedis: absent  Posterior tibial: absent  Saphenous nerve sensation: diminished  Tibial nerve sensation: diminished  Superficial peroneal nerve sensation: diminished  Deep peroneal nerve sensation: diminished  Sural nerve sensation: diminished    Comments  Ortho: Severe non reducible varus deformity of the left ankle.    Lateral Left ankle: Incision present with sutures in tact. No signs of  acute infection. Skin edges well coapted.     Medial Left ankle: Surgical incision left open with granular wound beds to edges. Mild erythema, no drainage no purulence, no malodor, no streaking. Positive pain to palpation.     See clinic images below.      Laboratory:  A1C:   Recent Labs   Lab  08/17/18   2159   HGBA1C  8.2*     Blood Cultures:   No results for input(s): LABBLOO in the last 48 hours.  CBC:   Recent Labs   Lab  08/28/18   0540   WBC  8.00   RBC  2.57*   HGB  7.4*   HCT  25.4*   PLT  96*   MCV  99*   MCH  28.8   MCHC  29.1*     CMP:   Recent Labs   Lab  08/28/18   0540   GLU  86   CALCIUM  8.5*   NA  139   K  5.6*   CO2  21*   CL  107   BUN  50*   CREATININE  7.4*     CRP:   No results for input(s): CRP in the last 168 hours.  ESR:   No results for input(s): SEDRATE in the last 168 hours.  Microbiology Results (last 7 days)     Procedure Component Value Units Date/Time    Blood culture (site 1) [[id number]] Collected:  08/17/18 2201    Order Status:  Completed Specimen:  Blood from Peripheral, Hand, Right Updated:  08/23/18 0612     Blood Culture, Routine No growth after 5 days.    Narrative:       Site # 1, aerobic and anaerobic    Blood culture (site 2) [[id number]] Collected:  08/17/18 2201    Order Status:  Completed Specimen:  Blood from Peripheral, Antecubital, Right Updated:  08/23/18 0612     Blood Culture, Routine No growth after 5 days.    Narrative:       Site # 2, aerobic only        Specimen (12h ago, onward)    None          Diagnostic Results:  X-ray:  Bony destruction, impaction and probable osseous fusion involving the hindfoot and midfoot.  There appears to be medial angulation and displacement of the hindfoot with respect to the adjacent tibia and fibula.  Comparison to prior films and correlation with clinical findings will be needed.    Clinical findings  Wound on left medial ankle measures approximately  With granular base and viable margins.   Negative ptb, No purulence, erythema,  edema, or malodor      Sutures intact on lateral ankle                              Assessment/Plan:     ESRD on hemodialysis    Per primary        Acute hematogenous osteomyelitis of left foot    Mickey Ross is a 53 y.o. male with Right ankle wound, stable  - Dressed with estefania, xeroform, 4x4's, kerlix, cast padding, and ace.  -Nursing to do dressing changes every other day.  -ID on board, appreciate recs.    -Patient to follow up with [facility] podiatry within one week of discharge.    -Podiatry will continue to follow.                    Romeo Hoyt MD  Podiatry  [facility]

## 2018-08-28 NOTE — SUBJECTIVE & OBJECTIVE
Interval History: NAEON.    Review of Systems   Constitutional: Negative for chills and fever.   HENT: Negative for trouble swallowing.    Eyes: Positive for visual disturbance. Negative for pain.   Respiratory: Negative for cough and shortness of breath.    Cardiovascular: Negative for chest pain and leg swelling.   Gastrointestinal: Negative for abdominal pain, constipation, diarrhea, nausea and vomiting.   Genitourinary: Positive for decreased urine volume.   Neurological: Negative for dizziness, light-headedness and headaches.   Psychiatric/Behavioral: Negative for agitation and confusion.     Objective:     Vital Signs (Most Recent):  Temp: 98.3 °F (36.8 °C) (08/28/18 1145)  Pulse: 94 (08/28/18 1145)  Resp: 16 (08/28/18 1145)  BP: 134/60 (08/28/18 1145)  SpO2: 96 % (08/28/18 1145) Vital Signs (24h Range):  Temp:  [97.1 °F (36.2 °C)-98.5 °F (36.9 °C)] 98.3 °F (36.8 °C)  Pulse:  [70-94] 94  Resp:  [12-18] 16  SpO2:  [92 %-97 %] 96 %  BP: (132-171)/(60-80) 134/60     Weight: 101.6 kg (224 lb)  Body mass index is 31.24 kg/m².    Intake/Output Summary (Last 24 hours) at 8/28/2018 1458  Last data filed at 8/28/2018 1040  Gross per 24 hour   Intake 900 ml   Output 3300 ml   Net -2400 ml      Physical Exam   Constitutional: He is oriented to person, place, and time. No distress.   HENT:   Head: Normocephalic.   Eyes: EOM are normal.   Neck: Normal range of motion.   Cardiovascular: Normal rate and regular rhythm.   Pulmonary/Chest: Effort normal and breath sounds normal.   Abdominal: Soft. Bowel sounds are normal.   Musculoskeletal: He exhibits edema and deformity. He exhibits no tenderness.   Neurological: He is alert and oriented to person, place, and time.   Skin: Skin is warm.   Nursing note and vitals reviewed.      Significant Labs:   A1C:   Recent Labs   Lab  08/17/18   2159   HGBA1C  8.2*     ABGs: No results for input(s): PH, PCO2, HCO3, POCSATURATED, BE, TOTALHB, COHB, METHB, O2HB, POCFIO2 in the last 48  hours.  Bilirubin:   Recent Labs   Lab  08/17/18   2159  08/18/18   0613  08/19/18   0645  08/20/18   0826  08/21/18   0456   BILITOT  0.7  0.8  0.7  0.4  0.5     Blood Culture: No results for input(s): LABBLOO in the last 48 hours.  BMP:   Recent Labs   Lab  08/28/18   0540   GLU  86   NA  139   K  5.6*   CL  107   CO2  21*   BUN  50*   CREATININE  7.4*   CALCIUM  8.5*   MG  2.3     CBC:   Recent Labs   Lab  08/27/18   0358  08/28/18   0540   WBC  7.06  8.00   HGB  7.6*  7.4*   HCT  25.8*  25.4*   PLT  145*  96*     CMP:   Recent Labs   Lab  08/27/18   0358  08/28/18   0019  08/28/18   0540   NA  136  139  139   K  5.3*  5.2*  5.6*   CL  104  106  107   CO2  24  25  21*   GLU  298*  183*  86   BUN  40*  54*  50*   CREATININE  5.7*  7.3*  7.4*   CALCIUM  8.0*  8.3*  8.5*   ANIONGAP  8  8  11   EGFRNONAA  10.4*  7.7*  7.6*     Cardiac Markers: No results for input(s): CKMB, MYOGLOBIN, BNP, TROPISTAT in the last 48 hours.  Coagulation: No results for input(s): PT, INR, APTT in the last 48 hours.  Lactic Acid: No results for input(s): LACTATE in the last 48 hours.  Lipase: No results for input(s): LIPASE in the last 48 hours.  Lipid Panel: No results for input(s): CHOL, HDL, LDLCALC, TRIG, CHOLHDL in the last 48 hours.  Magnesium:   Recent Labs   Lab  08/27/18   0358  08/28/18   0540   MG  2.2  2.3     Pathology Results  (Last 10 years)    None        POCT Glucose:   Recent Labs   Lab  08/27/18   1630  08/27/18   2023  08/28/18   1205   POCTGLUCOSE  166*  262*  406*     Prealbumin: No results for input(s): PREALBUMIN in the last 48 hours.  Respiratory Culture: No results for input(s): GSRESP, RESPIRATORYC in the last 48 hours.  Troponin: No results for input(s): TROPONINI in the last 48 hours.  TSH: No results for input(s): TSH in the last 4320 hours.  Urine Culture: No results for input(s): LABURIN in the last 48 hours.  Urine Studies: No results for input(s): COLORU, APPEARANCEUA, PHUR, SPECGRAV, PROTEINUA, GLUCUA,  KETONESU, BILIRUBINUA, OCCULTUA, NITRITE, UROBILINOGEN, LEUKOCYTESUR, RBCUA, WBCUA, BACTERIA, SQUAMEPITHEL, HYALINECASTS in the last 48 hours.    Invalid input(s): LISA  All pertinent labs within the past 24 hours have been reviewed.    Significant Imaging: I have reviewed all pertinent imaging results/findings within the past 24 hours.

## 2018-08-28 NOTE — PT/OT/SLP PROGRESS
Physical Therapy      Patient Name:  Mickey Ross   MRN:  [id number]    Patient not seen today secondary to pt away at dialysis  . Will follow-up next scheduled treatment per PT POC    Valeriy Green, PTA 8/28/2018

## 2018-08-28 NOTE — PROGRESS NOTES
IHD completed, blood returned. Pt alert & stable. Hemostasis 15 minutes, dry sterile dressing with bandaids applied to sites. Duration 3.0hrs, Qb 400ml/min, Qd 800ml/min, UFG 1.8L. Report called. Pt transported to 105  via stretcher.

## 2018-08-28 NOTE — SUBJECTIVE & OBJECTIVE
Interval Hx:  Patient Seen at bedside for dressing change.  Patient denies F/C/N/V.  Dressings clean, dry, and intact, vac with good seal.    Scheduled Meds:   atropine 1%  1 drop Right Eye Daily    calcium acetate  1,334 mg Oral TID WM    cetirizine  5 mg Oral Daily    DAPTOmycin (CUBICIN)  IV  8 mg/kg Intravenous Q48H    diltiaZEM  300 mg Oral Daily    epoetin umer (PROCRIT) injection  8,000 Units Intravenous Every Tues, Thurs, Sat    heparin (porcine)  5,000 Units Subcutaneous Q8H    insulin aspart U-100  18 Units Subcutaneous TIDWM    insulin detemir U-100  27 Units Subcutaneous QHS    labetalol  100 mg Oral Q12H    losartan  50 mg Oral Daily    methadone  10 mg Oral BID    patiromer calcium sorbitex  8.4 g Oral Once per day on Sun Mon Wed Fri    prednisoLONE acetate  1 drop Right Eye QID    sertraline  25 mg Oral Daily     Continuous Infusions:    PRN Meds:sodium chloride 0.9%, sodium chloride 0.9%, sodium chloride 0.9%, acetaminophen, albuterol-ipratropium, clonazePAM, dextrose 50%, dextrose 50%, glucagon (human recombinant), glucose, glucose, hydrALAZINE, HYDROcodone-acetaminophen, hydrOXYzine HCl, insulin aspart U-100, naloxone, ondansetron, ondansetron, polyethylene glycol, ramelteon, sodium chloride 0.9%    Review of patient's allergies indicates:   Allergen Reactions    Vancomycin analogues Rash     Red Man Syndrome    Penicillins         Past Medical History:   Diagnosis Date    Allergic drug rash - due to Vancomycin 8/19/2018    Arthritis     Blind left eye     Charcot's joint of foot     Diabetes mellitus     GERD (gastroesophageal reflux disease)     Glaucoma     Hypertension     MRSA (methicillin resistant staph aureus) culture positive     Renal disorder     Subretinal abscess 8/17/2018     Past Surgical History:   Procedure Laterality Date    AVF left upper arm      left ankle surgery      lumbar back surgery         Family History     Problem Relation (Age of Onset)     Pneumonia Mother        Tobacco Use    Smoking status: Never Smoker   Substance and Sexual Activity    Alcohol use: No     Frequency: Never    Drug use: No    Sexual activity: Not Currently     Review of Systems   Constitutional: Negative for chills and fever.   Gastrointestinal: Negative for nausea and vomiting.   Musculoskeletal:        Left ankle deformity.   Skin: Positive for color change and wound (Surgical incisions to the medial and lateral ankle).     Objective:     Vital Signs (Most Recent):  Temp: 98.3 °F (36.8 °C) (08/28/18 1145)  Pulse: 94 (08/28/18 1145)  Resp: 16 (08/28/18 1145)  BP: 134/60 (08/28/18 1145)  SpO2: 96 % (08/28/18 1145) Vital Signs (24h Range):  Temp:  [97.1 °F (36.2 °C)-98.5 °F (36.9 °C)] 98.3 °F (36.8 °C)  Pulse:  [70-94] 94  Resp:  [12-18] 16  SpO2:  [92 %-97 %] 96 %  BP: (132-171)/(60-80) 134/60     Weight: 101.6 kg (224 lb)  Body mass index is 31.24 kg/m².    Foot Exam    General  General Appearance: appears stated age and healthy   Orientation: alert and oriented to person, place, and time       Left Foot/Ankle      Inspection and Palpation  Swelling: (Diffuse edema to the left LE, non pitting.)  Skin Exam: erythema (To the medial ankle incision); skin not intact (Surgical incisions to the medial and lateral ankle.)     Neurovascular  Dorsalis pedis: absent  Posterior tibial: absent  Saphenous nerve sensation: diminished  Tibial nerve sensation: diminished  Superficial peroneal nerve sensation: diminished  Deep peroneal nerve sensation: diminished  Sural nerve sensation: diminished    Comments  Ortho: Severe non reducible varus deformity of the left ankle.    Lateral Left ankle: Incision present with sutures in tact. No signs of acute infection. Skin edges well coapted.     Medial Left ankle: Surgical incision left open with granular wound beds to edges. Mild erythema, no drainage no purulence, no malodor, no streaking. Positive pain to palpation.     See clinic images  below.      Laboratory:  A1C:   Recent Labs   Lab  08/17/18   2159   HGBA1C  8.2*     Blood Cultures:   No results for input(s): LABBLOO in the last 48 hours.  CBC:   Recent Labs   Lab  08/28/18   0540   WBC  8.00   RBC  2.57*   HGB  7.4*   HCT  25.4*   PLT  96*   MCV  99*   MCH  28.8   MCHC  29.1*     CMP:   Recent Labs   Lab  08/28/18   0540   GLU  86   CALCIUM  8.5*   NA  139   K  5.6*   CO2  21*   CL  107   BUN  50*   CREATININE  7.4*     CRP:   No results for input(s): CRP in the last 168 hours.  ESR:   No results for input(s): SEDRATE in the last 168 hours.  Microbiology Results (last 7 days)     Procedure Component Value Units Date/Time    Blood culture (site 1) [[id number]] Collected:  08/17/18 2201    Order Status:  Completed Specimen:  Blood from Peripheral, Hand, Right Updated:  08/23/18 0612     Blood Culture, Routine No growth after 5 days.    Narrative:       Site # 1, aerobic and anaerobic    Blood culture (site 2) [[id number]] Collected:  08/17/18 2201    Order Status:  Completed Specimen:  Blood from Peripheral, Antecubital, Right Updated:  08/23/18 0612     Blood Culture, Routine No growth after 5 days.    Narrative:       Site # 2, aerobic only        Specimen (12h ago, onward)    None          Diagnostic Results:  X-ray:  Bony destruction, impaction and probable osseous fusion involving the hindfoot and midfoot.  There appears to be medial angulation and displacement of the hindfoot with respect to the adjacent tibia and fibula.  Comparison to prior films and correlation with clinical findings will be needed.    Clinical findings  Wound on left medial ankle measures approximately  With granular base and viable margins.   Negative ptb, No purulence, erythema, edema, or malodor      Sutures intact on lateral ankle
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
## 2018-08-28 NOTE — PROGRESS NOTES
I personally saw and examined the patient on hemodialysis, tolerating treatment, see hemodyalisis flowsheet. I also reviewed the chart and current medication. The dialysis bath was adjusted. Target UF 1.7.  Here for osteomyelitis.   Severe anemia - will increase the epo to 8000 with each treatment.      Please stop unnecessary daily blood draws!

## 2018-08-28 NOTE — PROGRESS NOTES
Pt arrived to unit via stretcher. Pt alert & stable. Maintenance pt. Accessed LT AVF X2 15G needles without difficulty, duration 3.0hrs, Qb 400ml/min, Qd 800ml/min, planned UFG 1.0L, orders reviewed.

## 2018-08-28 NOTE — ASSESSMENT & PLAN NOTE
- Patient with many days of positive MRSA blood cultures at [facility], previously being treated with Flagyl, linezolid, and gentamicin with hemodialysis  - Likely source related to left foot osteomyelitis of 5th metatarsal. Will consider US of   - Patient with signs of septic embolization, right retro retinal abscess  - History of epidural abscess and associated IV drug use, reports discontinuation of IV drugs for over 5 years ago  - Repeat cultures drawn in the ED, shows NGTD.  - Patient with allergy to vancomycin, full body rash with associated pruritus.   - ID consulted, appreciate recs. Started on Daptomycin 8mg/kg to be given after HD.  * Recommended 1gm IV to be given after HD once discharged. Will need a total of 6-8 weeks.  - US LUE AVF shows no signs of abscess or fluid collection.  - Vitals stable, normal white count. Will continue to monitor.

## 2018-08-28 NOTE — ASSESSMENT & PLAN NOTE
Mickey Ross is a 53 y.o. male with Right ankle wound, stable  - Dressed with estefania, xeroform, 4x4's, kerlix, cast padding, and ace.  -Nursing to do dressing changes every other day.  -ID on board, appreciate recs.    -Patient to follow up with [facility] podiatry within one week of discharge.    -Podiatry will continue to follow.

## 2018-08-28 NOTE — PLAN OF CARE
"Sw was informed that Pt [facility] [place] informing "that is too far for me." [place] and CM to discuss if abx can be provided at HD and Pt can receive hh or outpatient wound care for his needs.   "

## 2018-08-28 NOTE — PLAN OF CARE
Problem: Patient Care Overview  Goal: Plan of Care Review  Outcome: Ongoing (interventions implemented as appropriate)  HD done today. Dsg change to L foot by podiatry. Dsg changes to be done by nursing from here on out. Blood glucose monitored AC/HS. Glucose in the 400's, MD aware. Adjustments to long acting insulin to be done tomorrow. Pain adequately controlled on current regimen. WCTM.

## 2018-08-28 NOTE — ASSESSMENT & PLAN NOTE
- Osteomyelitis of left 5th metatarsal taken for washout by Orthopedic surgery at [facility] on 08/15  - Patient being treated outside facility with Flagyl, linezolid, gentamicin with HD.  - Refusing amputation which would be curative.   - Podiatry consulted, appreciate recs. Advised BKA but patient refused. Recommended CAM boot and abx per ID. D/tyler wound vac. Follow up with [facility] podiatry at discharge  - Started on Daptomycin per ID  - ESR elevated at 58 upon admission  - X-ray left foot and ankle shows partial amputation the 1st, 2nd, 3rd, and 4th digits with fusion of the 2nd and 3rd MTP joints and throughout the midfoot, severe deformity and joint space loss the tibiotalar joint with a lapse of the talus likely 2/2 to chronic osteomyelitis, arthritis, or chronic Charcot foot.

## 2018-08-29 LAB
ANION GAP SERPL CALC-SCNC: 7 MMOL/L
BASOPHILS # BLD AUTO: 0.07 K/UL
BASOPHILS NFR BLD: 1.1 %
BUN SERPL-MCNC: 44 MG/DL
CALCIUM SERPL-MCNC: 8.1 MG/DL
CHLORIDE SERPL-SCNC: 100 MMOL/L
CO2 SERPL-SCNC: 29 MMOL/L
CREAT SERPL-MCNC: 6.2 MG/DL
DIFFERENTIAL METHOD: ABNORMAL
EOSINOPHIL # BLD AUTO: 0.4 K/UL
EOSINOPHIL NFR BLD: 5.8 %
ERYTHROCYTE [DISTWIDTH] IN BLOOD BY AUTOMATED COUNT: 16.5 %
EST. GFR  (AFRICAN AMERICAN): 10.9 ML/MIN/1.73 M^2
EST. GFR  (NON AFRICAN AMERICAN): 9.4 ML/MIN/1.73 M^2
GLUCOSE SERPL-MCNC: 234 MG/DL
HCT VFR BLD AUTO: 25.8 %
HGB BLD-MCNC: 7.8 G/DL
IMM GRANULOCYTES # BLD AUTO: 0.03 K/UL
IMM GRANULOCYTES NFR BLD AUTO: 0.5 %
LYMPHOCYTES # BLD AUTO: 1.2 K/UL
LYMPHOCYTES NFR BLD: 19.4 %
MAGNESIUM SERPL-MCNC: 2 MG/DL
MCH RBC QN AUTO: 29.4 PG
MCHC RBC AUTO-ENTMCNC: 30.2 G/DL
MCV RBC AUTO: 97 FL
MONOCYTES # BLD AUTO: 0.6 K/UL
MONOCYTES NFR BLD: 9.5 %
NEUTROPHILS # BLD AUTO: 3.9 K/UL
NEUTROPHILS NFR BLD: 63.7 %
NRBC BLD-RTO: 0 /100 WBC
PHOSPHATE SERPL-MCNC: 4.3 MG/DL
PLATELET # BLD AUTO: 140 K/UL
PMV BLD AUTO: 10.7 FL
POCT GLUCOSE: 129 MG/DL (ref 70–110)
POCT GLUCOSE: 154 MG/DL (ref 70–110)
POCT GLUCOSE: 169 MG/DL (ref 70–110)
POCT GLUCOSE: 207 MG/DL (ref 70–110)
POTASSIUM SERPL-SCNC: 5.1 MMOL/L
RBC # BLD AUTO: 2.65 M/UL
SODIUM SERPL-SCNC: 136 MMOL/L
WBC # BLD AUTO: 6.18 K/UL

## 2018-08-29 PROCEDURE — 97116 GAIT TRAINING THERAPY: CPT

## 2018-08-29 PROCEDURE — 25000003 PHARM REV CODE 250: Performed by: STUDENT IN AN ORGANIZED HEALTH CARE EDUCATION/TRAINING PROGRAM

## 2018-08-29 PROCEDURE — 25000003 PHARM REV CODE 250: Performed by: INTERNAL MEDICINE

## 2018-08-29 PROCEDURE — 63600175 PHARM REV CODE 636 W HCPCS: Performed by: STUDENT IN AN ORGANIZED HEALTH CARE EDUCATION/TRAINING PROGRAM

## 2018-08-29 PROCEDURE — 36415 COLL VENOUS BLD VENIPUNCTURE: CPT

## 2018-08-29 PROCEDURE — 85025 COMPLETE CBC W/AUTO DIFF WBC: CPT

## 2018-08-29 PROCEDURE — 84100 ASSAY OF PHOSPHORUS: CPT

## 2018-08-29 PROCEDURE — 99232 SBSQ HOSP IP/OBS MODERATE 35: CPT | Mod: ,,, | Performed by: HOSPITALIST

## 2018-08-29 PROCEDURE — 20600001 HC STEP DOWN PRIVATE ROOM

## 2018-08-29 PROCEDURE — 83735 ASSAY OF MAGNESIUM: CPT

## 2018-08-29 PROCEDURE — 80048 BASIC METABOLIC PNL TOTAL CA: CPT

## 2018-08-29 PROCEDURE — 97530 THERAPEUTIC ACTIVITIES: CPT

## 2018-08-29 RX ORDER — INSULIN ASPART 100 [IU]/ML
21 INJECTION, SOLUTION INTRAVENOUS; SUBCUTANEOUS
Status: DISCONTINUED | OUTPATIENT
Start: 2018-08-29 | End: 2018-08-31

## 2018-08-29 RX ORDER — SODIUM CHLORIDE 9 MG/ML
INJECTION, SOLUTION INTRAVENOUS ONCE
Status: COMPLETED | OUTPATIENT
Start: 2018-08-30 | End: 2018-08-30

## 2018-08-29 RX ORDER — SODIUM CHLORIDE 9 MG/ML
INJECTION, SOLUTION INTRAVENOUS
Status: DISCONTINUED | OUTPATIENT
Start: 2018-08-30 | End: 2018-08-31

## 2018-08-29 RX ADMIN — LABETALOL HCL 100 MG: 100 TABLET, FILM COATED ORAL at 08:08

## 2018-08-29 RX ADMIN — SERTRALINE HYDROCHLORIDE 25 MG: 25 TABLET ORAL at 08:08

## 2018-08-29 RX ADMIN — LOSARTAN POTASSIUM 50 MG: 50 TABLET, FILM COATED ORAL at 08:08

## 2018-08-29 RX ADMIN — HEPARIN SODIUM 5000 UNITS: 5000 INJECTION, SOLUTION INTRAVENOUS; SUBCUTANEOUS at 06:08

## 2018-08-29 RX ADMIN — DILTIAZEM HYDROCHLORIDE 300 MG: 300 CAPSULE, COATED, EXTENDED RELEASE ORAL at 08:08

## 2018-08-29 RX ADMIN — HEPARIN SODIUM 5000 UNITS: 5000 INJECTION, SOLUTION INTRAVENOUS; SUBCUTANEOUS at 01:08

## 2018-08-29 RX ADMIN — CALCIUM ACETATE 1334 MG: 667 CAPSULE ORAL at 08:08

## 2018-08-29 RX ADMIN — METHADONE HYDROCHLORIDE 10 MG: 5 TABLET ORAL at 09:08

## 2018-08-29 RX ADMIN — PREDNISOLONE ACETATE 1 DROP: 10 SUSPENSION/ DROPS OPHTHALMIC at 09:08

## 2018-08-29 RX ADMIN — INSULIN ASPART 1 UNITS: 100 INJECTION, SOLUTION INTRAVENOUS; SUBCUTANEOUS at 09:08

## 2018-08-29 RX ADMIN — ATROPINE SULFATE 1 DROP: 10 SOLUTION/ DROPS OPHTHALMIC at 07:08

## 2018-08-29 RX ADMIN — CLONAZEPAM 0.5 MG: 0.5 TABLET ORAL at 09:08

## 2018-08-29 RX ADMIN — PREDNISOLONE ACETATE 1 DROP: 10 SUSPENSION/ DROPS OPHTHALMIC at 05:08

## 2018-08-29 RX ADMIN — INSULIN ASPART 21 UNITS: 100 INJECTION, SOLUTION INTRAVENOUS; SUBCUTANEOUS at 11:08

## 2018-08-29 RX ADMIN — LABETALOL HCL 100 MG: 100 TABLET, FILM COATED ORAL at 09:08

## 2018-08-29 RX ADMIN — INSULIN ASPART 4 UNITS: 100 INJECTION, SOLUTION INTRAVENOUS; SUBCUTANEOUS at 07:08

## 2018-08-29 RX ADMIN — METHADONE HYDROCHLORIDE 10 MG: 5 TABLET ORAL at 08:08

## 2018-08-29 RX ADMIN — PREDNISOLONE ACETATE 1 DROP: 10 SUSPENSION/ DROPS OPHTHALMIC at 01:08

## 2018-08-29 RX ADMIN — INSULIN ASPART 21 UNITS: 100 INJECTION, SOLUTION INTRAVENOUS; SUBCUTANEOUS at 05:08

## 2018-08-29 RX ADMIN — CETIRIZINE HYDROCHLORIDE 5 MG: 5 TABLET ORAL at 08:08

## 2018-08-29 RX ADMIN — CALCIUM ACETATE 1334 MG: 667 CAPSULE ORAL at 05:08

## 2018-08-29 RX ADMIN — HEPARIN SODIUM 5000 UNITS: 5000 INJECTION, SOLUTION INTRAVENOUS; SUBCUTANEOUS at 09:08

## 2018-08-29 RX ADMIN — INSULIN ASPART 2 UNITS: 100 INJECTION, SOLUTION INTRAVENOUS; SUBCUTANEOUS at 11:08

## 2018-08-29 RX ADMIN — PREDNISOLONE ACETATE 1 DROP: 10 SUSPENSION/ DROPS OPHTHALMIC at 08:08

## 2018-08-29 RX ADMIN — CALCIUM ACETATE 1334 MG: 667 CAPSULE ORAL at 11:08

## 2018-08-29 RX ADMIN — INSULIN ASPART 18 UNITS: 100 INJECTION, SOLUTION INTRAVENOUS; SUBCUTANEOUS at 07:08

## 2018-08-29 NOTE — ASSESSMENT & PLAN NOTE
- Endocrinology following, appreciate recs.  - Long term diabetic, poorly controlled. Takes Levemir 30U at home.  - A1C 8.2  - POC last night was >400 and AM glucose was >200  - Lev 27 qHS with Asp 21 TIDWM , LDSSI.   - diabetic and renal diet  - AC/HS accuchecks.

## 2018-08-29 NOTE — ASSESSMENT & PLAN NOTE
- Patient with many days of positive MRSA blood cultures at [facility], previously being treated with Flagyl, linezolid, and gentamicin with hemodialysis  - Likely source related to left foot osteomyelitis of 5th metatarsal. Will consider US of   - Patient with signs of septic embolization, right retro retinal abscess  - History of epidural abscess and associated IV drug use, reports discontinuation of IV drugs for over 5 years ago  - Repeat cultures drawn in the ED, shows NGTD.  - Patient with allergy to vancomycin, full body rash with associated pruritus.   - ID consulted, appreciate recs. Started on Daptomycin 8mg/kg to be given after HD.  * Recommended 1gm IV to be given after HD once discharged. Will need a total of 6-8 weeks.  - US LUE AVF shows no signs of abscess or fluid collection.  - Vitals stable, normal white count. Will continue to monitor.

## 2018-08-29 NOTE — PT/OT/SLP PROGRESS
"Physical Therapy Treatment    Patient Name:  Mickey Ross   MRN:  [id number]    Recommendations:     Discharge Recommendations:  home with home health   Discharge Equipment Recommendations: none   Barriers to discharge: Inaccessible home and Decreased caregiver support    Assessment:     Mickey Ross is a 53 y.o. male admitted with a medical diagnosis of Sepsis due to methicillin resistant Staphylococcus aureus (MRSA).  He presents with the following impairments/functional limitations:  weakness, impaired endurance, impaired self care skills, gait instability, impaired functional mobilty, impaired balance, pain, orthopedic precautions, decreased safety awareness .Pt Progressing with PT Intervention. Pt Progressing with improving gait distance. Pt would continue to benefit from skilled PT to address overall functional mobility and goals. Goals remain appropriate      Rehab Prognosis:  good; patient would benefit from acute skilled PT services to address these deficits and reach maximum level of function.      Recent Surgery: * No surgery found *      Plan:     During this hospitalization, patient to be seen 3 x/week to address the above listed problems via gait training, therapeutic activities, therapeutic exercises, neuromuscular re-education  · Plan of Care Expires:  09/18/18   Plan of Care Reviewed with: patient    Subjective     Communicated with RN prior to session.  Patient found supine upon PT entry to room, agreeable to treatment.      Chief Complaint: pain  Patient comments/goals: The boot doesn't fit me to well, its kind of snug"  Pt with equina varus postioning of L foot     Pain/Comfort:  · Pain Rating 1: 0/10  · Location - Side 1: Left  · Location 1: ankle  · Pain Addressed 1: Reposition, Cessation of Activity  · Pain Rating Post-Intervention 1: (not rated)    Patients cultural, spiritual, Adventism conflicts given the current situation: none noted    Objective:     Patient found with: telemetry "     General Precautions: Standard, fall WBAT with cam boot LLE      Orthopedic Precautions:N/A   Braces: N/A     Functional Mobility:  Bed Mobility:  Rolling Left:  stand by assistance  Scooting: stand by assistance  Supine to Sit: stand by assistance  Sit to Supine: stand by assistance  Transfers:  Sit to Stand:  contact guard assistance with rolling walker  Gait:Patient ambulated PWB to WBAT on L LE x  13 ft and 75 ft  CGA/Min Assistance with Rolling Walker. Pt demonstarting a  2-point gait and decreased  weightbearing  on L LE, decreased cyrus, increased time in double stance, decreased velocity of limb motion and decreased step length.Impairments       AM-PAC 6 CLICK MOBILITY  Turning over in bed (including adjusting bedclothes, sheets and blankets)?: 4  Sitting down on and standing up from a chair with arms (e.g., wheelchair, bedside commode, etc.): 3  Moving from lying on back to sitting on the side of the bed?: 4  Moving to and from a bed to a chair (including a wheelchair)?: 3  Need to walk in hospital room?: 3  Climbing 3-5 steps with a railing?: 2  Basic Mobility Total Score: 19       Therapeutic Activities and Exercises:   Discussed/educated patient on progress, safety, importance of OOB mobility for improving outcomes, d/c, PT POC   Patient performed therex X 15 reps seated  B LE AROM LAQ, Hip Flexion, Hip Abd/Add   White board updated in patients room to current assistance level   Donned an L cam walker and R shoe  Bedside table in front of patient and area set up for function, convenience, and safety. RN aware of patient's mobility needs and status. Questions/concerns addressed within PTA scope of practice; patient with no further questions.       Patient left supine with all lines intact, call button in reach and nsg notified..    GOALS:   Multidisciplinary Problems     Physical Therapy Goals        Problem: Physical Therapy Goal    Goal Priority Disciplines Outcome Goal Variances Interventions  "  Physical Therapy Goal     PT, PT/OT Ongoing (interventions implemented as appropriate)     Description:  Goals to be met by: 18     Patient will increase functional independence with mobility by performin. Sit to stand transfer with Supervision  2. Bed to chair transfer with Minimal Assistance using Rolling Walker  3. Gait  x 50 feet with Contact Guard Assistance using Rolling Walker.   4. Ascend/Descend 1, 6"  inch curb step with Maximum Assistance using Rolling Walker.                      Time Tracking:     PT Received On: 18  PT Start Time: 1024     PT Stop Time: 1047  PT Total Time (min): 23 min     Billable Minutes: Gait Training 13 and Therapeutic Activity 10    Treatment Type: Treatment  PT/PTA: PTA     PTA Visit Number: 1     Valeriy Green PTA  2018  "

## 2018-08-29 NOTE — PROGRESS NOTES
CC:    HPI: Mickey Ross is a 53 y.o. male      POH:    Gtts:      Past Medical History:   Diagnosis Date    Allergic drug rash - due to Vancomycin 8/19/2018    Arthritis     Blind left eye     Charcot's joint of foot     Diabetes mellitus     GERD (gastroesophageal reflux disease)     Glaucoma     Hypertension     MRSA (methicillin resistant staph aureus) culture positive     Renal disorder     Subretinal abscess 8/17/2018         Family History   Problem Relation Age of Onset    Pneumonia Mother            Current Facility-Administered Medications:     [START ON 8/30/2018] 0.9%  NaCl infusion, , Intravenous, PRN, Vipin Sadler MD    [START ON 8/30/2018] 0.9%  NaCl infusion, , Intravenous, Once, Vipin Sadler MD    acetaminophen tablet 650 mg, 650 mg, Oral, Q4H PRN, Freddie Huston MD    albuterol-ipratropium 2.5 mg-0.5 mg/3 mL nebulizer solution 3 mL, 3 mL, Nebulization, Q4H PRN, Freddie Huston MD    atropine 1% ophthalmic solution 1 drop, 1 drop, Right Eye, Daily, Sal Villanueva MD, 1 drop at 08/29/18 0736    calcium acetate capsule 1,334 mg, 1,334 mg, Oral, TID WM, Ralph Tomas MD, 1,334 mg at 08/29/18 1150    cetirizine tablet 5 mg, 5 mg, Oral, Daily, Bhargav Chacon MD, 5 mg at 08/29/18 0810    clonazePAM tablet 0.5 mg, 0.5 mg, Oral, BID PRN, Freddie Huston MD, 0.5 mg at 08/28/18 2104    DAPTOmycin (CUBICIN) 905 mg in sodium chloride 0.9% IVPB, 8 mg/kg, Intravenous, Q48H, Armando Madrid MD, Last Rate: 100 mL/hr at 08/28/18 2105, 905 mg at 08/28/18 2105    dextrose 50% injection 12.5 g, 12.5 g, Intravenous, PRN, Bhargav Chacon MD    dextrose 50% injection 25 g, 25 g, Intravenous, PRN, Bhargav Chacon MD    diltiaZEM 24 hr capsule 300 mg, 300 mg, Oral, Daily, Ralph Tomas MD, 300 mg at 08/29/18 0810    epoetin umer injection 8,000 Units, 8,000 Units, Intravenous, Every Tues, Thurs, Sat, Vipin Sadler MD, 8,000 Units  at 08/28/18 1039    glucagon (human recombinant) injection 1 mg, 1 mg, Intramuscular, PRN, Bhargav Chacon MD    glucose chewable tablet 16 g, 16 g, Oral, PRN, Bhargav Chacon MD    glucose chewable tablet 24 g, 24 g, Oral, PRN, Bhargav Chacon MD    heparin (porcine) injection 5,000 Units, 5,000 Units, Subcutaneous, Q8H, Freddie Huston MD, 5,000 Units at 08/29/18 1329    hydrALAZINE tablet 25 mg, 25 mg, Oral, Q8H PRN, Ralph Tomas MD, 25 mg at 08/24/18 0013    HYDROcodone-acetaminophen 7.5-325 mg per tablet 1 tablet, 1 tablet, Oral, Q6H PRN, Freddie Huston MD, 1 tablet at 08/28/18 1203    hydrOXYzine HCl tablet 25 mg, 25 mg, Oral, TID PRN, Osiris Canela MD, 25 mg at 08/21/18 1004    insulin aspart U-100 pen 1-10 Units, 1-10 Units, Subcutaneous, QID (AC + HS) PRN, Bhargav Chaocn MD, 2 Units at 08/29/18 1150    insulin aspart U-100 pen 21 Units, 21 Units, Subcutaneous, TIDWM, Armando Madrid MD, 21 Units at 08/29/18 1149    insulin detemir U-100 pen 27 Units, 27 Units, Subcutaneous, QHS, Armando Madrid MD, 27 Units at 08/28/18 2109    labetalol tablet 100 mg, 100 mg, Oral, Q12H, Armando Madrid MD, 100 mg at 08/29/18 0811    losartan tablet 50 mg, 50 mg, Oral, Daily, Armando Madrid MD, 50 mg at 08/29/18 0810    methadone tablet 10 mg, 10 mg, Oral, BID, Freddie Huston MD, 10 mg at 08/29/18 0810    naloxone 0.4 mg/mL injection 0.4 mg, 0.4 mg, Intravenous, PRN, Freddie Huston MD    ondansetron disintegrating tablet 8 mg, 8 mg, Oral, Q8H PRN, Freddie Huston MD    ondansetron injection 4 mg, 4 mg, Intravenous, Q8H PRN, Freddie Hsuton MD    patiromer calcium sorbitex PwPk 8.4 g, 8.4 g, Oral, Once per day on Sun Mon Wed Fri, Bhargav Chacon MD, 8.4 g at 08/27/18 1337    polyethylene glycol packet 17 g, 17 g, Oral, Daily PRN, Bhargav Chacon MD    prednisoLONE acetate 1 % ophthalmic suspension 1 drop, 1 drop, Right Eye,  Jose MCKINNON MD, 1 drop at 08/29/18 1329    ramelteon tablet 8 mg, 8 mg, Oral, Nightly PRN, Freddie Huston MD    sertraline tablet 25 mg, 25 mg, Oral, Daily, Freddie Huston MD, 25 mg at 08/29/18 0811    sodium chloride 0.9% flush 5 mL, 5 mL, Intravenous, PRN, Freddie Huston MD      Review of patient's allergies indicates:   Allergen Reactions    Vancomycin analogues Rash     Red Man Syndrome    Penicillins          Social History     Tobacco Use    Smoking status: Never Smoker   Substance Use Topics    Alcohol use: No     Frequency: Never    Drug use: No         Base Eye Exam     Visual Acuity       Right Left    Near sc 20/100           Tonometry (Palpation, 4:54 PM)       Right Left    Pressure nl           Pupils       APD    Right dilated pharmacologically    Left             Slit Lamp and Fundus Exam     Slit Lamp Exam       Right Left    Conjunctiva/Sclera White and quiet     Cornea Clear     Anterior Chamber Deep and quiet     Iris dilated     Lens clear     Vitreous less vit debris over abscess           Fundus Exam       Right Left    Disc Normal     Macula flat, no fluid     Periphery ST arcade SR white elevated mass, edges continue to consolidate, heme resolved about 50% reduction in mass size.                    Plan   1. Retinal Abscess, OD   - Intravitreal vancomycin x 3 (last injection 8/24/18)   -8/29/18 continued improvement 5 days post injection   - Stable for dc from ophthalmology standpt.    - will eval tomorrow before DC   - Must f/u twice weekly outpatient    Sal Villanueva DO

## 2018-08-29 NOTE — PLAN OF CARE
"Problem: Physical Therapy Goal  Goal: Physical Therapy Goal  Goals to be met by: 18     Patient will increase functional independence with mobility by performin. Sit to stand transfer with Supervision  2. Bed to chair transfer with Minimal Assistance using Rolling Walker  3. Gait  x 50 feet with Contact Guard Assistance using Rolling Walker.   4. Ascend/Descend 1, 6"  inch curb step with Maximum Assistance using Rolling Walker.     Pt progressing towards goals. continue with PT POC.Goals remain appropriate.  Valeriy Green PTA        "

## 2018-08-29 NOTE — ASSESSMENT & PLAN NOTE
- Osteomyelitis of left 5th metatarsal taken for washout by Orthopedic surgery at [facility] on 08/15  - Patient being treated outside facility with Flagyl, linezolid, gentamicin with HD.  - Refusing amputation which would be curative.   - Podiatry consulted, appreciate recs. Advised BKA but patient refused. Recommended CAM boot and abx per ID. D/tyler wound vac. Follow up with [facility] podiatry at discharge  - Started on Daptomycin per ID  - ESR elevated at 58 upon admission  - X-ray left foot and ankle shows partial amputation the 1st, 2nd, 3rd, and 4th digits with fusion of the 2nd and 3rd MTP joints and throughout the midfoot, severe deformity and joint space loss the tibiotalar joint with a lapse of the talus likely 2/2 to chronic osteomyelitis, arthritis, or chronic Charcot foot.

## 2018-08-29 NOTE — ASSESSMENT & PLAN NOTE
- Received intravitreal vancomycin and ceftazidime at admit.  - Opthmology following, daily assessments ongoing.  - Per Ophthalmology, lesion appears to be consolidating but no significant improvement in vision. Will need daily assessment for atleast 1 week from date of last intravitreal injection.  - Received 3rd dose of intravitreal vancomycin on 8/24  - To be assessed by Ophthalmology on 8/29, pending recs.

## 2018-08-29 NOTE — PLAN OF CARE
Problem: Patient Care Overview  Goal: Plan of Care Review  Outcome: Ongoing (interventions implemented as appropriate)  Plan of care reviewed with pt. All questions encouraged and answered. HD scheduled for tomorrow. Dispo plan to have abx with HD and HH with wound care. Blood glucose significantly improved. Pain controlled on current regimen. No falls this shift. WCTM.
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
## 2018-08-29 NOTE — SUBJECTIVE & OBJECTIVE
Interval History: NAEON. Vitals stable.    Review of Systems   Constitutional: Negative for chills and fever.   HENT: Negative for trouble swallowing.    Eyes: Positive for visual disturbance. Negative for pain.   Respiratory: Negative for cough and shortness of breath.    Cardiovascular: Negative for chest pain and leg swelling.   Gastrointestinal: Negative for abdominal pain, diarrhea, nausea and vomiting.   Genitourinary: Negative for difficulty urinating.   Musculoskeletal: Negative for arthralgias.   Skin: Negative for color change.   Neurological: Negative for dizziness, light-headedness and headaches.   Psychiatric/Behavioral: Negative for agitation and confusion.     Objective:     Vital Signs (Most Recent):  Temp: 98.7 °F (37.1 °C) (08/29/18 1559)  Pulse: 70 (08/29/18 1600)  Resp: 17 (08/29/18 1559)  BP: (!) 151/67 (08/29/18 1559)  SpO2: (!) 92 % (08/29/18 1559) Vital Signs (24h Range):  Temp:  [97.9 °F (36.6 °C)-98.7 °F (37.1 °C)] 98.7 °F (37.1 °C)  Pulse:  [70-81] 70  Resp:  [14-20] 17  SpO2:  [92 %-95 %] 92 %  BP: (143-162)/(63-74) 151/67     Weight: 101.6 kg (224 lb)  Body mass index is 31.24 kg/m².  No intake or output data in the 24 hours ending 08/29/18 1645   Physical Exam   Constitutional: He is oriented to person, place, and time. No distress.   HENT:   Head: Normocephalic.   Eyes: EOM are normal.   Neck: Normal range of motion.   Cardiovascular: Normal rate and regular rhythm.   Pulmonary/Chest: Effort normal and breath sounds normal.   Abdominal: Soft. Bowel sounds are normal.   Musculoskeletal: He exhibits edema and deformity.   Neurological: He is alert and oriented to person, place, and time.   Nursing note and vitals reviewed.      Significant Labs:   A1C:   Recent Labs   Lab  08/17/18   2159   HGBA1C  8.2*     ABGs: No results for input(s): PH, PCO2, HCO3, POCSATURATED, BE, TOTALHB, COHB, METHB, O2HB, POCFIO2 in the last 48 hours.  Bilirubin:   Recent Labs   Lab  08/17/18   2159  08/18/18    0613  08/19/18   0645  08/20/18   0826  08/21/18   0456   BILITOT  0.7  0.8  0.7  0.4  0.5     Blood Culture: No results for input(s): LABBLOO in the last 48 hours.  BMP:   Recent Labs   Lab  08/29/18   0409   GLU  234*   NA  136   K  5.1   CL  100   CO2  29   BUN  44*   CREATININE  6.2*   CALCIUM  8.1*   MG  2.0     CBC:   Recent Labs   Lab  08/28/18   0540  08/29/18   0409   WBC  8.00  6.18   HGB  7.4*  7.8*   HCT  25.4*  25.8*   PLT  96*  140*     CMP:   Recent Labs   Lab  08/28/18   0019  08/28/18   0540  08/29/18   0409   NA  139  139  136   K  5.2*  5.6*  5.1   CL  106  107  100   CO2  25  21*  29   GLU  183*  86  234*   BUN  54*  50*  44*   CREATININE  7.3*  7.4*  6.2*   CALCIUM  8.3*  8.5*  8.1*   ANIONGAP  8  11  7*   EGFRNONAA  7.7*  7.6*  9.4*     Cardiac Markers: No results for input(s): CKMB, MYOGLOBIN, BNP, TROPISTAT in the last 48 hours.  Coagulation: No results for input(s): PT, INR, APTT in the last 48 hours.  Lactic Acid: No results for input(s): LACTATE in the last 48 hours.  Lipase: No results for input(s): LIPASE in the last 48 hours.  Lipid Panel: No results for input(s): CHOL, HDL, LDLCALC, TRIG, CHOLHDL in the last 48 hours.  Magnesium:   Recent Labs   Lab  08/28/18   0540  08/29/18   0409   MG  2.3  2.0     Pathology Results  (Last 10 years)    None        POCT Glucose:   Recent Labs   Lab  08/28/18   2109  08/29/18   0733  08/29/18   1137   POCTGLUCOSE  232*  207*  154*     Prealbumin: No results for input(s): PREALBUMIN in the last 48 hours.  Respiratory Culture: No results for input(s): GSRESP, RESPIRATORYC in the last 48 hours.  Troponin: No results for input(s): TROPONINI in the last 48 hours.  TSH: No results for input(s): TSH in the last 4320 hours.  Urine Culture: No results for input(s): LABURIN in the last 48 hours.  Urine Studies: No results for input(s): COLORU, APPEARANCEUA, PHUR, SPECGRAV, PROTEINUA, GLUCUA, KETONESU, BILIRUBINUA, OCCULTUA, NITRITE, UROBILINOGEN, LEUKOCYTESUR,  RBCUA, WBCUA, BACTERIA, SQUAMEPITHEL, HYALINECASTS in the last 48 hours.    Invalid input(s): TERESITASUR  All pertinent labs within the past 24 hours have been reviewed.    Significant Imaging: I have reviewed all pertinent imaging results/findings within the past 24 hours.

## 2018-08-30 PROBLEM — Z99.2 ESRD ON HEMODIALYSIS: Chronic | Status: ACTIVE | Noted: 2018-08-17

## 2018-08-30 PROBLEM — M86.072 ACUTE HEMATOGENOUS OSTEOMYELITIS OF LEFT FOOT: Chronic | Status: ACTIVE | Noted: 2018-08-17

## 2018-08-30 PROBLEM — N18.6 ESRD ON HEMODIALYSIS: Chronic | Status: ACTIVE | Noted: 2018-08-17

## 2018-08-30 LAB
ANION GAP SERPL CALC-SCNC: 10 MMOL/L
ANISOCYTOSIS BLD QL SMEAR: SLIGHT
BASOPHILS # BLD AUTO: 0.08 K/UL
BASOPHILS NFR BLD: 1.3 %
BUN SERPL-MCNC: 68 MG/DL
CALCIUM SERPL-MCNC: 8.3 MG/DL
CHLORIDE SERPL-SCNC: 100 MMOL/L
CO2 SERPL-SCNC: 25 MMOL/L
CREAT SERPL-MCNC: 8.3 MG/DL
DIFFERENTIAL METHOD: ABNORMAL
EOSINOPHIL # BLD AUTO: 0.3 K/UL
EOSINOPHIL NFR BLD: 5.3 %
ERYTHROCYTE [DISTWIDTH] IN BLOOD BY AUTOMATED COUNT: 16.1 %
EST. GFR  (AFRICAN AMERICAN): 7.7 ML/MIN/1.73 M^2
EST. GFR  (NON AFRICAN AMERICAN): 6.6 ML/MIN/1.73 M^2
GLUCOSE SERPL-MCNC: 331 MG/DL
HCT VFR BLD AUTO: 25.1 %
HGB BLD-MCNC: 7.6 G/DL
HYPOCHROMIA BLD QL SMEAR: ABNORMAL
IMM GRANULOCYTES # BLD AUTO: 0.07 K/UL
IMM GRANULOCYTES NFR BLD AUTO: 1.1 %
LYMPHOCYTES # BLD AUTO: 1.1 K/UL
LYMPHOCYTES NFR BLD: 18.2 %
MAGNESIUM SERPL-MCNC: 2.2 MG/DL
MCH RBC QN AUTO: 29.8 PG
MCHC RBC AUTO-ENTMCNC: 30.3 G/DL
MCV RBC AUTO: 98 FL
MONOCYTES # BLD AUTO: 0.5 K/UL
MONOCYTES NFR BLD: 8.1 %
NEUTROPHILS # BLD AUTO: 4.1 K/UL
NEUTROPHILS NFR BLD: 66 %
NRBC BLD-RTO: 0 /100 WBC
OVALOCYTES BLD QL SMEAR: ABNORMAL
PHOSPHATE SERPL-MCNC: 4.8 MG/DL
PLATELET # BLD AUTO: 119 K/UL
PMV BLD AUTO: 11.1 FL
POCT GLUCOSE: 117 MG/DL (ref 70–110)
POCT GLUCOSE: 126 MG/DL (ref 70–110)
POCT GLUCOSE: 157 MG/DL (ref 70–110)
POCT GLUCOSE: 226 MG/DL (ref 70–110)
POCT GLUCOSE: 232 MG/DL (ref 70–110)
POCT GLUCOSE: 237 MG/DL (ref 70–110)
POIKILOCYTOSIS BLD QL SMEAR: SLIGHT
POLYCHROMASIA BLD QL SMEAR: ABNORMAL
POTASSIUM SERPL-SCNC: 5.4 MMOL/L
RBC # BLD AUTO: 2.55 M/UL
SCHISTOCYTES BLD QL SMEAR: ABNORMAL
SODIUM SERPL-SCNC: 135 MMOL/L
WBC # BLD AUTO: 5.93 K/UL

## 2018-08-30 PROCEDURE — 97530 THERAPEUTIC ACTIVITIES: CPT

## 2018-08-30 PROCEDURE — 25000003 PHARM REV CODE 250: Performed by: STUDENT IN AN ORGANIZED HEALTH CARE EDUCATION/TRAINING PROGRAM

## 2018-08-30 PROCEDURE — 83735 ASSAY OF MAGNESIUM: CPT

## 2018-08-30 PROCEDURE — 27000207 HC ISOLATION

## 2018-08-30 PROCEDURE — 84100 ASSAY OF PHOSPHORUS: CPT

## 2018-08-30 PROCEDURE — 85025 COMPLETE CBC W/AUTO DIFF WBC: CPT

## 2018-08-30 PROCEDURE — 96372 THER/PROPH/DIAG INJ SC/IM: CPT

## 2018-08-30 PROCEDURE — 90935 HEMODIALYSIS ONE EVALUATION: CPT | Mod: ,,, | Performed by: INTERNAL MEDICINE

## 2018-08-30 PROCEDURE — 25000003 PHARM REV CODE 250: Performed by: GENERAL PRACTICE

## 2018-08-30 PROCEDURE — 25000003 PHARM REV CODE 250: Performed by: INTERNAL MEDICINE

## 2018-08-30 PROCEDURE — 63600175 PHARM REV CODE 636 W HCPCS: Performed by: STUDENT IN AN ORGANIZED HEALTH CARE EDUCATION/TRAINING PROGRAM

## 2018-08-30 PROCEDURE — 36415 COLL VENOUS BLD VENIPUNCTURE: CPT

## 2018-08-30 PROCEDURE — 20600001 HC STEP DOWN PRIVATE ROOM

## 2018-08-30 PROCEDURE — 80048 BASIC METABOLIC PNL TOTAL CA: CPT

## 2018-08-30 PROCEDURE — 63600175 PHARM REV CODE 636 W HCPCS: Performed by: GENERAL PRACTICE

## 2018-08-30 PROCEDURE — 99232 SBSQ HOSP IP/OBS MODERATE 35: CPT | Mod: ,,, | Performed by: HOSPITALIST

## 2018-08-30 PROCEDURE — 63600175 PHARM REV CODE 636 W HCPCS: Mod: JG | Performed by: STUDENT IN AN ORGANIZED HEALTH CARE EDUCATION/TRAINING PROGRAM

## 2018-08-30 PROCEDURE — 90935 HEMODIALYSIS ONE EVALUATION: CPT

## 2018-08-30 RX ORDER — HYDRALAZINE HYDROCHLORIDE 50 MG/1
50 TABLET, FILM COATED ORAL EVERY 8 HOURS PRN
Status: DISCONTINUED | OUTPATIENT
Start: 2018-08-30 | End: 2018-09-21 | Stop reason: HOSPADM

## 2018-08-30 RX ORDER — INSULIN GLARGINE 100 [IU]/ML
25 INJECTION, SOLUTION SUBCUTANEOUS 2 TIMES DAILY
Qty: 15 ML | Refills: 1 | Status: SHIPPED | OUTPATIENT
Start: 2018-08-30 | End: 2018-09-21 | Stop reason: SDUPTHER

## 2018-08-30 RX ADMIN — PREDNISOLONE ACETATE 1 DROP: 10 SUSPENSION/ DROPS OPHTHALMIC at 02:08

## 2018-08-30 RX ADMIN — HEPARIN SODIUM 5000 UNITS: 5000 INJECTION, SOLUTION INTRAVENOUS; SUBCUTANEOUS at 05:08

## 2018-08-30 RX ADMIN — INSULIN ASPART 21 UNITS: 100 INJECTION, SOLUTION INTRAVENOUS; SUBCUTANEOUS at 08:08

## 2018-08-30 RX ADMIN — CALCIUM ACETATE 1334 MG: 667 CAPSULE ORAL at 08:08

## 2018-08-30 RX ADMIN — PREDNISOLONE ACETATE 1 DROP: 10 SUSPENSION/ DROPS OPHTHALMIC at 06:08

## 2018-08-30 RX ADMIN — HEPARIN SODIUM 5000 UNITS: 5000 INJECTION, SOLUTION INTRAVENOUS; SUBCUTANEOUS at 09:08

## 2018-08-30 RX ADMIN — INSULIN ASPART 2 UNITS: 100 INJECTION, SOLUTION INTRAVENOUS; SUBCUTANEOUS at 09:08

## 2018-08-30 RX ADMIN — INSULIN ASPART 18 UNITS: 100 INJECTION, SOLUTION INTRAVENOUS; SUBCUTANEOUS at 06:08

## 2018-08-30 RX ADMIN — CALCIUM ACETATE 1334 MG: 667 CAPSULE ORAL at 11:08

## 2018-08-30 RX ADMIN — HYDROCODONE BITARTRATE AND ACETAMINOPHEN 1 TABLET: 7.5; 325 TABLET ORAL at 10:08

## 2018-08-30 RX ADMIN — DAPTOMYCIN 905 MG: 500 INJECTION, POWDER, LYOPHILIZED, FOR SOLUTION INTRAVENOUS at 09:08

## 2018-08-30 RX ADMIN — METHADONE HYDROCHLORIDE 10 MG: 5 TABLET ORAL at 11:08

## 2018-08-30 RX ADMIN — ERYTHROPOIETIN 8000 UNITS: 4000 INJECTION, SOLUTION INTRAVENOUS; SUBCUTANEOUS at 10:08

## 2018-08-30 RX ADMIN — INSULIN ASPART 4 UNITS: 100 INJECTION, SOLUTION INTRAVENOUS; SUBCUTANEOUS at 08:08

## 2018-08-30 RX ADMIN — CLONAZEPAM 0.5 MG: 0.5 TABLET ORAL at 08:08

## 2018-08-30 RX ADMIN — ATROPINE SULFATE 1 DROP: 10 SOLUTION/ DROPS OPHTHALMIC at 11:08

## 2018-08-30 RX ADMIN — LOSARTAN POTASSIUM 50 MG: 50 TABLET, FILM COATED ORAL at 10:08

## 2018-08-30 RX ADMIN — HEPARIN SODIUM 5000 UNITS: 5000 INJECTION, SOLUTION INTRAVENOUS; SUBCUTANEOUS at 02:08

## 2018-08-30 RX ADMIN — LABETALOL HCL 100 MG: 100 TABLET, FILM COATED ORAL at 08:08

## 2018-08-30 RX ADMIN — CETIRIZINE HYDROCHLORIDE 5 MG: 5 TABLET ORAL at 11:08

## 2018-08-30 RX ADMIN — METHADONE HYDROCHLORIDE 10 MG: 5 TABLET ORAL at 08:08

## 2018-08-30 RX ADMIN — INSULIN ASPART 18 UNITS: 100 INJECTION, SOLUTION INTRAVENOUS; SUBCUTANEOUS at 12:08

## 2018-08-30 RX ADMIN — CALCIUM ACETATE 1334 MG: 667 CAPSULE ORAL at 06:08

## 2018-08-30 RX ADMIN — PREDNISOLONE ACETATE 1 DROP: 10 SUSPENSION/ DROPS OPHTHALMIC at 11:08

## 2018-08-30 RX ADMIN — SODIUM CHLORIDE 300 ML: 0.9 INJECTION, SOLUTION INTRAVENOUS at 08:08

## 2018-08-30 RX ADMIN — HYDRALAZINE HYDROCHLORIDE 25 MG: 25 TABLET ORAL at 12:08

## 2018-08-30 RX ADMIN — DILTIAZEM HYDROCHLORIDE 300 MG: 300 CAPSULE, COATED, EXTENDED RELEASE ORAL at 10:08

## 2018-08-30 RX ADMIN — LABETALOL HCL 100 MG: 100 TABLET, FILM COATED ORAL at 10:08

## 2018-08-30 RX ADMIN — SERTRALINE HYDROCHLORIDE 25 MG: 25 TABLET ORAL at 11:08

## 2018-08-30 NOTE — PLAN OF CARE
Problem: Patient Care Overview  Goal: Plan of Care Review  Outcome: Ongoing (interventions implemented as appropriate)   08/30/18 3722   Coping/Psychosocial   Plan Of Care Reviewed With patient   Pt AAOx4. No acute events overnight. C/o anxiety clonazepam given. BS stable. HD in the morning. Vitals stable. Safety maintained. Will continue to monitor.

## 2018-08-30 NOTE — PLAN OF CARE
"Selma left message for clinic manager at [facility] at [phone], Sw to follow. Selma did leave another message for clinic manager but did fax Pt's face sheet, h & p, ID note and script for IV abx to [facility] to . Selma to follow. Elif with [facility], informed Selma that "her physician informed her that they can not provide the Dapto at HD only [facility]." Sw upload an infusion referral to [facility] to provide IV abx at Pt's home, Sw to follow. Pt met with LILIA and Selma in the room, agreeable to home with home infusion and assistance, Selma/LILIA offered the LTAC in [place] where Pt could reside for 3 to 4 weeks, Selma will follow with [facility], teaching and any copayments with IV abx.   "

## 2018-08-30 NOTE — PT/OT/SLP PROGRESS
Occupational Therapy   Treatment    Name: Mickey Ross  MRN: [id number]  Admitting Diagnosis:  Sepsis due to methicillin resistant Staphylococcus aureus (MRSA)       Recommendations:     Discharge Recommendations: home with home health  Discharge Equipment Recommendations:   Tub bench  Barriers to discharge:       Subjective     Communicated with: RN prior to session.  Pain/Comfort:  ·      Patients cultural, spiritual, Jain conflicts given the current situation: none stated     Objective:     Patient found with:      General Precautions: Standard, fall   Orthopedic Precautions:(LLE PWB)   Braces:       Occupational Performance:    Bed Mobility:    · Mod I    Functional Mobility/Transfers:  · Patient completed Sit <> Stand Transfer with modified independence  with  standard walker   · Patient completed Toilet Transfer Step Transfer technique with modified independence with  standard walker  · Functional Mobility: Mod I    Activities of Daily Living:  · Setup to (I).    Patient left supine with call button in reach    [facility] 6 Click:  [facility] Total Score: 24    Treatment & Education:  OT explained that services will be D/Cd due to pt's high functioning level.  Education:    Assessment:     Mickey Ross is a 53 y.o. male with a medical diagnosis of Sepsis due to methicillin resistant Staphylococcus aureus (MRSA). Pt is currently performing ADLs, functional mobility & t/fs at an (I) level and displays age-appropriate strength, endurance & balance. OT services will be D/Cd at this time - HH recommended.    Performance deficits affecting function are gait instability, impaired functional mobilty, visual deficits, orthopedic precautions.      Rehab Prognosis:  Good; patient would benefit from acute skilled OT services to address these deficits and reach maximum level of function.       Plan:     Patient to be seen 2 x/week to address the above listed problems via self-care/home management, therapeutic activities,  therapeutic exercises  · Plan of Care Expires: 09/19/18  · Plan of Care Reviewed with: patient    This Plan of care has been discussed with the patient who was involved in its development and understands and is in agreement with the identified goals and treatment plan    GOALS:   Multidisciplinary Problems     Occupational Therapy Goals     Not on file          Multidisciplinary Problems (Resolved)        Problem: Occupational Therapy Goal    Goal Priority Disciplines Outcome Interventions   Occupational Therapy Goal   (Resolved)     OT, PT/OT Outcome(s) achieved    Description:  Goals to be met by: 9/15    Patient will increase functional independence with ADLs by performing:    UE Dressing with [place].  LE Dressing with [place] and Contact Guard Assistance.  Grooming while seated with Modified [place].  Toileting from toilet with Contact Guard Assistance for hygiene and clothing management.  MET 8/24                       Time Tracking:     OT Date of Treatment: 08/30/18  OT Start Time: 1312  OT Stop Time: 1323  OT Total Time (min): 11 min    Billable Minutes:Therapeutic Activity 11    DARRYL Soto  8/30/2018

## 2018-08-30 NOTE — PROGRESS NOTES
Dialysis completed. Needles removed from left upper arm fistula with pressure held to sites for 10 minutes each with hemostasis achieved. Gauze and tape to sites. Patient dialyzed for 3 hours with fluid removal of 1.5 liters. Tolerated well with stable vital signs.

## 2018-08-30 NOTE — PLAN OF CARE
Problem: Occupational Therapy Goal  Goal: Occupational Therapy Goal  Goals to be met by: 9/15    Patient will increase functional independence with ADLs by performing:    UE Dressing with [place].  LE Dressing with [place] and Contact Guard Assistance.  Grooming while seated with Modified [place].  Toileting from toilet with Contact Guard Assistance for hygiene and clothing management.  MET 8/24      Outcome: Outcome(s) achieved Date Met: 08/30/18  Pt goals met - OT D/Cd.    DARRYL Soto

## 2018-08-30 NOTE — PROGRESS NOTES
Patient received from floor with report from JOSÉ MIGUEL Sargent.  Maintenance dialysis began per orders via 15 gauge fistula needles to left upper arm fistula.

## 2018-08-30 NOTE — PLAN OF CARE
.  [facility]    HOME HEALTH ORDERS  FACE TO FACE ENCOUNTER    Patient Name: Mickey Ross  YOB: 1965    PCP: Rosalina Moncada MD   PCP Address: [id number]  PCP Phone Number: [phone]  PCP Fax: [phone]    Encounter Date: 08/30/2018    Admit to Home Health    Diagnoses:  Active Hospital Problems    Diagnosis  POA    *Sepsis due to methicillin resistant Staphylococcus aureus (MRSA) [A41.02]  Yes     Priority: 1 - High    Acute hematogenous osteomyelitis of left foot [M86.072]  Yes     Priority: 2     Subretinal abscess [L02.91]  Yes     Priority: 3     Type 2 diabetes mellitus with chronic kidney disease on chronic dialysis, with long-term current use of insulin [E11.22, N18.6, Z99.2, Z79.4]  Not Applicable     Priority: 4     Hyperglycemia [R73.9]  Yes    Osteomyelitis [M86.9]  Yes    Allergic drug rash - due to Vancomycin [L27.0]  Yes    Methadone dependence [F11.20]  Yes    ESRD on hemodialysis [N18.6, Z99.2]  Not Applicable    Renovascular hypertension [I15.0]  Yes    Anxiety [F41.9]  Yes    Debility [R53.81]  Yes    Chronic back pain [M54.9, G89.29]  Yes    Chronic, continuous use of opioids [F11.90]  Yes      Resolved Hospital Problems   No resolved problems to display.       No future appointments.        I have seen and examined this patient face to face today. My clinical findings that support the need for the home health skilled services and home bound status are the following:  Medical restrictions requiring assistance of another human to leave home due to  IV infusion Needs, Wound care needs and right eye subretinal abscess, visually impaired..    Allergies:  Review of patient's allergies indicates:   Allergen Reactions    Vancomycin analogues Rash     Red Man Syndrome    Penicillins        Diet: diabetic diet: 1800 calorie and renal diet, low potassium, phosphorus diet    Activities: activity as tolerated    Nursing:    SN to complete comprehensive assessment including routine vital signs. Instruct on disease process and s/s of complications to report to MD. Review/verify medication list sent home with the patient at time of discharge  and instruct patient/caregiver as needed. Frequency may be adjusted depending on start of care date.    Notify MD if SBP > 160 or < 90; DBP > 90 or < 50; HR > 120 or < 50; Temp > 101; Other:         CONSULTS:    Physical Therapy to evaluate and treat. Evaluate for home safety and equipment needs; Establish/upgrade home exercise program. Perform / instruct on therapeutic exercises, gait training, transfer training, and Range of Motion.  Occupational Therapy to evaluate and treat. Evaluate home environment for safety and equipment needs. Perform/Instruct on transfers, ADL training, ROM, and therapeutic exercises.    MISCELLANEOUS CARE:  Home Infusion Therapy:   SN to perform Infusion Therapy/Central Line Care.  Review Central Line Care & Central Line Flush with patient.    Administer (drug and dose): Daptomycin 1gm to be administered on Sat, Tues, Thurs for a total of 13 doses starting on 9/1/18  Start Date: 9/1/18, End Date: 9/29/18                     Scrub the Hub: Prior to accessing the line, always perform a 30 second alcohol scrub  Each lumen of the central line is to be flushed at least daily with 10 mL Normal Saline and 3 mL Heparin flush (10 units/mL)  Skilled Nurse (SN) may draw blood from IV access  Blood Draw Procedure:   - Aspirate at least 5 mL of blood   - Discard   - Obtain specimen   - Change injection cap   - Flush with 20 mL Normal Saline followed by a                 3-5 mL Heparin flush (10 units/mL)  Central :   - Sterile dressing changes are done weekly and as needed.   - Use chlor-hexadine scrub to cleanse site, apply Biopatch to insertion site,       apply securement device dressing   - Injection caps are changed weekly and after EVERY lab draw.   - If sterile  gauze is under dressing to control oozing,                 dressing change must be performed every 24 hours until gauze is not needed.    WOUND CARE ORDERS  yes:  Surgical Wound:  Location: Left foot.    Consult ET nurse        Apply the following to wound:    Other: estefania, xeroform, 4x4's, kerlix, cast padding, and ace (frequency)      Medications: Review discharge medications with patient and family and provide education.      Current Discharge Medication List      START taking these medications    Details   sodium chloride 0.9% SolP 50 mL with DAPTOmycin 500 mg SolR 1,000 mg Inject 1,000 mg into the vein every Tues, Thurs, Sat. Administer 1gm of antibiotic after HD on Saturday, Tuesday and Thursday.  Start: 9/1/18, End:9/29/18 (last dose) for 13 doses  Qty: 85281 mg, Refills: 0         CONTINUE these medications which have CHANGED    Details   insulin glargine (BASAGLAR KWIKPEN U-100 INSULIN) 100 unit/mL (3 mL) InPn pen Inject 25 Units into the skin 2 (two) times daily.  Qty: 15 mL, Refills: 1         CONTINUE these medications which have NOT CHANGED    Details   calcium acetate (PHOSLO) 667 mg capsule Take 667 mg by mouth 3 (three) times daily with meals.      clonazePAM (KLONOPIN) 0.5 MG tablet Take 0.5 mg by mouth 2 (two) times daily as needed for Anxiety.      diltiaZEM (CARDIZEM CD) 300 MG 24 hr capsule Take 300 mg by mouth once daily.      losartan (COZAAR) 100 MG tablet Take 100 mg by mouth once daily.      omeprazole (PRILOSEC) 20 MG capsule Take 20 mg by mouth once daily.      sertraline (ZOLOFT) 25 MG tablet Take 25 mg by mouth once daily.         STOP taking these medications       HYDROcodone-acetaminophen (NORCO) 7.5-325 mg per tablet Comments:   Reason for Stopping:         methadone (DOLOPHINE) 10 MG tablet Comments:   Reason for Stopping:               I certify that this patient is confined to his home and needs physical therapy and occupational therapy.

## 2018-08-30 NOTE — SUBJECTIVE & OBJECTIVE
Interval History: NAEON. Vitals stable.    Review of Systems   Constitutional: Negative for chills and fever.   HENT: Negative for trouble swallowing.    Eyes: Positive for visual disturbance. Negative for pain.   Cardiovascular: Negative for chest pain.   Gastrointestinal: Negative for abdominal distention and abdominal pain.   Genitourinary: Positive for decreased urine volume.   Musculoskeletal: Positive for arthralgias.   Neurological: Negative for dizziness, light-headedness and headaches.   Psychiatric/Behavioral: Negative for agitation and confusion.     Objective:     Vital Signs (Most Recent):  Temp: 97.9 °F (36.6 °C) (08/30/18 0700)  Pulse: 73 (08/30/18 0800)  Resp: 18 (08/30/18 0745)  BP: (!) 166/82 (08/30/18 0800)  SpO2: (!) 94 % (08/30/18 0700) Vital Signs (24h Range):  Temp:  [97.2 °F (36.2 °C)-98.7 °F (37.1 °C)] 97.9 °F (36.6 °C)  Pulse:  [70-85] 73  Resp:  [17-18] 18  SpO2:  [92 %-96 %] 94 %  BP: (120-171)/(57-83) 166/82     Weight: 101.6 kg (224 lb)  Body mass index is 31.24 kg/m².    Intake/Output Summary (Last 24 hours) at 8/30/2018 0812  Last data filed at 8/30/2018 0425  Gross per 24 hour   Intake 120 ml   Output 50 ml   Net 70 ml      Physical Exam   Constitutional: He is oriented to person, place, and time. No distress.   HENT:   Head: Normocephalic.   Eyes: EOM are normal. Right eye exhibits no discharge.   Neck: Normal range of motion.   Cardiovascular: Normal rate and regular rhythm.   Pulmonary/Chest: Effort normal and breath sounds normal.   Abdominal: Soft. Bowel sounds are normal.   Musculoskeletal: He exhibits edema and deformity.   Neurological: He is alert and oriented to person, place, and time.   Nursing note and vitals reviewed.      Significant Labs:   A1C:   Recent Labs   Lab  08/17/18   2159   HGBA1C  8.2*     ABGs: No results for input(s): PH, PCO2, HCO3, POCSATURATED, BE, TOTALHB, COHB, METHB, O2HB, POCFIO2 in the last 48 hours.  Bilirubin:   Recent Labs   Lab  08/17/18    2159  08/18/18   0613  08/19/18   0645  08/20/18   0826  08/21/18   0456   BILITOT  0.7  0.8  0.7  0.4  0.5     Blood Culture: No results for input(s): LABBLOO in the last 48 hours.  BMP:   Recent Labs   Lab  08/30/18   0532   GLU  331*   NA  135*   K  5.4*   CL  100   CO2  25   BUN  68*   CREATININE  8.3*   CALCIUM  8.3*   MG  2.2     CBC:   Recent Labs   Lab  08/29/18   0409   WBC  6.18   HGB  7.8*   HCT  25.8*   PLT  140*     CMP:   Recent Labs   Lab  08/29/18   0409  08/30/18   0532   NA  136  135*   K  5.1  5.4*   CL  100  100   CO2  29  25   GLU  234*  331*   BUN  44*  68*   CREATININE  6.2*  8.3*   CALCIUM  8.1*  8.3*   ANIONGAP  7*  10   EGFRNONAA  9.4*  6.6*     Cardiac Markers: No results for input(s): CKMB, MYOGLOBIN, BNP, TROPISTAT in the last 48 hours.  Coagulation: No results for input(s): PT, INR, APTT in the last 48 hours.  Lactic Acid: No results for input(s): LACTATE in the last 48 hours.  Lipase: No results for input(s): LIPASE in the last 48 hours.  Lipid Panel: No results for input(s): CHOL, HDL, LDLCALC, TRIG, CHOLHDL in the last 48 hours.  Magnesium:   Recent Labs   Lab  08/29/18   0409  08/30/18   0532   MG  2.0  2.2     Pathology Results  (Last 10 years)    None        POCT Glucose:   Recent Labs   Lab  08/29/18   1137  08/29/18   1653  08/29/18   2123   POCTGLUCOSE  154*  129*  169*     Prealbumin: No results for input(s): PREALBUMIN in the last 48 hours.  Respiratory Culture: No results for input(s): GSRESP, RESPIRATORYC in the last 48 hours.  Troponin: No results for input(s): TROPONINI in the last 48 hours.  TSH: No results for input(s): TSH in the last 4320 hours.  Urine Culture: No results for input(s): LABURIN in the last 48 hours.  Urine Studies: No results for input(s): COLORU, APPEARANCEUA, PHUR, SPECGRAV, PROTEINUA, GLUCUA, KETONESU, BILIRUBINUA, OCCULTUA, NITRITE, UROBILINOGEN, LEUKOCYTESUR, RBCUA, WBCUA, BACTERIA, SQUAMEPITHEL, HYALINECASTS in the last 48 hours.    Invalid  input(s): LISA  All pertinent labs within the past 24 hours have been reviewed.    Significant Imaging: I have reviewed all pertinent imaging results/findings within the past 24 hours.

## 2018-08-30 NOTE — PROGRESS NOTES
Progress Note     HPI: Mickey Ross is a 53 y.o. male who were are following for intraretinal abscess. Patient reports vision improving and now seeing more color and shaped on television. Denies pain, flashes, floaters.            Past Medical History:   Diagnosis Date    Allergic drug rash - due to Vancomycin 8/19/2018    Arthritis      Blind left eye      Charcot's joint of foot      Diabetes mellitus      GERD (gastroesophageal reflux disease)      Glaucoma      Hypertension      MRSA (methicillin resistant staph aureus) culture positive      Renal disorder      Subretinal abscess 8/17/2018               Family History   Problem Relation Age of Onset    Pneumonia Mother               Review of patient's allergies indicates:   Allergen Reactions    Vancomycin analogues Rash       Red Man Syndrome    Penicillins        Social History            Tobacco Use    Smoking status: Never Smoker   Substance Use Topics    Alcohol use: No       Frequency: Never    Drug use: No            Base Eye Exam             Visual Acuity               Right Left      Near sc 20/200                      Tonometry (Palpation, 4:54 PM)               Right Left      Pressure nl                     Pupils              APD      Right dilated pharmacologically      Left                         Slit Lamp and Fundus Exam             Slit Lamp Exam               Right Left      Conjunctiva/Sclera White and quiet        Cornea Clear        Anterior Chamber Deep and quiet        Iris dilated        Lens clear        Vitreous less vit debris over abscess                      Fundus Exam               Right Left      Disc Normal        Macula flat, no fluid        Periphery ST arcade SR white elevated mass, edges continue to consolidate, heme resolving and decreasing in size                      Plan   1. Retinal Abscess, OD   - Intravitreal vancomycin x 3 (last injection 8/24/18)   - 8/30/18 continued improvement 6 days post  injection   - Stable for dc from ophthalmology standpt   - will eval tomorrow before DC   - Must f/u twice weekly outpatient    Case seen and discussed with MD Dwight Hernandez MD PGY-II

## 2018-08-31 ENCOUNTER — ANESTHESIA EVENT (OUTPATIENT)
Dept: SURGERY | Facility: HOSPITAL | Age: 53
DRG: 116 | End: 2018-08-31
Payer: MEDICAID

## 2018-08-31 LAB
ANION GAP SERPL CALC-SCNC: 10 MMOL/L
ANISOCYTOSIS BLD QL SMEAR: SLIGHT
BASOPHILS # BLD AUTO: 0.08 K/UL
BASOPHILS NFR BLD: 1.3 %
BUN SERPL-MCNC: 42 MG/DL
CALCIUM SERPL-MCNC: 8.3 MG/DL
CHLORIDE SERPL-SCNC: 103 MMOL/L
CK SERPL-CCNC: 11 U/L
CO2 SERPL-SCNC: 22 MMOL/L
CREAT SERPL-MCNC: 5.7 MG/DL
DIFFERENTIAL METHOD: ABNORMAL
EOSINOPHIL # BLD AUTO: 0.4 K/UL
EOSINOPHIL NFR BLD: 6.5 %
ERYTHROCYTE [DISTWIDTH] IN BLOOD BY AUTOMATED COUNT: 16.4 %
EST. GFR  (AFRICAN AMERICAN): 12.1 ML/MIN/1.73 M^2
EST. GFR  (NON AFRICAN AMERICAN): 10.4 ML/MIN/1.73 M^2
GLUCOSE SERPL-MCNC: 52 MG/DL
HCT VFR BLD AUTO: 25.7 %
HGB BLD-MCNC: 8 G/DL
IMM GRANULOCYTES # BLD AUTO: 0.07 K/UL
IMM GRANULOCYTES NFR BLD AUTO: 1.2 %
LYMPHOCYTES # BLD AUTO: 1.4 K/UL
LYMPHOCYTES NFR BLD: 22.8 %
MAGNESIUM SERPL-MCNC: 2.2 MG/DL
MCH RBC QN AUTO: 29.9 PG
MCHC RBC AUTO-ENTMCNC: 31.1 G/DL
MCV RBC AUTO: 96 FL
MONOCYTES # BLD AUTO: 0.7 K/UL
MONOCYTES NFR BLD: 10.8 %
NEUTROPHILS # BLD AUTO: 3.4 K/UL
NEUTROPHILS NFR BLD: 57.4 %
NRBC BLD-RTO: 0 /100 WBC
PHOSPHATE SERPL-MCNC: 3.9 MG/DL
PLATELET # BLD AUTO: ABNORMAL K/UL
PLATELET BLD QL SMEAR: ABNORMAL
PMV BLD AUTO: ABNORMAL FL
POCT GLUCOSE: 104 MG/DL (ref 70–110)
POCT GLUCOSE: 208 MG/DL (ref 70–110)
POCT GLUCOSE: 386 MG/DL (ref 70–110)
POCT GLUCOSE: 59 MG/DL (ref 70–110)
POCT GLUCOSE: 91 MG/DL (ref 70–110)
POCT GLUCOSE: 91 MG/DL (ref 70–110)
POTASSIUM SERPL-SCNC: 5 MMOL/L
RBC # BLD AUTO: 2.68 M/UL
SODIUM SERPL-SCNC: 135 MMOL/L
WBC # BLD AUTO: 6 K/UL

## 2018-08-31 PROCEDURE — 99232 SBSQ HOSP IP/OBS MODERATE 35: CPT | Mod: ,,, | Performed by: HOSPITALIST

## 2018-08-31 PROCEDURE — 25000003 PHARM REV CODE 250: Performed by: STUDENT IN AN ORGANIZED HEALTH CARE EDUCATION/TRAINING PROGRAM

## 2018-08-31 PROCEDURE — 20600001 HC STEP DOWN PRIVATE ROOM

## 2018-08-31 PROCEDURE — 85025 COMPLETE CBC W/AUTO DIFF WBC: CPT

## 2018-08-31 PROCEDURE — 36415 COLL VENOUS BLD VENIPUNCTURE: CPT

## 2018-08-31 PROCEDURE — 25000003 PHARM REV CODE 250: Performed by: INTERNAL MEDICINE

## 2018-08-31 PROCEDURE — 80048 BASIC METABOLIC PNL TOTAL CA: CPT

## 2018-08-31 PROCEDURE — 84100 ASSAY OF PHOSPHORUS: CPT

## 2018-08-31 PROCEDURE — 63600175 PHARM REV CODE 636 W HCPCS: Performed by: STUDENT IN AN ORGANIZED HEALTH CARE EDUCATION/TRAINING PROGRAM

## 2018-08-31 PROCEDURE — 82550 ASSAY OF CK (CPK): CPT

## 2018-08-31 PROCEDURE — 83735 ASSAY OF MAGNESIUM: CPT

## 2018-08-31 RX ORDER — LABETALOL 200 MG/1
200 TABLET, FILM COATED ORAL 2 TIMES DAILY
Qty: 180 TABLET | Refills: 3 | Status: SHIPPED | OUTPATIENT
Start: 2018-08-31 | End: 2018-09-05 | Stop reason: HOSPADM

## 2018-08-31 RX ORDER — SODIUM CHLORIDE 9 MG/ML
INJECTION, SOLUTION INTRAVENOUS
Status: DISCONTINUED | OUTPATIENT
Start: 2018-09-01 | End: 2018-09-04

## 2018-08-31 RX ORDER — SODIUM CHLORIDE 9 MG/ML
INJECTION, SOLUTION INTRAVENOUS ONCE
Status: DISCONTINUED | OUTPATIENT
Start: 2018-09-01 | End: 2018-09-04

## 2018-08-31 RX ORDER — LABETALOL 100 MG/1
200 TABLET, FILM COATED ORAL EVERY 12 HOURS
Status: DISCONTINUED | OUTPATIENT
Start: 2018-08-31 | End: 2018-09-02

## 2018-08-31 RX ORDER — INSULIN ASPART 100 [IU]/ML
10 INJECTION, SOLUTION INTRAVENOUS; SUBCUTANEOUS
Qty: 9 ML | Refills: 11 | Status: SHIPPED | OUTPATIENT
Start: 2018-08-31 | End: 2018-09-16 | Stop reason: HOSPADM

## 2018-08-31 RX ORDER — INSULIN ASPART 100 [IU]/ML
18 INJECTION, SOLUTION INTRAVENOUS; SUBCUTANEOUS
Status: DISCONTINUED | OUTPATIENT
Start: 2018-08-31 | End: 2018-09-04

## 2018-08-31 RX ADMIN — PREDNISOLONE ACETATE 1 DROP: 10 SUSPENSION/ DROPS OPHTHALMIC at 12:08

## 2018-08-31 RX ADMIN — CETIRIZINE HYDROCHLORIDE 5 MG: 5 TABLET ORAL at 09:08

## 2018-08-31 RX ADMIN — HYDROCODONE BITARTRATE AND ACETAMINOPHEN 1 TABLET: 7.5; 325 TABLET ORAL at 09:08

## 2018-08-31 RX ADMIN — HEPARIN SODIUM 5000 UNITS: 5000 INJECTION, SOLUTION INTRAVENOUS; SUBCUTANEOUS at 09:08

## 2018-08-31 RX ADMIN — INSULIN ASPART 15 UNITS: 100 INJECTION, SOLUTION INTRAVENOUS; SUBCUTANEOUS at 09:08

## 2018-08-31 RX ADMIN — METHADONE HYDROCHLORIDE 10 MG: 5 TABLET ORAL at 09:08

## 2018-08-31 RX ADMIN — HEPARIN SODIUM 5000 UNITS: 5000 INJECTION, SOLUTION INTRAVENOUS; SUBCUTANEOUS at 02:08

## 2018-08-31 RX ADMIN — INSULIN ASPART 21 UNITS: 100 INJECTION, SOLUTION INTRAVENOUS; SUBCUTANEOUS at 12:08

## 2018-08-31 RX ADMIN — PREDNISOLONE ACETATE 1 DROP: 10 SUSPENSION/ DROPS OPHTHALMIC at 09:08

## 2018-08-31 RX ADMIN — ATROPINE SULFATE 1 DROP: 10 SOLUTION/ DROPS OPHTHALMIC at 09:08

## 2018-08-31 RX ADMIN — HYDROCODONE BITARTRATE AND ACETAMINOPHEN 1 TABLET: 7.5; 325 TABLET ORAL at 11:08

## 2018-08-31 RX ADMIN — HEPARIN SODIUM 5000 UNITS: 5000 INJECTION, SOLUTION INTRAVENOUS; SUBCUTANEOUS at 06:08

## 2018-08-31 RX ADMIN — CALCIUM ACETATE 1334 MG: 667 CAPSULE ORAL at 12:08

## 2018-08-31 RX ADMIN — HYDRALAZINE HYDROCHLORIDE 50 MG: 50 TABLET ORAL at 01:08

## 2018-08-31 RX ADMIN — INSULIN ASPART 4 UNITS: 100 INJECTION, SOLUTION INTRAVENOUS; SUBCUTANEOUS at 12:08

## 2018-08-31 RX ADMIN — LABETALOL HCL 200 MG: 100 TABLET, FILM COATED ORAL at 09:08

## 2018-08-31 RX ADMIN — INSULIN ASPART 5 UNITS: 100 INJECTION, SOLUTION INTRAVENOUS; SUBCUTANEOUS at 07:08

## 2018-08-31 RX ADMIN — DILTIAZEM HYDROCHLORIDE 300 MG: 300 CAPSULE, COATED, EXTENDED RELEASE ORAL at 09:08

## 2018-08-31 RX ADMIN — CLONAZEPAM 0.5 MG: 0.5 TABLET ORAL at 09:08

## 2018-08-31 RX ADMIN — SERTRALINE HYDROCHLORIDE 25 MG: 25 TABLET ORAL at 09:08

## 2018-08-31 RX ADMIN — LOSARTAN POTASSIUM 50 MG: 50 TABLET, FILM COATED ORAL at 09:08

## 2018-08-31 RX ADMIN — HYDRALAZINE HYDROCHLORIDE 50 MG: 50 TABLET ORAL at 12:08

## 2018-08-31 RX ADMIN — CALCIUM ACETATE 1334 MG: 667 CAPSULE ORAL at 04:08

## 2018-08-31 RX ADMIN — LABETALOL HCL 100 MG: 100 TABLET, FILM COATED ORAL at 09:08

## 2018-08-31 NOTE — SUBJECTIVE & OBJECTIVE
Interval History: NAEON.    Review of Systems   Constitutional: Negative for chills and fever.   HENT: Negative for trouble swallowing.    Eyes: Positive for visual disturbance.   Respiratory: Negative for cough and shortness of breath.    Cardiovascular: Negative for chest pain, palpitations and leg swelling.   Gastrointestinal: Negative for abdominal pain, diarrhea, nausea and vomiting.   Genitourinary: Positive for decreased urine volume.   Musculoskeletal: Negative for arthralgias.   Skin: Negative for color change.   Neurological: Negative for dizziness, light-headedness, numbness and headaches.   Psychiatric/Behavioral: Negative for agitation and confusion.     Objective:     Vital Signs (Most Recent):  Temp: 98.7 °F (37.1 °C) (08/31/18 1158)  Pulse: 73 (08/31/18 1543)  Resp: 17 (08/31/18 1158)  BP: (!) 179/80 (08/31/18 1158)  SpO2: 99 % (08/31/18 1158) Vital Signs (24h Range):  Temp:  [97 °F (36.1 °C)-98.7 °F (37.1 °C)] 98.7 °F (37.1 °C)  Pulse:  [71-83] 73  Resp:  [17-18] 17  SpO2:  [92 %-99 %] 99 %  BP: (143-199)/(63-80) 179/80     Weight: 101.6 kg (224 lb)  Body mass index is 31.24 kg/m².  No intake or output data in the 24 hours ending 08/31/18 1545   Physical Exam   Constitutional: He is oriented to person, place, and time. No distress.   HENT:   Head: Normocephalic.   Eyes: Pupils are equal, round, and reactive to light.   Neck: Normal range of motion.   Cardiovascular: Normal rate and regular rhythm.   Pulmonary/Chest: Effort normal and breath sounds normal.   Abdominal: Soft. Bowel sounds are normal.   Musculoskeletal: He exhibits edema and deformity.   Neurological: He is alert and oriented to person, place, and time.   Nursing note and vitals reviewed.      Significant Labs:   A1C:   Recent Labs   Lab  08/17/18   2159   HGBA1C  8.2*     ABGs: No results for input(s): PH, PCO2, HCO3, POCSATURATED, BE, TOTALHB, COHB, METHB, O2HB, POCFIO2 in the last 48 hours.  Bilirubin:   Recent Labs   Lab   08/17/18   2159  08/18/18   0613  08/19/18   0645  08/20/18   0826  08/21/18   0456   BILITOT  0.7  0.8  0.7  0.4  0.5     Blood Culture: No results for input(s): LABBLOO in the last 48 hours.  BMP:   Recent Labs   Lab  08/31/18   0437   GLU  52*   NA  135*   K  5.0   CL  103   CO2  22*   BUN  42*   CREATININE  5.7*   CALCIUM  8.3*   MG  2.2     CBC:   Recent Labs   Lab  08/30/18   0532  08/31/18   0437   WBC  5.93  6.00   HGB  7.6*  8.0*   HCT  25.1*  25.7*   PLT  119*  SEE COMMENT     CMP:   Recent Labs   Lab  08/30/18   0532  08/31/18   0437   NA  135*  135*   K  5.4*  5.0   CL  100  103   CO2  25  22*   GLU  331*  52*   BUN  68*  42*   CREATININE  8.3*  5.7*   CALCIUM  8.3*  8.3*   ANIONGAP  10  10   EGFRNONAA  6.6*  10.4*     Cardiac Markers: No results for input(s): CKMB, MYOGLOBIN, BNP, TROPISTAT in the last 48 hours.  Coagulation: No results for input(s): PT, INR, APTT in the last 48 hours.  Lactic Acid: No results for input(s): LACTATE in the last 48 hours.  Lipase: No results for input(s): LIPASE in the last 48 hours.  Lipid Panel: No results for input(s): CHOL, HDL, LDLCALC, TRIG, CHOLHDL in the last 48 hours.  Magnesium:   Recent Labs   Lab  08/30/18   0532  08/31/18   0437   MG  2.2  2.2     Pathology Results  (Last 10 years)    None        POCT Glucose:   Recent Labs   Lab  08/30/18   2105  08/31/18   0725  08/31/18   1221   POCTGLUCOSE  232*  104  208*     Prealbumin: No results for input(s): PREALBUMIN in the last 48 hours.  Respiratory Culture: No results for input(s): GSRESP, RESPIRATORYC in the last 48 hours.  Troponin: No results for input(s): TROPONINI in the last 48 hours.  TSH: No results for input(s): TSH in the last 4320 hours.  Urine Culture: No results for input(s): LABURIN in the last 48 hours.  Urine Studies: No results for input(s): COLORU, APPEARANCEUA, PHUR, SPECGRAV, PROTEINUA, GLUCUA, KETONESU, BILIRUBINUA, OCCULTUA, NITRITE, UROBILINOGEN, LEUKOCYTESUR, RBCUA, WBCUA, BACTERIA,  YAMILE, HYALINECASTS in the last 48 hours.    Invalid input(s): LISA  All pertinent labs within the past 24 hours have been reviewed.    Significant Imaging: I have reviewed all pertinent imaging results/findings within the past 24 hours.
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
## 2018-08-31 NOTE — ASSESSMENT & PLAN NOTE
- Osteomyelitis of left 5th metatarsal taken for washout by Orthopedic surgery at [facility] on 08/15  - Patient being treated outside facility with Flagyl, linezolid, gentamicin with HD.  - Refusing amputation which would be curative.   - Podiatry consulted, appreciate recs. Advised BKA but patient refused. Recommended CAM boot and abx per ID. D/tyler wound vac. Follow up with [facility] podiatry at discharge  - Started on Daptomycin per ID  - ESR elevated at 58 upon admission  - X-ray left foot and ankle shows partial amputation the 1st, 2nd, 3rd, and 4th digits with fusion of the 2nd and 3rd MTP joints and throughout the midfoot, severe deformity and joint space loss the tibiotalar joint with a lapse of the talus likely 2/2 to chronic osteomyelitis, arthritis, or chronic Charcot foot.      no

## 2018-08-31 NOTE — PLAN OF CARE
Problem: Patient Care Overview  Goal: Plan of Care Review  Outcome: Ongoing (interventions implemented as appropriate)   08/31/18 8693   Coping/Psychosocial   Plan Of Care Reviewed With patient   Pt AAOx4. SBP elevated 170's hydralazine 50 mg given. Hydrocodone given prn for foot pain. Other vitals stable. Safety maintained. Will continue to monitor.

## 2018-08-31 NOTE — ASSESSMENT & PLAN NOTE
- Osteomyelitis of left 5th metatarsal taken for washout by Orthopedic surgery at [facility] on 08/15  - Patient being treated outside facility with Flagyl, linezolid, gentamicin with HD.  - Refusing amputation which would be curative.   - Podiatry consulted, appreciate recs. Advised BKA but patient refused. Recommended CAM boot and abx per ID. D/tyler wound vac. Follow up with [facility] podiatry at discharge  - Started on Daptomycin per ID  - ESR elevated at 58 upon admission  - X-ray left foot and ankle shows partial amputation the 1st, 2nd, 3rd, and 4th digits with fusion of the 2nd and 3rd MTP joints and throughout the midfoot, severe deformity and joint space loss the tibiotalar joint with a lapse of the talus likely 2/2 to chronic osteomyelitis, arthritis, or chronic Charcot foot.

## 2018-08-31 NOTE — SUBJECTIVE & OBJECTIVE
Interval History: Patient evaluated bedside at HD unit found in no acute distress, tolerating well treatment.  Night was uneventful.    Review of patient's allergies indicates:   Allergen Reactions    Vancomycin analogues Rash     Red Man Syndrome    Penicillins      Current Facility-Administered Medications   Medication Frequency    0.9%  NaCl infusion PRN    acetaminophen tablet 650 mg Q4H PRN    albuterol-ipratropium 2.5 mg-0.5 mg/3 mL nebulizer solution 3 mL Q4H PRN    atropine 1% ophthalmic solution 1 drop Daily    calcium acetate capsule 1,334 mg TID WM    cetirizine tablet 5 mg Daily    clonazePAM tablet 0.5 mg BID PRN    DAPTOmycin (CUBICIN) 905 mg in sodium chloride 0.9% IVPB Q48H    dextrose 50% injection 12.5 g PRN    dextrose 50% injection 25 g PRN    diltiaZEM 24 hr capsule 300 mg Daily    epoetin umer injection 8,000 Units Every Tues, Thurs, Sat    glucagon (human recombinant) injection 1 mg PRN    glucose chewable tablet 16 g PRN    glucose chewable tablet 24 g PRN    heparin (porcine) injection 5,000 Units Q8H    hydrALAZINE tablet 50 mg Q8H PRN    HYDROcodone-acetaminophen 7.5-325 mg per tablet 1 tablet Q6H PRN    hydrOXYzine HCl tablet 25 mg TID PRN    insulin aspart U-100 pen 1-10 Units QID (AC + HS) PRN    insulin aspart U-100 pen 21 Units TIDWM    insulin detemir U-100 pen 27 Units QHS    labetalol tablet 100 mg Q12H    losartan tablet 50 mg Daily    methadone tablet 10 mg BID    naloxone 0.4 mg/mL injection 0.4 mg PRN    ondansetron disintegrating tablet 8 mg Q8H PRN    ondansetron injection 4 mg Q8H PRN    patiromer calcium sorbitex PwPk 8.4 g Once per day on Sun Mon Wed Fri    polyethylene glycol packet 17 g Daily PRN    prednisoLONE acetate 1 % ophthalmic suspension 1 drop QID    ramelteon tablet 8 mg Nightly PRN    sertraline tablet 25 mg Daily    sodium chloride 0.9% flush 5 mL PRN       Objective:     Vital Signs (Most Recent):  Temp: 98 °F (36.7 °C)  (08/30/18 1715)  Pulse: 80 (08/30/18 1715)  Resp: 17 (08/30/18 1715)  BP: (!) 199/80 (08/30/18 1715)  SpO2: 97 % (08/30/18 1715)  O2 Device (Oxygen Therapy): room air (08/30/18 1715) Vital Signs (24h Range):  Temp:  [97.2 °F (36.2 °C)-98.3 °F (36.8 °C)] 98 °F (36.7 °C)  Pulse:  [72-85] 80  Resp:  [17-18] 17  SpO2:  [93 %-97 %] 97 %  BP: (120-201)/(57-84) 199/80     Weight: 101.6 kg (224 lb) (08/29/18 0400)  Body mass index is 31.24 kg/m².  Body surface area is 2.26 meters squared.    I/O last 3 completed shifts:  In: 720 [P.O.:120; Other:600]  Out: 2150 [Urine:50; Other:2100]    Physical Exam  Constitutional: He is oriented to person, place, and time. No distress.   HENT:   Head: Normocephalic.   Eyes: EOM are normal.   Neck: Normal range of motion.   Cardiovascular: Normal rate and regular rhythm.   Pulmonary/Chest: Effort normal and breath sounds normal.   Abdominal: Soft. Bowel sounds are normal.   Musculoskeletal: He exhibits edema and deformity. Left ankle deformity.   Skin: Positive for color change and wound (Surgical incisions to the medial and lateral ankle).   Neurological: He is alert and oriented to person, place, and time.   Nursing note and vitals reviewed.    Significant Labs:  CBC:   Recent Labs   Lab  08/30/18   0532   WBC  5.93   RBC  2.55*   HGB  7.6*   HCT  25.1*   PLT  119*   MCV  98   MCH  29.8   MCHC  30.3*     CMP:   Recent Labs   Lab  08/30/18   0532   GLU  331*   CALCIUM  8.3*   NA  135*   K  5.4*   CO2  25   CL  100   BUN  68*   CREATININE  8.3*     All labs within the past 24 hours have been reviewed.

## 2018-08-31 NOTE — ASSESSMENT & PLAN NOTE
- Patient with many days of positive MRSA blood cultures at [facility], previously being treated with Flagyl, linezolid, and gentamicin with hemodialysis  - Likely source related to left foot osteomyelitis of 5th metatarsal. Will consider US of   - Patient with signs of septic embolization, right retro retinal abscess  - History of epidural abscess and associated IV drug use, reports discontinuation of IV drugs for over 5 years ago  - Repeat cultures drawn in the ED, shows NGTD.  - Patient with allergy to vancomycin, full body rash with associated pruritus.   - ID consulted, appreciate recs. Started on Daptomycin 8mg/kg to be given after HD.  * Recommended 1gm IV to be given after HD once discharged. Will need a total of 6-8 weeks. End Date 9/29  - US LUE AVF shows no signs of abscess or fluid collection.  - Vitals stable, normal white count. Will continue to monitor.

## 2018-08-31 NOTE — ASSESSMENT & PLAN NOTE
iHD TTS    [facility] at [address]  Followed by Dr. Cruz  Duration 3 hrs with 15 minutes  Accesss on MIESHA AVF  No residual renal function    End-Stage Renal Disease on HD  - Will provide dialysis for metabolic clearance and volume management  - Seen and examined today during hemodialysis; tolerating treatment well without issues.  - Target ultrafiltration 1-2 L as tolerated by patient, keeping MAP > 65  - Dialysate adjusted to current labs   - Avoid nephrotoxic medications  - Medications to renal parameters  - Strict Input and Output and chart  - Daily standing weights    Anemia of Chronic Kidney Disease   - Hb > 7 gm/dL  - Will continue EPO     Mineral Bone Disease in CKD   - Renal Function Panel Daily for electrolytes monitoring  - Ca stable levels; PO4 normal range would continue with binders     HTN   - stable BP, will continue to monitor. Goal for BP is <140 mmHg SBP and BDP <90 mmHg, maintain MAP > 65.    Nutrition   - Renal Diet    Case discussed with staff further recs with attestation.

## 2018-08-31 NOTE — ASSESSMENT & PLAN NOTE
- Patient with many days of positive MRSA blood cultures at [facility], previously being treated with Flagyl, linezolid, and gentamicin with hemodialysis  - Likely source related to left foot osteomyelitis of 5th metatarsal. Will consider US of   - Patient with signs of septic embolization, right retro retinal abscess  - History of epidural abscess and associated IV drug use, reports discontinuation of IV drugs for over 5 years ago  - Repeat cultures drawn in the ED, shows NGTD.  - Patient with allergy to vancomycin, full body rash with associated pruritus.   - ID consulted, appreciate recs. Started on Daptomycin 8mg/kg to be given after HD.  * Recommended 1gm IV to be given after HD once discharged. Will need a total of 6-8 weeks.  - US LUE AVF shows no signs of abscess or fluid collection.  - Vitals stable, normal white count. Will continue to monitor.

## 2018-08-31 NOTE — PLAN OF CARE
[facility]    HOME HEALTH ORDERS  FACE TO FACE ENCOUNTER    Patient Name: Mickey Ross  YOB: 1965    PCP: Rosalina Moncada MD   PCP Address: [address] / [address]  PCP Phone Number: [phone]  PCP Fax: [phone]    Encounter Date: 09/05/2018    Admit to Home Health    Diagnoses:  Active Hospital Problems    Diagnosis  POA    *Acute hematogenous osteomyelitis of left foot [M86.072]  Yes     Priority: 1 - High     Chronic    Subretinal abscess [L02.91]  Yes     Priority: 3     Type 2 diabetes mellitus with chronic kidney disease on chronic dialysis, with long-term current use of insulin [E11.22, N18.6, Z99.2, Z79.4]  Not Applicable     Priority: 4     Hyperkalemia [E87.5]  Unknown    ESRD on hemodialysis [N18.6, Z99.2]  Not Applicable     Chronic    Renovascular hypertension [I15.0]  Yes      Resolved Hospital Problems    Diagnosis Date Resolved POA    Sepsis due to methicillin resistant Staphylococcus aureus (MRSA) [A41.02] 09/02/2018 Yes     Priority: 1 - High    NSTEMI (non-ST elevated myocardial infarction) [I21.4] 09/05/2018 Unknown    Hyperglycemia [R73.9] 09/05/2018 Yes    Osteomyelitis [M86.9] 09/02/2018 Yes    Allergic drug rash - due to Vancomycin [L27.0] 09/02/2018 Yes    Methadone dependence [F11.20] 09/02/2018 Yes    Anxiety [F41.9] 09/02/2018 Yes    Debility [R53.81] 09/02/2018 Yes    Chronic back pain [M54.9, G89.29] 09/02/2018 Yes    Chronic, continuous use of opioids [F11.90] 09/02/2018 Yes       No future appointments.        I have seen and examined this patient face to face today. My clinical findings that support the need for the home health skilled services and home bound status are the following:  Weakness/numbness causing balance and gait disturbance due to Weakness/Debility making it taxing to leave home.    Allergies:  Review of patient's allergies indicates:   Allergen Reactions    Vancomycin analogues Rash      Red Man Syndrome    Penicillins        Diet: diabetic diet: 1800 calorie and renal diet, low potassium, phosphorus diet     Activities: activity as tolerated     Nursing:   SN to complete comprehensive assessment including routine vital signs. Instruct on disease process and s/s of complications to report to MD. Review/verify medication list sent home with the patient at time of discharge  and instruct patient/caregiver as needed. Frequency may be adjusted depending on start of care date.     Notify MD if SBP > 160 or < 90; DBP > 90 or < 50; HR > 120 or < 50; Temp > 101; Other:           CONSULTS:    Physical Therapy to evaluate and treat. Evaluate for home safety and equipment needs; Establish/upgrade home exercise program. Perform / instruct on therapeutic exercises, gait training, transfer training, and Range of Motion.  Occupational Therapy to evaluate and treat. Evaluate home environment for safety and equipment needs. Perform/Instruct on transfers, ADL training, ROM, and therapeutic exercises.     MISCELLANEOUS CARE:  Home Infusion Therapy:   SN to perform Infusion Therapy/Central Line Care.  Review Central Line Care & Central Line Flush with patient.     Administer (drug and dose): Daptomycin 1gm to be administered on Sat, Tues, Thurs for a total of 10 doses starting on 9/8/18  Start Date: 9/8/18, End Date: 9/29/18                     Will need weekly cbc, cmp, crp, esr and cpk and fax results to ID at [phone]       MISCELLANEOUS CARE:  Wound Care Orders:  yes:  Arterial Wound:  Location: LEFT METTARSAL JOINT    WOUND CARE ORDERS  WEEKLY DRESSING CHANGES    Follow up:  Ophthalmology -   7th Sept with Dr. Corrales.    Medications: Review discharge medications with patient and family and provide education.      Current Discharge Medication List      START taking these medications    Details   amLODIPine (NORVASC) 10 MG tablet Take 1 tablet (10 mg total) by mouth once daily.  Qty: 30 tablet, Refills: 11       aspirin (ECOTRIN) 81 MG EC tablet Take 1 tablet (81 mg total) by mouth once daily.  Refills: 0      atorvastatin (LIPITOR) 40 MG tablet Take 1 tablet (40 mg total) by mouth once daily.  Qty: 90 tablet, Refills: 3      carvedilol (COREG) 12.5 MG tablet Take 1 tablet (12.5 mg total) by mouth 2 (two) times daily.  Qty: 60 tablet, Refills: 11      hydrALAZINE (APRESOLINE) 25 MG tablet Take 1 tablet (25 mg total) by mouth every 8 (eight) hours.  Qty: 90 tablet, Refills: 11      insulin aspart U-100 (NOVOLOG) 100 unit/mL InPn pen Inject 10 Units into the skin 3 (three) times daily with meals.  Qty: 9 mL, Refills: 11      sodium chloride 0.9% SolP 50 mL with DAPTOmycin 500 mg SolR 1,000 mg Inject 1,000 mg into the vein every Tues, Thurs, Sat. Administer 1gm of antibiotic after HD on Saturday, Tuesday and Thursday.  Start: 9/1/18, End:9/29/18 (last dose) for 13 doses  Qty: 81236 mg, Refills: 0         CONTINUE these medications which have CHANGED    Details   insulin glargine (BASAGLAR KWIKPEN U-100 INSULIN) 100 unit/mL (3 mL) InPn pen Inject 25 Units into the skin 2 (two) times daily.  Qty: 15 mL, Refills: 1         CONTINUE these medications which have NOT CHANGED    Details   calcium acetate (PHOSLO) 667 mg capsule Take 667 mg by mouth 3 (three) times daily with meals.      clonazePAM (KLONOPIN) 0.5 MG tablet Take 0.5 mg by mouth 2 (two) times daily as needed for Anxiety.      omeprazole (PRILOSEC) 20 MG capsule Take 20 mg by mouth once daily.      sertraline (ZOLOFT) 25 MG tablet Take 25 mg by mouth once daily.         STOP taking these medications       diltiaZEM (CARDIZEM CD) 300 MG 24 hr capsule Comments:   Reason for Stopping:         HYDROcodone-acetaminophen (NORCO) 7.5-325 mg per tablet Comments:   Reason for Stopping:         losartan (COZAAR) 100 MG tablet Comments:   Reason for Stopping:         methadone (DOLOPHINE) 10 MG tablet Comments:   Reason for Stopping:

## 2018-08-31 NOTE — SUBJECTIVE & OBJECTIVE
No current facility-administered medications on file prior to encounter.      Current Outpatient Medications on File Prior to Encounter   Medication Sig    calcium acetate (PHOSLO) 667 mg capsule Take 667 mg by mouth 3 (three) times daily with meals.    clonazePAM (KLONOPIN) 0.5 MG tablet Take 0.5 mg by mouth 2 (two) times daily as needed for Anxiety.    diltiaZEM (CARDIZEM CD) 300 MG 24 hr capsule Take 300 mg by mouth once daily.    losartan (COZAAR) 100 MG tablet Take 100 mg by mouth once daily.    omeprazole (PRILOSEC) 20 MG capsule Take 20 mg by mouth once daily.    sertraline (ZOLOFT) 25 MG tablet Take 25 mg by mouth once daily.     Review of patient's allergies indicates:   Allergen Reactions    Vancomycin analogues Rash     Red Man Syndrome    Penicillins      Past Medical History:   Diagnosis Date    Allergic drug rash - due to Vancomycin 8/19/2018    Arthritis     Blind left eye     Charcot's joint of foot     Diabetes mellitus     GERD (gastroesophageal reflux disease)     Glaucoma     Hypertension     MRSA (methicillin resistant staph aureus) culture positive     Renal disorder     Subretinal abscess 8/17/2018     Past Surgical History:   Procedure Laterality Date    AVF left upper arm      left ankle surgery      lumbar back surgery       Family History     Problem Relation (Age of Onset)    Pneumonia Mother        Tobacco Use    Smoking status: Never Smoker   Substance and Sexual Activity    Alcohol use: No     Frequency: Never    Drug use: No    Sexual activity: Not Currently     Review of Systems   Constitutional: Negative for chills and fever.   HENT: Negative for congestion.    Eyes: Positive for visual disturbance.   Respiratory: Negative for cough.    Cardiovascular: Negative for chest pain.   Gastrointestinal: Negative for abdominal pain, nausea and vomiting.   Neurological: Negative for headaches.        Objective:     Vital Signs (Most Recent):  Temp: 98 °F (36.7 °C)  (08/31/18 1557)  Pulse: 78 (08/31/18 1557)  Resp: 17 (08/31/18 1557)  BP: (!) 173/75 (08/31/18 1557)  SpO2: 95 % (08/31/18 1557) Vital Signs (24h Range):  Temp:  [97 °F (36.1 °C)-98.7 °F (37.1 °C)] 98 °F (36.7 °C)  Pulse:  [71-83] 78  Resp:  [17-18] 17  SpO2:  [92 %-99 %] 95 %  BP: (143-179)/(63-80) 173/75     Weight: 101.6 kg (224 lb)  Body mass index is 31.24 kg/m².    Physical Exam  Constitutional: He is oriented to person, place, and time. He appears well-developed and well-nourished. No distress.   HENT:   Head: Normocephalic.   Eyes: No scleral icterus.   Left prosthetic eye  Cardiovascular: Normal rate, regular rhythm and normal heart sounds. Exam reveals no gallop and no friction rub.   No murmur heard.  Pulmonary/Chest: Effort normal. No stridor. No respiratory distress. He has no wheezes.   Abdominal: Soft. He exhibits no distension and no mass. There is no tenderness. There is no rebound and no guarding.   Musculoskeletal: He exhibits no edema, tenderness or deformity.   LUE AVF   Neurological: He is alert and oriented to person, place, and time.   Skin: Skin is warm and dry. Flaky skin (extremities) noted. He is not diaphoretic. No erythema.     Significant Labs:  CBC:   Recent Labs   Lab  08/31/18 0437   WBC  6.00   RBC  2.68*   HGB  8.0*   HCT  25.7*   PLT  SEE COMMENT   MCV  96   MCH  29.9   MCHC  31.1*     CMP:   Recent Labs   Lab  08/31/18 0437   GLU  52*   CALCIUM  8.3*   NA  135*   K  5.0   CO2  22*   CL  103   BUN  42*   CREATININE  5.7*       Significant Diagnostics:  I have reviewed and interpreted all pertinent imaging results/findings within the past 24 hours.

## 2018-08-31 NOTE — PLAN OF CARE
Problem: Patient Care Overview  Goal: Plan of Care Review  Outcome: Ongoing (interventions implemented as appropriate)  Plan of care reviewed with pt. Plan for discharge tomorrow. Dressing change done as ordered. Blood glucose monitored, glucose 120's - MD adjusting standing order for short-acting insulin. No c.o pain. No falls this shift. WCTM.

## 2018-08-31 NOTE — PLAN OF CARE
"Updated hh orders uploaded to Lisbeth, liaison with Clarice at , SO will meet at 6pm at the [facility] for teaching. Still awaiting medicaid auth, [place] to follow with auth. Selma met with Pt and provided medicaid transportation resources, [place]ma to follow with hh approval and auth. [facility] wont accept Pt as "they can't meet Pt's needs." Pt needs a access line for IV abx per [facility], midline or PICC preferred, auth for meds approved. Selma to follow with hh in the am and Lisbeth updated with POC with nurse as well.   "
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
## 2018-08-31 NOTE — PLAN OF CARE
08/31/18 1519   Final Note   Assessment Type Final Discharge Note   Discharge Disposition Home-Health   What phone number can be called within the next 1-3 days to see how you are doing after discharge? [phone]   Hospital Follow Up  Appt(s) scheduled? Yes   Discharge plans and expectations educations in teach back method with documentation complete? Yes   Right Care Referral Info   Post Acute Recommendation Home-care     Future Appointments   Date Time Provider Department Center   9/4/2018  8:40 AM RD Miramontes MD [facility] OPHTHAL [facility] [place]   9/7/2018  8:45 AM Alexander Corrales MD [facility] OPHTHAL [facility]   9/17/2018  2:00 PM Fanny Luong MD [facility] ID [facility] [place]

## 2018-08-31 NOTE — ASSESSMENT & PLAN NOTE
- Osteomyelitis of left 5th metatarsal taken for washout by Orthopedic surgery at [facility] on 08/15  - Patient being treated outside facility with Flagyl, linezolid, gentamicin with HD.  - Refusing amputation which would be curative.   - Podiatry consulted, appreciate recs. Advised BKA but patient refused. Recommended CAM boot and abx per ID. D/tyler wound vac. Follow up with [facility] podiatry at discharge  - Started on Daptomycin per ID  - ESR elevated at 58 upon admission  - X-ray left foot and ankle shows partial amputation the 1st, 2nd, 3rd, and 4th digits with fusion of the 2nd and 3rd MTP joints and throughout the midfoot, severe deformity and joint space loss the tibiotalar joint with a lapse of the talus likely 2/2 to chronic osteomyelitis, arthritis, or chronic Charcot foot.

## 2018-08-31 NOTE — ASSESSMENT & PLAN NOTE
- Patient with many days of positive MRSA blood cultures at [facility], previously being treated with Flagyl, linezolid, and gentamicin with hemodialysis  - Likely source related to left foot osteomyelitis of 5th metatarsal. Will consider US of   - Patient with signs of septic embolization, right retro retinal abscess  - History of epidural abscess and associated IV drug use, reports discontinuation of IV drugs for over 5 years ago  - Repeat cultures drawn in the ED, shows NGTD.  - Patient with allergy to vancomycin, full body rash with associated pruritus.   - ID consulted, appreciate recs. Started on Daptomycin 8mg/kg to be given after HD.  * Recommended 1gm IV to be given after HD once discharged. Will need a total of 6-8 weeks. End Date 9/29  - US LUE AVF shows no signs of abscess or fluid collection.  - Vitals stable, normal white count. Will continue to monitor.
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
## 2018-08-31 NOTE — PT/OT/SLP PROGRESS
"Physical Therapy      Patient Name:  Mickey Ross   MRN:  [id number]    Patient not seen today secondary to pt reports " I am leaving today and I will need all my energy for when I d/c"  . Will follow-up next scheduled treatment per PT POC if pt remains in hospital      Valeriy Green, NATALY  8/31/2018    "

## 2018-08-31 NOTE — ASSESSMENT & PLAN NOTE
- Received intravitreal vancomycin and ceftazidime at admit.  - Opthmology following, daily assessments ongoing.  - Per Ophthalmology, lesion appears to be consolidating but no significant improvement in vision. Will need daily assessment for atleast 1 week from date of last intravitreal injection.  - Received 3rd dose of intravitreal vancomycin on 8/24  - To be assessed by Ophthalmology on 8/29, recommended twice weekly follow up at discharge. Guarded prognosis.

## 2018-08-31 NOTE — ASSESSMENT & PLAN NOTE
- Endocrinology following, appreciate recs.  - Long term diabetic, poorly controlled. Takes Levemir 30U at home.  - A1C 8.2  - POC last night was >400 and AM glucose was >200  - Lev 27 qHS with Asp 21 TIDWM , LDSSI.   - Discharged on Basaglar 25 q12 and Novolog 10 TIDWM  - diabetic and renal diet  - AC/HS accuchecks.
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
## 2018-09-01 ENCOUNTER — ANESTHESIA (OUTPATIENT)
Dept: SURGERY | Facility: HOSPITAL | Age: 53
DRG: 116 | End: 2018-09-01
Payer: MEDICAID

## 2018-09-01 LAB
ANION GAP SERPL CALC-SCNC: 8 MMOL/L
BASOPHILS # BLD AUTO: 0.07 K/UL
BASOPHILS NFR BLD: 1.2 %
BUN SERPL-MCNC: 62 MG/DL
CALCIUM SERPL-MCNC: 8 MG/DL
CHLORIDE SERPL-SCNC: 101 MMOL/L
CO2 SERPL-SCNC: 24 MMOL/L
CREAT SERPL-MCNC: 7.9 MG/DL
DIFFERENTIAL METHOD: ABNORMAL
EOSINOPHIL # BLD AUTO: 0.3 K/UL
EOSINOPHIL NFR BLD: 5.6 %
ERYTHROCYTE [DISTWIDTH] IN BLOOD BY AUTOMATED COUNT: 16.5 %
EST. GFR  (AFRICAN AMERICAN): 8.1 ML/MIN/1.73 M^2
EST. GFR  (NON AFRICAN AMERICAN): 7 ML/MIN/1.73 M^2
GLUCOSE SERPL-MCNC: 309 MG/DL
HCT VFR BLD AUTO: 25.2 %
HGB BLD-MCNC: 7.6 G/DL
IMM GRANULOCYTES # BLD AUTO: 0.03 K/UL
IMM GRANULOCYTES NFR BLD AUTO: 0.5 %
LYMPHOCYTES # BLD AUTO: 1.1 K/UL
LYMPHOCYTES NFR BLD: 18 %
MAGNESIUM SERPL-MCNC: 2.3 MG/DL
MCH RBC QN AUTO: 29.8 PG
MCHC RBC AUTO-ENTMCNC: 30.2 G/DL
MCV RBC AUTO: 99 FL
MONOCYTES # BLD AUTO: 0.6 K/UL
MONOCYTES NFR BLD: 10.4 %
NEUTROPHILS # BLD AUTO: 3.8 K/UL
NEUTROPHILS NFR BLD: 64.3 %
NRBC BLD-RTO: 0 /100 WBC
PHOSPHATE SERPL-MCNC: 4.4 MG/DL
PLATELET # BLD AUTO: 100 K/UL
PMV BLD AUTO: 11.5 FL
POCT GLUCOSE: 181 MG/DL (ref 70–110)
POCT GLUCOSE: 184 MG/DL (ref 70–110)
POCT GLUCOSE: 255 MG/DL (ref 70–110)
POCT GLUCOSE: 258 MG/DL (ref 70–110)
POCT GLUCOSE: 339 MG/DL (ref 70–110)
POCT GLUCOSE: 81 MG/DL (ref 70–110)
POTASSIUM SERPL-SCNC: 5.7 MMOL/L
RBC # BLD AUTO: 2.55 M/UL
SODIUM SERPL-SCNC: 133 MMOL/L
WBC # BLD AUTO: 5.88 K/UL

## 2018-09-01 PROCEDURE — 84100 ASSAY OF PHOSPHORUS: CPT

## 2018-09-01 PROCEDURE — 37000009 HC ANESTHESIA EA ADD 15 MINS: Performed by: SURGERY

## 2018-09-01 PROCEDURE — 20600001 HC STEP DOWN PRIVATE ROOM

## 2018-09-01 PROCEDURE — 96372 THER/PROPH/DIAG INJ SC/IM: CPT

## 2018-09-01 PROCEDURE — 63600175 PHARM REV CODE 636 W HCPCS

## 2018-09-01 PROCEDURE — 02HV33Z INSERTION OF INFUSION DEVICE INTO SUPERIOR VENA CAVA, PERCUTANEOUS APPROACH: ICD-10-PCS | Performed by: SURGERY

## 2018-09-01 PROCEDURE — D9220A PRA ANESTHESIA: Mod: CRNA,,, | Performed by: NURSE ANESTHETIST, CERTIFIED REGISTERED

## 2018-09-01 PROCEDURE — 63600175 PHARM REV CODE 636 W HCPCS: Performed by: STUDENT IN AN ORGANIZED HEALTH CARE EDUCATION/TRAINING PROGRAM

## 2018-09-01 PROCEDURE — 82962 GLUCOSE BLOOD TEST: CPT | Performed by: SURGERY

## 2018-09-01 PROCEDURE — 80048 BASIC METABOLIC PNL TOTAL CA: CPT

## 2018-09-01 PROCEDURE — 71000033 HC RECOVERY, INTIAL HOUR: Performed by: SURGERY

## 2018-09-01 PROCEDURE — 85025 COMPLETE CBC W/AUTO DIFF WBC: CPT

## 2018-09-01 PROCEDURE — 25000003 PHARM REV CODE 250: Performed by: GENERAL PRACTICE

## 2018-09-01 PROCEDURE — 63600175 PHARM REV CODE 636 W HCPCS: Performed by: GENERAL PRACTICE

## 2018-09-01 PROCEDURE — 36558 INSERT TUNNELED CV CATH: CPT | Mod: ,,, | Performed by: SURGERY

## 2018-09-01 PROCEDURE — 25000003 PHARM REV CODE 250: Performed by: NURSE ANESTHETIST, CERTIFIED REGISTERED

## 2018-09-01 PROCEDURE — 25000003 PHARM REV CODE 250: Performed by: STUDENT IN AN ORGANIZED HEALTH CARE EDUCATION/TRAINING PROGRAM

## 2018-09-01 PROCEDURE — 83735 ASSAY OF MAGNESIUM: CPT

## 2018-09-01 PROCEDURE — 36415 COLL VENOUS BLD VENIPUNCTURE: CPT

## 2018-09-01 PROCEDURE — 36000706: Performed by: SURGERY

## 2018-09-01 PROCEDURE — 90935 HEMODIALYSIS ONE EVALUATION: CPT

## 2018-09-01 PROCEDURE — D9220A PRA ANESTHESIA: Mod: ANES,,, | Performed by: ANESTHESIOLOGY

## 2018-09-01 PROCEDURE — 25000003 PHARM REV CODE 250: Performed by: INTERNAL MEDICINE

## 2018-09-01 PROCEDURE — 99232 SBSQ HOSP IP/OBS MODERATE 35: CPT | Mod: ,,, | Performed by: HOSPITALIST

## 2018-09-01 PROCEDURE — 71000039 HC RECOVERY, EACH ADD'L HOUR: Performed by: SURGERY

## 2018-09-01 PROCEDURE — 36000707: Performed by: SURGERY

## 2018-09-01 PROCEDURE — C1751 CATH, INF, PER/CENT/MIDLINE: HCPCS | Performed by: SURGERY

## 2018-09-01 PROCEDURE — 37000008 HC ANESTHESIA 1ST 15 MINUTES: Performed by: SURGERY

## 2018-09-01 PROCEDURE — 99232 SBSQ HOSP IP/OBS MODERATE 35: CPT | Mod: 25,,, | Performed by: SURGERY

## 2018-09-01 PROCEDURE — 63600175 PHARM REV CODE 636 W HCPCS: Performed by: SURGERY

## 2018-09-01 PROCEDURE — 90935 HEMODIALYSIS ONE EVALUATION: CPT | Mod: ,,, | Performed by: INTERNAL MEDICINE

## 2018-09-01 PROCEDURE — 63600175 PHARM REV CODE 636 W HCPCS: Performed by: NURSE ANESTHETIST, CERTIFIED REGISTERED

## 2018-09-01 PROCEDURE — 25000003 PHARM REV CODE 250: Performed by: SURGERY

## 2018-09-01 PROCEDURE — S0020 INJECTION, BUPIVICAINE HYDRO: HCPCS | Performed by: SURGERY

## 2018-09-01 PROCEDURE — 77001 FLUOROGUIDE FOR VEIN DEVICE: CPT | Mod: 26,,, | Performed by: SURGERY

## 2018-09-01 DEVICE — CATH POWERHICKMAN 8FR 5CM: Type: IMPLANTABLE DEVICE | Site: NECK | Status: FUNCTIONAL

## 2018-09-01 RX ORDER — LIDOCAINE HYDROCHLORIDE 10 MG/ML
INJECTION, SOLUTION EPIDURAL; INFILTRATION; INTRACAUDAL; PERINEURAL
Status: DISCONTINUED | OUTPATIENT
Start: 2018-09-01 | End: 2018-09-01 | Stop reason: HOSPADM

## 2018-09-01 RX ORDER — PROPOFOL 10 MG/ML
VIAL (ML) INTRAVENOUS
Status: DISCONTINUED | OUTPATIENT
Start: 2018-09-01 | End: 2018-09-01

## 2018-09-01 RX ORDER — SODIUM CHLORIDE 0.9 % (FLUSH) 0.9 %
3 SYRINGE (ML) INJECTION
Status: DISCONTINUED | OUTPATIENT
Start: 2018-09-01 | End: 2018-09-05

## 2018-09-01 RX ORDER — MIDAZOLAM HYDROCHLORIDE 1 MG/ML
INJECTION, SOLUTION INTRAMUSCULAR; INTRAVENOUS
Status: DISCONTINUED | OUTPATIENT
Start: 2018-09-01 | End: 2018-09-01

## 2018-09-01 RX ORDER — FENTANYL CITRATE 50 UG/ML
INJECTION, SOLUTION INTRAMUSCULAR; INTRAVENOUS
Status: COMPLETED
Start: 2018-09-01 | End: 2018-09-01

## 2018-09-01 RX ORDER — HEPARIN SODIUM 1000 [USP'U]/ML
INJECTION, SOLUTION INTRAVENOUS; SUBCUTANEOUS
Status: DISCONTINUED | OUTPATIENT
Start: 2018-09-01 | End: 2018-09-01 | Stop reason: HOSPADM

## 2018-09-01 RX ORDER — FENTANYL CITRATE 50 UG/ML
25 INJECTION, SOLUTION INTRAMUSCULAR; INTRAVENOUS EVERY 5 MIN PRN
Status: COMPLETED | OUTPATIENT
Start: 2018-09-01 | End: 2018-09-01

## 2018-09-01 RX ORDER — ACETAMINOPHEN 10 MG/ML
INJECTION, SOLUTION INTRAVENOUS
Status: DISPENSED
Start: 2018-09-01 | End: 2018-09-01

## 2018-09-01 RX ORDER — ACETAMINOPHEN 10 MG/ML
1000 INJECTION, SOLUTION INTRAVENOUS ONCE
Status: COMPLETED | OUTPATIENT
Start: 2018-09-01 | End: 2018-09-01

## 2018-09-01 RX ORDER — LIDOCAINE HCL/PF 100 MG/5ML
SYRINGE (ML) INTRAVENOUS
Status: DISCONTINUED | OUTPATIENT
Start: 2018-09-01 | End: 2018-09-01

## 2018-09-01 RX ORDER — BUPIVACAINE HYDROCHLORIDE 5 MG/ML
INJECTION, SOLUTION EPIDURAL; INTRACAUDAL
Status: DISCONTINUED | OUTPATIENT
Start: 2018-09-01 | End: 2018-09-01 | Stop reason: HOSPADM

## 2018-09-01 RX ADMIN — CALCIUM ACETATE 1334 MG: 667 CAPSULE ORAL at 05:09

## 2018-09-01 RX ADMIN — MIDAZOLAM HYDROCHLORIDE 1 MG: 1 INJECTION, SOLUTION INTRAMUSCULAR; INTRAVENOUS at 08:09

## 2018-09-01 RX ADMIN — ACETAMINOPHEN 1000 MG: 10 INJECTION, SOLUTION INTRAVENOUS at 10:09

## 2018-09-01 RX ADMIN — DILTIAZEM HYDROCHLORIDE 300 MG: 300 CAPSULE, COATED, EXTENDED RELEASE ORAL at 05:09

## 2018-09-01 RX ADMIN — HEPARIN SODIUM 5000 UNITS: 5000 INJECTION, SOLUTION INTRAVENOUS; SUBCUTANEOUS at 06:09

## 2018-09-01 RX ADMIN — PREDNISOLONE ACETATE 1 DROP: 10 SUSPENSION/ DROPS OPHTHALMIC at 12:09

## 2018-09-01 RX ADMIN — ATROPINE SULFATE 1 DROP: 10 SOLUTION/ DROPS OPHTHALMIC at 12:09

## 2018-09-01 RX ADMIN — FENTANYL CITRATE 25 MCG: 50 INJECTION INTRAMUSCULAR; INTRAVENOUS at 10:09

## 2018-09-01 RX ADMIN — CETIRIZINE HYDROCHLORIDE 5 MG: 5 TABLET ORAL at 12:09

## 2018-09-01 RX ADMIN — ONDANSETRON 4 MG: 2 INJECTION INTRAMUSCULAR; INTRAVENOUS at 09:09

## 2018-09-01 RX ADMIN — SODIUM CHLORIDE, SODIUM GLUCONATE, SODIUM ACETATE, POTASSIUM CHLORIDE, MAGNESIUM CHLORIDE, SODIUM PHOSPHATE, DIBASIC, AND POTASSIUM PHOSPHATE: .53; .5; .37; .037; .03; .012; .00082 INJECTION, SOLUTION INTRAVENOUS at 08:09

## 2018-09-01 RX ADMIN — CALCIUM ACETATE 1334 MG: 667 CAPSULE ORAL at 12:09

## 2018-09-01 RX ADMIN — HEPARIN SODIUM 5000 UNITS: 5000 INJECTION, SOLUTION INTRAVENOUS; SUBCUTANEOUS at 10:09

## 2018-09-01 RX ADMIN — SERTRALINE HYDROCHLORIDE 25 MG: 25 TABLET ORAL at 12:09

## 2018-09-01 RX ADMIN — PREDNISOLONE ACETATE 1 DROP: 10 SUSPENSION/ DROPS OPHTHALMIC at 05:09

## 2018-09-01 RX ADMIN — DAPTOMYCIN 905 MG: 500 INJECTION, POWDER, LYOPHILIZED, FOR SOLUTION INTRAVENOUS at 11:09

## 2018-09-01 RX ADMIN — INSULIN ASPART 18 UNITS: 100 INJECTION, SOLUTION INTRAVENOUS; SUBCUTANEOUS at 12:09

## 2018-09-01 RX ADMIN — HYDROCODONE BITARTRATE AND ACETAMINOPHEN 1 TABLET: 7.5; 325 TABLET ORAL at 05:09

## 2018-09-01 RX ADMIN — ERYTHROPOIETIN 8000 UNITS: 4000 INJECTION, SOLUTION INTRAVENOUS; SUBCUTANEOUS at 03:09

## 2018-09-01 RX ADMIN — LOSARTAN POTASSIUM 50 MG: 50 TABLET, FILM COATED ORAL at 10:09

## 2018-09-01 RX ADMIN — SODIUM CHLORIDE 300 ML: 0.9 INJECTION, SOLUTION INTRAVENOUS at 01:09

## 2018-09-01 RX ADMIN — INSULIN ASPART 2 UNITS: 100 INJECTION, SOLUTION INTRAVENOUS; SUBCUTANEOUS at 12:09

## 2018-09-01 RX ADMIN — CLONAZEPAM 0.5 MG: 0.5 TABLET ORAL at 10:09

## 2018-09-01 RX ADMIN — FENTANYL CITRATE 25 MCG: 50 INJECTION INTRAMUSCULAR; INTRAVENOUS at 09:09

## 2018-09-01 RX ADMIN — PROPOFOL 170 MG: 10 INJECTION, EMULSION INTRAVENOUS at 08:09

## 2018-09-01 RX ADMIN — PREDNISOLONE ACETATE 1 DROP: 10 SUSPENSION/ DROPS OPHTHALMIC at 10:09

## 2018-09-01 RX ADMIN — LABETALOL HCL 200 MG: 100 TABLET, FILM COATED ORAL at 10:09

## 2018-09-01 RX ADMIN — INSULIN ASPART 8 UNITS: 100 INJECTION, SOLUTION INTRAVENOUS; SUBCUTANEOUS at 07:09

## 2018-09-01 RX ADMIN — LABETALOL HCL 200 MG: 100 TABLET, FILM COATED ORAL at 12:09

## 2018-09-01 RX ADMIN — LIDOCAINE HYDROCHLORIDE 60 MG: 20 INJECTION, SOLUTION INTRAVENOUS at 08:09

## 2018-09-01 RX ADMIN — INSULIN ASPART 3 UNITS: 100 INJECTION, SOLUTION INTRAVENOUS; SUBCUTANEOUS at 10:09

## 2018-09-01 RX ADMIN — INSULIN ASPART 18 UNITS: 100 INJECTION, SOLUTION INTRAVENOUS; SUBCUTANEOUS at 05:09

## 2018-09-01 RX ADMIN — HYDROCODONE BITARTRATE AND ACETAMINOPHEN 1 TABLET: 7.5; 325 TABLET ORAL at 09:09

## 2018-09-01 RX ADMIN — METHADONE HYDROCHLORIDE 10 MG: 5 TABLET ORAL at 10:09

## 2018-09-01 RX ADMIN — INSULIN ASPART 18 UNITS: 100 INJECTION, SOLUTION INTRAVENOUS; SUBCUTANEOUS at 07:09

## 2018-09-01 RX ADMIN — INSULIN DETEMIR 22 UNITS: 100 INJECTION, SOLUTION SUBCUTANEOUS at 10:09

## 2018-09-01 NOTE — ANESTHESIA RELEASE NOTE
"Anesthesia Release from PACU Note    Patient: Mickey Ross    Procedure(s) Performed: Procedure(s) (LRB):  INSERTION, CATHETER, CENTRAL VENOUS, SANZ (Right)    Anesthesia type: general    Post pain: Adequate analgesia    Post assessment: no apparent anesthetic complications    Last Vitals:   Visit Vitals  BP (!) 142/64   Pulse 65   Temp 37 °C (98.6 °F) (Temporal)   Resp 13   Ht 5' 11" (1.803 m)   Wt 101.6 kg (224 lb)   SpO2 97%   BMI 31.24 kg/m²       Post vital signs: stable    Level of consciousness: awake, alert  and oriented    Nausea/Vomiting: no nausea/no vomiting    Complications: none    Airway Patency: patent    Respiratory: unassisted, spontaneous ventilation, room air    Cardiovascular: stable and blood pressure at baseline    Hydration: euvolemic  "

## 2018-09-01 NOTE — PROGRESS NOTES
Pt arrived to unit via stretcher. Pt alert & stable. Maintenance pt. Accessed LT AVF x2 15g needles without difficulty, Qb 400ml/min, Qd 800ml/min, planned UFG 1.5L, orders reviewed.

## 2018-09-01 NOTE — ASSESSMENT & PLAN NOTE
- Endocrinology following, appreciate recs.  - Long term diabetic, poorly controlled. Takes Levemir 30U at home.  - A1C 8.2  - POC AM >330 and pre-meal accuchecks in the 100s  - Lev 22 qHS with Asp 18 TIDWM , LDSSI.   - diabetic and renal diet  - AC/HS accuchecks.

## 2018-09-01 NOTE — ASSESSMENT & PLAN NOTE
- Patient with many days of positive MRSA blood cultures at [facility], previously being treated with Flagyl, linezolid, and gentamicin with hemodialysis  - Likely source related to left foot osteomyelitis of 5th metatarsal. Will consider US of   - Patient with signs of septic embolization, right retro retinal abscess  - History of epidural abscess and associated IV drug use, reports discontinuation of IV drugs for over 5 years ago  - Repeat cultures drawn in the ED, shows NGTD.  - Patient with allergy to vancomycin, full body rash with associated pruritus.   - ID consulted, appreciate recs. Started on Daptomycin 8mg/kg to be given after HD.  * Recommended 1gm IV to be given after HD once discharged. Will need a total of 6-8 weeks. End Date 9/29  - US LUE AVF shows no signs of abscess or fluid collection.  - Vitals stable, normal white count. Will continue to monitor.

## 2018-09-01 NOTE — NURSING TRANSFER
Nursing Transfer Note      9/1/2018     Transfer To: 1054    Transfer via stretcher    Transfer with cardiac monitoring    Transported by pct     Medicines sent: none    Chart send with patient: Yes    Notified: no one in the waiting according to patient    Patient reassessed at: 9/1/18

## 2018-09-01 NOTE — OP NOTE
DATE OF PROCEDURE:  09/01/2018    PREOPERATIVE DIAGNOSIS:  Osteomyelitis.    POSTOPERATIVE DIAGNOSIS:  Osteomyelitis.    PROCEDURE:  Insertion of right internal jugular Jama.    SURGEON:  Rafi Churchill M.D.    ASSISTANT:  Lisbeth Nagy M.D. (RES)    ANESTHESIA:  General.    BLOOD LOSS:  Minimal.    COMPLICATIONS:  None.    INDICATIONS:  This is a 53-year-old diagnosed with osteomyelitis, who will   require long-term antibiotics and Jama has been requested to facilitate   delivery of this treatment.    OPERATIVE REPORT IN DETAIL:  The patient was brought to the Operating Room and   placed in the supine position, prepped and draped in sterile fashion.  Once   satisfactory general anesthesia was induced, ultrasound was utilized to localize   the right internal jugular vein, which was then accessed with a needle.  Wire   was introduced into the right heart and this was confirmed fluoroscopically.    Jama was introduced through a stab incision in the right anterior chest,   brought up above the clavicle to the insertion site in the neck.  Then, the   tract over the wire was dilated and then a peel-away catheter was placed.  The   catheter was directly inserted into the peel-away catheter and the tip of the   catheter was advanced into the superior vena cava.  This was confirmed with   fluoroscopy.  The Jama was secured to the skin with a nylon suture.  The   access site at the neck was closed with a single subcuticular stitch.  The   catheter was noted to aspirate blood freely and then was flushed with   heparinized solution.  Needle, sponge and instrument counts were correct.  The   patient tolerated the procedure well and was stable at the completion of the   operation.      GF/IN  dd: 09/01/2018 10:24:50 (CDT)  td: 09/01/2018 10:37:18 (CDT)  Doc ID   #[id number]  Job ID #[id number]    CC:

## 2018-09-01 NOTE — PLAN OF CARE
Problem: Patient Care Overview  Goal: Plan of Care Review  Outcome: Ongoing (interventions implemented as appropriate)   09/01/18 2068   Coping/Psychosocial   Plan Of Care Reviewed With patient   Pt AAOx4. No acute events overnight. NPO after midnight for central line placement in the morning. HD tomorrow then poss d/c home on IV abx. Hydrocodone given for back pain. Hs  coverage given per sliding scale. Vitals stable. Safety maintained. Will continue to monitor.

## 2018-09-01 NOTE — PLAN OF CARE
Problem: Patient Care Overview  Goal: Plan of Care Review  Outcome: Ongoing (interventions implemented as appropriate)  POC reviewed with patient. AAOx4, PRN hydralazine given for SBP > 160. PM insulin held for CBG of 91 after original CBG of 59 and patient given pudding. Patient NPO after midnight for Central Line placement so patient can be d/c'ed with home IV abx. Safety maintained, hourly rounding performed, will continue to monitor.
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
## 2018-09-01 NOTE — ASSESSMENT & PLAN NOTE
- Osteomyelitis of left 5th metatarsal taken for washout by Orthopedic surgery at [facility] on 08/15  - Patient being treated outside facility with Flagyl, linezolid, gentamicin with HD.  - Refusing amputation which would be curative.   - Podiatry consulted, appreciate recs. Advised BKA but patient refused. Recommended CAM boot and abx per ID. D/tyler wound vac. Follow up with [facility] podiatry at discharge  - Started on Daptomycin per ID  - ESR elevated at 58 upon admission  - X-ray left foot and ankle shows partial amputation the 1st, 2nd, 3rd, and 4th digits with fusion of the 2nd and 3rd MTP joints and throughout the midfoot, severe deformity and joint space loss the tibiotalar joint with a lapse of the talus likely 2/2 to chronic osteomyelitis, arthritis, or chronic Charcot foot.

## 2018-09-01 NOTE — PT/OT/SLP PROGRESS
Physical Therapy      Patient Name:  Mickey Ross   MRN:  [id number]    Patient not seen today secondary to  Pt away at procedure  . Will follow-up next scheduled treatment per PT POC    Valeriy Green, PTA  9/1/2018

## 2018-09-01 NOTE — PROGRESS NOTES
IHD completed, blood returned. Pt alert & stable. Hemostasis 15 minutes, dry sterile dressing with bandaids applied to sites. Duration 3.0hrs, Qb 400ml/min, Qd 800ml/min, UFG 2.1L. Report called. Pt transported to 1054A with telemonitoring via stretcher.

## 2018-09-01 NOTE — ANESTHESIA POSTPROCEDURE EVALUATION
"Anesthesia Post Evaluation    Patient: Mickey Ross    Procedure(s) Performed: Procedure(s) (LRB):  INSERTION, CATHETER, CENTRAL VENOUS, SANZ (Right)    Final Anesthesia Type: general  Patient location during evaluation: PACU  Patient participation: Yes- Able to Participate  Level of consciousness: awake and alert  Post-procedure vital signs: reviewed and stable  Pain management: adequate  Airway patency: patent  PONV status at discharge: No PONV  Anesthetic complications: no      Cardiovascular status: blood pressure returned to baseline  Respiratory status: unassisted  Hydration status: euvolemic  Follow-up not needed.        Visit Vitals  BP (!) 142/64   Pulse 65   Temp 37 °C (98.6 °F) (Temporal)   Resp 13   Ht 5' 11" (1.803 m)   Wt 101.6 kg (224 lb)   SpO2 97%   BMI 31.24 kg/m²       Pain/Gemma Score: Pain Assessment Performed: Yes (9/1/2018 10:48 AM)  Presence of Pain: complains of pain/discomfort (9/1/2018 10:48 AM)  Pain Rating Prior to Med Admin: 6 (9/1/2018 10:25 AM)  Pain Rating Post Med Admin: 5 (9/1/2018 10:48 AM)  Gemma Score: 10 (9/1/2018 10:48 AM)        "

## 2018-09-01 NOTE — PLAN OF CARE
Pt vital signs wnl.  Pt verbalizes pain is tolerable.  Pt verbalizes no nausea.  Rt chest dressing intact.

## 2018-09-01 NOTE — TRANSFER OF CARE
"Anesthesia Transfer of Care Note    Patient: Mickey Ross    Procedure(s) Performed: Procedure(s) (LRB):  INSERTION, CATHETER, CENTRAL VENOUS, SANZ (Right)    Patient location: PACU    Anesthesia Type: general    Transport from OR: Transported from OR on 6-10 L/min O2 by face mask with adequate spontaneous ventilation    Post pain: adequate analgesia    Post assessment: no apparent anesthetic complications and tolerated procedure well    Post vital signs: stable    Level of consciousness: lethargic    Nausea/Vomiting: no nausea/vomiting    Complications: none    Transfer of care protocol was followed      Last vitals:   Visit Vitals  /63 (BP Location: Right arm, Patient Position: Lying)   Pulse 66   Temp 36.5 °C (97.7 °F) (Temporal)   Resp 20   Ht 5' 11" (1.803 m)   Wt 101.6 kg (224 lb)   SpO2 100%   BMI 31.24 kg/m²       "

## 2018-09-01 NOTE — PLAN OF CARE
"Selma placed call to [facility] at , Sw left message with on call Fe to return Sw's call. Pt received Evita ye, Selma uploaded in [facility], Pt has been declined by 4  agencies and will be difficult to staff. Sw to follow with [facility] and follow with Pt and SO. Pt can not d/c to an Psychiatric center per Fe with [facility], Pt has no accepting home health and Fe states "they can not accept Pt or do teaching with Pt or family until a home health agency can accept Pt." Pt will be not able to d/c until Tuesday when Sw's return's, Sw to follow.  team and SO Pat updated.   "

## 2018-09-01 NOTE — BRIEF OP NOTE
[facility]  Brief Operative Note    SUMMARY     Surgery Date: 9/1/2018     Surgeon(s) and Role:     * Rafi Churchill MD - Primary     * Lisbeth Nagy MD - Resident - Assisting    Pre-op Diagnosis:  Osteomyelitis, unspecified site, unspecified type [M86.9]    Post-op Diagnosis:  Post-Op Diagnosis Codes:     * Osteomyelitis, unspecified site, unspecified type [M86.9]    Procedure(s) (LRB):  INSERTION, CATHETER, CENTRAL VENOUS, SANZ (Right)    Anesthesia: Choice    Description of Procedure: R- IJ single port Sanz catheter placed for long term abx, fluoro and U/S confirmed placement     Description of the findings of the procedure: as above    Estimated Blood Loss: minimal         Specimens:   Specimen (12h ago, onward)    None

## 2018-09-01 NOTE — SUBJECTIVE & OBJECTIVE
Interval History: NAEON. Vitals stable.    Review of Systems   Constitutional: Negative for chills and fever.   HENT: Negative for trouble swallowing.    Eyes: Positive for visual disturbance.   Respiratory: Negative for cough and shortness of breath.    Cardiovascular: Negative for chest pain and palpitations.   Gastrointestinal: Negative for abdominal pain, diarrhea, nausea and vomiting.   Genitourinary: Positive for decreased urine volume.   Musculoskeletal: Negative for arthralgias.   Skin: Positive for wound.   Neurological: Negative for dizziness, light-headedness and headaches.   Psychiatric/Behavioral: Negative for agitation and confusion.     Objective:     Vital Signs (Most Recent):  Temp: 97.1 °F (36.2 °C) (09/01/18 1305)  Pulse: (P) 66 (09/01/18 1330)  Resp: (P) 16 (09/01/18 1330)  BP: (!) (P) 113/44 (09/01/18 1330)  SpO2: (!) (P) 94 % (09/01/18 1330) Vital Signs (24h Range):  Temp:  [97.1 °F (36.2 °C)-98.6 °F (37 °C)] 97.1 °F (36.2 °C)  Pulse:  [65-88] (P) 66  Resp:  [11-20] (P) 16  SpO2:  [92 %-100 %] (P) 94 %  BP: (112-173)/(44-75) (P) 113/44     Weight: 101.6 kg (224 lb)  Body mass index is 31.24 kg/m².    Intake/Output Summary (Last 24 hours) at 9/1/2018 1428  Last data filed at 9/1/2018 1048  Gross per 24 hour   Intake 425 ml   Output --   Net 425 ml      Physical Exam   Constitutional: He is oriented to person, place, and time. No distress.   HENT:   Head: Normocephalic.   Eyes: Pupils are equal, round, and reactive to light.   Neck: Normal range of motion.   Cardiovascular: Normal rate and regular rhythm.   Pulmonary/Chest: Effort normal and breath sounds normal.   Abdominal: Soft. Bowel sounds are normal.   Musculoskeletal: He exhibits edema and deformity.   Neurological: He is alert and oriented to person, place, and time.   Nursing note and vitals reviewed.      Significant Labs:   A1C:   Recent Labs   Lab  08/17/18   2159   HGBA1C  8.2*     ABGs: No results for input(s): PH, PCO2, HCO3,  POCSATURATED, BE, TOTALHB, COHB, METHB, O2HB, POCFIO2 in the last 48 hours.  Bilirubin:   Recent Labs   Lab  08/17/18   2159  08/18/18   0613  08/19/18   0645  08/20/18   0826  08/21/18   0456   BILITOT  0.7  0.8  0.7  0.4  0.5     Blood Culture: No results for input(s): LABBLOO in the last 48 hours.  BMP:   Recent Labs   Lab  09/01/18 0458   GLU  309*   NA  133*   K  5.7*   CL  101   CO2  24   BUN  62*   CREATININE  7.9*   CALCIUM  8.0*   MG  2.3     CBC:   Recent Labs   Lab  08/31/18 0437 09/01/18 0458   WBC  6.00  5.88   HGB  8.0*  7.6*   HCT  25.7*  25.2*   PLT  SEE COMMENT  100*     CMP:   Recent Labs   Lab  08/31/18 0437 09/01/18 0458   NA  135*  133*   K  5.0  5.7*   CL  103  101   CO2  22*  24   GLU  52*  309*   BUN  42*  62*   CREATININE  5.7*  7.9*   CALCIUM  8.3*  8.0*   ANIONGAP  10  8   EGFRNONAA  10.4*  7.0*     Cardiac Markers: No results for input(s): CKMB, MYOGLOBIN, BNP, TROPISTAT in the last 48 hours.  Coagulation: No results for input(s): PT, INR, APTT in the last 48 hours.  Lactic Acid: No results for input(s): LACTATE in the last 48 hours.  Lipase: No results for input(s): LIPASE in the last 48 hours.  Lipid Panel: No results for input(s): CHOL, HDL, LDLCALC, TRIG, CHOLHDL in the last 48 hours.  Magnesium:   Recent Labs   Lab  08/31/18   0437 09/01/18   0458   MG  2.2  2.3     Pathology Results  (Last 10 years)    None        POCT Glucose:   Recent Labs   Lab  09/01/18   0745  09/01/18   0934  09/01/18   1202   POCTGLUCOSE  339*  258*  184*     Prealbumin: No results for input(s): PREALBUMIN in the last 48 hours.  Respiratory Culture: No results for input(s): GSRESP, RESPIRATORYC in the last 48 hours.  Troponin: No results for input(s): TROPONINI in the last 48 hours.  TSH: No results for input(s): TSH in the last 4320 hours.  Urine Culture: No results for input(s): LABURIN in the last 48 hours.  Urine Studies: No results for input(s): COLORU, APPEARANCEUA, PHUR, SPECGRAV,  PROTEINUA, GLUCUA, KETONESU, BILIRUBINUA, OCCULTUA, NITRITE, UROBILINOGEN, LEUKOCYTESUR, RBCUA, WBCUA, BACTERIA, SQUAMEPITHEL, HYALINECASTS in the last 48 hours.    Invalid input(s): LISA  All pertinent labs within the past 24 hours have been reviewed.    Significant Imaging: I have reviewed all pertinent imaging results/findings within the past 24 hours.

## 2018-09-02 PROBLEM — F11.20 METHADONE DEPENDENCE: Status: RESOLVED | Noted: 2018-08-18 | Resolved: 2018-09-02

## 2018-09-02 PROBLEM — M54.9 CHRONIC BACK PAIN: Status: RESOLVED | Noted: 2018-08-17 | Resolved: 2018-09-02

## 2018-09-02 PROBLEM — F41.9 ANXIETY: Status: RESOLVED | Noted: 2018-08-17 | Resolved: 2018-09-02

## 2018-09-02 PROBLEM — I21.4 NSTEMI (NON-ST ELEVATED MYOCARDIAL INFARCTION): Status: ACTIVE | Noted: 2018-09-02

## 2018-09-02 PROBLEM — E87.5 HYPERKALEMIA: Status: ACTIVE | Noted: 2018-09-02

## 2018-09-02 PROBLEM — L27.0 ALLERGIC DRUG RASH: Status: RESOLVED | Noted: 2018-08-19 | Resolved: 2018-09-02

## 2018-09-02 PROBLEM — F11.90 CHRONIC, CONTINUOUS USE OF OPIOIDS: Status: RESOLVED | Noted: 2018-08-17 | Resolved: 2018-09-02

## 2018-09-02 PROBLEM — R53.81 DEBILITY: Status: RESOLVED | Noted: 2018-08-17 | Resolved: 2018-09-02

## 2018-09-02 PROBLEM — G89.29 CHRONIC BACK PAIN: Status: RESOLVED | Noted: 2018-08-17 | Resolved: 2018-09-02

## 2018-09-02 PROBLEM — A41.02 SEPSIS DUE TO METHICILLIN RESISTANT STAPHYLOCOCCUS AUREUS (MRSA): Status: RESOLVED | Noted: 2018-08-17 | Resolved: 2018-09-02

## 2018-09-02 LAB
ALBUMIN SERPL BCP-MCNC: 2.4 G/DL
ALLENS TEST: ABNORMAL
ALP SERPL-CCNC: 140 U/L
ALT SERPL W/O P-5'-P-CCNC: 24 U/L
ANION GAP SERPL CALC-SCNC: 10 MMOL/L
ANION GAP SERPL CALC-SCNC: 11 MMOL/L
ANION GAP SERPL CALC-SCNC: 12 MMOL/L
ANION GAP SERPL CALC-SCNC: 8 MMOL/L
ANION GAP SERPL CALC-SCNC: 9 MMOL/L
ANION GAP SERPL CALC-SCNC: 9 MMOL/L
AST SERPL-CCNC: 33 U/L
B-OH-BUTYR BLD STRIP-SCNC: 1.3 MMOL/L
BASOPHILS # BLD AUTO: 0.04 K/UL
BASOPHILS NFR BLD: 0.9 %
BILIRUB SERPL-MCNC: 0.5 MG/DL
BNP SERPL-MCNC: 1140 PG/ML
BUN SERPL-MCNC: 39 MG/DL
BUN SERPL-MCNC: 41 MG/DL
BUN SERPL-MCNC: 41 MG/DL
BUN SERPL-MCNC: 42 MG/DL
BUN SERPL-MCNC: 43 MG/DL
BUN SERPL-MCNC: 43 MG/DL
CALCIUM SERPL-MCNC: 7.9 MG/DL
CALCIUM SERPL-MCNC: 7.9 MG/DL
CALCIUM SERPL-MCNC: 8.1 MG/DL
CALCIUM SERPL-MCNC: 8.3 MG/DL
CALCIUM SERPL-MCNC: 8.4 MG/DL
CALCIUM SERPL-MCNC: 8.5 MG/DL
CHLORIDE SERPL-SCNC: 102 MMOL/L
CHLORIDE SERPL-SCNC: 102 MMOL/L
CHLORIDE SERPL-SCNC: 103 MMOL/L
CHLORIDE SERPL-SCNC: 98 MMOL/L
CHLORIDE SERPL-SCNC: 99 MMOL/L
CHLORIDE SERPL-SCNC: 99 MMOL/L
CO2 SERPL-SCNC: 20 MMOL/L
CO2 SERPL-SCNC: 21 MMOL/L
CO2 SERPL-SCNC: 22 MMOL/L
CO2 SERPL-SCNC: 22 MMOL/L
CO2 SERPL-SCNC: 23 MMOL/L
CO2 SERPL-SCNC: 23 MMOL/L
CREAT SERPL-MCNC: 5.5 MG/DL
CREAT SERPL-MCNC: 5.8 MG/DL
CREAT SERPL-MCNC: 6 MG/DL
CREAT SERPL-MCNC: 6.1 MG/DL
CREAT SERPL-MCNC: 6.1 MG/DL
CREAT SERPL-MCNC: 6.7 MG/DL
DELSYS: ABNORMAL
DIASTOLIC DYSFUNCTION: YES
DIFFERENTIAL METHOD: ABNORMAL
EOSINOPHIL # BLD AUTO: 0.1 K/UL
EOSINOPHIL NFR BLD: 2.1 %
ERYTHROCYTE [DISTWIDTH] IN BLOOD BY AUTOMATED COUNT: 16.9 %
EST. GFR  (AFRICAN AMERICAN): 11.1 ML/MIN/1.73 M^2
EST. GFR  (AFRICAN AMERICAN): 11.1 ML/MIN/1.73 M^2
EST. GFR  (AFRICAN AMERICAN): 11.3 ML/MIN/1.73 M^2
EST. GFR  (AFRICAN AMERICAN): 11.8 ML/MIN/1.73 M^2
EST. GFR  (AFRICAN AMERICAN): 12.6 ML/MIN/1.73 M^2
EST. GFR  (AFRICAN AMERICAN): 9.9 ML/MIN/1.73 M^2
EST. GFR  (NON AFRICAN AMERICAN): 10.2 ML/MIN/1.73 M^2
EST. GFR  (NON AFRICAN AMERICAN): 10.9 ML/MIN/1.73 M^2
EST. GFR  (NON AFRICAN AMERICAN): 8.6 ML/MIN/1.73 M^2
EST. GFR  (NON AFRICAN AMERICAN): 9.6 ML/MIN/1.73 M^2
EST. GFR  (NON AFRICAN AMERICAN): 9.6 ML/MIN/1.73 M^2
EST. GFR  (NON AFRICAN AMERICAN): 9.8 ML/MIN/1.73 M^2
ESTIMATED PA SYSTOLIC PRESSURE: 36.18
FLOW: 3
GLUCOSE SERPL-MCNC: 139 MG/DL
GLUCOSE SERPL-MCNC: 245 MG/DL
GLUCOSE SERPL-MCNC: 308 MG/DL
GLUCOSE SERPL-MCNC: 446 MG/DL
GLUCOSE SERPL-MCNC: 609 MG/DL
GLUCOSE SERPL-MCNC: 651 MG/DL
HCT VFR BLD AUTO: 24.1 %
HCT VFR BLD CALC: 21 %PCV (ref 36–54)
HGB BLD-MCNC: 7.1 G/DL
IMM GRANULOCYTES # BLD AUTO: 0.03 K/UL
IMM GRANULOCYTES NFR BLD AUTO: 0.7 %
LACTATE SERPL-SCNC: 1.6 MMOL/L
LYMPHOCYTES # BLD AUTO: 0.9 K/UL
LYMPHOCYTES NFR BLD: 19.9 %
MAGNESIUM SERPL-MCNC: 2.1 MG/DL
MAGNESIUM SERPL-MCNC: 2.1 MG/DL
MCH RBC QN AUTO: 30.2 PG
MCHC RBC AUTO-ENTMCNC: 29.5 G/DL
MCV RBC AUTO: 103 FL
MITRAL VALVE REGURGITATION: ABNORMAL
MODE: ABNORMAL
MONOCYTES # BLD AUTO: 0.3 K/UL
MONOCYTES NFR BLD: 6.9 %
NEUTROPHILS # BLD AUTO: 3 K/UL
NEUTROPHILS NFR BLD: 69.5 %
NRBC BLD-RTO: 0 /100 WBC
PHOSPHATE SERPL-MCNC: 5 MG/DL
PHOSPHATE SERPL-MCNC: 5.1 MG/DL
PLATELET # BLD AUTO: 131 K/UL
PMV BLD AUTO: 10.7 FL
POC IONIZED CALCIUM: 1.08 MMOL/L (ref 1.06–1.42)
POCT GLUCOSE: 123 MG/DL (ref 70–110)
POCT GLUCOSE: 135 MG/DL (ref 70–110)
POCT GLUCOSE: 212 MG/DL (ref 70–110)
POCT GLUCOSE: 237 MG/DL (ref 70–110)
POCT GLUCOSE: 239 MG/DL (ref 70–110)
POCT GLUCOSE: 286 MG/DL (ref 70–110)
POCT GLUCOSE: 290 MG/DL (ref 70–110)
POCT GLUCOSE: 317 MG/DL (ref 70–110)
POCT GLUCOSE: 375 MG/DL (ref 70–110)
POCT GLUCOSE: 436 MG/DL (ref 70–110)
POCT GLUCOSE: 484 MG/DL (ref 70–110)
POTASSIUM BLD-SCNC: 6.6 MMOL/L (ref 3.5–5.1)
POTASSIUM SERPL-SCNC: 4.8 MMOL/L
POTASSIUM SERPL-SCNC: 5 MMOL/L
POTASSIUM SERPL-SCNC: 5.8 MMOL/L
POTASSIUM SERPL-SCNC: 5.8 MMOL/L
POTASSIUM SERPL-SCNC: 6.2 MMOL/L
POTASSIUM SERPL-SCNC: 6.9 MMOL/L
POTASSIUM SERPL-SCNC: 6.9 MMOL/L
PROT SERPL-MCNC: 5.9 G/DL
PROVIDER CREDENTIALS: ABNORMAL
PROVIDER NOTIFIED: ABNORMAL
RBC # BLD AUTO: 2.35 M/UL
RETIRED EF AND QEF - SEE NOTES: 65 (ref 55–65)
SAMPLE: ABNORMAL
SITE: ABNORMAL
SODIUM BLD-SCNC: 128 MMOL/L (ref 136–145)
SODIUM SERPL-SCNC: 129 MMOL/L
SODIUM SERPL-SCNC: 129 MMOL/L
SODIUM SERPL-SCNC: 131 MMOL/L
SODIUM SERPL-SCNC: 132 MMOL/L
SODIUM SERPL-SCNC: 136 MMOL/L
SODIUM SERPL-SCNC: 136 MMOL/L
SP02: 99
TRICUSPID VALVE REGURGITATION: ABNORMAL
TROPONIN I SERPL DL<=0.01 NG/ML-MCNC: 0.04 NG/ML
TROPONIN I SERPL DL<=0.01 NG/ML-MCNC: 0.17 NG/ML
TROPONIN I SERPL DL<=0.01 NG/ML-MCNC: 0.22 NG/ML
TROPONIN I SERPL DL<=0.01 NG/ML-MCNC: 0.24 NG/ML
WBC # BLD AUTO: 4.37 K/UL

## 2018-09-02 PROCEDURE — 25000003 PHARM REV CODE 250: Performed by: STUDENT IN AN ORGANIZED HEALTH CARE EDUCATION/TRAINING PROGRAM

## 2018-09-02 PROCEDURE — 25000003 PHARM REV CODE 250: Performed by: OPHTHALMOLOGY

## 2018-09-02 PROCEDURE — 84100 ASSAY OF PHOSPHORUS: CPT | Mod: 91

## 2018-09-02 PROCEDURE — 99232 SBSQ HOSP IP/OBS MODERATE 35: CPT | Mod: ,,, | Performed by: HOSPITALIST

## 2018-09-02 PROCEDURE — 25000003 PHARM REV CODE 250: Performed by: INTERNAL MEDICINE

## 2018-09-02 PROCEDURE — 84484 ASSAY OF TROPONIN QUANT: CPT

## 2018-09-02 PROCEDURE — 63600175 PHARM REV CODE 636 W HCPCS: Performed by: STUDENT IN AN ORGANIZED HEALTH CARE EDUCATION/TRAINING PROGRAM

## 2018-09-02 PROCEDURE — 84484 ASSAY OF TROPONIN QUANT: CPT | Mod: 91

## 2018-09-02 PROCEDURE — 93306 TTE W/DOPPLER COMPLETE: CPT | Mod: 26,,, | Performed by: INTERNAL MEDICINE

## 2018-09-02 PROCEDURE — 80053 COMPREHEN METABOLIC PANEL: CPT

## 2018-09-02 PROCEDURE — 20600001 HC STEP DOWN PRIVATE ROOM

## 2018-09-02 PROCEDURE — 27201247 HC HEMODIALYSIS, SET-UP & CANCEL

## 2018-09-02 PROCEDURE — 93005 ELECTROCARDIOGRAM TRACING: CPT

## 2018-09-02 PROCEDURE — 87040 BLOOD CULTURE FOR BACTERIA: CPT | Mod: 59

## 2018-09-02 PROCEDURE — 85025 COMPLETE CBC W/AUTO DIFF WBC: CPT

## 2018-09-02 PROCEDURE — 83880 ASSAY OF NATRIURETIC PEPTIDE: CPT

## 2018-09-02 PROCEDURE — 83735 ASSAY OF MAGNESIUM: CPT | Mod: 91

## 2018-09-02 PROCEDURE — 93306 TTE W/DOPPLER COMPLETE: CPT

## 2018-09-02 PROCEDURE — 83735 ASSAY OF MAGNESIUM: CPT

## 2018-09-02 PROCEDURE — 82010 KETONE BODYS QUAN: CPT

## 2018-09-02 PROCEDURE — 84100 ASSAY OF PHOSPHORUS: CPT

## 2018-09-02 PROCEDURE — 93010 ELECTROCARDIOGRAM REPORT: CPT | Mod: ,,, | Performed by: INTERNAL MEDICINE

## 2018-09-02 PROCEDURE — 80048 BASIC METABOLIC PNL TOTAL CA: CPT | Mod: 91

## 2018-09-02 PROCEDURE — 36415 COLL VENOUS BLD VENIPUNCTURE: CPT

## 2018-09-02 PROCEDURE — 83605 ASSAY OF LACTIC ACID: CPT

## 2018-09-02 RX ORDER — SODIUM CHLORIDE 9 MG/ML
INJECTION, SOLUTION INTRAVENOUS
Status: CANCELLED | OUTPATIENT
Start: 2018-09-02

## 2018-09-02 RX ORDER — ASPIRIN 81 MG/1
81 TABLET ORAL DAILY
Status: DISCONTINUED | OUTPATIENT
Start: 2018-09-03 | End: 2018-09-06

## 2018-09-02 RX ORDER — LABETALOL 100 MG/1
100 TABLET, FILM COATED ORAL EVERY 12 HOURS
Status: DISCONTINUED | OUTPATIENT
Start: 2018-09-02 | End: 2018-09-02

## 2018-09-02 RX ORDER — ASPIRIN 325 MG
325 TABLET, DELAYED RELEASE (ENTERIC COATED) ORAL ONCE
Status: COMPLETED | OUTPATIENT
Start: 2018-09-02 | End: 2018-09-02

## 2018-09-02 RX ORDER — ATORVASTATIN CALCIUM 20 MG/1
40 TABLET, FILM COATED ORAL DAILY
Status: DISCONTINUED | OUTPATIENT
Start: 2018-09-02 | End: 2018-09-06

## 2018-09-02 RX ORDER — SODIUM CHLORIDE 9 MG/ML
INJECTION, SOLUTION INTRAVENOUS ONCE
Status: CANCELLED | OUTPATIENT
Start: 2018-09-02 | End: 2018-09-02

## 2018-09-02 RX ADMIN — ASPIRIN 325 MG: 325 TABLET, DELAYED RELEASE ORAL at 06:09

## 2018-09-02 RX ADMIN — CALCIUM ACETATE 1334 MG: 667 CAPSULE ORAL at 06:09

## 2018-09-02 RX ADMIN — INSULIN DETEMIR 22 UNITS: 100 INJECTION, SOLUTION SUBCUTANEOUS at 09:09

## 2018-09-02 RX ADMIN — METHADONE HYDROCHLORIDE 10 MG: 5 TABLET ORAL at 09:09

## 2018-09-02 RX ADMIN — INSULIN DETEMIR 22 UNITS: 100 INJECTION, SOLUTION SUBCUTANEOUS at 02:09

## 2018-09-02 RX ADMIN — INSULIN ASPART 18 UNITS: 100 INJECTION, SOLUTION INTRAVENOUS; SUBCUTANEOUS at 06:09

## 2018-09-02 RX ADMIN — ATORVASTATIN CALCIUM 40 MG: 20 TABLET, FILM COATED ORAL at 06:09

## 2018-09-02 RX ADMIN — HYDROCODONE BITARTRATE AND ACETAMINOPHEN 1 TABLET: 7.5; 325 TABLET ORAL at 09:09

## 2018-09-02 RX ADMIN — METHADONE HYDROCHLORIDE 10 MG: 5 TABLET ORAL at 10:09

## 2018-09-02 RX ADMIN — PREDNISOLONE ACETATE 1 DROP: 10 SUSPENSION/ DROPS OPHTHALMIC at 06:09

## 2018-09-02 RX ADMIN — ONDANSETRON 4 MG: 2 INJECTION INTRAMUSCULAR; INTRAVENOUS at 04:09

## 2018-09-02 RX ADMIN — PREDNISOLONE ACETATE 1 DROP: 10 SUSPENSION/ DROPS OPHTHALMIC at 02:09

## 2018-09-02 RX ADMIN — CETIRIZINE HYDROCHLORIDE 5 MG: 5 TABLET ORAL at 10:09

## 2018-09-02 RX ADMIN — SERTRALINE HYDROCHLORIDE 25 MG: 25 TABLET ORAL at 10:09

## 2018-09-02 RX ADMIN — LOSARTAN POTASSIUM 50 MG: 50 TABLET, FILM COATED ORAL at 10:09

## 2018-09-02 RX ADMIN — SODIUM CHLORIDE 4 UNITS/HR: 9 INJECTION, SOLUTION INTRAVENOUS at 05:09

## 2018-09-02 RX ADMIN — CALCIUM GLUCONATE 500 MG: 98 INJECTION, SOLUTION INTRAVENOUS at 08:09

## 2018-09-02 RX ADMIN — INSULIN ASPART 3 UNITS: 100 INJECTION, SOLUTION INTRAVENOUS; SUBCUTANEOUS at 09:09

## 2018-09-02 RX ADMIN — HEPARIN SODIUM 5000 UNITS: 5000 INJECTION, SOLUTION INTRAVENOUS; SUBCUTANEOUS at 09:09

## 2018-09-02 RX ADMIN — PREDNISOLONE ACETATE 1 DROP: 10 SUSPENSION/ DROPS OPHTHALMIC at 09:09

## 2018-09-02 RX ADMIN — HEPARIN SODIUM 5000 UNITS: 5000 INJECTION, SOLUTION INTRAVENOUS; SUBCUTANEOUS at 02:09

## 2018-09-02 RX ADMIN — LABETALOL HCL 200 MG: 100 TABLET, FILM COATED ORAL at 10:09

## 2018-09-02 RX ADMIN — SODIUM POLYSTYRENE SULFONATE 15 G: 15 SUSPENSION ORAL; RECTAL at 12:09

## 2018-09-02 RX ADMIN — INSULIN ASPART 4 UNITS: 100 INJECTION, SOLUTION INTRAVENOUS; SUBCUTANEOUS at 06:09

## 2018-09-02 RX ADMIN — PATIROMER 8.4 G: 8.4 POWDER, FOR SUSPENSION ORAL at 10:09

## 2018-09-02 RX ADMIN — HYDROCODONE BITARTRATE AND ACETAMINOPHEN 1 TABLET: 7.5; 325 TABLET ORAL at 02:09

## 2018-09-02 RX ADMIN — CLONAZEPAM 0.5 MG: 0.5 TABLET ORAL at 09:09

## 2018-09-02 RX ADMIN — CALCIUM ACETATE 1334 MG: 667 CAPSULE ORAL at 10:09

## 2018-09-02 RX ADMIN — PREDNISOLONE ACETATE 1 DROP: 10 SUSPENSION/ DROPS OPHTHALMIC at 08:09

## 2018-09-02 RX ADMIN — DILTIAZEM HYDROCHLORIDE 300 MG: 300 CAPSULE, COATED, EXTENDED RELEASE ORAL at 10:09

## 2018-09-02 RX ADMIN — HEPARIN SODIUM 5000 UNITS: 5000 INJECTION, SOLUTION INTRAVENOUS; SUBCUTANEOUS at 10:09

## 2018-09-02 RX ADMIN — ATROPINE SULFATE 1 DROP: 10 SOLUTION/ DROPS OPHTHALMIC at 08:09

## 2018-09-02 NOTE — CARE UPDATE
"Spoke with RN around 4:15 this AM, who reported new-onset of A. Fib on telemetry. Patient would "go in and out" with sinus pauses. Patient reported nausea but no other symptoms. He vomited green-brown material once. Rapid response was called. HR 48, BP 76/35 and on repeat 105/45. Was desatting to 80% RA. Patient put on 2L NC with O2 sats back to 96%.     Glucose was greater than 500 on two readings. Labs BMP, troponin, lactic acid, blood cultures, and beta-hydroxy were ordered. 250 cc bolus administered. EKG showed junctional rhythm with occasional premature ventricular complexes. Broadened abx coverage to aztreonam. Insulin drip started, and patient transferred to , where q1H POCT can be done.   "

## 2018-09-02 NOTE — SUBJECTIVE & OBJECTIVE
Review of Systems   Constitutional: Negative for chills and fever.   HENT: Negative for trouble swallowing.    Eyes: Positive for visual disturbance.   Respiratory: Negative for cough and shortness of breath.    Cardiovascular: Negative for chest pain and palpitations.   Gastrointestinal: Negative for abdominal pain, diarrhea, nausea and vomiting.   Genitourinary: Positive for decreased urine volume.   Musculoskeletal: Negative for arthralgias.   Skin: Positive for wound.   Neurological: Negative for dizziness, light-headedness and headaches.   Psychiatric/Behavioral: Negative for agitation and confusion.       Objective:     Vital Signs (Most Recent):  Temp: 97.7 °F (36.5 °C) (09/02/18 1218)  Pulse: 66 (09/02/18 1459)  Resp: 18 (09/02/18 1218)  BP: 132/60 (09/02/18 1218)  SpO2: (!) 92 % (09/02/18 1218) Vital Signs (24h Range):  Temp:  [97.4 °F (36.3 °C)-98.2 °F (36.8 °C)] 97.7 °F (36.5 °C)  Pulse:  [40-84] 66  Resp:  [13-18] 18  SpO2:  [88 %-99 %] 92 %  BP: ()/(35-67) 132/60     Weight: 101.6 kg (224 lb)  Body mass index is 31.24 kg/m².    Intake/Output Summary (Last 24 hours) at 9/2/2018 1552  Last data filed at 9/2/2018 1200  Gross per 24 hour   Intake 1823.33 ml   Output 2600 ml   Net -776.67 ml      Physical Exam   Constitutional: He is oriented to person, place, and time. No distress.   HENT:   Head: Normocephalic.   Eyes: Pupils are equal, round, and reactive to light.   Neck: Normal range of motion.   Cardiovascular: Normal rate and regular rhythm.   Pulmonary/Chest: Effort normal and breath sounds normal.   Abdominal: Soft. Bowel sounds are normal.   Musculoskeletal: left metatarsal joint wound dressing. Intact. clean  Neurological: He is alert and oriented to person, place, and time.   Nursing note and vitals reviewed.    Significant Labs:   CBC:   Recent Labs   Lab  09/01/18   0458  09/02/18   0506  09/02/18   0518   WBC  5.88   --   4.37   HGB  7.6*   --   7.1*   HCT  25.2*  21*  24.1*   PLT   100*   --   131*     CMP:   Recent Labs   Lab  09/02/18   0518   09/02/18   1010  09/02/18   1203  09/02/18   1517   NA  129*   < >  131*  132*  136   K  6.9*  6.9*   < >  5.8*  5.8*  5.0   CL  98   < >  99  102  103   CO2  23   < >  23  20*  22*   GLU  651*   < >  446*  245*  139*   BUN  39*   < >  42*  41*  43*   CREATININE  5.5*   < >  6.1*  6.0*  6.1*   CALCIUM  7.9*   < >  8.1*  8.3*  8.5*   PROT  5.9*   --    --    --    --    ALBUMIN  2.4*   --    --    --    --    BILITOT  0.5   --    --    --    --    ALKPHOS  140*   --    --    --    --    AST  33   --    --    --    --    ALT  24   --    --    --    --    ANIONGAP  8   < >  9  10  11   EGFRNONAA  10.9*   < >  9.6*  9.8*  9.6*    < > = values in this interval not displayed.       Significant Imaging: I have reviewed all pertinent imaging results/findings within the past 24 hours.

## 2018-09-02 NOTE — ASSESSMENT & PLAN NOTE
- Patient was hypotensive overnight.,  - Diltiazem held  - Losartan and labetalol discontinued given in the setting of bradycardia and junctional rhythm

## 2018-09-02 NOTE — NURSING TRANSFER
Nursing Transfer Note      9/2/2018     Transfer From: 1054    Transfer via bed    Transfer with 2L to O2, cardiac monitoring    Transported by [employer], Rapid response RN    Medicines sent: insulin gtt, IVF    Chart send with patient: Yes    Patient reassessed at: 0600, 9/2    Upon arrival to floor: cardiac monitor applied, patient oriented to room, call bell in reach and bed in lowest position

## 2018-09-02 NOTE — ASSESSMENT & PLAN NOTE
- Long term diabetic.. Takes Basglar 20 units BID and Novolog 10 units premeal  - A1C 8.2  - POC AM >330 and pre-meal accuchecks in the 100s  - Lev 22 qHS with Asp 18 TIDWM , LDSSI.   - diabetic and renal diet

## 2018-09-02 NOTE — NURSING
Notified by tele that pt had been having 1-2 sec sinus pauses, upon entering pt's room for 0400 vitals, pt noted to be in new onset A.fib/ bradycardia. Pt found to be hypotensive, w/ decreased o2 sats. Pt asymptomatic, only complaining of nausea. Notified charge RN, place pt on 2L NC and started 500ml bolus of NS @ 250 ml. MD/ SHON RN notified. Pt's glucose at this time >500. EKG and labs ordered. INS gtt ordered. Pt transferred to CSU for closer monitoring, refused to have family updated. WCTM

## 2018-09-02 NOTE — PROGRESS NOTES
Patient with hyperkalemia, was dialyzed yesterday - will dialyze again today. Dialysis nurse informed, orders written.

## 2018-09-02 NOTE — ASSESSMENT & PLAN NOTE
- Rapid response at 5 am for bradycardia in 40's, BP 78/35 mmHg, and desaturations in 80's. Patient responded to 250 cc IV bolus NaCl and BP improved to 104/45. Sugar was noted to be in 500's. BHB 1.3, bicarb 23 with normal anion GAP. Potassium 6.9. Trop: 0.043. EKG: junctional rhythm with premature complexes. Patient was started on Insulin drip and titrated and eventually at noon the potassium was 5.8.     Patient had HD yesterday and 2.0 L removed at 5:30 pm (09/01). Patient was evaluated by myself at 7 am this morning. He was in no apparent cardiopulmonary distress. Denies chest pain, dyspnea. Reported nausea in am. Denies vomiting, abdominal pain, cough, sore throat, pain radiation to arm or jaw, muscle weakness. At 2 pm, troponin was noted to be 0.238. At 4 pm repeat troponin trending down to 0.222. EKG NSR with Vent rate 74 bpm. No ST-T wave changes noted    Concern for type II NSTEMI  - will get serial EKG  - will trend troponin  - if need persists will consult cardiology

## 2018-09-02 NOTE — ASSESSMENT & PLAN NOTE
- Patient with diabetic nephropathy and concomitant ESRD, HD (MWF via LUE AVF) last dialyzed 8/17  - CMP drawn in ED show no major electrolyte derangements or need for emergent dialysis  -  Undergoing HD on TuThSa  - low potassium diet and patiromer 8.4 gm on nondialysis days  - Patient had HD yesterday (09/01); given in setting of hyperkalemia was offered repeat dialysis today. He refused. Potassium at 3:20 pm is 5.0.

## 2018-09-02 NOTE — ASSESSMENT & PLAN NOTE
At 5 am, noted to have potassium of 6.9. Started on insulin drip.   At 4 pm: potassium was 5.0  Will continue BMP Q4H

## 2018-09-02 NOTE — ASSESSMENT & PLAN NOTE
- Osteomyelitis of left 5th metatarsal taken for washout by Orthopedic surgery at [facility] on 08/15  - Patient being treated outside facility with Flagyl, linezolid, gentamicin with HD.  - Refusing amputation which would be curative.   - Podiatry consulted, appreciate recs. Advised BKA but patient refused. Recommended CAM boot and abx per ID. D/tyler wound vac  - ESR elevated at 58 upon admission  - X-ray left foot and ankle shows partial amputation the 1st, 2nd, 3rd, and 4th digits with fusion of the 2nd and 3rd MTP joints and throughout the midfoot, severe deformity and joint space loss the tibiotalar joint with a lapse of the talus likely 2/2 to chronic osteomyelitis, arthritis, or chronic Charcot foot.     Plan:  . Follow up with [facility] podiatry at discharge  - Started on Daptomycin for treatment

## 2018-09-02 NOTE — PLAN OF CARE
Problem: Patient Care Overview  Goal: Plan of Care Review  Pt went for hamm placement to R chest wall. Pt then went for HD. 2 L removed. Dressing change performed to L ankle. Pain well controlled. Pt d/c being placed on hold until home health can be set up. No other issues noted. WCTM.

## 2018-09-02 NOTE — ASSESSMENT & PLAN NOTE
- Received intravitreal vancomycin and ceftazidime at admit.  - Opthmology following, daily assessments ongoing.  - Per Ophthalmology, lesion appears to be consolidating but no significant improvement in vision. Will need daily assessment for atleast 1 week from date of last intravitreal injection.  - Received 3rd dose of intravitreal vancomycin on 8/24  - To be assessed by Ophthalmology on 8/29, recommended twice weekly follow up at discharge.

## 2018-09-02 NOTE — CARE UPDATE
Rapid Response Nurse Note     Rapid Response Metrics:     Admit Date: 2018  LOS: 16  Code Status: Full Code   Date of Consult: 2018  : 1965  Age: 53 y.o.  Weight:   Wt Readings from Last 1 Encounters:   18 101.6 kg (224 lb)     Race:   Sex: male  [facility]:   MRN: [id number]  Time Rapid Response Team page Received: 0443  Time Rapid Response Team at Bedside: 0425  Time Rapid Response Team left Bedside: 0548  Was the patient discharged from an ICU this admission? NO   Was the patient discharged from a PACU within last 24 hours? NO  Did the patient receive conscious sedation/general anesthesia within last 24 hours? NO  Was the patient in the ED within the past 24 hours? NO  Was the patient started on NIPPV within the past 24 hours? NO  Did this progress into an ARC or CPA: NO  Attending Physician: Carrol García MD  Primary Service: [facility] MED 2  Consult Requested By: Carrol García MD   Rapid Response Indication(s): HR 40, /45, BG >500  Disposition: Upgraded to CSU for closer monitoring.    SITUATION:     Reason for Call:   Called to evaluate the patient for Dysrythmia    BACKGROUND:     Why is the patient in the hospital?: Sepsis  What changed?: Rhythm Change    ASSESSMENT:     What did you find: Upon arrival patient complaints of N&V, NC @ 3 liters, /45, HR 40 with potential junctional rhythm.  Primary team at bedside. Patient dialyzed this AM. Ordered BNP, CMP, Mag , Phos, Troponin.  STAT ABG with lytes revealed K of 6.6, BG >500,  Insulin drip ordered.  STAT EKG revealed Bradycardic with Junctional rhythm. 250 cc bolus given per primary team.  STAT chest X Ray ordered. Team escalated care to step down unit for better glucose monitoring and increased VS monitoring.  Patient converted to NSR upon leaving floor.  Patient transferred to .    RECOMMENDATIONS:     We recommend: See above    FOLLOW-UP/CONTINGENCY PLAN:       Call the rapid response Nurse  at x [phone] for additional questions or concerns.      PHYSICIAN ESCALATION:     Orders received and case discussed with  Afshan Oropeza MD    Disposition: Transferred to .

## 2018-09-02 NOTE — ASSESSMENT & PLAN NOTE
At 5 am, glucose noted to be in 500's. BHB 1.3, bicarb 23 with normal anion GAP. Started on titrating Insulin drip. His glucose check at 1 pm was 135; he was transitioned to subcutaneous determir 22 units and started on bariatric no sugar diet. Insulin drip discontinued an hour later.     Plan:  BMP checks Q4H  Glucose checks Q2H
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
## 2018-09-02 NOTE — PROGRESS NOTES
Patient with high potassium (repeatedly). Refused dialysis today despite being repeatedly explained by the dialysis nurse and me (in person) that the only effective way to get rid of potassium for him is dialysis and that he might die from hyperkalemia if he refuses dialysis today.

## 2018-09-02 NOTE — PROGRESS NOTES
Glucose dropped from 290 to 135. MD notified, insulin discontinue. Pt to eat a clear liquid diet now with added Insulin detemir 22u once.

## 2018-09-03 LAB
ANION GAP SERPL CALC-SCNC: 10 MMOL/L
ANION GAP SERPL CALC-SCNC: 11 MMOL/L
ANION GAP SERPL CALC-SCNC: 13 MMOL/L
ANION GAP SERPL CALC-SCNC: 8 MMOL/L
ANION GAP SERPL CALC-SCNC: 9 MMOL/L
BASOPHILS # BLD AUTO: 0.05 K/UL
BASOPHILS # BLD AUTO: 0.06 K/UL
BASOPHILS NFR BLD: 0.9 %
BASOPHILS NFR BLD: 1.1 %
BUN SERPL-MCNC: 26 MG/DL
BUN SERPL-MCNC: 45 MG/DL
BUN SERPL-MCNC: 47 MG/DL
BUN SERPL-MCNC: 48 MG/DL
BUN SERPL-MCNC: 50 MG/DL
CALCIUM SERPL-MCNC: 8.1 MG/DL
CALCIUM SERPL-MCNC: 8.2 MG/DL
CALCIUM SERPL-MCNC: 8.2 MG/DL
CALCIUM SERPL-MCNC: 8.3 MG/DL
CALCIUM SERPL-MCNC: 8.5 MG/DL
CHLORIDE SERPL-SCNC: 101 MMOL/L
CHLORIDE SERPL-SCNC: 102 MMOL/L
CO2 SERPL-SCNC: 22 MMOL/L
CO2 SERPL-SCNC: 24 MMOL/L
CO2 SERPL-SCNC: 25 MMOL/L
CREAT SERPL-MCNC: 4.6 MG/DL
CREAT SERPL-MCNC: 6.9 MG/DL
CREAT SERPL-MCNC: 7.2 MG/DL
CREAT SERPL-MCNC: 7.5 MG/DL
CREAT SERPL-MCNC: 7.7 MG/DL
DIFFERENTIAL METHOD: ABNORMAL
DIFFERENTIAL METHOD: ABNORMAL
EOSINOPHIL # BLD AUTO: 0.3 K/UL
EOSINOPHIL # BLD AUTO: 0.4 K/UL
EOSINOPHIL NFR BLD: 5 %
EOSINOPHIL NFR BLD: 6.8 %
ERYTHROCYTE [DISTWIDTH] IN BLOOD BY AUTOMATED COUNT: 17.1 %
ERYTHROCYTE [DISTWIDTH] IN BLOOD BY AUTOMATED COUNT: 17.1 %
EST. GFR  (AFRICAN AMERICAN): 15.6 ML/MIN/1.73 M^2
EST. GFR  (AFRICAN AMERICAN): 8.4 ML/MIN/1.73 M^2
EST. GFR  (AFRICAN AMERICAN): 8.7 ML/MIN/1.73 M^2
EST. GFR  (AFRICAN AMERICAN): 9.1 ML/MIN/1.73 M^2
EST. GFR  (AFRICAN AMERICAN): 9.6 ML/MIN/1.73 M^2
EST. GFR  (NON AFRICAN AMERICAN): 13.5 ML/MIN/1.73 M^2
EST. GFR  (NON AFRICAN AMERICAN): 7.3 ML/MIN/1.73 M^2
EST. GFR  (NON AFRICAN AMERICAN): 7.5 ML/MIN/1.73 M^2
EST. GFR  (NON AFRICAN AMERICAN): 7.9 ML/MIN/1.73 M^2
EST. GFR  (NON AFRICAN AMERICAN): 8.3 ML/MIN/1.73 M^2
GLUCOSE SERPL-MCNC: 162 MG/DL
GLUCOSE SERPL-MCNC: 214 MG/DL
GLUCOSE SERPL-MCNC: 241 MG/DL
GLUCOSE SERPL-MCNC: 42 MG/DL
GLUCOSE SERPL-MCNC: 514 MG/DL
HCT VFR BLD AUTO: 22.8 %
HCT VFR BLD AUTO: 25 %
HGB BLD-MCNC: 6.9 G/DL
HGB BLD-MCNC: 7.3 G/DL
IMM GRANULOCYTES # BLD AUTO: 0.03 K/UL
IMM GRANULOCYTES # BLD AUTO: 0.04 K/UL
IMM GRANULOCYTES NFR BLD AUTO: 0.5 %
IMM GRANULOCYTES NFR BLD AUTO: 0.7 %
LYMPHOCYTES # BLD AUTO: 0.9 K/UL
LYMPHOCYTES # BLD AUTO: 1.3 K/UL
LYMPHOCYTES NFR BLD: 15.6 %
LYMPHOCYTES NFR BLD: 23.3 %
MAGNESIUM SERPL-MCNC: 2.3 MG/DL
MCH RBC QN AUTO: 29.3 PG
MCH RBC QN AUTO: 30.3 PG
MCHC RBC AUTO-ENTMCNC: 29.2 G/DL
MCHC RBC AUTO-ENTMCNC: 30.3 G/DL
MCV RBC AUTO: 100 FL
MCV RBC AUTO: 100 FL
MONOCYTES # BLD AUTO: 0.4 K/UL
MONOCYTES # BLD AUTO: 0.5 K/UL
MONOCYTES NFR BLD: 7.4 %
MONOCYTES NFR BLD: 9.5 %
NEUTROPHILS # BLD AUTO: 3.3 K/UL
NEUTROPHILS # BLD AUTO: 3.9 K/UL
NEUTROPHILS NFR BLD: 59 %
NEUTROPHILS NFR BLD: 70.2 %
NRBC BLD-RTO: 0 /100 WBC
NRBC BLD-RTO: 0 /100 WBC
PHOSPHATE SERPL-MCNC: 4.8 MG/DL
PLATELET # BLD AUTO: 108 K/UL
PLATELET # BLD AUTO: 112 K/UL
PMV BLD AUTO: 10.7 FL
PMV BLD AUTO: 11.5 FL
POCT GLUCOSE: 104 MG/DL (ref 70–110)
POCT GLUCOSE: 166 MG/DL (ref 70–110)
POCT GLUCOSE: 208 MG/DL (ref 70–110)
POCT GLUCOSE: 213 MG/DL (ref 70–110)
POCT GLUCOSE: 243 MG/DL (ref 70–110)
POCT GLUCOSE: 268 MG/DL (ref 70–110)
POCT GLUCOSE: 430 MG/DL (ref 70–110)
POCT GLUCOSE: 458 MG/DL (ref 70–110)
POCT GLUCOSE: 48 MG/DL (ref 70–110)
POTASSIUM SERPL-SCNC: 4.3 MMOL/L
POTASSIUM SERPL-SCNC: 4.7 MMOL/L
POTASSIUM SERPL-SCNC: 4.8 MMOL/L
POTASSIUM SERPL-SCNC: 5.1 MMOL/L
POTASSIUM SERPL-SCNC: 5.1 MMOL/L
RBC # BLD AUTO: 2.28 M/UL
RBC # BLD AUTO: 2.49 M/UL
SODIUM SERPL-SCNC: 135 MMOL/L
SODIUM SERPL-SCNC: 135 MMOL/L
SODIUM SERPL-SCNC: 136 MMOL/L
SODIUM SERPL-SCNC: 136 MMOL/L
SODIUM SERPL-SCNC: 137 MMOL/L
WBC # BLD AUTO: 5.56 K/UL
WBC # BLD AUTO: 5.57 K/UL

## 2018-09-03 PROCEDURE — 27000207 HC ISOLATION

## 2018-09-03 PROCEDURE — 63600175 PHARM REV CODE 636 W HCPCS: Performed by: STUDENT IN AN ORGANIZED HEALTH CARE EDUCATION/TRAINING PROGRAM

## 2018-09-03 PROCEDURE — 25000003 PHARM REV CODE 250: Performed by: HOSPITALIST

## 2018-09-03 PROCEDURE — 80048 BASIC METABOLIC PNL TOTAL CA: CPT | Mod: 91

## 2018-09-03 PROCEDURE — 97110 THERAPEUTIC EXERCISES: CPT

## 2018-09-03 PROCEDURE — 84100 ASSAY OF PHOSPHORUS: CPT

## 2018-09-03 PROCEDURE — 25000003 PHARM REV CODE 250: Performed by: STUDENT IN AN ORGANIZED HEALTH CARE EDUCATION/TRAINING PROGRAM

## 2018-09-03 PROCEDURE — 94761 N-INVAS EAR/PLS OXIMETRY MLT: CPT

## 2018-09-03 PROCEDURE — 20600001 HC STEP DOWN PRIVATE ROOM

## 2018-09-03 PROCEDURE — 85025 COMPLETE CBC W/AUTO DIFF WBC: CPT | Mod: 91

## 2018-09-03 PROCEDURE — 25000003 PHARM REV CODE 250: Performed by: INTERNAL MEDICINE

## 2018-09-03 PROCEDURE — 83735 ASSAY OF MAGNESIUM: CPT

## 2018-09-03 PROCEDURE — 90935 HEMODIALYSIS ONE EVALUATION: CPT | Mod: ,,, | Performed by: INTERNAL MEDICINE

## 2018-09-03 PROCEDURE — 99232 SBSQ HOSP IP/OBS MODERATE 35: CPT | Mod: ,,, | Performed by: HOSPITALIST

## 2018-09-03 PROCEDURE — 36415 COLL VENOUS BLD VENIPUNCTURE: CPT

## 2018-09-03 PROCEDURE — 90935 HEMODIALYSIS ONE EVALUATION: CPT

## 2018-09-03 RX ORDER — SODIUM CHLORIDE 9 MG/ML
INJECTION, SOLUTION INTRAVENOUS ONCE
Status: DISCONTINUED | OUTPATIENT
Start: 2018-09-03 | End: 2018-09-04

## 2018-09-03 RX ORDER — INSULIN ASPART 100 [IU]/ML
9 INJECTION, SOLUTION INTRAVENOUS; SUBCUTANEOUS
Status: COMPLETED | OUTPATIENT
Start: 2018-09-03 | End: 2018-09-03

## 2018-09-03 RX ORDER — INSULIN ASPART 100 [IU]/ML
9 INJECTION, SOLUTION INTRAVENOUS; SUBCUTANEOUS ONCE
Status: COMPLETED | OUTPATIENT
Start: 2018-09-03 | End: 2018-09-03

## 2018-09-03 RX ORDER — SODIUM CHLORIDE 9 MG/ML
INJECTION, SOLUTION INTRAVENOUS
Status: DISCONTINUED | OUTPATIENT
Start: 2018-09-03 | End: 2018-09-04

## 2018-09-03 RX ORDER — HYDRALAZINE HYDROCHLORIDE 25 MG/1
25 TABLET, FILM COATED ORAL EVERY 8 HOURS PRN
Status: CANCELLED | OUTPATIENT
Start: 2018-09-03

## 2018-09-03 RX ORDER — AMLODIPINE BESYLATE 10 MG/1
10 TABLET ORAL DAILY
Status: DISCONTINUED | OUTPATIENT
Start: 2018-09-03 | End: 2018-09-21 | Stop reason: HOSPADM

## 2018-09-03 RX ADMIN — HEPARIN SODIUM 5000 UNITS: 5000 INJECTION, SOLUTION INTRAVENOUS; SUBCUTANEOUS at 05:09

## 2018-09-03 RX ADMIN — INSULIN DETEMIR 22 UNITS: 100 INJECTION, SOLUTION SUBCUTANEOUS at 09:09

## 2018-09-03 RX ADMIN — DAPTOMYCIN 905 MG: 500 INJECTION, POWDER, LYOPHILIZED, FOR SOLUTION INTRAVENOUS at 09:09

## 2018-09-03 RX ADMIN — SERTRALINE HYDROCHLORIDE 25 MG: 25 TABLET ORAL at 08:09

## 2018-09-03 RX ADMIN — CETIRIZINE HYDROCHLORIDE 5 MG: 5 TABLET ORAL at 08:09

## 2018-09-03 RX ADMIN — HEPARIN SODIUM 5000 UNITS: 5000 INJECTION, SOLUTION INTRAVENOUS; SUBCUTANEOUS at 08:09

## 2018-09-03 RX ADMIN — ASPIRIN 81 MG: 81 TABLET, COATED ORAL at 08:09

## 2018-09-03 RX ADMIN — AMLODIPINE BESYLATE 10 MG: 10 TABLET ORAL at 01:09

## 2018-09-03 RX ADMIN — INSULIN ASPART 9 UNITS: 100 INJECTION, SOLUTION INTRAVENOUS; SUBCUTANEOUS at 11:09

## 2018-09-03 RX ADMIN — PREDNISOLONE ACETATE 1 DROP: 10 SUSPENSION/ DROPS OPHTHALMIC at 09:09

## 2018-09-03 RX ADMIN — CALCIUM ACETATE 1334 MG: 667 CAPSULE ORAL at 11:09

## 2018-09-03 RX ADMIN — METHADONE HYDROCHLORIDE 10 MG: 5 TABLET ORAL at 08:09

## 2018-09-03 RX ADMIN — PREDNISOLONE ACETATE 1 DROP: 10 SUSPENSION/ DROPS OPHTHALMIC at 08:09

## 2018-09-03 RX ADMIN — HEPARIN SODIUM 5000 UNITS: 5000 INJECTION, SOLUTION INTRAVENOUS; SUBCUTANEOUS at 01:09

## 2018-09-03 RX ADMIN — INSULIN ASPART 9 UNITS: 100 INJECTION, SOLUTION INTRAVENOUS; SUBCUTANEOUS at 09:09

## 2018-09-03 RX ADMIN — INSULIN ASPART 18 UNITS: 100 INJECTION, SOLUTION INTRAVENOUS; SUBCUTANEOUS at 08:09

## 2018-09-03 RX ADMIN — DILTIAZEM HYDROCHLORIDE 300 MG: 300 CAPSULE, COATED, EXTENDED RELEASE ORAL at 08:09

## 2018-09-03 RX ADMIN — ATROPINE SULFATE 1 DROP: 10 SOLUTION/ DROPS OPHTHALMIC at 08:09

## 2018-09-03 RX ADMIN — HYDROCODONE BITARTRATE AND ACETAMINOPHEN 1 TABLET: 7.5; 325 TABLET ORAL at 08:09

## 2018-09-03 RX ADMIN — CLONAZEPAM 0.5 MG: 0.5 TABLET ORAL at 08:09

## 2018-09-03 RX ADMIN — PREDNISOLONE ACETATE 1 DROP: 10 SUSPENSION/ DROPS OPHTHALMIC at 01:09

## 2018-09-03 RX ADMIN — CALCIUM ACETATE 1334 MG: 667 CAPSULE ORAL at 08:09

## 2018-09-03 RX ADMIN — ATORVASTATIN CALCIUM 40 MG: 20 TABLET, FILM COATED ORAL at 08:09

## 2018-09-03 RX ADMIN — Medication 16 G: at 11:09

## 2018-09-03 NOTE — PLAN OF CARE
Problem: Patient Care Overview  Goal: Plan of Care Review  Outcome: Ongoing (interventions implemented as appropriate)  Pt remain free of falls and injury during shift. POC reviewed with pt. VS stable. POC BG monitoring q2h, see results. No acute events noted at this time. No complaints. Bed low and locked, call light with in reach. Will continue to monitor.

## 2018-09-03 NOTE — SUBJECTIVE & OBJECTIVE
Interval History: NAEON.    Review of Systems   Constitutional: Negative for fever.   HENT: Negative for trouble swallowing.    Eyes: Positive for visual disturbance. Negative for pain and discharge.   Respiratory: Negative for shortness of breath.    Cardiovascular: Negative for chest pain and palpitations.   Gastrointestinal: Negative for abdominal pain, diarrhea, nausea and vomiting.   Genitourinary: Negative for decreased urine volume and difficulty urinating.   Musculoskeletal: Negative for arthralgias.   Neurological: Negative for light-headedness and headaches.   Psychiatric/Behavioral: Negative for agitation and confusion.     Objective:     Vital Signs (Most Recent):  Temp: 97.5 °F (36.4 °C) (09/03/18 0713)  Pulse: 79 (09/03/18 1112)  Resp: 18 (09/03/18 0713)  BP: (!) 155/71 (09/03/18 0713)  SpO2: 97 % (09/03/18 0714) Vital Signs (24h Range):  Temp:  [97.4 °F (36.3 °C)-98.1 °F (36.7 °C)] 97.5 °F (36.4 °C)  Pulse:  [66-84] 79  Resp:  [16-18] 18  SpO2:  [92 %-97 %] 97 %  BP: (123-157)/(52-71) 155/71     Weight: 101.6 kg (224 lb)  Body mass index is 31.24 kg/m².    Intake/Output Summary (Last 24 hours) at 9/3/2018 1126  Last data filed at 9/3/2018 0400  Gross per 24 hour   Intake 630 ml   Output 0 ml   Net 630 ml      Physical Exam   Constitutional: He is oriented to person, place, and time. No distress.   HENT:   Head: Normocephalic.   Eyes: Pupils are equal, round, and reactive to light.   Neck: Normal range of motion.   Cardiovascular: Normal rate and regular rhythm.   Pulmonary/Chest: Effort normal and breath sounds normal.   Abdominal: Soft. Bowel sounds are normal.   Musculoskeletal: He exhibits edema and deformity.   Neurological: He is alert and oriented to person, place, and time.   Skin: Skin is warm.   Nursing note and vitals reviewed.      Significant Labs:   A1C:   Recent Labs   Lab  08/17/18   2159   HGBA1C  8.2*     ABGs: No results for input(s): PH, PCO2, HCO3, POCSATURATED, BE, TOTALHB, COHB,  METHB, O2HB, POCFIO2 in the last 48 hours.  Bilirubin:   Recent Labs   Lab  08/18/18   0613  08/19/18   0645  08/20/18   0826  08/21/18   0456  09/02/18 0518   BILITOT  0.8  0.7  0.4  0.5  0.5     Blood Culture:   Recent Labs   Lab  09/02/18 0518   LABBLOO  No Growth to date  No Growth to date  No Growth to date  No Growth to date     BMP:   Recent Labs   Lab  09/03/18   0404  09/03/18   0744   GLU  214*  162*   NA  135*  135*   K  4.8  5.1   CL  102  102   CO2  24  25   BUN  47*  48*   CREATININE  7.2*  7.5*   CALCIUM  8.2*  8.3*   MG  2.3   --      CBC:   Recent Labs   Lab  09/02/18   0506  09/02/18 0518 09/03/18 0404   WBC   --   4.37  5.57   HGB   --   7.1*  6.9*   HCT  21*  24.1*  22.8*   PLT   --   131*  112*     CMP:   Recent Labs   Lab  09/02/18   0518   09/03/18   0026  09/03/18   0404  09/03/18   0744   NA  129*   < >  136  135*  135*   K  6.9*  6.9*   < >  4.7  4.8  5.1   CL  98   < >  102  102  102   CO2  23   < >  24  24  25   GLU  651*   < >  241*  214*  162*   BUN  39*   < >  45*  47*  48*   CREATININE  5.5*   < >  6.9*  7.2*  7.5*   CALCIUM  7.9*   < >  8.1*  8.2*  8.3*   PROT  5.9*   --    --    --    --    ALBUMIN  2.4*   --    --    --    --    BILITOT  0.5   --    --    --    --    ALKPHOS  140*   --    --    --    --    AST  33   --    --    --    --    ALT  24   --    --    --    --    ANIONGAP  8   < >  10  9  8   EGFRNONAA  10.9*   < >  8.3*  7.9*  7.5*    < > = values in this interval not displayed.     Cardiac Markers:   Recent Labs   Lab  09/02/18   0518   BNP  1,140*     Coagulation: No results for input(s): PT, INR, APTT in the last 48 hours.  Lactic Acid:   Recent Labs   Lab  09/02/18   0518   LACTATE  1.6     Lipase: No results for input(s): LIPASE in the last 48 hours.  Lipid Panel: No results for input(s): CHOL, HDL, LDLCALC, TRIG, CHOLHDL in the last 48 hours.  Magnesium:   Recent Labs   Lab  09/02/18   0518  09/03/18   0404   MG  2.1  2.1  2.3     Pathology Results   (Last 10 years)    None        POCT Glucose:   Recent Labs   Lab  09/03/18   0246  09/03/18   0454  09/03/18   0645   POCTGLUCOSE  213*  208*  166*     Prealbumin: No results for input(s): PREALBUMIN in the last 48 hours.  Respiratory Culture: No results for input(s): GSRESP, RESPIRATORYC in the last 48 hours.  Troponin:   Recent Labs   Lab  09/02/18   1203  09/02/18   1517  09/02/18   1952   TROPONINI  0.238*  0.222*  0.172*     TSH: No results for input(s): TSH in the last 4320 hours.  Urine Culture: No results for input(s): LABURIN in the last 48 hours.  Urine Studies: No results for input(s): COLORU, APPEARANCEUA, PHUR, SPECGRAV, PROTEINUA, GLUCUA, KETONESU, BILIRUBINUA, OCCULTUA, NITRITE, UROBILINOGEN, LEUKOCYTESUR, RBCUA, WBCUA, BACTERIA, SQUAMEPITHEL, HYALINECASTS in the last 48 hours.    Invalid input(s): LAMONTR  All pertinent labs within the past 24 hours have been reviewed.    Significant Imaging: I have reviewed all pertinent imaging results/findings within the past 24 hours.

## 2018-09-03 NOTE — NURSING TRANSFER
Nursing Transfer Note      9/3/2018     Transfer To: Dialysis    Transfer via stretcher    Transfer with cardiac monitoring    Transported by Transport Attendant    Medicines sent: No    Chart send with patient: Yes

## 2018-09-03 NOTE — ASSESSMENT & PLAN NOTE
- Rapid response at 5 am on 9/2 for bradycardia in 40's, BP 78/35 mmHg, and desaturations in 80's. Patient responded to 250 cc IV bolus NaCl and BP improved to 104/45. Sugar was noted to be in 500's. BHB 1.3, bicarb 23 with normal anion GAP. Potassium 6.9. Trop: 0.043. EKG: junctional rhythm with premature complexes. Patient was started on Insulin drip and titrated and eventually at noon the potassium was 5.8.   - Concern for type II NSTEMI initially    PLAN:  - Troponin trending down  - EKG shows junctional rhythms.  - ECHO shows diastolic dysfunction with bi atrial enlargement.  - If recurs, will consult Cardiology  - Monitor for now on telemetry

## 2018-09-03 NOTE — PROGRESS NOTES
I personally saw and examined the patient on hemodialysis, tolerating treatment, see hemodyalisis flowsheet. I also reviewed the chart and current medication. The dialysis bath was adjusted.   Dialyzed today because of high K. Target UF 2 liter, next HD Thursday.

## 2018-09-03 NOTE — PLAN OF CARE
"Problem: Physical Therapy Goal  Goal: Physical Therapy Goal  Goals to be met by: 18     Patient will increase functional independence with mobility by performin. Sit to stand transfer with Supervision  2. Bed to chair transfer with Minimal Assistance using Rolling Walker  3. Gait  x 50 feet with Contact Guard Assistance using Rolling Walker.   4. Ascend/Descend 1, 6"  inch curb step with Maximum Assistance using Rolling Walker.     Outcome: Ongoing (interventions implemented as appropriate)  Progress limited today due to high blood pressure and low blood sugar levels.        "

## 2018-09-03 NOTE — PT/OT/SLP PROGRESS
"Physical Therapy Treatment    Patient Name:  Mickey Ross   MRN:  [id number]    Recommendations:     Discharge Recommendations:  home with home health   Discharge Equipment Recommendations: none   Barriers to discharge: None    Assessment:     Mickey Ross is a 53 y.o. male admitted with a medical diagnosis of Acute hematogenous osteomyelitis of left foot.  He presents with the following impairments/functional limitations:  weakness, impaired endurance, impaired self care skills, impaired functional mobilty, impaired balance, decreased lower extremity function, orthopedic precautions, decreased ROM, edema . Patient with limited participation today due to c/o nausea. RN and PCT assessed vitals and found elevated blood pressure and low blood sugar level.    Rehab Prognosis:  fair; patient would benefit from acute skilled PT services to address these deficits and reach maximum level of function.      Recent Surgery: Procedure(s) (LRB):  INSERTION, CATHETER, CENTRAL VENOUS, SANZ (Right) 2 Days Post-Op    Plan:     During this hospitalization, patient to be seen 3 x/week to address the above listed problems via gait training, therapeutic activities, therapeutic exercises, neuromuscular re-education  · Plan of Care Expires:  09/18/18   Plan of Care Reviewed with: patient    Subjective     Communicated with nsg prior to session.  Patient found in supine upon PT entry to room, agreeable to treatment.   Patient states " Something doesn't feel right, I feel nauseated and light headed"     Chief Complaint: nasea  Patient comments/goals: to get stronger  Pain/Comfort:  · Pain Rating 1: 0/10  · Pain Rating Post-Intervention 1: 0/10    Patients cultural, spiritual, Christian conflicts given the current situation: None stated    Objective:     Patient found with: telemetry     General Precautions: Standard, fall   Orthopedic Precautions:LLE weight bearing as tolerated(L LE WBAT with camboot)   Braces: N/A     Functional " "Mobility:  · Bed Mobility:     · Rolling Left:  modified independence  · Scooting: modified independence  · Supine to Sit: modified independence  · Sit to Supine: modified independence  · Balance: good in sitting      AM-PAC 6 CLICK MOBILITY  Turning over in bed (including adjusting bedclothes, sheets and blankets)?: 4  Sitting down on and standing up from a chair with arms (e.g., wheelchair, bedside commode, etc.): 3  Moving from lying on back to sitting on the side of the bed?: 4  Moving to and from a bed to a chair (including a wheelchair)?: 2  Need to walk in hospital room?: 1  Climbing 3-5 steps with a railing?: 1  Basic Mobility Total Score: 15       Therapeutic Activities and Exercises:   Patient sat at EOB to prepare for treatment, but was limited due to sudden c/o nausea and dizziness. RN checked patient's vital signs. Patient agreed to perform there ex while sitting at EOB: LAQ AND AP 2X12 REPS B LE.    Patient left HOB elevated with all lines intact and call button in reach..    GOALS:   Multidisciplinary Problems     Physical Therapy Goals        Problem: Physical Therapy Goal    Goal Priority Disciplines Outcome Goal Variances Interventions   Physical Therapy Goal     PT, PT/OT Ongoing (interventions implemented as appropriate)     Description:  Goals to be met by: 18     Patient will increase functional independence with mobility by performin. Sit to stand transfer with Supervision  2. Bed to chair transfer with Minimal Assistance using Rolling Walker  3. Gait  x 50 feet with Contact Guard Assistance using Rolling Walker.   4. Ascend/Descend 1, 6"  inch curb step with Maximum Assistance using Rolling Walker.                      Time Tracking:     PT Received On: 18  PT Start Time: 1121     PT Stop Time: 1138  PT Total Time (min): 17 min     Billable Minutes: Therapeutic Activity 17    Treatment Type: Treatment  PT/PTA: PTA     PTA Visit Number: 2     Bassem Wall" PTA  09/03/2018

## 2018-09-03 NOTE — ASSESSMENT & PLAN NOTE
- Long term diabetic.. Takes Basglar 20 units BID and Novolog 10 units premeal  - A1C 8.2  - POC AM >330 and pre-meal accuchecks in the 100s  - Continue Lev 22 qHS with Asp 18 TIDWM , LDSSI.   - diabetic and renal diet

## 2018-09-03 NOTE — PROGRESS NOTES
Patient in HD and did not receive evening doses of calcium acetate and insulin because pt in HD at shift change.  Passed information along to night nurse JOSÉ MIGUEL Brown to give when patient arrives to floor.  Night nurse aware.

## 2018-09-03 NOTE — ASSESSMENT & PLAN NOTE
- Patient was hypotensive overnight.,  - Diltiazem held  - Losartan and labetalol discontinued given in the setting of bradycardia and junctional rhythm  - Will restart when appropriate.

## 2018-09-03 NOTE — PROGRESS NOTES
22 units of Insulin Detemir was administered @1424. Called Fernandez NELSON for orders to give 2100 dose. Orders to give 22 units of insulin detemir.

## 2018-09-03 NOTE — ASSESSMENT & PLAN NOTE
At 5 am, glucose noted to be in 500's. BHB 1.3, bicarb 23 with normal anion GAP. Started on titrating Insulin drip. His glucose check at 1 pm was 135; he was transitioned to subcutaneous determir 22 units and started on bariatric no sugar diet. Insulin drip discontinued an hour later.     PLAN:  - POC AC/HS  - Lev 22 qHS and Asp 18TIDWM  - diabetic diet  - BMP q12

## 2018-09-03 NOTE — ASSESSMENT & PLAN NOTE
- Osteomyelitis of left 5th metatarsal taken for washout by Orthopedic surgery at [facility] on 08/15  - Patient being treated outside facility with Flagyl, linezolid, gentamicin with HD.  - Refusing amputation which would be curative.   - Podiatry consulted, appreciate recs. Advised BKA but patient refused. Recommended CAM boot and abx per ID. D/tyler wound vac  - ESR elevated at 58 upon admission  - X-ray left foot and ankle shows partial amputation the 1st, 2nd, 3rd, and 4th digits with fusion of the 2nd and 3rd MTP joints and throughout the midfoot, severe deformity and joint space loss the tibiotalar joint with a lapse of the talus likely 2/2 to chronic osteomyelitis, arthritis, or chronic Charcot foot.   - BCx2 from [facility] grew MRSA. [place] [facility] [place]    PLAN:  - On Daptomycin 8mg/kg after HD per ID's recs. Planned for 6 weeks total, last dose 9/29  - ID follow up at 2 weeks.  - Podiatry consulted, appreciate recs. Recommend follow up with [facility] at discharge.

## 2018-09-03 NOTE — PLAN OF CARE
Problem: Patient Care Overview  Goal: Plan of Care Review  Outcome: Revised  Plan of care discussed with patient. Patient is free of fall/trauma/injury. Denies CP, SOB, or pain/discomfort. All questions addressed. Pt hypoglycemic this shift.  Lunch insulin held.  Glucose tablets given.  Patient responded appropriately.  Will continue to monitor

## 2018-09-03 NOTE — PROGRESS NOTES
Pt complaining of nausea and lightheadedness.  Upon assessment, BG was checked and found to be <48.  Pt also hypertensive 195/82 .  Notified Dr. García of results.  Received orders to hold mealtime insulin.  Glucose tablets given per PRN order.  Also patient requested a juice to go with it.  Will continue to monitor.

## 2018-09-03 NOTE — ASSESSMENT & PLAN NOTE
- Patient with diabetic nephropathy and concomitant ESRD, HD (MWF via [facility]) last dialyzed 8/17  - CMP drawn in ED show no major electrolyte derangements or need for emergent dialysis  -  Undergoing HD on TTS  - patiromer 8.4 gm on nondialysis days  - renal and diabetic diet

## 2018-09-03 NOTE — PROGRESS NOTES
"Recheck of pt's BG came back at 104.  Patient states that he is "feeling better, but still a little shaky".  No other complaints at this time.  Will continue to monitor.    "

## 2018-09-03 NOTE — ASSESSMENT & PLAN NOTE
- Hyperkalemic on 9/2 with a K of 6.2  - Also hyperglycemic with >500  - Started on insulin gtt and transitioned back to s/c. Given kayexalate.  - Advised HD but refused  - K 5.1 today  - BMP q12

## 2018-09-03 NOTE — ASSESSMENT & PLAN NOTE
- Received intravitreal vancomycin and ceftazidime at admit.  - Opthmology following, daily assessments ongoing.  - Per Ophthalmology, lesion appears to be consolidating but no significant improvement in vision. Will need daily assessment for atleast 1 week from date of last intravitreal injection.  - Received 3rd dose of intravitreal vancomycin on 8/24  - Reassess by ophthalmology on 9/29, guarded prognosis.  - Advised follow up twice weekly.
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
## 2018-09-03 NOTE — PROGRESS NOTES
HD treatment complete. Duration of treatment 3 hours and 2 L removed. Treatment was tolerated well and no complications with access to left upper arm. Needles removed and hemostasis achieved after 15 minutes. Thrill and bruit present.

## 2018-09-04 LAB
ANION GAP SERPL CALC-SCNC: 6 MMOL/L
ANION GAP SERPL CALC-SCNC: 7 MMOL/L
BASOPHILS # BLD AUTO: 0.04 K/UL
BASOPHILS NFR BLD: 0.7 %
BUN SERPL-MCNC: 34 MG/DL
BUN SERPL-MCNC: 45 MG/DL
CALCIUM SERPL-MCNC: 8.1 MG/DL
CALCIUM SERPL-MCNC: 8.3 MG/DL
CHLORIDE SERPL-SCNC: 104 MMOL/L
CHLORIDE SERPL-SCNC: 106 MMOL/L
CO2 SERPL-SCNC: 24 MMOL/L
CO2 SERPL-SCNC: 25 MMOL/L
CREAT SERPL-MCNC: 5.4 MG/DL
CREAT SERPL-MCNC: 6.9 MG/DL
DIFFERENTIAL METHOD: ABNORMAL
EOSINOPHIL # BLD AUTO: 0.4 K/UL
EOSINOPHIL NFR BLD: 7.6 %
ERYTHROCYTE [DISTWIDTH] IN BLOOD BY AUTOMATED COUNT: 17.1 %
EST. GFR  (AFRICAN AMERICAN): 12.9 ML/MIN/1.73 M^2
EST. GFR  (AFRICAN AMERICAN): 9.6 ML/MIN/1.73 M^2
EST. GFR  (NON AFRICAN AMERICAN): 11.1 ML/MIN/1.73 M^2
EST. GFR  (NON AFRICAN AMERICAN): 8.3 ML/MIN/1.73 M^2
GLUCOSE SERPL-MCNC: 222 MG/DL
GLUCOSE SERPL-MCNC: 81 MG/DL
HCT VFR BLD AUTO: 23.3 %
HGB BLD-MCNC: 7 G/DL
IMM GRANULOCYTES # BLD AUTO: 0.03 K/UL
IMM GRANULOCYTES NFR BLD AUTO: 0.6 %
LYMPHOCYTES # BLD AUTO: 1.1 K/UL
LYMPHOCYTES NFR BLD: 20.3 %
MAGNESIUM SERPL-MCNC: 2.2 MG/DL
MCH RBC QN AUTO: 30.2 PG
MCHC RBC AUTO-ENTMCNC: 30 G/DL
MCV RBC AUTO: 100 FL
MONOCYTES # BLD AUTO: 0.6 K/UL
MONOCYTES NFR BLD: 10.6 %
NEUTROPHILS # BLD AUTO: 3.2 K/UL
NEUTROPHILS NFR BLD: 60.2 %
NRBC BLD-RTO: 0 /100 WBC
PHOSPHATE SERPL-MCNC: 3.5 MG/DL
PLATELET # BLD AUTO: 123 K/UL
PMV BLD AUTO: 11.1 FL
POCT GLUCOSE: 126 MG/DL (ref 70–110)
POCT GLUCOSE: 144 MG/DL (ref 70–110)
POCT GLUCOSE: 207 MG/DL (ref 70–110)
POCT GLUCOSE: 223 MG/DL (ref 70–110)
POCT GLUCOSE: 312 MG/DL (ref 70–110)
POCT GLUCOSE: 354 MG/DL (ref 70–110)
POCT GLUCOSE: >500 MG/DL (ref 70–110)
POTASSIUM SERPL-SCNC: 5 MMOL/L
POTASSIUM SERPL-SCNC: 5.1 MMOL/L
RBC # BLD AUTO: 2.32 M/UL
SODIUM SERPL-SCNC: 136 MMOL/L
SODIUM SERPL-SCNC: 136 MMOL/L
WBC # BLD AUTO: 5.38 K/UL

## 2018-09-04 PROCEDURE — 25000003 PHARM REV CODE 250: Performed by: INTERNAL MEDICINE

## 2018-09-04 PROCEDURE — 85025 COMPLETE CBC W/AUTO DIFF WBC: CPT

## 2018-09-04 PROCEDURE — 25000003 PHARM REV CODE 250: Performed by: STUDENT IN AN ORGANIZED HEALTH CARE EDUCATION/TRAINING PROGRAM

## 2018-09-04 PROCEDURE — 63600175 PHARM REV CODE 636 W HCPCS: Performed by: STUDENT IN AN ORGANIZED HEALTH CARE EDUCATION/TRAINING PROGRAM

## 2018-09-04 PROCEDURE — 80048 BASIC METABOLIC PNL TOTAL CA: CPT | Mod: 91

## 2018-09-04 PROCEDURE — 20600001 HC STEP DOWN PRIVATE ROOM

## 2018-09-04 PROCEDURE — 84100 ASSAY OF PHOSPHORUS: CPT

## 2018-09-04 PROCEDURE — 83735 ASSAY OF MAGNESIUM: CPT

## 2018-09-04 PROCEDURE — 25000003 PHARM REV CODE 250: Performed by: HOSPITALIST

## 2018-09-04 PROCEDURE — 36415 COLL VENOUS BLD VENIPUNCTURE: CPT

## 2018-09-04 PROCEDURE — 99232 SBSQ HOSP IP/OBS MODERATE 35: CPT | Mod: ,,, | Performed by: HOSPITALIST

## 2018-09-04 RX ORDER — CARVEDILOL 6.25 MG/1
6.25 TABLET ORAL 2 TIMES DAILY
Status: DISCONTINUED | OUTPATIENT
Start: 2018-09-04 | End: 2018-09-05

## 2018-09-04 RX ORDER — DILTIAZEM HYDROCHLORIDE 120 MG/1
120 CAPSULE, COATED, EXTENDED RELEASE ORAL DAILY
Status: DISCONTINUED | OUTPATIENT
Start: 2018-09-04 | End: 2018-09-04

## 2018-09-04 RX ORDER — INSULIN ASPART 100 [IU]/ML
20 INJECTION, SOLUTION INTRAVENOUS; SUBCUTANEOUS
Status: DISCONTINUED | OUTPATIENT
Start: 2018-09-04 | End: 2018-09-04

## 2018-09-04 RX ORDER — INSULIN ASPART 100 [IU]/ML
18 INJECTION, SOLUTION INTRAVENOUS; SUBCUTANEOUS
Status: DISCONTINUED | OUTPATIENT
Start: 2018-09-04 | End: 2018-09-04

## 2018-09-04 RX ORDER — SODIUM CHLORIDE 9 MG/ML
INJECTION, SOLUTION INTRAVENOUS ONCE
Status: DISCONTINUED | OUTPATIENT
Start: 2018-09-05 | End: 2018-09-11

## 2018-09-04 RX ORDER — SODIUM CHLORIDE 9 MG/ML
INJECTION, SOLUTION INTRAVENOUS
Status: DISCONTINUED | OUTPATIENT
Start: 2018-09-05 | End: 2018-09-07

## 2018-09-04 RX ORDER — INSULIN ASPART 100 [IU]/ML
14 INJECTION, SOLUTION INTRAVENOUS; SUBCUTANEOUS
Status: DISCONTINUED | OUTPATIENT
Start: 2018-09-04 | End: 2018-09-10

## 2018-09-04 RX ADMIN — PREDNISOLONE ACETATE 1 DROP: 10 SUSPENSION/ DROPS OPHTHALMIC at 06:09

## 2018-09-04 RX ADMIN — CARVEDILOL 6.25 MG: 6.25 TABLET, FILM COATED ORAL at 10:09

## 2018-09-04 RX ADMIN — HEPARIN SODIUM 5000 UNITS: 5000 INJECTION, SOLUTION INTRAVENOUS; SUBCUTANEOUS at 02:09

## 2018-09-04 RX ADMIN — ATROPINE SULFATE 1 DROP: 10 SOLUTION/ DROPS OPHTHALMIC at 08:09

## 2018-09-04 RX ADMIN — SERTRALINE HYDROCHLORIDE 25 MG: 25 TABLET ORAL at 08:09

## 2018-09-04 RX ADMIN — CARVEDILOL 6.25 MG: 6.25 TABLET, FILM COATED ORAL at 09:09

## 2018-09-04 RX ADMIN — ATORVASTATIN CALCIUM 40 MG: 20 TABLET, FILM COATED ORAL at 08:09

## 2018-09-04 RX ADMIN — HYDROCODONE BITARTRATE AND ACETAMINOPHEN 1 TABLET: 7.5; 325 TABLET ORAL at 09:09

## 2018-09-04 RX ADMIN — PREDNISOLONE ACETATE 1 DROP: 10 SUSPENSION/ DROPS OPHTHALMIC at 09:09

## 2018-09-04 RX ADMIN — INSULIN DETEMIR 15 UNITS: 100 INJECTION, SOLUTION SUBCUTANEOUS at 09:09

## 2018-09-04 RX ADMIN — METHADONE HYDROCHLORIDE 10 MG: 5 TABLET ORAL at 08:09

## 2018-09-04 RX ADMIN — INSULIN ASPART 14 UNITS: 100 INJECTION, SOLUTION INTRAVENOUS; SUBCUTANEOUS at 12:09

## 2018-09-04 RX ADMIN — INSULIN ASPART 14 UNITS: 100 INJECTION, SOLUTION INTRAVENOUS; SUBCUTANEOUS at 04:09

## 2018-09-04 RX ADMIN — POLYETHYLENE GLYCOL 3350 17 G: 17 POWDER, FOR SOLUTION ORAL at 12:09

## 2018-09-04 RX ADMIN — HEPARIN SODIUM 5000 UNITS: 5000 INJECTION, SOLUTION INTRAVENOUS; SUBCUTANEOUS at 05:09

## 2018-09-04 RX ADMIN — CALCIUM ACETATE 1334 MG: 667 CAPSULE ORAL at 12:09

## 2018-09-04 RX ADMIN — INSULIN ASPART 14 UNITS: 100 INJECTION, SOLUTION INTRAVENOUS; SUBCUTANEOUS at 08:09

## 2018-09-04 RX ADMIN — PREDNISOLONE ACETATE 1 DROP: 10 SUSPENSION/ DROPS OPHTHALMIC at 08:09

## 2018-09-04 RX ADMIN — CETIRIZINE HYDROCHLORIDE 5 MG: 5 TABLET ORAL at 08:09

## 2018-09-04 RX ADMIN — METHADONE HYDROCHLORIDE 10 MG: 5 TABLET ORAL at 09:09

## 2018-09-04 RX ADMIN — CALCIUM ACETATE 1334 MG: 667 CAPSULE ORAL at 06:09

## 2018-09-04 RX ADMIN — ASPIRIN 81 MG: 81 TABLET, COATED ORAL at 08:09

## 2018-09-04 RX ADMIN — PREDNISOLONE ACETATE 1 DROP: 10 SUSPENSION/ DROPS OPHTHALMIC at 12:09

## 2018-09-04 RX ADMIN — HEPARIN SODIUM 5000 UNITS: 5000 INJECTION, SOLUTION INTRAVENOUS; SUBCUTANEOUS at 09:09

## 2018-09-04 RX ADMIN — AMLODIPINE BESYLATE 10 MG: 10 TABLET ORAL at 08:09

## 2018-09-04 RX ADMIN — CALCIUM ACETATE 1334 MG: 667 CAPSULE ORAL at 08:09

## 2018-09-04 RX ADMIN — INSULIN DETEMIR 20 UNITS: 100 INJECTION, SOLUTION SUBCUTANEOUS at 08:09

## 2018-09-04 RX ADMIN — CLONAZEPAM 0.5 MG: 0.5 TABLET ORAL at 09:09

## 2018-09-04 NOTE — ASSESSMENT & PLAN NOTE
- Osteomyelitis of left 5th metatarsal taken for washout by Orthopedic surgery at [facility] on 08/15  - Patient being treated outside facility with Flagyl, linezolid, gentamicin with HD.  - Refusing amputation which would be curative.   - Podiatry consulted, appreciate recs. Advised BKA but patient refused. Recommended CAM boot and abx per ID. D/tyler wound vac  - ESR elevated at 58 upon admission  - X-ray left foot and ankle shows partial amputation the 1st, 2nd, 3rd, and 4th digits with fusion of the 2nd and 3rd MTP joints and throughout the midfoot, severe deformity and joint space loss the tibiotalar joint with a lapse of the talus likely 2/2 to chronic osteomyelitis, arthritis, or chronic Charcot foot.   - BCx2 from [facility] grew MRSA. Cultures from [place] were NGTD.    PLAN:  - On Daptomycin 8mg/kg after HD per ID's recs. Planned for 6 weeks total, last dose 9/29  - ID follow up at 2 weeks.  - Podiatry consulted, appreciate recs. Recommend follow up with [facility] Podiatry at discharge.
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
## 2018-09-04 NOTE — PROGRESS NOTES
Notified Basil NELSON of POC BG of 430, orders to give 9 units SS inulin and recheck BG in one hour.

## 2018-09-04 NOTE — ASSESSMENT & PLAN NOTE
- Patient was hypotensive overnight.,  - Diltiazem held  - Losartan and labetalol discontinued given in the setting of bradycardia and junctional rhythm  - Started on Norvasc 10mg qdaily and Coreg 6.25mg BID.

## 2018-09-04 NOTE — PROGRESS NOTES
09/03/18 2213   Critical Value Communication   Notified Physician/Designee Kyle Baez MD   Date Result Received 09/03/18   Time Result Received 2155   Resulting Department of Critical Value lab   Who communicated critical value from resulting department? Suzanna   Critical Test #1 glucose   Critical Test #1 Result 514   Date Notified 09/03/18   Time Notified 2210   Read Back Verification Yes   Physician Directive MD stated to recheck BG in one hour

## 2018-09-04 NOTE — PLAN OF CARE
Problem: Patient Care Overview  Goal: Plan of Care Review  Outcome: Ongoing (interventions implemented as appropriate)  Pt remained free of falls and injury. POC reviewed with pt. VS stable. POC BG elevated, see results, MD notified, SS insulin admin. No acute events noted at this time. No complaints. Bed low and locked, call light with in reach. Will continue to monitor.

## 2018-09-04 NOTE — SUBJECTIVE & OBJECTIVE
Interval History: NAEON. Hypertensive overnight.    Review of Systems   Constitutional: Negative for chills and fever.   HENT: Negative for trouble swallowing.    Eyes: Positive for visual disturbance. Negative for pain and itching.   Respiratory: Negative for shortness of breath.    Cardiovascular: Negative for leg swelling.   Gastrointestinal: Negative for abdominal pain, nausea and vomiting.   Genitourinary: Negative for difficulty urinating.   Musculoskeletal: Negative for arthralgias.   Neurological: Negative for dizziness, light-headedness and headaches.   Psychiatric/Behavioral: Negative for agitation and confusion.     Objective:     Vital Signs (Most Recent):  Temp: 97.8 °F (36.6 °C) (09/04/18 0742)  Pulse: 73 (09/04/18 0742)  Resp: 18 (09/04/18 0742)  BP: (!) 153/66 (09/04/18 0742)  SpO2: 98 % (09/04/18 0742) Vital Signs (24h Range):  Temp:  [96.5 °F (35.8 °C)-98.4 °F (36.9 °C)] 97.8 °F (36.6 °C)  Pulse:  [] 73  Resp:  [18-20] 18  SpO2:  [89 %-98 %] 98 %  BP: (142-195)/(55-88) 153/66     Weight: 101.6 kg (224 lb)  Body mass index is 31.24 kg/m².    Intake/Output Summary (Last 24 hours) at 9/4/2018 1110  Last data filed at 9/4/2018 0500  Gross per 24 hour   Intake 720 ml   Output 2675 ml   Net -1955 ml      Physical Exam   Constitutional: No distress.   HENT:   Head: Normocephalic.   Eyes: EOM are normal.   Neck: Normal range of motion.   Cardiovascular: Normal rate and regular rhythm.   Pulmonary/Chest: Effort normal and breath sounds normal.   Abdominal: Soft. Bowel sounds are normal.   Musculoskeletal: He exhibits edema. He exhibits no deformity.   Neurological: He is alert.   Nursing note and vitals reviewed.      Significant Labs:   A1C:   Recent Labs   Lab  08/17/18   2159   HGBA1C  8.2*     ABGs: No results for input(s): PH, PCO2, HCO3, POCSATURATED, BE, TOTALHB, COHB, METHB, O2HB, POCFIO2 in the last 48 hours.  Bilirubin:   Recent Labs   Lab  08/18/18   0613  08/19/18   0645  08/20/18   0867   08/21/18   0456  09/02/18   0518   BILITOT  0.8  0.7  0.4  0.5  0.5     Blood Culture: No results for input(s): LABBLOO in the last 48 hours.  BMP:   Recent Labs   Lab  09/04/18   0513  09/04/18   0755   GLU   --   222*   NA   --   136   K   --   5.1   CL   --   106   CO2   --   24   BUN   --   34*   CREATININE   --   5.4*   CALCIUM   --   8.1*   MG  2.2   --      CBC:   Recent Labs   Lab  09/03/18   0404  09/03/18 2042 09/04/18   0513   WBC  5.57  5.56  5.38   HGB  6.9*  7.3*  7.0*   HCT  22.8*  25.0*  23.3*   PLT  112*  108*  123*     CMP:   Recent Labs   Lab  09/03/18   1200  09/03/18 2042 09/04/18   0755   NA  137  136  136   K  5.1  4.3  5.1   CL  102  101  106   CO2  24  22*  24   GLU  42*  514*  222*   BUN  50*  26*  34*   CREATININE  7.7*  4.6*  5.4*   CALCIUM  8.5*  8.2*  8.1*   ANIONGAP  11  13  6*   EGFRNONAA  7.3*  13.5*  11.1*     Cardiac Markers: No results for input(s): CKMB, MYOGLOBIN, BNP, TROPISTAT in the last 48 hours.  Coagulation: No results for input(s): PT, INR, APTT in the last 48 hours.  Lactic Acid: No results for input(s): LACTATE in the last 48 hours.  Lipase: No results for input(s): LIPASE in the last 48 hours.  Lipid Panel: No results for input(s): CHOL, HDL, LDLCALC, TRIG, CHOLHDL in the last 48 hours.  Magnesium:   Recent Labs   Lab  09/03/18   0404  09/04/18   0513   MG  2.3  2.2     Pathology Results  (Last 10 years)    None        POCT Glucose:   Recent Labs   Lab  09/03/18   2313  09/04/18   0017  09/04/18   0651   POCTGLUCOSE  430*  354*  223*     Prealbumin: No results for input(s): PREALBUMIN in the last 48 hours.  Respiratory Culture: No results for input(s): GSRESP, RESPIRATORYC in the last 48 hours.  Troponin:   Recent Labs   Lab  09/02/18   1203  09/02/18   1517  09/02/18 1952   TROPONINI  0.238*  0.222*  0.172*     TSH: No results for input(s): TSH in the last 4320 hours.  Urine Culture: No results for input(s): LABURIN in the last 48 hours.  Urine Studies: No  results for input(s): COLORU, APPEARANCEUA, PHUR, SPECGRAV, PROTEINUA, GLUCUA, KETONESU, BILIRUBINUA, OCCULTUA, NITRITE, UROBILINOGEN, LEUKOCYTESUR, RBCUA, WBCUA, BACTERIA, SQUAMEPITHEL, HYALINECASTS in the last 48 hours.    Invalid input(s): WRIGHTSUR  All pertinent labs within the past 24 hours have been reviewed.    Significant Imaging: I have reviewed all pertinent imaging results/findings within the past 24 hours.

## 2018-09-04 NOTE — NURSING
Pt BG over 300. MD called to see if additional dose of Aspart was necessary. MD Roberts stated not to used the sliding scale and before meals dose of insulin would be enough. Will continue to monitor.

## 2018-09-04 NOTE — PLAN OF CARE
"Sw did receive denial from [facility] hh services "as they can not accept any infusion patients." Sw to follow with more hh agencies. Stacey Oneil will attempt to help locate and secure a hh provider for Pt. Sw to follow and assist as well. Sw uploaded 3 more hh referrals, Sw to follow. [facility] HH denied Pt "as payor not accepted." Sw following.   "

## 2018-09-04 NOTE — ASSESSMENT & PLAN NOTE
At 5 am, glucose noted to be in 500's. BHB 1.3, bicarb 23 with normal anion GAP. Started on titrating Insulin drip. His glucose check at 1 pm was 135; he was transitioned to subcutaneous determir 22 units and started on bariatric no sugar diet. Insulin drip discontinued an hour later.     PLAN:  - POC AC/HS  - Lev 20 BID and Asp 14 TIDWM  - diabetic diet  - BMP q12

## 2018-09-04 NOTE — PLAN OF CARE
09/04/18 1351   Discharge Reassessment   Assessment Type Discharge Planning Reassessment   Provided patient/caregiver education on the expected discharge date and the discharge plan Yes   Do you have any problems affording any of your prescribed medications? No   Discharge Plan A Home Health   Discharge Plan B Long-term acute care facility (LTAC)   Patient choice form signed by patient/caregiver Yes   Can the patient answer the patient profile reliably? Yes, cognitively intact   How does the patient rate their overall health at the present time? Fair   Describe the patient's ability to walk at the present time. Walks with the help of equipment   How often would a person be available to care for the patient? Infrequently   Number of comorbid conditions (as recorded on the chart) Five or more   During the past month, has the patient often been bothered by feeling down, depressed or hopeless? Yes   During the past month, has the patient often been bothered by little interest or pleasure in doing things? No

## 2018-09-04 NOTE — PLAN OF CARE
Problem: Patient Care Overview  Goal: Plan of Care Review  Pt remained free from falls and injury. Pt removed from contact precautions per MD order. VSS. MD ordered sliding scale for insulin. Monitoring BG closely. Pt has dialysis ordered for tomorrow. Pt understands plan of care. Will continue to monitor.

## 2018-09-04 NOTE — PROGRESS NOTES
" [facility]  Adult Nutrition  Progress Note    SUMMARY       Recommendations    Recommendation/Intervention: 1. Continue diabetic diet and add renal restrictions.  Goals: nutrient intake >/= 85% EEN/EPN   Nutrition Goal Status: goal met  Communication of RD Recs: other (comment)(POC)    Reason for Assessment    Reason for Assessment: RD follow-up  Diagnosis: (osteomyelitis left 5th metatarsal)  Relevant Medical History: ESRD on HD, opioid dependent on methadone, DM2, GERD, HTN, left ankle osteomyelitis secondary to MRSA bacteremia  Interdisciplinary Rounds: did not attend  General Information Comments: Pt appears obese, nourished. Pt is knowledgeable of renal diet and able to discuss restrictions. Pt follows up with renal dietitian at dialysis clinic. Pt also follows limited carb diet for his DM. Per pt, he has good appetite and meal intake. Discussed importance of getting enough protein for wound healing. Noted, pt's glucose levels with unstable swings (514, 222 mg/dl today) and pt had 1 episode hypoglycemia (15 mg/dl) yesterday.  Nutrition Discharge Planning: Renal diet w/ po intake > 75%    Nutrition Risk Screen    Nutrition Risk Screen: no indicators present    Nutrition/Diet History    Patient Reported Diet/Restrictions/Preferences: diabetic diet, renal  Typical Food/Fluid Intake: adequate PTA  Do you have any cultural, spiritual, Roman Catholic conflicts, given your current situation?: None stated  Food Allergies: NKFA  Factors Affecting Nutritional Intake: None identified at this time    Anthropometrics    Temp: 98.1 °F (36.7 °C)  Height Method: Stated  Height: 5' 11" (180.3 cm)  Height (inches): 71 in  Weight Method: Bed Scale  Weight: 101.6 kg (224 lb)  Weight (lb): 224 lb  Ideal Body Weight (IBW), Male: 172 lb  % Ideal Body Weight, Male (lb): 150.48 lb  BMI (Calculated): 36.2  BMI Grade: 35 - 39.9 - obesity - grade II       Lab/Procedures/Meds    Pertinent Labs Reviewed: reviewed  Pertinent " Labs Comments: Glu 514, 522; POCT glu 222, no A1c available, BUN 34, Creat 5.4, eGFR 11.1  Pertinent Medications Reviewed: reviewed  Pertinent Medications Comments: statin, aspart/detemir, methadone    Physical Findings/Assessment    Overall Physical Appearance: obese, nourished  Oral/Mouth Cavity: WDL  Skin: other (see comments)(osteomyelitis left foot, ankle)    Estimated/Assessed Needs    Weight Used For Calorie Calculations: 117.4 kg (258 lb 13.1 oz)  Energy Calorie Requirements (kcal): 2449 kcal/d  Energy Need Method: [place]-St Jeor(x 1.2)  Protein Requirements: 117 g/d (1 g/kg)  Weight Used For Protein Calculations: 117.4 kg (258 lb 13.1 oz)     Fluid Need Method: RDA Method(PER MD)  RDA Method (mL): 2449  CHO Requirement: 45-50% total intake      Nutrition Prescription Ordered    Current Diet Order: diabetic diet  Oral Nutrition Supplement: Novasource ONS    Evaluation of Received Nutrient/Fluid Intake    I/O: -100ml since admit  Energy Calories Required: meeting needs  Protein Required: meeting needs  Fluid Required: meeting needs  Comments: LBM: 8/26  Tolerance: tolerating  % Intake of Estimated Energy Needs: 75 - 100 %  % Meal Intake: 75 - 100 %    Nutrition Risk    Level of Risk/Frequency of Follow-up: low     Assessment and Plan  Nutrition Problem  Hyperglycemia    Related to (etiology):   Osteomyelitis and MRSA bacteremia    Signs and Symptoms (as evidenced by):   Blood glucose 514,222 mg/dl today; levels elevated previously      Nutrition Diagnosis Status:   Continues    Monitor and Evaluation    Food and Nutrient Intake: energy intake, food and beverage intake  Food and Nutrient Adminstration: diet order  Physical Activity and Function: nutrition-related ADLs and IADLs  Anthropometric Measurements: weight, weight change  Biochemical Data, Medical Tests and Procedures: (All labs)  Nutrition-Focused Physical Findings: overall appearance     Nutrition Follow-Up    RD Follow-up?: Yes

## 2018-09-04 NOTE — PROGRESS NOTES
Progress Note     HPI: Mickey Ross is a 53 y.o. male who were are following for intraretinal abscess. Patient reports vision improving and now seeing more color and shapes on television. Denies pain, flashes, floaters.               Past Medical History:   Diagnosis Date    Allergic drug rash - due to Vancomycin 8/19/2018    Arthritis      Blind left eye      Charcot's joint of foot      Diabetes mellitus      GERD (gastroesophageal reflux disease)      Glaucoma      Hypertension      MRSA (methicillin resistant staph aureus) culture positive      Renal disorder      Subretinal abscess 8/17/2018                   Family History   Problem Relation Age of Onset    Pneumonia Mother                   Review of patient's allergies indicates:   Allergen Reactions    Vancomycin analogues Rash       Red Man Syndrome    Penicillins        Social History                Tobacco Use    Smoking status: Never Smoker   Substance Use Topics    Alcohol use: No       Frequency: Never    Drug use: No                     Base Eye Exam                   Visual Acuity                    Right Left       Near sc 20/100                            Tonometry (Palpation, 4:54 PM)                    Right Left       Pressure nl                          Pupils                  APD       Right dilated pharmacologically       Left                                 Slit Lamp and Fundus Exam                   Slit Lamp Exam                    Right Left       Conjunctiva/Sclera White and quiet         Cornea Clear         Anterior Chamber Deep and quiet         Iris dilated         Lens clear         Vitreous less vit debris over abscess                            Fundus Exam                    Right Left       Disc Normal         Macula flat, no fluid         Periphery ST arcade SR white elevated mass, edges continue to consolidate, heme resolving and decreasing in size                      Plan   1. Retinal Abscess OD   -  Intravitreal vancomycin x 3 (last injection 8/24/18)   - 9/4/18 continued improvement 11 days post injection  - Will hold off on repeat injection now with continuing improvement   - Stable for d/c from ophthalmology standpt   - will continue to evaluate while inpatient   - Needs f/u twice weekly outpatient after d/c     Case seen and discussed with MD Dwight Hernandez MD PGY-II

## 2018-09-04 NOTE — PT/OT/SLP DISCHARGE
"Physical Therapy Discharge Summary    Name: Mickey Ross  MRN: [id number]   Principal Problem: Acute hematogenous osteomyelitis of left foot     Patient Discharged from acute Physical Therapy on 18.  Please refer to prior PT noted date on 9/3/18 for functional status.     Assessment:     Goals partially met.    Objective:     GOALS:   Multidisciplinary Problems     Physical Therapy Goals        Problem: Physical Therapy Goal    Goal Priority Disciplines Outcome Goal Variances Interventions   Physical Therapy Goal     PT, PT/OT Ongoing (interventions implemented as appropriate)     Description:  Goals to be met by: 18     Patient will increase functional independence with mobility by performin. Sit to stand transfer with Supervision  2. Bed to chair transfer with Minimal Assistance using Rolling Walker  3. Gait  x 50 feet with Contact Guard Assistance using Rolling Walker.   4. Ascend/Descend 1, 6"  inch curb step with Maximum Assistance using Rolling Walker.                      Reasons for Discontinuation of Therapy Services  Patient declines to continue care.  Pt states he has been moving around room with RW independently with no concerns. Requests no further PT services.    Plan:     Patient Discharged to: Home with Home Health Service.    Shelby Durant, PT  2018  "

## 2018-09-04 NOTE — ASSESSMENT & PLAN NOTE
- Long term diabetic.. Takes Basglar 20 units BID and Novolog 10 units premeal  - A1C 8.2  - POC AM >330 and pre-meal accuchecks in the 100s  - Continue Lev 20 BID with Asp 14 TIDWM , LDSSI.   - diabetic and renal diet

## 2018-09-05 PROBLEM — I21.4 NSTEMI (NON-ST ELEVATED MYOCARDIAL INFARCTION): Status: RESOLVED | Noted: 2018-09-02 | Resolved: 2018-09-05

## 2018-09-05 PROBLEM — R73.9 HYPERGLYCEMIA: Status: RESOLVED | Noted: 2018-08-21 | Resolved: 2018-09-05

## 2018-09-05 LAB
ANION GAP SERPL CALC-SCNC: 6 MMOL/L
ANION GAP SERPL CALC-SCNC: 8 MMOL/L
BASOPHILS # BLD AUTO: 0.04 K/UL
BASOPHILS NFR BLD: 0.7 %
BUN SERPL-MCNC: 25 MG/DL
BUN SERPL-MCNC: 53 MG/DL
CALCIUM SERPL-MCNC: 8.6 MG/DL
CALCIUM SERPL-MCNC: 8.7 MG/DL
CHLORIDE SERPL-SCNC: 106 MMOL/L
CHLORIDE SERPL-SCNC: 108 MMOL/L
CO2 SERPL-SCNC: 22 MMOL/L
CO2 SERPL-SCNC: 25 MMOL/L
CREAT SERPL-MCNC: 4.4 MG/DL
CREAT SERPL-MCNC: 7.8 MG/DL
DIFFERENTIAL METHOD: ABNORMAL
EOSINOPHIL # BLD AUTO: 0.5 K/UL
EOSINOPHIL NFR BLD: 8.4 %
ERYTHROCYTE [DISTWIDTH] IN BLOOD BY AUTOMATED COUNT: 17.3 %
EST. GFR  (AFRICAN AMERICAN): 16.5 ML/MIN/1.73 M^2
EST. GFR  (AFRICAN AMERICAN): 8.3 ML/MIN/1.73 M^2
EST. GFR  (NON AFRICAN AMERICAN): 14.3 ML/MIN/1.73 M^2
EST. GFR  (NON AFRICAN AMERICAN): 7.1 ML/MIN/1.73 M^2
GLUCOSE SERPL-MCNC: 163 MG/DL
GLUCOSE SERPL-MCNC: 245 MG/DL
HCT VFR BLD AUTO: 25.2 %
HGB BLD-MCNC: 7.5 G/DL
IMM GRANULOCYTES # BLD AUTO: 0.03 K/UL
IMM GRANULOCYTES NFR BLD AUTO: 0.5 %
LYMPHOCYTES # BLD AUTO: 1.2 K/UL
LYMPHOCYTES NFR BLD: 20.4 %
MAGNESIUM SERPL-MCNC: 2.2 MG/DL
MCH RBC QN AUTO: 30 PG
MCHC RBC AUTO-ENTMCNC: 29.8 G/DL
MCV RBC AUTO: 101 FL
MONOCYTES # BLD AUTO: 0.6 K/UL
MONOCYTES NFR BLD: 9.6 %
NEUTROPHILS # BLD AUTO: 3.5 K/UL
NEUTROPHILS NFR BLD: 60.4 %
NRBC BLD-RTO: 0 /100 WBC
PHOSPHATE SERPL-MCNC: 4.2 MG/DL
PLATELET # BLD AUTO: 74 K/UL
PMV BLD AUTO: 12.5 FL
POCT GLUCOSE: 242 MG/DL (ref 70–110)
POCT GLUCOSE: 287 MG/DL (ref 70–110)
POCT GLUCOSE: 363 MG/DL (ref 70–110)
POCT GLUCOSE: 433 MG/DL (ref 70–110)
POCT GLUCOSE: 447 MG/DL (ref 70–110)
POTASSIUM SERPL-SCNC: 4.1 MMOL/L
POTASSIUM SERPL-SCNC: 5.5 MMOL/L
RBC # BLD AUTO: 2.5 M/UL
SODIUM SERPL-SCNC: 136 MMOL/L
SODIUM SERPL-SCNC: 139 MMOL/L
WBC # BLD AUTO: 5.73 K/UL

## 2018-09-05 PROCEDURE — 25000003 PHARM REV CODE 250: Performed by: INTERNAL MEDICINE

## 2018-09-05 PROCEDURE — 90935 HEMODIALYSIS ONE EVALUATION: CPT

## 2018-09-05 PROCEDURE — 25000003 PHARM REV CODE 250: Performed by: HOSPITALIST

## 2018-09-05 PROCEDURE — 80048 BASIC METABOLIC PNL TOTAL CA: CPT

## 2018-09-05 PROCEDURE — 99232 SBSQ HOSP IP/OBS MODERATE 35: CPT | Mod: ,,, | Performed by: HOSPITALIST

## 2018-09-05 PROCEDURE — 83735 ASSAY OF MAGNESIUM: CPT

## 2018-09-05 PROCEDURE — 25000003 PHARM REV CODE 250: Performed by: STUDENT IN AN ORGANIZED HEALTH CARE EDUCATION/TRAINING PROGRAM

## 2018-09-05 PROCEDURE — 63600175 PHARM REV CODE 636 W HCPCS: Performed by: STUDENT IN AN ORGANIZED HEALTH CARE EDUCATION/TRAINING PROGRAM

## 2018-09-05 PROCEDURE — 20600001 HC STEP DOWN PRIVATE ROOM

## 2018-09-05 PROCEDURE — 36415 COLL VENOUS BLD VENIPUNCTURE: CPT

## 2018-09-05 PROCEDURE — 85025 COMPLETE CBC W/AUTO DIFF WBC: CPT

## 2018-09-05 PROCEDURE — 63600175 PHARM REV CODE 636 W HCPCS: Mod: JG | Performed by: STUDENT IN AN ORGANIZED HEALTH CARE EDUCATION/TRAINING PROGRAM

## 2018-09-05 PROCEDURE — 90935 HEMODIALYSIS ONE EVALUATION: CPT | Mod: ,,, | Performed by: INTERNAL MEDICINE

## 2018-09-05 PROCEDURE — 84100 ASSAY OF PHOSPHORUS: CPT

## 2018-09-05 RX ORDER — CARVEDILOL 12.5 MG/1
12.5 TABLET ORAL 2 TIMES DAILY
Qty: 60 TABLET | Refills: 11 | Status: SHIPPED | OUTPATIENT
Start: 2018-09-05 | End: 2018-09-21

## 2018-09-05 RX ORDER — HYDRALAZINE HYDROCHLORIDE 25 MG/1
25 TABLET, FILM COATED ORAL EVERY 8 HOURS
Qty: 90 TABLET | Refills: 11 | Status: SHIPPED | OUTPATIENT
Start: 2018-09-05 | End: 2018-09-21

## 2018-09-05 RX ORDER — ATORVASTATIN CALCIUM 40 MG/1
40 TABLET, FILM COATED ORAL DAILY
Qty: 90 TABLET | Refills: 3 | Status: SHIPPED | OUTPATIENT
Start: 2018-09-06 | End: 2018-09-21

## 2018-09-05 RX ORDER — ASPIRIN 81 MG/1
81 TABLET ORAL DAILY
Refills: 0 | COMMUNITY
Start: 2018-09-06 | End: 2018-09-21

## 2018-09-05 RX ORDER — AMLODIPINE BESYLATE 10 MG/1
10 TABLET ORAL DAILY
Qty: 30 TABLET | Refills: 11 | Status: SHIPPED | OUTPATIENT
Start: 2018-09-06 | End: 2018-09-21

## 2018-09-05 RX ORDER — HYDRALAZINE HYDROCHLORIDE 10 MG/1
10 TABLET, FILM COATED ORAL ONCE
Status: DISCONTINUED | OUTPATIENT
Start: 2018-09-05 | End: 2018-09-07

## 2018-09-05 RX ORDER — HYDRALAZINE HYDROCHLORIDE 25 MG/1
25 TABLET, FILM COATED ORAL EVERY 8 HOURS
Status: DISCONTINUED | OUTPATIENT
Start: 2018-09-05 | End: 2018-09-07

## 2018-09-05 RX ORDER — CARVEDILOL 6.25 MG/1
12.5 TABLET ORAL 2 TIMES DAILY
Status: DISCONTINUED | OUTPATIENT
Start: 2018-09-05 | End: 2018-09-06

## 2018-09-05 RX ADMIN — PREDNISOLONE ACETATE 1 DROP: 10 SUSPENSION/ DROPS OPHTHALMIC at 08:09

## 2018-09-05 RX ADMIN — HYDRALAZINE HYDROCHLORIDE 50 MG: 50 TABLET ORAL at 05:09

## 2018-09-05 RX ADMIN — METHADONE HYDROCHLORIDE 10 MG: 5 TABLET ORAL at 09:09

## 2018-09-05 RX ADMIN — HYDRALAZINE HYDROCHLORIDE 25 MG: 25 TABLET ORAL at 10:09

## 2018-09-05 RX ADMIN — CALCIUM ACETATE 1334 MG: 667 CAPSULE ORAL at 12:09

## 2018-09-05 RX ADMIN — DAPTOMYCIN 905 MG: 500 INJECTION, POWDER, LYOPHILIZED, FOR SOLUTION INTRAVENOUS at 11:09

## 2018-09-05 RX ADMIN — ATROPINE SULFATE 1 DROP: 10 SOLUTION/ DROPS OPHTHALMIC at 08:09

## 2018-09-05 RX ADMIN — INSULIN ASPART 14 UNITS: 100 INJECTION, SOLUTION INTRAVENOUS; SUBCUTANEOUS at 11:09

## 2018-09-05 RX ADMIN — CETIRIZINE HYDROCHLORIDE 5 MG: 5 TABLET ORAL at 08:09

## 2018-09-05 RX ADMIN — INSULIN DETEMIR 20 UNITS: 100 INJECTION, SOLUTION SUBCUTANEOUS at 09:09

## 2018-09-05 RX ADMIN — METHADONE HYDROCHLORIDE 10 MG: 5 TABLET ORAL at 08:09

## 2018-09-05 RX ADMIN — INSULIN DETEMIR 20 UNITS: 100 INJECTION, SOLUTION SUBCUTANEOUS at 10:09

## 2018-09-05 RX ADMIN — PATIROMER 8.4 G: 8.4 POWDER, FOR SUSPENSION ORAL at 12:09

## 2018-09-05 RX ADMIN — PREDNISOLONE ACETATE 1 DROP: 10 SUSPENSION/ DROPS OPHTHALMIC at 12:09

## 2018-09-05 RX ADMIN — CLONAZEPAM 0.5 MG: 0.5 TABLET ORAL at 09:09

## 2018-09-05 RX ADMIN — HYDRALAZINE HYDROCHLORIDE 25 MG: 25 TABLET ORAL at 09:09

## 2018-09-05 RX ADMIN — INSULIN ASPART 5 UNITS: 100 INJECTION, SOLUTION INTRAVENOUS; SUBCUTANEOUS at 09:09

## 2018-09-05 RX ADMIN — HYDRALAZINE HYDROCHLORIDE 50 MG: 50 TABLET ORAL at 06:09

## 2018-09-05 RX ADMIN — HEPARIN SODIUM 5000 UNITS: 5000 INJECTION, SOLUTION INTRAVENOUS; SUBCUTANEOUS at 09:09

## 2018-09-05 RX ADMIN — CARVEDILOL 12.5 MG: 6.25 TABLET, FILM COATED ORAL at 09:09

## 2018-09-05 RX ADMIN — CALCIUM ACETATE 1334 MG: 667 CAPSULE ORAL at 07:09

## 2018-09-05 RX ADMIN — HEPARIN SODIUM 5000 UNITS: 5000 INJECTION, SOLUTION INTRAVENOUS; SUBCUTANEOUS at 05:09

## 2018-09-05 RX ADMIN — SERTRALINE HYDROCHLORIDE 25 MG: 25 TABLET ORAL at 08:09

## 2018-09-05 RX ADMIN — HYDROCODONE BITARTRATE AND ACETAMINOPHEN 1 TABLET: 7.5; 325 TABLET ORAL at 09:09

## 2018-09-05 RX ADMIN — INSULIN ASPART 14 UNITS: 100 INJECTION, SOLUTION INTRAVENOUS; SUBCUTANEOUS at 08:09

## 2018-09-05 RX ADMIN — CALCIUM ACETATE 1334 MG: 667 CAPSULE ORAL at 08:09

## 2018-09-05 RX ADMIN — INSULIN ASPART 14 UNITS: 100 INJECTION, SOLUTION INTRAVENOUS; SUBCUTANEOUS at 12:09

## 2018-09-05 RX ADMIN — ASPIRIN 81 MG: 81 TABLET, COATED ORAL at 08:09

## 2018-09-05 RX ADMIN — CARVEDILOL 12.5 MG: 6.25 TABLET, FILM COATED ORAL at 08:09

## 2018-09-05 RX ADMIN — ATORVASTATIN CALCIUM 40 MG: 20 TABLET, FILM COATED ORAL at 08:09

## 2018-09-05 RX ADMIN — PREDNISOLONE ACETATE 1 DROP: 10 SUSPENSION/ DROPS OPHTHALMIC at 09:09

## 2018-09-05 RX ADMIN — AMLODIPINE BESYLATE 10 MG: 10 TABLET ORAL at 08:09

## 2018-09-05 NOTE — PROGRESS NOTES
[facility]IAL[facility] NOTE     Patient currently on hemodialysis for removal of uremic toxins and volume.    Patient seen and evaluated on hemodialysis, tolerating treatment, see HD flowsheet for vitals and assessments.      Ultrafiltration goal is 2 L as tolerated     Labs have been reviewed and the dialysate bath has been adjusted.     Assessment/Plan:     HD today for removal of uremic toxins and volume.    Hyperkalemia: renal diet 2 gm K ordered    Anemia on procrit, but unable to give IV iron with infection so he may still require transfusion if hbg < 7.0

## 2018-09-05 NOTE — ASSESSMENT & PLAN NOTE
Mickey Ross is a 53 y.o. male with Right ankle wound, stable  - Dressed with estefania, betadine, 4x4's, cast padding, and ace.  -Nursing to do dressing changes every other day.  -recommend long term abx per ID recs, per Id Anticipate 6 weeks of IV antibiotics.  Estimated end date 9/28/18  -Patient to follow up with [facility] podiatry within one week of discharge.    -Podiatry will continue to follow.

## 2018-09-05 NOTE — PLAN OF CARE
Problem: Patient Care Overview  Goal: Plan of Care Review  Outcome: Ongoing (interventions implemented as appropriate)  Patient AAOx4. Plan of care and all safety precautions maintained and discussed. PRN hydralazine administered for SBP>160. Prn medication administered for c/o back pain. Patient remains free from falls/injury. Call bell in reach. Encouraged to call for assistance. Verbalized understanding. Will continue to monitor.

## 2018-09-05 NOTE — ASSESSMENT & PLAN NOTE
- Osteomyelitis of left 5th metatarsal taken for washout by Orthopedic surgery at [facility] on 08/15  - Patient being treated outside facility with Flagyl, linezolid, gentamicin with HD.  - Refusing amputation which would be curative.   - Podiatry consulted, appreciate recs. Advised BKA but patient refused. Recommended CAM boot and abx per ID. D/tyler wound vac  - ESR elevated at 58 upon admission  - X-ray left foot and ankle shows partial amputation the 1st, 2nd, 3rd, and 4th digits with fusion of the 2nd and 3rd MTP joints and throughout the midfoot, severe deformity and joint space loss the tibiotalar joint with a lapse of the talus likely 2/2 to chronic osteomyelitis, arthritis, or chronic Charcot foot.   - BCx2 from [facility] grew MRSA. Cultures from [facility] were NGTD.    PLAN:  - On Daptomycin 8mg/kg after HD per ID's recs. Planned for 6 weeks total, last dose 9/29  - ID follow up at 2 weeks.  - Podiatry consulted, appreciate recs. Recommend follow up with [facility] Podiatry at discharge.

## 2018-09-05 NOTE — ASSESSMENT & PLAN NOTE
- Patient was hypotensive overnight.,  - Diltiazem held  - Losartan and labetalol discontinued given in the setting of bradycardia and junctional rhythm  - Continue Norvasc 10mg qdaily, Coreg 12.5mg BID and Hydralazine 25mg TID

## 2018-09-05 NOTE — SUBJECTIVE & OBJECTIVE
Interval History: NAEON. Hypertensive.    Review of Systems   Constitutional: Negative for chills and fever.   HENT: Negative for trouble swallowing.    Eyes: Positive for visual disturbance.   Respiratory: Negative for shortness of breath.    Cardiovascular: Negative for chest pain.   Gastrointestinal: Negative for abdominal pain.   Genitourinary: Negative for difficulty urinating.   Musculoskeletal: Negative for arthralgias.   Neurological: Negative for dizziness and light-headedness.   Psychiatric/Behavioral: Negative for agitation and confusion.     Objective:     Vital Signs (Most Recent):  Temp: 98.1 °F (36.7 °C) (09/05/18 1500)  Pulse: 82 (09/05/18 1630)  Resp: 14 (09/05/18 1630)  BP: (!) 175/72 (09/05/18 1630)  SpO2: 95 % (09/05/18 1201) Vital Signs (24h Range):  Temp:  [97.4 °F (36.3 °C)-98.2 °F (36.8 °C)] 98.1 °F (36.7 °C)  Pulse:  [78-91] 82  Resp:  [13-20] 14  SpO2:  [93 %-95 %] 95 %  BP: (142-222)/(63-89) 175/72     Weight: 106.1 kg (234 lb)  Body mass index is 32.64 kg/m².    Intake/Output Summary (Last 24 hours) at 9/5/2018 1820  Last data filed at 9/5/2018 1300  Gross per 24 hour   Intake 700 ml   Output 100 ml   Net 600 ml      Physical Exam   Constitutional: He is oriented to person, place, and time. No distress.   HENT:   Head: Normocephalic.   Eyes: EOM are normal.   Neck: Normal range of motion.   Cardiovascular: Normal rate and regular rhythm.   Pulmonary/Chest: Effort normal and breath sounds normal.   Abdominal: Soft. Bowel sounds are normal.   Musculoskeletal: He exhibits edema.   Neurological: He is alert and oriented to person, place, and time.   Nursing note and vitals reviewed.      Significant Labs:   A1C:   Recent Labs   Lab  08/17/18   2159   HGBA1C  8.2*     ABGs: No results for input(s): PH, PCO2, HCO3, POCSATURATED, BE, TOTALHB, COHB, METHB, O2HB, POCFIO2 in the last 48 hours.  Bilirubin:   Recent Labs   Lab  08/18/18   0613  08/19/18   0645  08/20/18   0826  08/21/18   0459   09/02/18   0518   BILITOT  0.8  0.7  0.4  0.5  0.5     Blood Culture: No results for input(s): LABBLOO in the last 48 hours.  BMP:   Recent Labs   Lab  09/05/18   0631  09/05/18   0754   GLU   --   245*   NA   --   136   K   --   5.5*   CL   --   106   CO2   --   22*   BUN   --   53*   CREATININE   --   7.8*   CALCIUM   --   8.6*   MG  2.2   --      CBC:   Recent Labs   Lab  09/03/18 2042 09/04/18   0513  09/05/18   0630   WBC  5.56  5.38  5.73   HGB  7.3*  7.0*  7.5*   HCT  25.0*  23.3*  25.2*   PLT  108*  123*  74*     CMP:   Recent Labs   Lab  09/04/18   0755  09/04/18 2000 09/05/18   0754   NA  136  136  136   K  5.1  5.0  5.5*   CL  106  104  106   CO2  24  25  22*   GLU  222*  81  245*   BUN  34*  45*  53*   CREATININE  5.4*  6.9*  7.8*   CALCIUM  8.1*  8.3*  8.6*   ANIONGAP  6*  7*  8   EGFRNONAA  11.1*  8.3*  7.1*     Cardiac Markers: No results for input(s): CKMB, MYOGLOBIN, BNP, TROPISTAT in the last 48 hours.  Coagulation: No results for input(s): PT, INR, APTT in the last 48 hours.  Lactic Acid: No results for input(s): LACTATE in the last 48 hours.  Lipase: No results for input(s): LIPASE in the last 48 hours.  Lipid Panel: No results for input(s): CHOL, HDL, LDLCALC, TRIG, CHOLHDL in the last 48 hours.  Magnesium:   Recent Labs   Lab  09/04/18 0513 09/05/18   0631   MG  2.2  2.2     Pathology Results  (Last 10 years)    None        POCT Glucose:   Recent Labs   Lab  09/04/18   2107 09/05/18   0749  09/05/18   1214   POCTGLUCOSE  126*  242*  287*     Prealbumin: No results for input(s): PREALBUMIN in the last 48 hours.  Respiratory Culture: No results for input(s): GSRESP, RESPIRATORYC in the last 48 hours.  Troponin: No results for input(s): TROPONINI in the last 48 hours.  TSH: No results for input(s): TSH in the last 4320 hours.  Urine Culture: No results for input(s): LABURIN in the last 48 hours.  Urine Studies: No results for input(s): COLORU, APPEARANCEUA, PHUR, SPECGRAV, PROTEINUA,  GLUCUA, KETONESU, BILIRUBINUA, OCCULTUA, NITRITE, UROBILINOGEN, LEUKOCYTESUR, RBCUA, WBCUA, BACTERIA, SQUAMEPITHEL, HYALINECASTS in the last 48 hours.    Invalid input(s): LISA  All pertinent labs within the past 24 hours have been reviewed.    Significant Imaging: I have reviewed all pertinent imaging results/findings within the past 24 hours.

## 2018-09-05 NOTE — ASSESSMENT & PLAN NOTE
- Hyperkalemic on 9/2 with a K of 6.2  - Also hyperglycemic with >500  - Started on insulin gtt and transitioned back to s/c. Given kayexalate.  - Advised HD but refused  - K 5.1 today  - BMP daily
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
## 2018-09-05 NOTE — PROGRESS NOTES
[facility]  Podiatry  Progress Note    Patient Name: Mickey Ross  MRN: [id number]  Admission Date: 8/17/2018  Hospital Length of Stay: 19 days  Attending Physician: Carrol García MD  Primary Care Provider: Rosalina Moncada MD   Interval Hx:  Patient Seen at bedside for dressing change.  Patient denies F/C/N/V.  Dressings clean, dry, and intact.     Scheduled Meds:   sodium chloride 0.9%   Intravenous Once    amLODIPine  10 mg Oral Daily    aspirin  81 mg Oral Daily    atorvastatin  40 mg Oral Daily    atropine 1%  1 drop Right Eye Daily    calcium acetate  1,334 mg Oral TID WM    carvedilol  12.5 mg Oral BID    cetirizine  5 mg Oral Daily    DAPTOmycin (CUBICIN)  IV  8 mg/kg Intravenous Q48H    epoetin umer (PROCRIT) injection  8,000 Units Intravenous Every Tues, Thurs, Sat    heparin (porcine)  5,000 Units Subcutaneous Q8H    hydrALAZINE  25 mg Oral Q8H    insulin aspart U-100  14 Units Subcutaneous TIDWM    insulin detemir U-100  20 Units Subcutaneous BID    methadone  10 mg Oral BID    patiromer calcium sorbitex  8.4 g Oral Once per day on Sun Mon Wed Fri    prednisoLONE acetate  1 drop Right Eye QID    sertraline  25 mg Oral Daily     Continuous Infusions:    PRN Meds:sodium chloride 0.9%, acetaminophen, albuterol-ipratropium, clonazePAM, dextrose 50%, dextrose 50%, glucagon (human recombinant), glucose, glucose, hydrALAZINE, HYDROcodone-acetaminophen, hydrOXYzine HCl, insulin aspart U-100, naloxone, ondansetron, ondansetron, polyethylene glycol    Review of patient's allergies indicates:   Allergen Reactions    Vancomycin analogues Rash     Red Man Syndrome    Penicillins         Past Medical History:   Diagnosis Date    Allergic drug rash - due to Vancomycin 8/19/2018    Allergic drug rash - due to Vancomycin 8/19/2018    Anxiety 8/17/2018    Arthritis     Blind left eye     Charcot's joint of foot     Chronic back pain 8/17/2018    Chronic, continuous use of  opioids 8/17/2018    Debility 8/17/2018    Diabetes mellitus     GERD (gastroesophageal reflux disease)     Glaucoma     Hypertension     Methadone dependence 8/18/2018    MRSA (methicillin resistant staph aureus) culture positive     Osteomyelitis     Renal disorder     Sepsis due to methicillin resistant Staphylococcus aureus (MRSA) 8/17/2018    Subretinal abscess 8/17/2018     Past Surgical History:   Procedure Laterality Date    AVF left upper arm      left ankle surgery      lumbar back surgery         Family History     Problem Relation (Age of Onset)    Pneumonia Mother        Tobacco Use    Smoking status: Never Smoker   Substance and Sexual Activity    Alcohol use: No     Frequency: Never    Drug use: No    Sexual activity: Not Currently     Review of Systems   Constitutional: Negative for chills and fever.   Gastrointestinal: Negative for nausea and vomiting.   Musculoskeletal:        Left ankle deformity.   Skin: Positive for color change and wound (Surgical incisions to the medial and lateral ankle).     Objective:     Vital Signs (Most Recent):  Temp: 98.2 °F (36.8 °C) (09/05/18 1201)  Pulse: 83 (09/05/18 1201)  Resp: 20 (09/05/18 1201)  BP: (!) 160/70 (09/05/18 1201)  SpO2: 95 % (09/05/18 1201) Vital Signs (24h Range):  Temp:  [97.4 °F (36.3 °C)-98.2 °F (36.8 °C)] 98.2 °F (36.8 °C)  Pulse:  [74-87] 83  Resp:  [15-20] 20  SpO2:  [93 %-96 %] 95 %  BP: (142-222)/(63-89) 160/70     Weight: 106.1 kg (234 lb)  Body mass index is 32.64 kg/m².    Foot Exam    General  General Appearance: appears stated age and healthy   Orientation: alert and oriented to person, place, and time       Left Foot/Ankle      Inspection and Palpation  Swelling: (Diffuse edema to the left LE, non pitting.)  Skin Exam: erythema (To the medial ankle incision); skin not intact (Surgical incisions to the medial and lateral ankle.)     Neurovascular  Dorsalis pedis: absent  Posterior tibial: absent  Saphenous nerve  sensation: diminished  Tibial nerve sensation: diminished  Superficial peroneal nerve sensation: diminished  Deep peroneal nerve sensation: diminished  Sural nerve sensation: diminished    Comments  Ortho: Severe non reducible varus deformity of the left ankle.    Lateral Left ankle: Incision present with sutures in tact. No signs of acute infection. Skin edges well coapted.     Medial Left ankle: Surgical incision left open with granular wound beds to edges. Mild erythema, no drainage no purulence, no malodor, no streaking. Positive pain to palpation.     See clinic images below.      Laboratory:  A1C:   Recent Labs   Lab  08/17/18   2159   HGBA1C  8.2*     Blood Cultures:   No results for input(s): LABBLOO in the last 48 hours.  CBC:   Recent Labs   Lab  09/05/18   0630   WBC  5.73   RBC  2.50*   HGB  7.5*   HCT  25.2*   PLT  74*   MCV  101*   MCH  30.0   MCHC  29.8*     CMP:   Recent Labs   Lab  09/02/18 0518 09/05/18   0754   GLU  651*   < >  245*   CALCIUM  7.9*   < >  8.6*   ALBUMIN  2.4*   --    --    PROT  5.9*   --    --    NA  129*   < >  136   K  6.9*  6.9*   < >  5.5*   CO2  23   < >  22*   CL  98   < >  106   BUN  39*   < >  53*   CREATININE  5.5*   < >  7.8*   ALKPHOS  140*   --    --    ALT  24   --    --    AST  33   --    --    BILITOT  0.5   --    --     < > = values in this interval not displayed.     CRP:   No results for input(s): CRP in the last 168 hours.  ESR:   No results for input(s): SEDRATE in the last 168 hours.  Microbiology Results (last 7 days)     Procedure Component Value Units Date/Time    Blood culture [[id number]] Collected:  09/02/18 0518    Order Status:  Completed Specimen:  Blood Updated:  09/05/18 0812     Blood Culture, Routine No Growth to date     Blood Culture, Routine No Growth to date     Blood Culture, Routine No Growth to date     Blood Culture, Routine No Growth to date    Blood culture [[id number]] Collected:  09/02/18 0518    Order Status:  Completed Specimen:   Blood Updated:  09/05/18 0812     Blood Culture, Routine No Growth to date     Blood Culture, Routine No Growth to date     Blood Culture, Routine No Growth to date     Blood Culture, Routine No Growth to date        Specimen (12h ago, onward)    None          Diagnostic Results:  X-ray:  Bony destruction, impaction and probable osseous fusion involving the hindfoot and midfoot.  There appears to be medial angulation and displacement of the hindfoot with respect to the adjacent tibia and fibula.  Comparison to prior films and correlation with clinical findings will be needed.    Clinical findings  Wound on left medial ankle measures approximately  With granular base and viable margins.   Negative ptb, No purulence, erythema, edema, or malodor    Mild dehisence of lateral ankle incision.                                          Assessment/Plan:     * Acute hematogenous osteomyelitis of left foot    Mickey Ross is a 53 y.o. male with Right ankle wound, stable  - Dressed with estefania, betadine, 4x4's, cast padding, and ace.  -Nursing to do dressing changes every other day.  -recommend long term abx per ID recs, per Id Anticipate 6 weeks of IV antibiotics.  Estimated end date 9/28/18  -Patient to follow up with [facility] podiatry within one week of discharge.    -Podiatry will continue to follow.                ESRD on hemodialysis    Per primary            Romeo Hoyt MD  Podiatry  [facility]

## 2018-09-05 NOTE — SUBJECTIVE & OBJECTIVE
Interval Hx:  Patient Seen at bedside for dressing change.  Patient denies F/C/N/V.  Dressings clean, dry, and intact.     Scheduled Meds:   sodium chloride 0.9%   Intravenous Once    amLODIPine  10 mg Oral Daily    aspirin  81 mg Oral Daily    atorvastatin  40 mg Oral Daily    atropine 1%  1 drop Right Eye Daily    calcium acetate  1,334 mg Oral TID WM    carvedilol  12.5 mg Oral BID    cetirizine  5 mg Oral Daily    DAPTOmycin (CUBICIN)  IV  8 mg/kg Intravenous Q48H    epoetin umer (PROCRIT) injection  8,000 Units Intravenous Every Tues, Thurs, Sat    heparin (porcine)  5,000 Units Subcutaneous Q8H    hydrALAZINE  25 mg Oral Q8H    insulin aspart U-100  14 Units Subcutaneous TIDWM    insulin detemir U-100  20 Units Subcutaneous BID    methadone  10 mg Oral BID    patiromer calcium sorbitex  8.4 g Oral Once per day on Sun Mon Wed Fri    prednisoLONE acetate  1 drop Right Eye QID    sertraline  25 mg Oral Daily     Continuous Infusions:    PRN Meds:sodium chloride 0.9%, acetaminophen, albuterol-ipratropium, clonazePAM, dextrose 50%, dextrose 50%, glucagon (human recombinant), glucose, glucose, hydrALAZINE, HYDROcodone-acetaminophen, hydrOXYzine HCl, insulin aspart U-100, naloxone, ondansetron, ondansetron, polyethylene glycol    Review of patient's allergies indicates:   Allergen Reactions    Vancomycin analogues Rash     Red Man Syndrome    Penicillins         Past Medical History:   Diagnosis Date    Allergic drug rash - due to Vancomycin 8/19/2018    Allergic drug rash - due to Vancomycin 8/19/2018    Anxiety 8/17/2018    Arthritis     Blind left eye     Charcot's joint of foot     Chronic back pain 8/17/2018    Chronic, continuous use of opioids 8/17/2018    Debility 8/17/2018    Diabetes mellitus     GERD (gastroesophageal reflux disease)     Glaucoma     Hypertension     Methadone dependence 8/18/2018    MRSA (methicillin resistant staph aureus) culture positive      Osteomyelitis     Renal disorder     Sepsis due to methicillin resistant Staphylococcus aureus (MRSA) 8/17/2018    Subretinal abscess 8/17/2018     Past Surgical History:   Procedure Laterality Date    AVF left upper arm      left ankle surgery      lumbar back surgery         Family History     Problem Relation (Age of Onset)    Pneumonia Mother        Tobacco Use    Smoking status: Never Smoker   Substance and Sexual Activity    Alcohol use: No     Frequency: Never    Drug use: No    Sexual activity: Not Currently     Review of Systems   Constitutional: Negative for chills and fever.   Gastrointestinal: Negative for nausea and vomiting.   Musculoskeletal:        Left ankle deformity.   Skin: Positive for color change and wound (Surgical incisions to the medial and lateral ankle).     Objective:     Vital Signs (Most Recent):  Temp: 98.2 °F (36.8 °C) (09/05/18 1201)  Pulse: 83 (09/05/18 1201)  Resp: 20 (09/05/18 1201)  BP: (!) 160/70 (09/05/18 1201)  SpO2: 95 % (09/05/18 1201) Vital Signs (24h Range):  Temp:  [97.4 °F (36.3 °C)-98.2 °F (36.8 °C)] 98.2 °F (36.8 °C)  Pulse:  [74-87] 83  Resp:  [15-20] 20  SpO2:  [93 %-96 %] 95 %  BP: (142-222)/(63-89) 160/70     Weight: 106.1 kg (234 lb)  Body mass index is 32.64 kg/m².    Foot Exam    General  General Appearance: appears stated age and healthy   Orientation: alert and oriented to person, place, and time       Left Foot/Ankle      Inspection and Palpation  Swelling: (Diffuse edema to the left LE, non pitting.)  Skin Exam: erythema (To the medial ankle incision); skin not intact (Surgical incisions to the medial and lateral ankle.)     Neurovascular  Dorsalis pedis: absent  Posterior tibial: absent  Saphenous nerve sensation: diminished  Tibial nerve sensation: diminished  Superficial peroneal nerve sensation: diminished  Deep peroneal nerve sensation: diminished  Sural nerve sensation: diminished    Comments  Ortho: Severe non reducible varus deformity of  the left ankle.    Lateral Left ankle: Incision present with sutures in tact. No signs of acute infection. Skin edges well coapted.     Medial Left ankle: Surgical incision left open with granular wound beds to edges. Mild erythema, no drainage no purulence, no malodor, no streaking. Positive pain to palpation.     See clinic images below.      Laboratory:  A1C:   Recent Labs   Lab  08/17/18   2159   HGBA1C  8.2*     Blood Cultures:   No results for input(s): LABBLOO in the last 48 hours.  CBC:   Recent Labs   Lab  09/05/18   0630   WBC  5.73   RBC  2.50*   HGB  7.5*   HCT  25.2*   PLT  74*   MCV  101*   MCH  30.0   MCHC  29.8*     CMP:   Recent Labs   Lab  09/02/18 0518 09/05/18   0754   GLU  651*   < >  245*   CALCIUM  7.9*   < >  8.6*   ALBUMIN  2.4*   --    --    PROT  5.9*   --    --    NA  129*   < >  136   K  6.9*  6.9*   < >  5.5*   CO2  23   < >  22*   CL  98   < >  106   BUN  39*   < >  53*   CREATININE  5.5*   < >  7.8*   ALKPHOS  140*   --    --    ALT  24   --    --    AST  33   --    --    BILITOT  0.5   --    --     < > = values in this interval not displayed.     CRP:   No results for input(s): CRP in the last 168 hours.  ESR:   No results for input(s): SEDRATE in the last 168 hours.  Microbiology Results (last 7 days)     Procedure Component Value Units Date/Time    Blood culture [[id number]] Collected:  09/02/18 0518    Order Status:  Completed Specimen:  Blood Updated:  09/05/18 0812     Blood Culture, Routine No Growth to date     Blood Culture, Routine No Growth to date     Blood Culture, Routine No Growth to date     Blood Culture, Routine No Growth to date    Blood culture [[id number]] Collected:  09/02/18 0518    Order Status:  Completed Specimen:  Blood Updated:  09/05/18 0812     Blood Culture, Routine No Growth to date     Blood Culture, Routine No Growth to date     Blood Culture, Routine No Growth to date     Blood Culture, Routine No Growth to date        Specimen (12h ago, onward)     None          Diagnostic Results:  X-ray:  Bony destruction, impaction and probable osseous fusion involving the hindfoot and midfoot.  There appears to be medial angulation and displacement of the hindfoot with respect to the adjacent tibia and fibula.  Comparison to prior films and correlation with clinical findings will be needed.    Clinical findings  Wound on left medial ankle measures approximately  With granular base and viable margins.   Negative ptb, No purulence, erythema, edema, or malodor    Mild dehisence of lateral ankle incision.

## 2018-09-05 NOTE — NURSING
Pt systolic was >160 after 0530 PRN hydralazine. MD Roberts was notified of elevated pressures. MD stated not to give anything further since he has dyalisis scheduled. Will continue to monitor.

## 2018-09-05 NOTE — PLAN OF CARE
Problem: Patient Care Overview  Goal: Plan of Care Review  Outcome: Ongoing (interventions implemented as appropriate)  Pt remained free from falls and injury. Pt complained of some SOB on moving but resolved with PRN O2. Pt BP trending down after PO hydralazine. Pt scheduled for dialysis. Will continue to monitor.

## 2018-09-05 NOTE — PLAN OF CARE
Selma did speak with Patsy with [employer], informed of the barriers to d/c for Pt including hh services and [facility] center not able to do dressing changes and lab draws for Pt for infusion company [employer]. Patsy will reach out to [place] to see if she can assist in securing Pt a hh provider. Plan B will be LTAC and Pt will have to little choice to be admitted to [facility] in [place]. Sw to follow. Selma provided [facility] health of [place] by Patsy, Selma uploaded to [facility] and will follow with referral. Selma also placed call to [facility] at , left voicemail message with agency to return Selma's call.

## 2018-09-05 NOTE — PROGRESS NOTES
Pt arrived to unit via stretcher. Maintenance pt. Pt alert & stable. Accessed LT AVF X2 15G needles without difficulty. Duration 3.0hrs, Qb 400ml/min, Qd 800ml/min, planned UFG 2.0L, orders reviewed.

## 2018-09-06 LAB
ABO + RH BLD: NORMAL
ANION GAP SERPL CALC-SCNC: 8 MMOL/L
BASOPHILS # BLD AUTO: 0.04 K/UL
BASOPHILS NFR BLD: 0.8 %
BLD GP AB SCN CELLS X3 SERPL QL: NORMAL
BLD PROD TYP BPU: NORMAL
BLOOD UNIT EXPIRATION DATE: NORMAL
BLOOD UNIT TYPE CODE: 5100
BLOOD UNIT TYPE: NORMAL
BUN SERPL-MCNC: 40 MG/DL
CALCIUM SERPL-MCNC: 8.2 MG/DL
CHLORIDE SERPL-SCNC: 104 MMOL/L
CK MB SERPL-MCNC: 1.2 NG/ML
CK MB SERPL-RTO: 10 %
CK SERPL-CCNC: 12 U/L
CO2 SERPL-SCNC: 25 MMOL/L
CODING SYSTEM: NORMAL
CREAT SERPL-MCNC: 5.7 MG/DL
DIFFERENTIAL METHOD: ABNORMAL
DISPENSE STATUS: NORMAL
EOSINOPHIL # BLD AUTO: 0.5 K/UL
EOSINOPHIL NFR BLD: 8.6 %
ERYTHROCYTE [DISTWIDTH] IN BLOOD BY AUTOMATED COUNT: 17.2 %
EST. GFR  (AFRICAN AMERICAN): 12.1 ML/MIN/1.73 M^2
EST. GFR  (NON AFRICAN AMERICAN): 10.4 ML/MIN/1.73 M^2
GLUCOSE SERPL-MCNC: 311 MG/DL
HCT VFR BLD AUTO: 22.5 %
HGB BLD-MCNC: 6.8 G/DL
IMM GRANULOCYTES # BLD AUTO: 0.04 K/UL
IMM GRANULOCYTES NFR BLD AUTO: 0.8 %
LYMPHOCYTES # BLD AUTO: 1 K/UL
LYMPHOCYTES NFR BLD: 19.5 %
MAGNESIUM SERPL-MCNC: 2.1 MG/DL
MCH RBC QN AUTO: 29.7 PG
MCHC RBC AUTO-ENTMCNC: 30.2 G/DL
MCV RBC AUTO: 98 FL
MONOCYTES # BLD AUTO: 0.5 K/UL
MONOCYTES NFR BLD: 10.2 %
NEUTROPHILS # BLD AUTO: 3.1 K/UL
NEUTROPHILS NFR BLD: 60.1 %
NRBC BLD-RTO: 0 /100 WBC
PHOSPHATE SERPL-MCNC: 3.5 MG/DL
PLATELET # BLD AUTO: 139 K/UL
PMV BLD AUTO: 10.6 FL
POCT GLUCOSE: 126 MG/DL (ref 70–110)
POCT GLUCOSE: 154 MG/DL (ref 70–110)
POCT GLUCOSE: 163 MG/DL (ref 70–110)
POCT GLUCOSE: 208 MG/DL (ref 70–110)
POCT GLUCOSE: 30 MG/DL (ref 70–110)
POCT GLUCOSE: 304 MG/DL (ref 70–110)
POCT GLUCOSE: 35 MG/DL (ref 70–110)
POCT GLUCOSE: 360 MG/DL (ref 70–110)
POCT GLUCOSE: 57 MG/DL (ref 70–110)
POTASSIUM SERPL-SCNC: 4.5 MMOL/L
RBC # BLD AUTO: 2.29 M/UL
SODIUM SERPL-SCNC: 137 MMOL/L
TRANS ERYTHROCYTES VOL PATIENT: NORMAL ML
TROPONIN I SERPL DL<=0.01 NG/ML-MCNC: 0.08 NG/ML
WBC # BLD AUTO: 5.22 K/UL

## 2018-09-06 PROCEDURE — 63600175 PHARM REV CODE 636 W HCPCS: Performed by: HOSPITALIST

## 2018-09-06 PROCEDURE — 20600001 HC STEP DOWN PRIVATE ROOM

## 2018-09-06 PROCEDURE — 82550 ASSAY OF CK (CPK): CPT

## 2018-09-06 PROCEDURE — 83735 ASSAY OF MAGNESIUM: CPT

## 2018-09-06 PROCEDURE — 84100 ASSAY OF PHOSPHORUS: CPT

## 2018-09-06 PROCEDURE — 84484 ASSAY OF TROPONIN QUANT: CPT

## 2018-09-06 PROCEDURE — 25000003 PHARM REV CODE 250: Performed by: HOSPITALIST

## 2018-09-06 PROCEDURE — 25000003 PHARM REV CODE 250: Performed by: STUDENT IN AN ORGANIZED HEALTH CARE EDUCATION/TRAINING PROGRAM

## 2018-09-06 PROCEDURE — 86920 COMPATIBILITY TEST SPIN: CPT

## 2018-09-06 PROCEDURE — 86850 RBC ANTIBODY SCREEN: CPT

## 2018-09-06 PROCEDURE — 99232 SBSQ HOSP IP/OBS MODERATE 35: CPT | Mod: ,,, | Performed by: HOSPITALIST

## 2018-09-06 PROCEDURE — 63600175 PHARM REV CODE 636 W HCPCS: Performed by: STUDENT IN AN ORGANIZED HEALTH CARE EDUCATION/TRAINING PROGRAM

## 2018-09-06 PROCEDURE — 85025 COMPLETE CBC W/AUTO DIFF WBC: CPT

## 2018-09-06 PROCEDURE — 80048 BASIC METABOLIC PNL TOTAL CA: CPT

## 2018-09-06 PROCEDURE — 25000003 PHARM REV CODE 250: Performed by: INTERNAL MEDICINE

## 2018-09-06 PROCEDURE — P9021 RED BLOOD CELLS UNIT: HCPCS

## 2018-09-06 PROCEDURE — 82553 CREATINE MB FRACTION: CPT

## 2018-09-06 PROCEDURE — 93010 ELECTROCARDIOGRAM REPORT: CPT | Mod: ,,, | Performed by: INTERNAL MEDICINE

## 2018-09-06 PROCEDURE — 93005 ELECTROCARDIOGRAM TRACING: CPT

## 2018-09-06 PROCEDURE — 36430 TRANSFUSION BLD/BLD COMPNT: CPT

## 2018-09-06 RX ORDER — ASPIRIN 81 MG/1
81 TABLET ORAL DAILY
Status: DISCONTINUED | OUTPATIENT
Start: 2018-09-07 | End: 2018-09-21 | Stop reason: HOSPADM

## 2018-09-06 RX ORDER — DEXTROSE MONOHYDRATE 25 G/50ML
INJECTION, SOLUTION INTRAVENOUS
Status: DISPENSED
Start: 2018-09-06 | End: 2018-09-07

## 2018-09-06 RX ORDER — LABETALOL HYDROCHLORIDE 5 MG/ML
10 INJECTION, SOLUTION INTRAVENOUS ONCE
Status: COMPLETED | OUTPATIENT
Start: 2018-09-06 | End: 2018-09-06

## 2018-09-06 RX ORDER — ATORVASTATIN CALCIUM 20 MG/1
80 TABLET, FILM COATED ORAL DAILY
Status: DISCONTINUED | OUTPATIENT
Start: 2018-09-07 | End: 2018-09-21 | Stop reason: HOSPADM

## 2018-09-06 RX ORDER — HYDROCODONE BITARTRATE AND ACETAMINOPHEN 500; 5 MG/1; MG/1
TABLET ORAL
Status: DISCONTINUED | OUTPATIENT
Start: 2018-09-06 | End: 2018-09-21 | Stop reason: HOSPADM

## 2018-09-06 RX ORDER — ASPIRIN 325 MG
325 TABLET ORAL ONCE
Status: COMPLETED | OUTPATIENT
Start: 2018-09-06 | End: 2018-09-06

## 2018-09-06 RX ORDER — ASPIRIN 325 MG
325 TABLET ORAL DAILY
Status: DISCONTINUED | OUTPATIENT
Start: 2018-09-06 | End: 2018-09-06

## 2018-09-06 RX ORDER — CARVEDILOL 25 MG/1
25 TABLET ORAL 2 TIMES DAILY
Status: DISCONTINUED | OUTPATIENT
Start: 2018-09-06 | End: 2018-09-21 | Stop reason: HOSPADM

## 2018-09-06 RX ADMIN — PREDNISOLONE ACETATE 1 DROP: 10 SUSPENSION/ DROPS OPHTHALMIC at 08:09

## 2018-09-06 RX ADMIN — INSULIN DETEMIR 20 UNITS: 100 INJECTION, SOLUTION SUBCUTANEOUS at 08:09

## 2018-09-06 RX ADMIN — INSULIN ASPART 14 UNITS: 100 INJECTION, SOLUTION INTRAVENOUS; SUBCUTANEOUS at 08:09

## 2018-09-06 RX ADMIN — CALCIUM ACETATE 1334 MG: 667 CAPSULE ORAL at 05:09

## 2018-09-06 RX ADMIN — ATORVASTATIN CALCIUM 40 MG: 20 TABLET, FILM COATED ORAL at 08:09

## 2018-09-06 RX ADMIN — CALCIUM ACETATE 1334 MG: 667 CAPSULE ORAL at 08:09

## 2018-09-06 RX ADMIN — CARVEDILOL 25 MG: 25 TABLET, FILM COATED ORAL at 08:09

## 2018-09-06 RX ADMIN — AMLODIPINE BESYLATE 10 MG: 10 TABLET ORAL at 08:09

## 2018-09-06 RX ADMIN — METHADONE HYDROCHLORIDE 10 MG: 5 TABLET ORAL at 08:09

## 2018-09-06 RX ADMIN — HEPARIN SODIUM 5000 UNITS: 5000 INJECTION, SOLUTION INTRAVENOUS; SUBCUTANEOUS at 08:09

## 2018-09-06 RX ADMIN — INSULIN ASPART 10 UNITS: 100 INJECTION, SOLUTION INTRAVENOUS; SUBCUTANEOUS at 08:09

## 2018-09-06 RX ADMIN — ONDANSETRON 8 MG: 8 TABLET, ORALLY DISINTEGRATING ORAL at 08:09

## 2018-09-06 RX ADMIN — HYDRALAZINE HYDROCHLORIDE 25 MG: 25 TABLET ORAL at 08:09

## 2018-09-06 RX ADMIN — ATROPINE SULFATE 1 DROP: 10 SOLUTION/ DROPS OPHTHALMIC at 10:09

## 2018-09-06 RX ADMIN — INSULIN ASPART 4 UNITS: 100 INJECTION, SOLUTION INTRAVENOUS; SUBCUTANEOUS at 12:09

## 2018-09-06 RX ADMIN — PREDNISOLONE ACETATE 1 DROP: 10 SUSPENSION/ DROPS OPHTHALMIC at 10:09

## 2018-09-06 RX ADMIN — HYDRALAZINE HYDROCHLORIDE 25 MG: 25 TABLET ORAL at 05:09

## 2018-09-06 RX ADMIN — INSULIN ASPART 14 UNITS: 100 INJECTION, SOLUTION INTRAVENOUS; SUBCUTANEOUS at 12:09

## 2018-09-06 RX ADMIN — DEXTROSE MONOHYDRATE 25 G: 25 INJECTION, SOLUTION INTRAVENOUS at 03:09

## 2018-09-06 RX ADMIN — ASPIRIN 325 MG ORAL TABLET 325 MG: 325 PILL ORAL at 08:09

## 2018-09-06 RX ADMIN — CLONAZEPAM 0.5 MG: 0.5 TABLET ORAL at 12:09

## 2018-09-06 RX ADMIN — CETIRIZINE HYDROCHLORIDE 5 MG: 5 TABLET ORAL at 08:09

## 2018-09-06 RX ADMIN — INSULIN ASPART 14 UNITS: 100 INJECTION, SOLUTION INTRAVENOUS; SUBCUTANEOUS at 05:09

## 2018-09-06 RX ADMIN — SERTRALINE HYDROCHLORIDE 25 MG: 25 TABLET ORAL at 08:09

## 2018-09-06 RX ADMIN — HYDRALAZINE HYDROCHLORIDE 25 MG: 25 TABLET ORAL at 02:09

## 2018-09-06 RX ADMIN — CLONAZEPAM 0.5 MG: 0.5 TABLET ORAL at 08:09

## 2018-09-06 RX ADMIN — Medication 16 G: at 02:09

## 2018-09-06 RX ADMIN — LABETALOL HYDROCHLORIDE 10 MG: 5 INJECTION, SOLUTION INTRAVENOUS at 12:09

## 2018-09-06 RX ADMIN — HYDRALAZINE HYDROCHLORIDE 50 MG: 50 TABLET ORAL at 07:09

## 2018-09-06 RX ADMIN — ERYTHROPOIETIN 8000 UNITS: 4000 INJECTION, SOLUTION INTRAVENOUS; SUBCUTANEOUS at 10:09

## 2018-09-06 RX ADMIN — PREDNISOLONE ACETATE 1 DROP: 10 SUSPENSION/ DROPS OPHTHALMIC at 05:09

## 2018-09-06 RX ADMIN — HEPARIN SODIUM 5000 UNITS: 5000 INJECTION, SOLUTION INTRAVENOUS; SUBCUTANEOUS at 05:09

## 2018-09-06 RX ADMIN — INSULIN ASPART 2 UNITS: 100 INJECTION, SOLUTION INTRAVENOUS; SUBCUTANEOUS at 05:09

## 2018-09-06 RX ADMIN — PREDNISOLONE ACETATE 1 DROP: 10 SUSPENSION/ DROPS OPHTHALMIC at 12:09

## 2018-09-06 RX ADMIN — HEPARIN SODIUM 5000 UNITS: 5000 INJECTION, SOLUTION INTRAVENOUS; SUBCUTANEOUS at 02:09

## 2018-09-06 RX ADMIN — CALCIUM ACETATE 1334 MG: 667 CAPSULE ORAL at 12:09

## 2018-09-06 NOTE — NURSING
Notified Hydralazine 10mg was not given as 25mg was due and given. No new orders given at this time. Will continue to monitor.

## 2018-09-06 NOTE — PROGRESS NOTES
Progress Note     HPI: Mickey Ross is a 53 y.o. male who were are following for intraretinal abscess. Patient reports vision continue to improve and now seeing more color and shapes on television. Today able to read phone with reading glasses.  Denies pain, flashes, floaters.               Past Medical History:   Diagnosis Date    Allergic drug rash - due to Vancomycin 8/19/2018    Arthritis      Blind left eye      Charcot's joint of foot      Diabetes mellitus      GERD (gastroesophageal reflux disease)      Glaucoma      Hypertension      MRSA (methicillin resistant staph aureus) culture positive      Renal disorder      Subretinal abscess 8/17/2018                   Family History   Problem Relation Age of Onset    Pneumonia Mother                   Review of patient's allergies indicates:   Allergen Reactions    Vancomycin analogues Rash       Red Man Syndrome    Penicillins        Social History                Tobacco Use    Smoking status: Never Smoker   Substance Use Topics    Alcohol use: No       Frequency: Never    Drug use: No                              Base Eye Exam                     Visual Acuity                    Right Left       Near sc 20/100            Distance sc                 20/50                    Tonometry (Palpation, 4:54 PM)                    Right Left       Pressure nl                          Pupils                  APD       Right dilated pharmacologically       Left                                       Slit Lamp and Fundus Exam                     Slit Lamp Exam                    Right Left       Conjunctiva/Sclera White and quiet         Cornea Clear         Anterior Chamber Deep and quiet         Iris dilated         Lens clear         Vitreous Vit mostly clear, min consolidation over scar                            Fundus Exam                    Right Left       Disc Normal         Macula flat, no fluid         Periphery ST arcade CR scar surrounded by  slightly elevated edges, min heme on posterior edge                        Plan   1. Retinal Abscess OD   - Intravitreal vancomycin x 3 (last injection 8/24/18)   - 9/6/18 continued improvement 14 days post injection  - Will hold off on repeat injection now with continuing improvement   - Stable for d/c from ophthalmology standpt   - will continue to evaluate while inpatient   - Needs weekly outpatient followup after d/c and continued IV antibiotics     Case discussed with Dr. Sal Villanueva MD  Seen and examined by Dr Corrales in clinic.  Fundus photos taken documenting lesion OD.     Dwight Bowers MD PGY-II  Ophthalmology  [facility]

## 2018-09-06 NOTE — NURSING
BLOOD TRANSFUSION; ON HOLD FOR NOW, SPOKE TO RAZ WHO STATED THAT HE WILL NOTIFY ME IF TRANSFUSION IS TO BE GIVEN. PT NOTIFIED.

## 2018-09-06 NOTE — NURSING
Kyle Baez MD notified of blood pressure of 201/81 . MD ordered Hydralazine 10 mg until 25mg scheduled dose can be given. Will continue to monitor.

## 2018-09-06 NOTE — NURSING
"Notified MD Alcon of B/P of 208/84 .  Patient complains of chest discomfort in center of chest and SOB. MD ordering 12 lead EKG and troponin labs. Patient now states "I feel fine." Will continue to monitor.  "

## 2018-09-06 NOTE — NURSING
Transfusion: blood consent needed. Primary team aware spoke to Connie Who relayed message to Dr. Tomas

## 2018-09-06 NOTE — ASSESSMENT & PLAN NOTE
- Patient was hypotensive overnight.,  - Diltiazem held  - Losartan and labetalol discontinued given in the setting of bradycardia and junctional rhythm  - Continue Norvasc 10mg qdaily, Coreg 25 mg BID and Hydralazine 25mg TID

## 2018-09-06 NOTE — SUBJECTIVE & OBJECTIVE
Review of Systems   Constitutional: Negative for chills and fever.   HENT: Negative for trouble swallowing.    Eyes: Positive for visual disturbance.   Respiratory: Negative for shortness of breath.    Cardiovascular: Negative for chest pain.   Gastrointestinal: Negative for abdominal pain.   Genitourinary: Negative for difficulty urinating.   Musculoskeletal: Negative for arthralgias.   Neurological: Negative for dizziness and light-headedness.   Psychiatric/Behavioral: Negative for agitation and confusion.       Objective:     Vital Signs (Most Recent):  Temp: 97.7 °F (36.5 °C) (09/06/18 1700)  Pulse: 77 (09/06/18 1700)  Resp: 18 (09/06/18 1700)  BP: 122/60 (09/06/18 1700)  SpO2: 96 % (09/06/18 1700) Vital Signs (24h Range):  Temp:  [97.4 °F (36.3 °C)-98.9 °F (37.2 °C)] 97.7 °F (36.5 °C)  Pulse:  [72-98] 77  Resp:  [13-20] 18  SpO2:  [90 %-97 %] 96 %  BP: (122-215)/(55-91) 122/60     Weight: 101.2 kg (223 lb 1.6 oz)  Body mass index is 31.12 kg/m².    Intake/Output Summary (Last 24 hours) at 9/6/2018 1711  Last data filed at 9/6/2018 1610  Gross per 24 hour   Intake 1289 ml   Output 3100 ml   Net -1811 ml      Physical Exam  Constitutional: He is oriented to person, place, and time. No distress.   HENT:   Head: Normocephalic.   Eyes: EOM are normal.   Neck: Normal range of motion.   Cardiovascular: Normal rate and regular rhythm.   Pulmonary/Chest: Effort normal and breath sounds normal.   Abdominal: Soft. Bowel sounds are normal.   Musculoskeletal: He exhibits edema.   Neurological: He is alert and oriented to person, place, and time.   Nursing note and vitals reviewed.      Significant Labs:   CBC:   Recent Labs   Lab  09/05/18   0630  09/06/18   0500   WBC  5.73  5.22   HGB  7.5*  6.8*   HCT  25.2*  22.5*   PLT  74*  139*     CMP:   Recent Labs   Lab  09/05/18   0754  09/05/18   1941 09/06/18   0500   NA  136  139  137   K  5.5*  4.1  4.5   CL  106  108  104   CO2  22*  25  25   GLU  245*  163*  311*   BUN   53*  25*  40*   CREATININE  7.8*  4.4*  5.7*   CALCIUM  8.6*  8.7  8.2*   ANIONGAP  8  6*  8   EGFRNONAA  7.1*  14.3*  10.4*       Significant Imaging: I have reviewed all pertinent imaging results/findings within the past 24 hours.

## 2018-09-06 NOTE — NURSING
MD notified of CBG of 433. MD ordered to give past due dose of aspart 14 units. Will continue to monitor.

## 2018-09-06 NOTE — PROGRESS NOTES
IHD completed, blood returned. Pt alert & stable. Hemostasis 10 minutes, dry sterile dressing with bandaids applied to access sites. Duration 3.0hrs, Qb 400ml/min,Qd 800ml/min, UFG 2.5L. Pt transported to unit via stretcher.

## 2018-09-06 NOTE — PLAN OF CARE
Problem: Pressure Ulcer Risk ([place] Scale) (Adult,Obstetrics,Pediatric)  Goal: Skin Integrity  Patient will demonstrate the desired outcomes by discharge/transition of care.  Outcome: Ongoing (interventions implemented as appropriate)  H&H 6.8/22.5, CURRENTLY RECEIVING  1 UNIT PRBC, HYPGLYCEMIC THIS AFTERNOON, REQUIRING GLUCOSE TABS 16GM, WITH WORSENING CBG FROM 57 TO 30,  REVERSED WITH 25GM DEXTROSE IVP, PT  % DINNER, PRANDIAL INSULIN GIVEN. ABX QM-W-F AFTER HD. WOUND CARE DUE Friday. POC DISCUSSED WITH PT. VERBALIZE UNDERSTANDING

## 2018-09-06 NOTE — PLAN OF CARE
Problem: Patient Care Overview  Goal: Plan of Care Review  Outcome: Ongoing (interventions implemented as appropriate)  Patient free of falls, traumas, and injuries. Patient vital signs stable in no distress. CBG managed with insulin and diet. Received Daptomycin 8mg/kg IVPG per MD order. Skin remains clean dry and intact. Reviewed care plan with patient; patient verbalized and understanding. Will continue to monitor.
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
## 2018-09-06 NOTE — PLAN OF CARE
"Selma informed by Anjana with [facility] that "Pt is accepted but PT/OT orders must be taken off." Selma updated orders resent to [facility] and Selma to inform Stacey with [facility] that Pt should be ok to d/c home today. Selma informed Stacey with [facility] infusion nurse liaison at  of [facility]. He will contact Selma if any issues with medications but if Pt stable, ok to d/c. Pt's labs are unstable and Pt will d/c tomorrow, Selma updated Stacey with [facility] and also Anjana with [facility].   "

## 2018-09-07 PROBLEM — R06.03 ACUTE RESPIRATORY DISTRESS: Status: ACTIVE | Noted: 2018-09-07

## 2018-09-07 LAB
ANION GAP SERPL CALC-SCNC: 5 MMOL/L
ANION GAP SERPL CALC-SCNC: 8 MMOL/L
ANION GAP SERPL CALC-SCNC: 8 MMOL/L
BACTERIA BLD CULT: NORMAL
BACTERIA BLD CULT: NORMAL
BASOPHILS # BLD AUTO: 0.03 K/UL
BASOPHILS NFR BLD: 0.5 %
BUN SERPL-MCNC: 28 MG/DL
BUN SERPL-MCNC: 40 MG/DL
BUN SERPL-MCNC: 63 MG/DL
CALCIUM SERPL-MCNC: 8.1 MG/DL
CALCIUM SERPL-MCNC: 8.2 MG/DL
CALCIUM SERPL-MCNC: 8.6 MG/DL
CHLORIDE SERPL-SCNC: 102 MMOL/L
CHLORIDE SERPL-SCNC: 103 MMOL/L
CHLORIDE SERPL-SCNC: 106 MMOL/L
CO2 SERPL-SCNC: 24 MMOL/L
CO2 SERPL-SCNC: 25 MMOL/L
CO2 SERPL-SCNC: 28 MMOL/L
CREAT SERPL-MCNC: 4.2 MG/DL
CREAT SERPL-MCNC: 5 MG/DL
CREAT SERPL-MCNC: 7.5 MG/DL
DIFFERENTIAL METHOD: ABNORMAL
EOSINOPHIL # BLD AUTO: 0.5 K/UL
EOSINOPHIL NFR BLD: 8.6 %
ERYTHROCYTE [DISTWIDTH] IN BLOOD BY AUTOMATED COUNT: 17.1 %
EST. GFR  (AFRICAN AMERICAN): 14.1 ML/MIN/1.73 M^2
EST. GFR  (AFRICAN AMERICAN): 17.4 ML/MIN/1.73 M^2
EST. GFR  (AFRICAN AMERICAN): 8.7 ML/MIN/1.73 M^2
EST. GFR  (NON AFRICAN AMERICAN): 12.2 ML/MIN/1.73 M^2
EST. GFR  (NON AFRICAN AMERICAN): 15.1 ML/MIN/1.73 M^2
EST. GFR  (NON AFRICAN AMERICAN): 7.5 ML/MIN/1.73 M^2
FOLATE SERPL-MCNC: 6.3 NG/ML
GLUCOSE SERPL-MCNC: 134 MG/DL
GLUCOSE SERPL-MCNC: 338 MG/DL
GLUCOSE SERPL-MCNC: 449 MG/DL
HCT VFR BLD AUTO: 25.5 %
HGB BLD-MCNC: 7.7 G/DL
IMM GRANULOCYTES # BLD AUTO: 0.03 K/UL
IMM GRANULOCYTES NFR BLD AUTO: 0.5 %
LYMPHOCYTES # BLD AUTO: 1.2 K/UL
LYMPHOCYTES NFR BLD: 20.1 %
MAGNESIUM SERPL-MCNC: 2.2 MG/DL
MCH RBC QN AUTO: 30.4 PG
MCHC RBC AUTO-ENTMCNC: 30.2 G/DL
MCV RBC AUTO: 101 FL
MONOCYTES # BLD AUTO: 0.5 K/UL
MONOCYTES NFR BLD: 9.1 %
NEUTROPHILS # BLD AUTO: 3.5 K/UL
NEUTROPHILS NFR BLD: 61.2 %
NRBC BLD-RTO: 0 /100 WBC
PHOSPHATE SERPL-MCNC: 4.4 MG/DL
PLATELET # BLD AUTO: 134 K/UL
PMV BLD AUTO: 10.7 FL
POCT GLUCOSE: 166 MG/DL (ref 70–110)
POCT GLUCOSE: 166 MG/DL (ref 70–110)
POCT GLUCOSE: 296 MG/DL (ref 70–110)
POCT GLUCOSE: 305 MG/DL (ref 70–110)
POCT GLUCOSE: 386 MG/DL (ref 70–110)
POCT GLUCOSE: 425 MG/DL (ref 70–110)
POTASSIUM SERPL-SCNC: 4 MMOL/L
POTASSIUM SERPL-SCNC: 4.1 MMOL/L
POTASSIUM SERPL-SCNC: 5.5 MMOL/L
RBC # BLD AUTO: 2.53 M/UL
SODIUM SERPL-SCNC: 135 MMOL/L
SODIUM SERPL-SCNC: 135 MMOL/L
SODIUM SERPL-SCNC: 139 MMOL/L
VIT B12 SERPL-MCNC: 731 PG/ML
WBC # BLD AUTO: 5.72 K/UL

## 2018-09-07 PROCEDURE — 25000003 PHARM REV CODE 250: Performed by: GENERAL PRACTICE

## 2018-09-07 PROCEDURE — 90935 HEMODIALYSIS ONE EVALUATION: CPT

## 2018-09-07 PROCEDURE — 84100 ASSAY OF PHOSPHORUS: CPT

## 2018-09-07 PROCEDURE — 82746 ASSAY OF FOLIC ACID SERUM: CPT

## 2018-09-07 PROCEDURE — 63600175 PHARM REV CODE 636 W HCPCS: Mod: JG | Performed by: STUDENT IN AN ORGANIZED HEALTH CARE EDUCATION/TRAINING PROGRAM

## 2018-09-07 PROCEDURE — 80048 BASIC METABOLIC PNL TOTAL CA: CPT

## 2018-09-07 PROCEDURE — 83735 ASSAY OF MAGNESIUM: CPT

## 2018-09-07 PROCEDURE — 20600001 HC STEP DOWN PRIVATE ROOM

## 2018-09-07 PROCEDURE — 25000003 PHARM REV CODE 250: Performed by: HOSPITALIST

## 2018-09-07 PROCEDURE — 25000003 PHARM REV CODE 250: Performed by: STUDENT IN AN ORGANIZED HEALTH CARE EDUCATION/TRAINING PROGRAM

## 2018-09-07 PROCEDURE — 36415 COLL VENOUS BLD VENIPUNCTURE: CPT

## 2018-09-07 PROCEDURE — 99232 SBSQ HOSP IP/OBS MODERATE 35: CPT | Mod: ,,, | Performed by: INTERNAL MEDICINE

## 2018-09-07 PROCEDURE — 25000003 PHARM REV CODE 250: Performed by: INTERNAL MEDICINE

## 2018-09-07 PROCEDURE — 63600175 PHARM REV CODE 636 W HCPCS: Performed by: STUDENT IN AN ORGANIZED HEALTH CARE EDUCATION/TRAINING PROGRAM

## 2018-09-07 PROCEDURE — 82607 VITAMIN B-12: CPT

## 2018-09-07 PROCEDURE — 85025 COMPLETE CBC W/AUTO DIFF WBC: CPT

## 2018-09-07 PROCEDURE — 80048 BASIC METABOLIC PNL TOTAL CA: CPT | Mod: 91

## 2018-09-07 PROCEDURE — 90935 HEMODIALYSIS ONE EVALUATION: CPT | Mod: ,,, | Performed by: INTERNAL MEDICINE

## 2018-09-07 PROCEDURE — 99232 SBSQ HOSP IP/OBS MODERATE 35: CPT | Mod: ,,, | Performed by: HOSPITALIST

## 2018-09-07 RX ORDER — SODIUM CHLORIDE 9 MG/ML
INJECTION, SOLUTION INTRAVENOUS ONCE
Status: COMPLETED | OUTPATIENT
Start: 2018-09-07 | End: 2018-09-07

## 2018-09-07 RX ORDER — HYDRALAZINE HYDROCHLORIDE 50 MG/1
50 TABLET, FILM COATED ORAL EVERY 8 HOURS
Status: DISCONTINUED | OUTPATIENT
Start: 2018-09-07 | End: 2018-09-08

## 2018-09-07 RX ORDER — SODIUM CHLORIDE 9 MG/ML
INJECTION, SOLUTION INTRAVENOUS
Status: DISCONTINUED | OUTPATIENT
Start: 2018-09-07 | End: 2018-09-11

## 2018-09-07 RX ADMIN — ASPIRIN 81 MG: 81 TABLET, COATED ORAL at 11:09

## 2018-09-07 RX ADMIN — ATROPINE SULFATE 1 DROP: 10 SOLUTION/ DROPS OPHTHALMIC at 11:09

## 2018-09-07 RX ADMIN — METHADONE HYDROCHLORIDE 10 MG: 5 TABLET ORAL at 11:09

## 2018-09-07 RX ADMIN — HEPARIN SODIUM 5000 UNITS: 5000 INJECTION, SOLUTION INTRAVENOUS; SUBCUTANEOUS at 02:09

## 2018-09-07 RX ADMIN — DAPTOMYCIN 905 MG: 500 INJECTION, POWDER, LYOPHILIZED, FOR SOLUTION INTRAVENOUS at 09:09

## 2018-09-07 RX ADMIN — CETIRIZINE HYDROCHLORIDE 5 MG: 5 TABLET ORAL at 11:09

## 2018-09-07 RX ADMIN — CARVEDILOL 25 MG: 25 TABLET, FILM COATED ORAL at 09:09

## 2018-09-07 RX ADMIN — PREDNISOLONE ACETATE 1 DROP: 10 SUSPENSION/ DROPS OPHTHALMIC at 02:09

## 2018-09-07 RX ADMIN — HYDRALAZINE HYDROCHLORIDE 50 MG: 50 TABLET ORAL at 09:09

## 2018-09-07 RX ADMIN — INSULIN ASPART 14 UNITS: 100 INJECTION, SOLUTION INTRAVENOUS; SUBCUTANEOUS at 02:09

## 2018-09-07 RX ADMIN — CALCIUM ACETATE 1334 MG: 667 CAPSULE ORAL at 05:09

## 2018-09-07 RX ADMIN — INSULIN DETEMIR 20 UNITS: 100 INJECTION, SOLUTION SUBCUTANEOUS at 10:09

## 2018-09-07 RX ADMIN — HYDRALAZINE HYDROCHLORIDE 25 MG: 25 TABLET ORAL at 02:09

## 2018-09-07 RX ADMIN — METHADONE HYDROCHLORIDE 10 MG: 5 TABLET ORAL at 09:09

## 2018-09-07 RX ADMIN — HYDRALAZINE HYDROCHLORIDE 50 MG: 50 TABLET ORAL at 11:09

## 2018-09-07 RX ADMIN — PREDNISOLONE ACETATE 1 DROP: 10 SUSPENSION/ DROPS OPHTHALMIC at 05:09

## 2018-09-07 RX ADMIN — INSULIN ASPART 10 UNITS: 100 INJECTION, SOLUTION INTRAVENOUS; SUBCUTANEOUS at 02:09

## 2018-09-07 RX ADMIN — HEPARIN SODIUM 5000 UNITS: 5000 INJECTION, SOLUTION INTRAVENOUS; SUBCUTANEOUS at 05:09

## 2018-09-07 RX ADMIN — CARVEDILOL 25 MG: 25 TABLET, FILM COATED ORAL at 10:09

## 2018-09-07 RX ADMIN — CALCIUM ACETATE 1334 MG: 667 CAPSULE ORAL at 11:09

## 2018-09-07 RX ADMIN — ACETAMINOPHEN 650 MG: 325 TABLET, FILM COATED ORAL at 09:09

## 2018-09-07 RX ADMIN — AMLODIPINE BESYLATE 10 MG: 10 TABLET ORAL at 10:09

## 2018-09-07 RX ADMIN — ATORVASTATIN CALCIUM 80 MG: 20 TABLET, FILM COATED ORAL at 11:09

## 2018-09-07 RX ADMIN — SODIUM CHLORIDE 300 ML: 0.9 INJECTION, SOLUTION INTRAVENOUS at 07:09

## 2018-09-07 RX ADMIN — INSULIN ASPART 14 UNITS: 100 INJECTION, SOLUTION INTRAVENOUS; SUBCUTANEOUS at 10:09

## 2018-09-07 RX ADMIN — SERTRALINE HYDROCHLORIDE 25 MG: 25 TABLET ORAL at 11:09

## 2018-09-07 RX ADMIN — HEPARIN SODIUM 5000 UNITS: 5000 INJECTION, SOLUTION INTRAVENOUS; SUBCUTANEOUS at 09:09

## 2018-09-07 RX ADMIN — CLONAZEPAM 0.5 MG: 0.5 TABLET ORAL at 09:09

## 2018-09-07 RX ADMIN — INSULIN DETEMIR 20 UNITS: 100 INJECTION, SOLUTION SUBCUTANEOUS at 09:09

## 2018-09-07 RX ADMIN — HYDRALAZINE HYDROCHLORIDE 25 MG: 25 TABLET ORAL at 05:09

## 2018-09-07 RX ADMIN — INSULIN ASPART 14 UNITS: 100 INJECTION, SOLUTION INTRAVENOUS; SUBCUTANEOUS at 06:09

## 2018-09-07 NOTE — SUBJECTIVE & OBJECTIVE
Interval History: NAEON. Hypertensive this AM    Review of Systems   Constitutional: Negative for chills and fever.   HENT: Negative for trouble swallowing.    Eyes: Positive for visual disturbance. Negative for pain.   Respiratory: Positive for chest tightness and shortness of breath.    Cardiovascular: Negative for chest pain, palpitations and leg swelling.   Gastrointestinal: Negative for abdominal pain.   Genitourinary: Negative for difficulty urinating.   Musculoskeletal: Negative for arthralgias.   Neurological: Negative for dizziness, light-headedness and headaches.   Psychiatric/Behavioral: Negative for agitation and confusion.     Objective:     Vital Signs (Most Recent):  Temp: 97.7 °F (36.5 °C) (09/07/18 1558)  Pulse: 84 (09/07/18 1628)  Resp: 18 (09/07/18 1558)  BP: (!) 158/70 (09/07/18 1558)  SpO2: 95 % (09/07/18 1558) Vital Signs (24h Range):  Temp:  [97.7 °F (36.5 °C)-98.8 °F (37.1 °C)] 97.7 °F (36.5 °C)  Pulse:  [] 84  Resp:  [16-18] 18  SpO2:  [90 %-95 %] 95 %  BP: (125-198)/(40-85) 158/70     Weight: 101.2 kg (223 lb 1.6 oz)  Body mass index is 31.12 kg/m².    Intake/Output Summary (Last 24 hours) at 9/7/2018 1829  Last data filed at 9/7/2018 1300  Gross per 24 hour   Intake 1320 ml   Output 2650 ml   Net -1330 ml      Physical Exam   Constitutional: He is oriented to person, place, and time. No distress.   HENT:   Head: Normocephalic.   Eyes: Pupils are equal, round, and reactive to light.   Cardiovascular: Normal rate and regular rhythm.   Pulmonary/Chest: Effort normal and breath sounds normal.   Abdominal: Soft. Bowel sounds are normal.   Musculoskeletal: He exhibits no edema.   Neurological: He is alert and oriented to person, place, and time.   Skin: Skin is warm.       Significant Labs:   A1C:   Recent Labs   Lab  08/17/18   2159   HGBA1C  8.2*     ABGs: No results for input(s): PH, PCO2, HCO3, POCSATURATED, BE, TOTALHB, COHB, METHB, O2HB, POCFIO2 in the last 48 hours.  Bilirubin:    Recent Labs   Lab  08/18/18   0613  08/19/18   0645  08/20/18   0826  08/21/18   0456  09/02/18   0518   BILITOT  0.8  0.7  0.4  0.5  0.5     Blood Culture: No results for input(s): LABBLOO in the last 48 hours.  BMP:   Recent Labs   Lab  09/07/18   0431  09/07/18   1155   GLU  338*  449*   NA  135*  135*   K  5.5*  4.0   CL  103  102   CO2  24  25   BUN  63*  28*   CREATININE  7.5*  4.2*   CALCIUM  8.1*  8.6*   MG  2.2   --      CBC:   Recent Labs   Lab  09/06/18   0500  09/07/18   0431   WBC  5.22  5.72   HGB  6.8*  7.7*   HCT  22.5*  25.5*   PLT  139*  134*     CMP:   Recent Labs   Lab  09/06/18   0500  09/07/18   0431  09/07/18   1155   NA  137  135*  135*   K  4.5  5.5*  4.0   CL  104  103  102   CO2  25  24  25   GLU  311*  338*  449*   BUN  40*  63*  28*   CREATININE  5.7*  7.5*  4.2*   CALCIUM  8.2*  8.1*  8.6*   ANIONGAP  8  8  8   EGFRNONAA  10.4*  7.5*  15.1*     Cardiac Markers: No results for input(s): CKMB, MYOGLOBIN, BNP, TROPISTAT in the last 48 hours.  Coagulation: No results for input(s): PT, INR, APTT in the last 48 hours.  Lactic Acid: No results for input(s): LACTATE in the last 48 hours.  Lipase: No results for input(s): LIPASE in the last 48 hours.  Lipid Panel: No results for input(s): CHOL, HDL, LDLCALC, TRIG, CHOLHDL in the last 48 hours.  Magnesium:   Recent Labs   Lab  09/06/18   0500  09/07/18   0431   MG  2.1  2.2     Pathology Results  (Last 10 years)    None        POCT Glucose:   Recent Labs   Lab  09/07/18   1418  09/07/18   1559  09/07/18   1708   POCTGLUCOSE  425*  305*  166*     Prealbumin: No results for input(s): PREALBUMIN in the last 48 hours.  Respiratory Culture: No results for input(s): GSRESP, RESPIRATORYC in the last 48 hours.  Troponin:   Recent Labs   Lab  09/06/18   0728   TROPONINI  0.083*     TSH: No results for input(s): TSH in the last 4320 hours.  Urine Culture: No results for input(s): LABURIN in the last 48 hours.  Urine Studies: No results for input(s):  COLORU, APPEARANCEUA, PHUR, SPECGRAV, PROTEINUA, GLUCUA, KETONESU, BILIRUBINUA, OCCULTUA, NITRITE, UROBILINOGEN, LEUKOCYTESUR, RBCUA, WBCUA, BACTERIA, SQUAMEPITHEL, HYALINECASTS in the last 48 hours.    Invalid input(s): LISA  All pertinent labs within the past 24 hours have been reviewed.    Significant Imaging: I have reviewed all pertinent imaging results/findings within the past 24 hours.

## 2018-09-07 NOTE — ASSESSMENT & PLAN NOTE
- Osteomyelitis of left 5th metatarsal taken for washout by Orthopedic surgery at [facility] on 08/15  - Patient being treated outside facility with Flagyl, linezolid, gentamicin with HD.  - Refusing amputation which would be curative.   - Podiatry consulted, appreciate recs. Advised BKA but patient refused. Recommended CAM boot and abx per ID. D/tyler wound vac  - ESR elevated at 58 upon admission  - X-ray left foot and ankle shows partial amputation the 1st, 2nd, 3rd, and 4th digits with fusion of the 2nd and 3rd MTP joints and throughout the midfoot, severe deformity and joint space loss the tibiotalar joint with a lapse of the talus likely 2/2 to chronic osteomyelitis, arthritis, or chronic Charcot foot.   - BCx2 from [facility] grew MRSA. Cult[place] [facility] [place]GT[place].    PLAN:  - On Daptomycin 8mg/kg after HD per ID's recs. Planned for 6 weeks total, last dose 9/29  - ID follow up at 2 weeks.  - Podiatry consulted, appreciate recs. [place] [facility] at discharge.

## 2018-09-07 NOTE — PLAN OF CARE
Problem: Patient Care Overview  Goal: Plan of Care Review  Outcome: Ongoing (interventions implemented as appropriate)  Plan of care discussed with patient, no new questions at this time. VSS, denies SOB, pain well controlled. 1 unit of PRBC given, will check H/H w/ morning labs. Plan for Dialysis in am. Safety precautions taken, no falls/trauma during the shift. Will continue to monitor.

## 2018-09-07 NOTE — PLAN OF CARE
Sw informed Pt not stable for d/c today, Selma updated Stacey with [facility] also updated as well. On call CM needs to touch base with on call nurse from [facility] if Pt is stable to d/c.

## 2018-09-07 NOTE — PROGRESS NOTES
Pt arrived to unit via stretcher. Pt alert & stable. Maintenance pt. Accessed LT AVF x2 15g needles without difficulty. Duration 3.0hrs, Qb 400ml/min, Qd 800ml/min, planned UFG 2.0L, orders reviewed.

## 2018-09-07 NOTE — PROGRESS NOTES
IHD completed, blood returned. Pt alert & stable. Hemostasis 10 minutes, dry sterile dressing with bandaids applied to sites. Duration 3.0hrs, Qb 400ml/min, Qd 800ml/min, UFG 2.1L. Report called. Pt transported to [facility] via stretcher with tele.
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
## 2018-09-07 NOTE — PROGRESS NOTES
Rechecked pt BG after lunch insulin given.  BG = 305.  Dr. Roberts notified of results.  No new orders received at this time.  Will continue to monitor.

## 2018-09-07 NOTE — PLAN OF CARE
Problem: Patient Care Overview  Goal: Plan of Care Review  Outcome: Revised  Plan of care discussed with patient. Patient is free of fall/trauma/injury. Denies CP, SOB. Pain managed with PRN pain medication.  Pt received HD today with 2.1 liters removed. All questions addressed. Will continue to monitor

## 2018-09-07 NOTE — NURSING TRANSFER
Nursing Transfer Note      9/7/2018     Transfer To: 322 from HD    Transfer via stretcher    Transfer with cardiac monitoring    Transported by Transport attendant    Medicines sent: yes    Chart send with patient: Yes

## 2018-09-07 NOTE — PROGRESS NOTES
Pt arrived back form ophthalmology.  Rechecked patient's BG and received a result of 425.  Gave pt.  14 units for mealtime insulin and 10 units of sliding scale insulin.  Notified Dr. Roberts of results.  No additional orders received at this time.  Will continue to monitor.

## 2018-09-07 NOTE — PROGRESS NOTES
I personally saw and examined the patient on hemodialysis, tolerating treatment, see hemodyalisis flowsheet. I also reviewed the chart and current medication. The dialysis bath was adjusted.   Target UF 2 liter.

## 2018-09-07 NOTE — SUBJECTIVE & OBJECTIVE
Past Medical History:   Diagnosis Date    Allergic drug rash - due to Vancomycin 8/19/2018    Allergic drug rash - due to Vancomycin 8/19/2018    Anxiety 8/17/2018    Arthritis     Blind left eye     Charcot's joint of foot     Chronic back pain 8/17/2018    Chronic, continuous use of opioids 8/17/2018    Debility 8/17/2018    Diabetes mellitus     GERD (gastroesophageal reflux disease)     Glaucoma     Hypertension     Methadone dependence 8/18/2018    MRSA (methicillin resistant staph aureus) culture positive     Osteomyelitis     Renal disorder     Sepsis due to methicillin resistant Staphylococcus aureus (MRSA) 8/17/2018    Subretinal abscess 8/17/2018       Past Surgical History:   Procedure Laterality Date    AVF left upper arm      left ankle surgery      lumbar back surgery         Review of patient's allergies indicates:   Allergen Reactions    Vancomycin analogues Rash     Red Man Syndrome    Penicillins        No current facility-administered medications on file prior to encounter.      Current Outpatient Medications on File Prior to Encounter   Medication Sig    calcium acetate (PHOSLO) 667 mg capsule Take 667 mg by mouth 3 (three) times daily with meals.    clonazePAM (KLONOPIN) 0.5 MG tablet Take 0.5 mg by mouth 2 (two) times daily as needed for Anxiety.    omeprazole (PRILOSEC) 20 MG capsule Take 20 mg by mouth once daily.    sertraline (ZOLOFT) 25 MG tablet Take 25 mg by mouth once daily.     Family History     Problem Relation (Age of Onset)    Pneumonia Mother        Tobacco Use    Smoking status: Never Smoker   Substance and Sexual Activity    Alcohol use: No     Frequency: Never    Drug use: No    Sexual activity: Not Currently     Review of Systems   Constitution: Negative for chills and fever.   HENT: Negative for hoarse voice and sore throat.    Cardiovascular: Positive for orthopnea. Negative for chest pain, claudication, cyanosis, dyspnea on exertion,  irregular heartbeat, leg swelling, near-syncope, palpitations, paroxysmal nocturnal dyspnea and syncope.   Respiratory: Positive for shortness of breath. Negative for cough and hemoptysis.    Musculoskeletal: Positive for joint pain. Negative for back pain and joint swelling.   Gastrointestinal: Positive for vomiting. Negative for abdominal pain, constipation, diarrhea, hematochezia, melena and nausea.   Neurological: Negative for dizziness, headaches and light-headedness.   Psychiatric/Behavioral: Negative for altered mental status, depression and suicidal ideas. The patient is not nervous/anxious.      Objective:     Vital Signs (Most Recent):  Temp: 98.6 °F (37 °C) (09/07/18 1137)  Pulse: 88 (09/07/18 1137)  Resp: 18 (09/07/18 1137)  BP: (!) 167/79 (09/07/18 1137)  SpO2: (!) 90 % (09/07/18 1137) Vital Signs (24h Range):  Temp:  [97.4 °F (36.3 °C)-98.8 °F (37.1 °C)] 98.6 °F (37 °C)  Pulse:  [72-92] 88  Resp:  [16-20] 18  SpO2:  [90 %-97 %] 90 %  BP: (122-198)/(40-85) 167/79     Weight: 101.2 kg (223 lb 1.6 oz)  Body mass index is 31.12 kg/m².    SpO2: (!) 90 %  O2 Device (Oxygen Therapy): room air      Intake/Output Summary (Last 24 hours) at 9/7/2018 1156  Last data filed at 9/7/2018 1030  Gross per 24 hour   Intake 1469 ml   Output 2650 ml   Net -1181 ml       Lines/Drains/Airways     Central Venous Catheter Line                 Port A Cath Single Lumen 09/01/18 0905 right subclavian 6 days          Drain                 Hemodialysis AV Fistula Left upper arm -- days          Peripheral Intravenous Line                 Peripheral IV - Single Lumen 08/30/18 1416 Right Upper Arm 7 days                Physical Exam   Constitutional: He is oriented to person, place, and time. He appears well-developed and well-nourished. No distress.   HENT:   Head: Normocephalic and atraumatic.   Mouth/Throat: Oropharynx is clear and moist. No oropharyngeal exudate.   Eyes: Conjunctivae and EOM are normal. Pupils are equal, round,  and reactive to light. Right eye exhibits no discharge. Left eye exhibits no discharge. No scleral icterus.   Neck: Normal range of motion. Neck supple. No JVD present. No tracheal deviation present. No thyromegaly present.   Cardiovascular: Normal rate, regular rhythm and intact distal pulses. Exam reveals no gallop and no friction rub.   Murmur (LLSB 2/6 CLAIRE) heard.  Pulmonary/Chest: Effort normal and breath sounds normal. No respiratory distress. He has no wheezes. He has no rales.   Abdominal: Soft. Bowel sounds are normal. He exhibits no distension and no mass. There is no tenderness. There is no rebound and no guarding.   Musculoskeletal: Normal range of motion. He exhibits edema (Trace) and deformity (LLE).   Lymphadenopathy:     He has no cervical adenopathy.   Neurological: He is alert and oriented to person, place, and time. No cranial nerve deficit.   Skin: Skin is warm and dry. He is not diaphoretic.   Psychiatric: He has a normal mood and affect. His behavior is normal. Judgment and thought content normal.   Nursing note and vitals reviewed.      Significant Labs:   CMP   Recent Labs   Lab  09/06/18   0500  09/07/18   0431  09/07/18   1155   NA  137  135*  135*   K  4.5  5.5*  4.0   CL  104  103  102   CO2  25  24  25   GLU  311*  338*  449*   BUN  40*  63*  28*   CREATININE  5.7*  7.5*  4.2*   CALCIUM  8.2*  8.1*  8.6*   ANIONGAP  8  8  8   ESTGFRAFRICA  12.1*  8.7*  17.4*   EGFRNONAA  10.4*  7.5*  15.1*   , CBC   Recent Labs   Lab  09/06/18   0500  09/07/18   0431   WBC  5.22  5.72   HGB  6.8*  7.7*   HCT  22.5*  25.5*   PLT  139*  134*    and Troponin   Recent Labs   Lab  09/06/18   0728   TROPONINI  0.083*       Significant Imaging: EKG: NSR
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
## 2018-09-07 NOTE — NURSING TRANSFER
Nursing Transfer Note  Patient left for ophthalmology clinic in wheelchair per clinic nurse.   Patient remains connected to telemetry

## 2018-09-08 LAB
ANION GAP SERPL CALC-SCNC: 8 MMOL/L
ANION GAP SERPL CALC-SCNC: 9 MMOL/L
BASOPHILS # BLD AUTO: 0.04 K/UL
BASOPHILS NFR BLD: 0.7 %
BUN SERPL-MCNC: 59 MG/DL
BUN SERPL-MCNC: 60 MG/DL
CALCIUM SERPL-MCNC: 8.5 MG/DL
CALCIUM SERPL-MCNC: 8.6 MG/DL
CHLORIDE SERPL-SCNC: 104 MMOL/L
CHLORIDE SERPL-SCNC: 106 MMOL/L
CO2 SERPL-SCNC: 25 MMOL/L
CO2 SERPL-SCNC: 26 MMOL/L
CREAT SERPL-MCNC: 6.6 MG/DL
CREAT SERPL-MCNC: 7.1 MG/DL
DIFFERENTIAL METHOD: ABNORMAL
EOSINOPHIL # BLD AUTO: 0.7 K/UL
EOSINOPHIL NFR BLD: 12.8 %
ERYTHROCYTE [DISTWIDTH] IN BLOOD BY AUTOMATED COUNT: 16.8 %
EST. GFR  (AFRICAN AMERICAN): 10.1 ML/MIN/1.73 M^2
EST. GFR  (AFRICAN AMERICAN): 9.2 ML/MIN/1.73 M^2
EST. GFR  (NON AFRICAN AMERICAN): 8 ML/MIN/1.73 M^2
EST. GFR  (NON AFRICAN AMERICAN): 8.7 ML/MIN/1.73 M^2
GLUCOSE SERPL-MCNC: 208 MG/DL
GLUCOSE SERPL-MCNC: 80 MG/DL
HCT VFR BLD AUTO: 24.2 %
HGB BLD-MCNC: 7.4 G/DL
IMM GRANULOCYTES # BLD AUTO: 0.03 K/UL
IMM GRANULOCYTES NFR BLD AUTO: 0.5 %
LYMPHOCYTES # BLD AUTO: 1.2 K/UL
LYMPHOCYTES NFR BLD: 22.1 %
MAGNESIUM SERPL-MCNC: 2.1 MG/DL
MCH RBC QN AUTO: 30.8 PG
MCHC RBC AUTO-ENTMCNC: 30.6 G/DL
MCV RBC AUTO: 101 FL
MONOCYTES # BLD AUTO: 0.5 K/UL
MONOCYTES NFR BLD: 9.9 %
NEUTROPHILS # BLD AUTO: 3 K/UL
NEUTROPHILS NFR BLD: 54 %
NRBC BLD-RTO: 0 /100 WBC
PHOSPHATE SERPL-MCNC: 3.2 MG/DL
PLATELET # BLD AUTO: 112 K/UL
PMV BLD AUTO: 11.5 FL
POCT GLUCOSE: 185 MG/DL (ref 70–110)
POCT GLUCOSE: 215 MG/DL (ref 70–110)
POCT GLUCOSE: 219 MG/DL (ref 70–110)
POCT GLUCOSE: 269 MG/DL (ref 70–110)
POCT GLUCOSE: 312 MG/DL (ref 70–110)
POCT GLUCOSE: 68 MG/DL (ref 70–110)
POTASSIUM SERPL-SCNC: 4.4 MMOL/L
POTASSIUM SERPL-SCNC: 5 MMOL/L
RBC # BLD AUTO: 2.4 M/UL
SODIUM SERPL-SCNC: 138 MMOL/L
SODIUM SERPL-SCNC: 140 MMOL/L
TROPONIN I SERPL DL<=0.01 NG/ML-MCNC: 0.03 NG/ML
WBC # BLD AUTO: 5.47 K/UL

## 2018-09-08 PROCEDURE — 25000003 PHARM REV CODE 250: Performed by: STUDENT IN AN ORGANIZED HEALTH CARE EDUCATION/TRAINING PROGRAM

## 2018-09-08 PROCEDURE — 84484 ASSAY OF TROPONIN QUANT: CPT

## 2018-09-08 PROCEDURE — 63600175 PHARM REV CODE 636 W HCPCS: Performed by: HOSPITALIST

## 2018-09-08 PROCEDURE — 99232 SBSQ HOSP IP/OBS MODERATE 35: CPT | Mod: ,,, | Performed by: HOSPITALIST

## 2018-09-08 PROCEDURE — 63600175 PHARM REV CODE 636 W HCPCS: Performed by: STUDENT IN AN ORGANIZED HEALTH CARE EDUCATION/TRAINING PROGRAM

## 2018-09-08 PROCEDURE — 85025 COMPLETE CBC W/AUTO DIFF WBC: CPT

## 2018-09-08 PROCEDURE — 80048 BASIC METABOLIC PNL TOTAL CA: CPT

## 2018-09-08 PROCEDURE — 84100 ASSAY OF PHOSPHORUS: CPT

## 2018-09-08 PROCEDURE — 20600001 HC STEP DOWN PRIVATE ROOM

## 2018-09-08 PROCEDURE — 83735 ASSAY OF MAGNESIUM: CPT

## 2018-09-08 PROCEDURE — 25000003 PHARM REV CODE 250: Performed by: INTERNAL MEDICINE

## 2018-09-08 PROCEDURE — 25000003 PHARM REV CODE 250: Performed by: HOSPITALIST

## 2018-09-08 RX ORDER — OXYMETAZOLINE HCL 0.05 %
2 SPRAY, NON-AEROSOL (ML) NASAL ONCE
Status: COMPLETED | OUTPATIENT
Start: 2018-09-08 | End: 2018-09-08

## 2018-09-08 RX ORDER — FLUTICASONE PROPIONATE 50 MCG
2 SPRAY, SUSPENSION (ML) NASAL DAILY
Status: DISCONTINUED | OUTPATIENT
Start: 2018-09-08 | End: 2018-09-21 | Stop reason: HOSPADM

## 2018-09-08 RX ORDER — HYDRALAZINE HYDROCHLORIDE 50 MG/1
100 TABLET, FILM COATED ORAL EVERY 8 HOURS
Status: DISCONTINUED | OUTPATIENT
Start: 2018-09-08 | End: 2018-09-21 | Stop reason: HOSPADM

## 2018-09-08 RX ADMIN — ASPIRIN 81 MG: 81 TABLET, COATED ORAL at 08:09

## 2018-09-08 RX ADMIN — HEPARIN SODIUM 5000 UNITS: 5000 INJECTION, SOLUTION INTRAVENOUS; SUBCUTANEOUS at 09:09

## 2018-09-08 RX ADMIN — HYDRALAZINE HYDROCHLORIDE 50 MG: 50 TABLET ORAL at 06:09

## 2018-09-08 RX ADMIN — CARVEDILOL 25 MG: 25 TABLET, FILM COATED ORAL at 08:09

## 2018-09-08 RX ADMIN — HEPARIN SODIUM 5000 UNITS: 5000 INJECTION, SOLUTION INTRAVENOUS; SUBCUTANEOUS at 06:09

## 2018-09-08 RX ADMIN — METHADONE HYDROCHLORIDE 10 MG: 5 TABLET ORAL at 08:09

## 2018-09-08 RX ADMIN — INSULIN DETEMIR 20 UNITS: 100 INJECTION, SOLUTION SUBCUTANEOUS at 09:09

## 2018-09-08 RX ADMIN — HYDROCODONE BITARTRATE AND ACETAMINOPHEN 1 TABLET: 7.5; 325 TABLET ORAL at 12:09

## 2018-09-08 RX ADMIN — AMLODIPINE BESYLATE 10 MG: 10 TABLET ORAL at 08:09

## 2018-09-08 RX ADMIN — INSULIN ASPART 14 UNITS: 100 INJECTION, SOLUTION INTRAVENOUS; SUBCUTANEOUS at 12:09

## 2018-09-08 RX ADMIN — ATORVASTATIN CALCIUM 80 MG: 20 TABLET, FILM COATED ORAL at 08:09

## 2018-09-08 RX ADMIN — INSULIN DETEMIR 20 UNITS: 100 INJECTION, SOLUTION SUBCUTANEOUS at 08:09

## 2018-09-08 RX ADMIN — HEPARIN SODIUM 5000 UNITS: 5000 INJECTION, SOLUTION INTRAVENOUS; SUBCUTANEOUS at 03:09

## 2018-09-08 RX ADMIN — HYDRALAZINE HYDROCHLORIDE 50 MG: 50 TABLET ORAL at 12:09

## 2018-09-08 RX ADMIN — CALCIUM ACETATE 1334 MG: 667 CAPSULE ORAL at 05:09

## 2018-09-08 RX ADMIN — CALCIUM ACETATE 1334 MG: 667 CAPSULE ORAL at 12:09

## 2018-09-08 RX ADMIN — SERTRALINE HYDROCHLORIDE 25 MG: 25 TABLET ORAL at 08:09

## 2018-09-08 RX ADMIN — INSULIN ASPART 6 UNITS: 100 INJECTION, SOLUTION INTRAVENOUS; SUBCUTANEOUS at 07:09

## 2018-09-08 RX ADMIN — PREDNISOLONE ACETATE 1 DROP: 10 SUSPENSION/ DROPS OPHTHALMIC at 05:09

## 2018-09-08 RX ADMIN — CETIRIZINE HYDROCHLORIDE 5 MG: 5 TABLET ORAL at 08:09

## 2018-09-08 RX ADMIN — ERYTHROPOIETIN 8000 UNITS: 4000 INJECTION, SOLUTION INTRAVENOUS; SUBCUTANEOUS at 10:09

## 2018-09-08 RX ADMIN — HYDRALAZINE HYDROCHLORIDE 100 MG: 50 TABLET ORAL at 08:09

## 2018-09-08 RX ADMIN — PREDNISOLONE ACETATE 1 DROP: 10 SUSPENSION/ DROPS OPHTHALMIC at 08:09

## 2018-09-08 RX ADMIN — CLONAZEPAM 0.5 MG: 0.5 TABLET ORAL at 08:09

## 2018-09-08 RX ADMIN — INSULIN ASPART 4 UNITS: 100 INJECTION, SOLUTION INTRAVENOUS; SUBCUTANEOUS at 12:09

## 2018-09-08 RX ADMIN — INSULIN ASPART 14 UNITS: 100 INJECTION, SOLUTION INTRAVENOUS; SUBCUTANEOUS at 07:09

## 2018-09-08 RX ADMIN — OXYMETAZOLINE HYDROCHLORIDE 2 SPRAY: 5 SPRAY NASAL at 03:09

## 2018-09-08 RX ADMIN — CALCIUM ACETATE 1334 MG: 667 CAPSULE ORAL at 08:09

## 2018-09-08 NOTE — PLAN OF CARE
Problem: Patient Care Overview  Goal: Plan of Care Review  Outcome: Ongoing (interventions implemented as appropriate)  Pt free of falls/trauma/injuries this shift. Pt receiving PO hydralazine PRN for BP. CBGs managed with meals, meal dose insulin, and sliding scale insulin. Pt voices no complaints at this time. Will continue to monitor pt.
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
## 2018-09-08 NOTE — ASSESSMENT & PLAN NOTE
- Hypertensive since admission with brief episodes of hypotension bradycardia in between.  - Was on Losartan and labetalol initially but later discontinued in the setting of bradycardia and junctional rhythm  - BP consistently high and in the setting of negative ischemic workup, is likely the cause for his pulmonary edema and SOB  - Continue Norvasc 10mg qdaily, Coreg 25 mg BID.  - Hydralazine increased to 100mg TID with 25mg PRN q8

## 2018-09-08 NOTE — PLAN OF CARE
Problem: Patient Care Overview  Goal: Plan of Care Review  Outcome: Revised  Pt free of falls/trauma/injuries.  Denies c/o SOB, CP, or discomfort.  Generalized skin remains CDI; No edema noted.  Possible d/c tomorrow. Pt tolerating plan of care.

## 2018-09-08 NOTE — SUBJECTIVE & OBJECTIVE
Interval History: NAEON.    Review of Systems   Constitutional: Negative for chills and fever.   HENT: Negative for trouble swallowing.    Eyes: Negative for pain and discharge.        Visual disturbance (improving)   Respiratory: Positive for chest tightness and shortness of breath.    Cardiovascular: Negative for chest pain, palpitations and leg swelling.   Gastrointestinal: Negative for abdominal distention and abdominal pain.   Genitourinary: Negative for difficulty urinating.   Neurological: Negative for dizziness and light-headedness.   Psychiatric/Behavioral: Negative for agitation.     Objective:     Vital Signs (Most Recent):  Temp: 98.3 °F (36.8 °C) (09/08/18 1220)  Pulse: 72 (09/08/18 1220)  Resp: 17 (09/08/18 1220)  BP: (!) 180/83 (09/08/18 1220)  SpO2: (!) 92 % (09/08/18 1220) Vital Signs (24h Range):  Temp:  [97.4 °F (36.3 °C)-98.3 °F (36.8 °C)] 98.3 °F (36.8 °C)  Pulse:  [70-90] 72  Resp:  [16-18] 17  SpO2:  [90 %-95 %] 92 %  BP: (137-180)/(61-83) 180/83     Weight: 101.2 kg (223 lb 1.6 oz)  Body mass index is 31.12 kg/m².    Intake/Output Summary (Last 24 hours) at 9/8/2018 1526  Last data filed at 9/8/2018 0500  Gross per 24 hour   Intake 480 ml   Output 25 ml   Net 455 ml      Physical Exam   Constitutional: He is oriented to person, place, and time. No distress.   HENT:   Head: Normocephalic.   Eyes: Pupils are equal, round, and reactive to light.   Neck: Normal range of motion.   Cardiovascular: Normal rate and regular rhythm.   Pulmonary/Chest: Effort normal.   Bibasilar crackles.   Abdominal: Soft. Bowel sounds are normal.   Musculoskeletal: Normal range of motion.   Neurological: He is alert and oriented to person, place, and time.   Skin: Skin is warm.   Nursing note and vitals reviewed.      Significant Labs:   A1C:   Recent Labs   Lab  08/17/18   2159   HGBA1C  8.2*     ABGs: No results for input(s): PH, PCO2, HCO3, POCSATURATED, BE, TOTALHB, COHB, METHB, O2HB, POCFIO2 in the last 48  hours.  Bilirubin:   Recent Labs   Lab  08/18/18   0613  08/19/18   0645  08/20/18   0826  08/21/18   0456  09/02/18   0518   BILITOT  0.8  0.7  0.4  0.5  0.5     Blood Culture: No results for input(s): LABBLOO in the last 48 hours.  BMP:   Recent Labs   Lab  09/07/18 1952 09/08/18   0531   GLU  134*   --    NA  139   --    K  4.1   --    CL  106   --    CO2  28   --    BUN  40*   --    CREATININE  5.0*   --    CALCIUM  8.2*   --    MG   --   2.1     CBC:   Recent Labs   Lab  09/07/18   0431 09/08/18   0531   WBC  5.72  5.47   HGB  7.7*  7.4*   HCT  25.5*  24.2*   PLT  134*  112*     CMP:   Recent Labs   Lab  09/07/18   0431 09/07/18   1155  09/07/18 1952   NA  135*  135*  139   K  5.5*  4.0  4.1   CL  103  102  106   CO2  24  25  28   GLU  338*  449*  134*   BUN  63*  28*  40*   CREATININE  7.5*  4.2*  5.0*   CALCIUM  8.1*  8.6*  8.2*   ANIONGAP  8  8  5*   EGFRNONAA  7.5*  15.1*  12.2*     Cardiac Markers: No results for input(s): CKMB, MYOGLOBIN, BNP, TROPISTAT in the last 48 hours.  Coagulation: No results for input(s): PT, INR, APTT in the last 48 hours.  Lactic Acid: No results for input(s): LACTATE in the last 48 hours.  Lipase: No results for input(s): LIPASE in the last 48 hours.  Lipid Panel: No results for input(s): CHOL, HDL, LDLCALC, TRIG, CHOLHDL in the last 48 hours.  Magnesium:   Recent Labs   Lab  09/07/18 0431 09/08/18   0531   MG  2.2  2.1     Pathology Results  (Last 10 years)    None        POCT Glucose:   Recent Labs   Lab  09/08/18   0127  09/08/18   0736  09/08/18   1204   POCTGLUCOSE  312*  269*  219*     Prealbumin: No results for input(s): PREALBUMIN in the last 48 hours.  Respiratory Culture: No results for input(s): GSRESP, RESPIRATORYC in the last 48 hours.  Troponin: No results for input(s): TROPONINI in the last 48 hours.  TSH: No results for input(s): TSH in the last 4320 hours.  Urine Culture: No results for input(s): LABURIN in the last 48 hours.  Urine Studies: No  results for input(s): COLORU, APPEARANCEUA, PHUR, SPECGRAV, PROTEINUA, GLUCUA, KETONESU, BILIRUBINUA, OCCULTUA, NITRITE, UROBILINOGEN, LEUKOCYTESUR, RBCUA, WBCUA, BACTERIA, SQUAMEPITHEL, HYALINECASTS in the last 48 hours.    Invalid input(s): WRIGHTSUR  All pertinent labs within the past 24 hours have been reviewed.    Significant Imaging: I have reviewed all pertinent imaging results/findings within the past 24 hours.

## 2018-09-08 NOTE — ASSESSMENT & PLAN NOTE
- Osteomyelitis of left 5th metatarsal taken for washout by Orthopedic surgery at [facility] on 08/15  - Patient being treated outside facility with Flagyl, linezolid, gentamicin with HD.  - Refusing amputation which would be curative.   - Podiatry consulted, appreciate recs. Advised BKA but patient refused. Recommended CAM boot and abx per ID. D/tyler wound vac  - ESR elevated at 58 upon admission  - X-ray left foot and ankle shows partial amputation the 1st, 2nd, 3rd, and 4th digits with fusion of the 2nd and 3rd MTP joints and throughout the midfoot, severe deformity and joint space loss the tibiotalar joint with a lapse of the talus likely 2/2 to chronic osteomyelitis, arthritis, or chronic Charcot foot.   - BCx2 from [facility] grew MRSA. Cultures from [facility] were NGTD.    PLAN:  - On Daptomycin 8mg/kg after HD per ID's recs. Planned for 6 weeks total, last dose 9/29  - ID follow up at 2 weeks.  - Podiatry consulted, appreciate recs. Recommend follow up with [facility] Podiatry at discharge.

## 2018-09-09 LAB
ANION GAP SERPL CALC-SCNC: 10 MMOL/L
ANION GAP SERPL CALC-SCNC: 10 MMOL/L
ANION GAP SERPL CALC-SCNC: 7 MMOL/L
ANION GAP SERPL CALC-SCNC: 9 MMOL/L
BASOPHILS # BLD AUTO: 0.04 K/UL
BASOPHILS NFR BLD: 0.6 %
BUN SERPL-MCNC: 55 MG/DL
BUN SERPL-MCNC: 55 MG/DL
BUN SERPL-MCNC: 64 MG/DL
BUN SERPL-MCNC: 66 MG/DL
CALCIUM SERPL-MCNC: 6.3 MG/DL
CALCIUM SERPL-MCNC: 8.5 MG/DL
CHLORIDE SERPL-SCNC: 102 MMOL/L
CHLORIDE SERPL-SCNC: 102 MMOL/L
CHLORIDE SERPL-SCNC: 103 MMOL/L
CHLORIDE SERPL-SCNC: 112 MMOL/L
CO2 SERPL-SCNC: 17 MMOL/L
CO2 SERPL-SCNC: 23 MMOL/L
CO2 SERPL-SCNC: 23 MMOL/L
CO2 SERPL-SCNC: 25 MMOL/L
CREAT SERPL-MCNC: 6.1 MG/DL
CREAT SERPL-MCNC: 6.1 MG/DL
CREAT SERPL-MCNC: 6.4 MG/DL
CREAT SERPL-MCNC: 7.9 MG/DL
DIFFERENTIAL METHOD: ABNORMAL
EOSINOPHIL # BLD AUTO: 0.6 K/UL
EOSINOPHIL NFR BLD: 9.4 %
ERYTHROCYTE [DISTWIDTH] IN BLOOD BY AUTOMATED COUNT: 16.6 %
EST. GFR  (AFRICAN AMERICAN): 10.5 ML/MIN/1.73 M^2
EST. GFR  (AFRICAN AMERICAN): 11.1 ML/MIN/1.73 M^2
EST. GFR  (AFRICAN AMERICAN): 11.1 ML/MIN/1.73 M^2
EST. GFR  (AFRICAN AMERICAN): 8.1 ML/MIN/1.73 M^2
EST. GFR  (NON AFRICAN AMERICAN): 7 ML/MIN/1.73 M^2
EST. GFR  (NON AFRICAN AMERICAN): 9.1 ML/MIN/1.73 M^2
EST. GFR  (NON AFRICAN AMERICAN): 9.6 ML/MIN/1.73 M^2
EST. GFR  (NON AFRICAN AMERICAN): 9.6 ML/MIN/1.73 M^2
GLUCOSE SERPL-MCNC: 332 MG/DL
GLUCOSE SERPL-MCNC: 618 MG/DL
GLUCOSE SERPL-MCNC: 645 MG/DL
GLUCOSE SERPL-MCNC: 645 MG/DL
HCT VFR BLD AUTO: 25 %
HGB BLD-MCNC: 7.7 G/DL
IMM GRANULOCYTES # BLD AUTO: 0.03 K/UL
IMM GRANULOCYTES NFR BLD AUTO: 0.5 %
LYMPHOCYTES # BLD AUTO: 1.2 K/UL
LYMPHOCYTES NFR BLD: 18.7 %
MAGNESIUM SERPL-MCNC: 2.2 MG/DL
MCH RBC QN AUTO: 30.8 PG
MCHC RBC AUTO-ENTMCNC: 30.8 G/DL
MCV RBC AUTO: 100 FL
MONOCYTES # BLD AUTO: 0.5 K/UL
MONOCYTES NFR BLD: 8 %
NEUTROPHILS # BLD AUTO: 4.1 K/UL
NEUTROPHILS NFR BLD: 62.8 %
NRBC BLD-RTO: 0 /100 WBC
PHOSPHATE SERPL-MCNC: 3 MG/DL
PLATELET # BLD AUTO: 140 K/UL
PMV BLD AUTO: 11.4 FL
POCT GLUCOSE: 230 MG/DL (ref 70–110)
POCT GLUCOSE: 469 MG/DL (ref 70–110)
POCT GLUCOSE: 479 MG/DL (ref 70–110)
POTASSIUM SERPL-SCNC: 4.3 MMOL/L
POTASSIUM SERPL-SCNC: 4.7 MMOL/L
POTASSIUM SERPL-SCNC: 4.7 MMOL/L
POTASSIUM SERPL-SCNC: 5 MMOL/L
RBC # BLD AUTO: 2.5 M/UL
SODIUM SERPL-SCNC: 135 MMOL/L
SODIUM SERPL-SCNC: 135 MMOL/L
SODIUM SERPL-SCNC: 136 MMOL/L
SODIUM SERPL-SCNC: 137 MMOL/L
WBC # BLD AUTO: 6.51 K/UL

## 2018-09-09 PROCEDURE — 63600175 PHARM REV CODE 636 W HCPCS: Mod: JG | Performed by: STUDENT IN AN ORGANIZED HEALTH CARE EDUCATION/TRAINING PROGRAM

## 2018-09-09 PROCEDURE — 85025 COMPLETE CBC W/AUTO DIFF WBC: CPT

## 2018-09-09 PROCEDURE — 20600001 HC STEP DOWN PRIVATE ROOM

## 2018-09-09 PROCEDURE — 99232 SBSQ HOSP IP/OBS MODERATE 35: CPT | Mod: ,,, | Performed by: INTERNAL MEDICINE

## 2018-09-09 PROCEDURE — 99232 SBSQ HOSP IP/OBS MODERATE 35: CPT | Mod: ,,, | Performed by: HOSPITALIST

## 2018-09-09 PROCEDURE — 80048 BASIC METABOLIC PNL TOTAL CA: CPT | Mod: 91

## 2018-09-09 PROCEDURE — 25000242 PHARM REV CODE 250 ALT 637 W/ HCPCS: Performed by: HOSPITALIST

## 2018-09-09 PROCEDURE — 25000003 PHARM REV CODE 250: Performed by: INTERNAL MEDICINE

## 2018-09-09 PROCEDURE — 80100014 HC HEMODIALYSIS 1:1

## 2018-09-09 PROCEDURE — 25000003 PHARM REV CODE 250: Performed by: STUDENT IN AN ORGANIZED HEALTH CARE EDUCATION/TRAINING PROGRAM

## 2018-09-09 PROCEDURE — 83735 ASSAY OF MAGNESIUM: CPT

## 2018-09-09 PROCEDURE — 63600175 PHARM REV CODE 636 W HCPCS: Performed by: STUDENT IN AN ORGANIZED HEALTH CARE EDUCATION/TRAINING PROGRAM

## 2018-09-09 PROCEDURE — 25000003 PHARM REV CODE 250: Performed by: HOSPITALIST

## 2018-09-09 PROCEDURE — 84100 ASSAY OF PHOSPHORUS: CPT

## 2018-09-09 RX ORDER — SODIUM CHLORIDE 9 MG/ML
INJECTION, SOLUTION INTRAVENOUS
Status: DISCONTINUED | OUTPATIENT
Start: 2018-09-09 | End: 2018-09-11

## 2018-09-09 RX ORDER — SODIUM CHLORIDE 9 MG/ML
INJECTION, SOLUTION INTRAVENOUS ONCE
Status: CANCELLED | OUTPATIENT
Start: 2018-09-09 | End: 2018-09-09

## 2018-09-09 RX ORDER — AMOXICILLIN 250 MG
2 CAPSULE ORAL 2 TIMES DAILY
Status: DISCONTINUED | OUTPATIENT
Start: 2018-09-09 | End: 2018-09-21 | Stop reason: HOSPADM

## 2018-09-09 RX ORDER — SODIUM CHLORIDE 9 MG/ML
INJECTION, SOLUTION INTRAVENOUS ONCE
Status: DISCONTINUED | OUTPATIENT
Start: 2018-09-09 | End: 2018-09-11

## 2018-09-09 RX ORDER — POLYETHYLENE GLYCOL 3350 17 G/17G
17 POWDER, FOR SOLUTION ORAL DAILY
Status: DISCONTINUED | OUTPATIENT
Start: 2018-09-09 | End: 2018-09-21 | Stop reason: HOSPADM

## 2018-09-09 RX ORDER — SODIUM CHLORIDE 9 MG/ML
INJECTION, SOLUTION INTRAVENOUS
Status: CANCELLED | OUTPATIENT
Start: 2018-09-09

## 2018-09-09 RX ADMIN — SERTRALINE HYDROCHLORIDE 25 MG: 25 TABLET ORAL at 08:09

## 2018-09-09 RX ADMIN — INSULIN ASPART 14 UNITS: 100 INJECTION, SOLUTION INTRAVENOUS; SUBCUTANEOUS at 08:09

## 2018-09-09 RX ADMIN — DAPTOMYCIN 905 MG: 500 INJECTION, POWDER, LYOPHILIZED, FOR SOLUTION INTRAVENOUS at 11:09

## 2018-09-09 RX ADMIN — HYDRALAZINE HYDROCHLORIDE 100 MG: 50 TABLET ORAL at 01:09

## 2018-09-09 RX ADMIN — ASPIRIN 81 MG: 81 TABLET, COATED ORAL at 08:09

## 2018-09-09 RX ADMIN — HEPARIN SODIUM 5000 UNITS: 5000 INJECTION, SOLUTION INTRAVENOUS; SUBCUTANEOUS at 05:09

## 2018-09-09 RX ADMIN — CARVEDILOL 25 MG: 25 TABLET, FILM COATED ORAL at 08:09

## 2018-09-09 RX ADMIN — INSULIN DETEMIR 20 UNITS: 100 INJECTION, SOLUTION SUBCUTANEOUS at 08:09

## 2018-09-09 RX ADMIN — SENNOSIDES AND DOCUSATE SODIUM 2 TABLET: 8.6; 5 TABLET ORAL at 01:09

## 2018-09-09 RX ADMIN — INSULIN ASPART 14 UNITS: 100 INJECTION, SOLUTION INTRAVENOUS; SUBCUTANEOUS at 04:09

## 2018-09-09 RX ADMIN — AMLODIPINE BESYLATE 10 MG: 10 TABLET ORAL at 08:09

## 2018-09-09 RX ADMIN — INSULIN ASPART 4 UNITS: 100 INJECTION, SOLUTION INTRAVENOUS; SUBCUTANEOUS at 08:09

## 2018-09-09 RX ADMIN — METHADONE HYDROCHLORIDE 10 MG: 5 TABLET ORAL at 11:09

## 2018-09-09 RX ADMIN — ATORVASTATIN CALCIUM 80 MG: 20 TABLET, FILM COATED ORAL at 08:09

## 2018-09-09 RX ADMIN — INSULIN ASPART 10 UNITS: 100 INJECTION, SOLUTION INTRAVENOUS; SUBCUTANEOUS at 04:09

## 2018-09-09 RX ADMIN — CARVEDILOL 25 MG: 25 TABLET, FILM COATED ORAL at 11:09

## 2018-09-09 RX ADMIN — INSULIN ASPART 14 UNITS: 100 INJECTION, SOLUTION INTRAVENOUS; SUBCUTANEOUS at 11:09

## 2018-09-09 RX ADMIN — FLUTICASONE PROPIONATE 100 MCG: 50 SPRAY, METERED NASAL at 08:09

## 2018-09-09 RX ADMIN — HYDRALAZINE HYDROCHLORIDE 100 MG: 50 TABLET ORAL at 05:09

## 2018-09-09 RX ADMIN — PREDNISOLONE ACETATE 1 DROP: 10 SUSPENSION/ DROPS OPHTHALMIC at 08:09

## 2018-09-09 RX ADMIN — SODIUM CHLORIDE 4 UNITS/HR: 9 INJECTION, SOLUTION INTRAVENOUS at 09:09

## 2018-09-09 RX ADMIN — CALCIUM ACETATE 1334 MG: 667 CAPSULE ORAL at 04:09

## 2018-09-09 RX ADMIN — HYDRALAZINE HYDROCHLORIDE 100 MG: 50 TABLET ORAL at 11:09

## 2018-09-09 RX ADMIN — INSULIN ASPART 10 UNITS: 100 INJECTION, SOLUTION INTRAVENOUS; SUBCUTANEOUS at 11:09

## 2018-09-09 RX ADMIN — CALCIUM ACETATE 1334 MG: 667 CAPSULE ORAL at 11:09

## 2018-09-09 RX ADMIN — CETIRIZINE HYDROCHLORIDE 5 MG: 5 TABLET ORAL at 08:09

## 2018-09-09 RX ADMIN — SENNOSIDES AND DOCUSATE SODIUM 2 TABLET: 8.6; 5 TABLET ORAL at 11:09

## 2018-09-09 RX ADMIN — HYDRALAZINE HYDROCHLORIDE 50 MG: 50 TABLET ORAL at 08:09

## 2018-09-09 RX ADMIN — PREDNISOLONE ACETATE 1 DROP: 10 SUSPENSION/ DROPS OPHTHALMIC at 01:09

## 2018-09-09 RX ADMIN — PREDNISOLONE ACETATE 1 DROP: 10 SUSPENSION/ DROPS OPHTHALMIC at 04:09

## 2018-09-09 RX ADMIN — PATIROMER 8.4 G: 8.4 POWDER, FOR SUSPENSION ORAL at 04:09

## 2018-09-09 RX ADMIN — CALCIUM ACETATE 1334 MG: 667 CAPSULE ORAL at 08:09

## 2018-09-09 RX ADMIN — SODIUM CHLORIDE 6 UNITS/HR: 9 INJECTION, SOLUTION INTRAVENOUS at 11:09

## 2018-09-09 RX ADMIN — METHADONE HYDROCHLORIDE 10 MG: 5 TABLET ORAL at 08:09

## 2018-09-09 NOTE — SUBJECTIVE & OBJECTIVE
Interval History: complaining of increased REED, has been off and on with O2, this morning requiring 2L    Review of patient's allergies indicates:   Allergen Reactions    Vancomycin analogues Rash     Red Man Syndrome    Penicillins      Current Facility-Administered Medications   Medication Frequency    0.9%  NaCl infusion (for blood administration) Q24H PRN    0.9%  NaCl infusion Once    0.9%  NaCl infusion PRN    acetaminophen tablet 650 mg Q4H PRN    albuterol-ipratropium 2.5 mg-0.5 mg/3 mL nebulizer solution 3 mL Q4H PRN    amLODIPine tablet 10 mg Daily    aspirin EC tablet 81 mg Daily    atorvastatin tablet 80 mg Daily    atropine 1% ophthalmic solution 1 drop Daily    calcium acetate capsule 1,334 mg TID WM    carvedilol tablet 25 mg BID    cetirizine tablet 5 mg Daily    clonazePAM tablet 0.5 mg BID PRN    DAPTOmycin (CUBICIN) 905 mg in sodium chloride 0.9% IVPB Q48H    dextrose 50% injection 12.5 g PRN    dextrose 50% injection 25 g PRN    dextrose 50% injection 25 g PRN    epoetin umer injection 8,000 Units Every Tues, Thurs, Sat    fluticasone 50 mcg/actuation nasal spray 100 mcg Daily    glucagon (human recombinant) injection 1 mg PRN    glucose chewable tablet 16 g PRN    glucose chewable tablet 24 g PRN    heparin (porcine) injection 5,000 Units Q8H    hydrALAZINE tablet 100 mg Q8H    hydrALAZINE tablet 50 mg Q8H PRN    HYDROcodone-acetaminophen 7.5-325 mg per tablet 1 tablet Q6H PRN    hydrOXYzine HCl tablet 25 mg TID PRN    insulin aspart U-100 pen 1-10 Units QID (AC + HS) PRN    insulin aspart U-100 pen 14 Units TIDWM    insulin detemir U-100 pen 20 Units BID    methadone tablet 10 mg BID    naloxone 0.4 mg/mL injection 0.4 mg PRN    ondansetron disintegrating tablet 8 mg Q8H PRN    ondansetron injection 4 mg Q8H PRN    patiromer calcium sorbitex PwPk 8.4 g Once per day on Sun Mon Wed Fri    polyethylene glycol packet 17 g Daily    prednisoLONE acetate 1 %  ophthalmic suspension 1 drop QID    senna-docusate 8.6-50 mg per tablet 2 tablet BID    sertraline tablet 25 mg Daily       Objective:     Vital Signs (Most Recent):  Temp: 97.1 °F (36.2 °C) (09/09/18 0800)  Pulse: 68 (09/09/18 1511)  Resp: 18 (09/09/18 1100)  BP: (!) 162/70 (09/09/18 1100)  SpO2: (!) 91 % (09/09/18 1113)  O2 Device (Oxygen Therapy): nasal cannula (09/09/18 1113) Vital Signs (24h Range):  Temp:  [97.1 °F (36.2 °C)-98.5 °F (36.9 °C)] 97.1 °F (36.2 °C)  Pulse:  [68-82] 68  Resp:  [17-20] 18  SpO2:  [82 %-94 %] 91 %  BP: (146-195)/(66-83) 162/70     Weight: 111.8 kg (246 lb 6.4 oz) (09/09/18 0700)  Body mass index is 34.37 kg/m².  Body surface area is 2.37 meters squared.    I/O last 3 completed shifts:  In: 1380 [P.O.:1380]  Out: 45 [Urine:45]    Physical Exam   Constitutional: He is oriented to person, place, and time.   HENT:   Head: Atraumatic.   Eyes: EOM are normal.   Neck: No JVD present.   Cardiovascular: Normal rate and regular rhythm. Exam reveals no friction rub.   Pulmonary/Chest: Effort normal. He has rales.   Abdominal: Soft. He exhibits no distension.   Musculoskeletal: He exhibits edema.   Neurological: He is alert and oriented to person, place, and time.   Skin: Skin is warm.   Psychiatric: He has a normal mood and affect.

## 2018-09-09 NOTE — ASSESSMENT & PLAN NOTE
- Received intravitreal vancomycin and ceftazidime at admit.  - Opthmology following, daily assessments ongoing.  - Per Ophthalmology, lesion appears to be consolidating but no significant improvement in vision. Will need daily assessment for atleast 1 week from date of last intravitreal injection.  - Received 3rd dose of intravitreal vancomycin on 8/24  - Reassess by ophthalmology on 9/29, guarded prognosis.  - Advised follow up twice weekly.  - Vision improving since admit.
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
## 2018-09-09 NOTE — ASSESSMENT & PLAN NOTE
iHD TTS    [facility] at [address]  Followed by Dr. 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 Goal for BP is <140 mmHg SBP and BDP <90 mmHg, maintain MAP > 65.    Nutrition   - Renal Diet    Case discussed with staff further recs with attestation.

## 2018-09-09 NOTE — PROGRESS NOTES
Repeat . Pt stated he just recently finished dinner. MD paged. Awaiting return call. Pt denies any complaints at this time.

## 2018-09-09 NOTE — PROGRESS NOTES
MD Armando notified of CBG findings. Ordered to admin PM Levemir dose now. Orders to be carried out.

## 2018-09-09 NOTE — ASSESSMENT & PLAN NOTE
- Complaining of SOB since 3-4 days  - Sats dropping to low 80s on RA, HR normal  - 4L O2 via NC  - EKG shows NSR,  - Cardiology consulted given 2 prior episodes of hypotension and bradycardia with SOB. Troponin were elevated intially but then started to trend down.  *Recommended BP control and no further cardiac workup  - CXR shows diffuse interstitial infiltrates.  - DDx at this point includes fluid overload due to ESRD vs prolonged hypertension causing pul edema  - Will try and get patient dialyzed today and consider chest CT  - US LE pending

## 2018-09-09 NOTE — ASSESSMENT & PLAN NOTE
- Osteomyelitis of left 5th metatarsal taken for washout by Orthopedic surgery at [facility] on 08/15  - Patient being treated outside facility with Flagyl, linezolid, gentamicin with HD.  - Refusing amputation which would be curative.   - Podiatry consulted, appreciate recs. Advised BKA but patient refused. Recommended CAM boot and abx per ID. D/tyler wound vac  - ESR elevated at 58 upon admission  - X-ray left foot and ankle shows partial amputation the 1st, 2nd, 3rd, and 4th digits with fusion of the 2nd and 3rd MTP joints and throughout the midfoot, severe deformity and joint space loss the tibiotalar joint with a lapse of the talus likely 2/2 to chronic osteomyelitis, arthritis, or chronic Charcot foot.   - BCx2 from [facility] grew MRSA. Cultures from [facility] were NGTD.    PLAN:  - On Daptomycin 8mg/kg after HD per ID's recs. Planned for 6 weeks total, last dose 9/29  - ID follow up at 2 weeks.  - Podiatry consulted, appreciate recs. Recommend follow up with [facility] at discharge.

## 2018-09-09 NOTE — SUBJECTIVE & OBJECTIVE
Interval History: NAEON. Vitals stable    Review of Systems   Constitutional: Negative for chills and fever.   HENT: Negative for trouble swallowing.    Eyes:        Visual disturbance (improving)   Respiratory: Positive for chest tightness and shortness of breath.    Cardiovascular: Negative for palpitations and leg swelling.   Gastrointestinal: Negative for abdominal pain, constipation, diarrhea, nausea and vomiting.   Genitourinary: Negative for difficulty urinating.   Musculoskeletal: Negative for arthralgias.   Neurological: Negative for dizziness, light-headedness and headaches.   Psychiatric/Behavioral: Negative for agitation and confusion.     Objective:     Vital Signs (Most Recent):  Temp: 98.1 °F (36.7 °C) (09/08/18 2324)  Pulse: 76 (09/09/18 0656)  Resp: 18 (09/09/18 0519)  BP: (!) 157/73 (09/09/18 0519)  SpO2: (!) 93 % (09/09/18 0519) Vital Signs (24h Range):  Temp:  [97.5 °F (36.4 °C)-98.5 °F (36.9 °C)] 98.1 °F (36.7 °C)  Pulse:  [72-82] 76  Resp:  [17-18] 18  SpO2:  [92 %-94 %] 93 %  BP: (146-181)/(66-83) 157/73     Weight: 111.8 kg (246 lb 6.4 oz)  Body mass index is 34.37 kg/m².    Intake/Output Summary (Last 24 hours) at 9/9/2018 0806  Last data filed at 9/9/2018 0000  Gross per 24 hour   Intake 900 ml   Output 20 ml   Net 880 ml      Physical Exam   Constitutional: He is oriented to person, place, and time. No distress.   HENT:   Head: Normocephalic.   Eyes: Pupils are equal, round, and reactive to light.   Neck: Normal range of motion.   Cardiovascular: Normal rate and regular rhythm.   Pulmonary/Chest:   Bibasilar crackles.   Abdominal: Soft. Bowel sounds are normal.   Musculoskeletal: He exhibits no edema.   Neurological: He is alert and oriented to person, place, and time.   Nursing note and vitals reviewed.      Significant Labs:   A1C:   Recent Labs   Lab  08/17/18   2159   HGBA1C  8.2*     ABGs: No results for input(s): PH, PCO2, HCO3, POCSATURATED, BE, TOTALHB, COHB, METHB, O2HB, POCFIO2  in the last 48 hours.  Bilirubin:   Recent Labs   Lab  08/18/18   0613  08/19/18   0645  08/20/18   0826  08/21/18   0456  09/02/18   0518   BILITOT  0.8  0.7  0.4  0.5  0.5     Blood Culture: No results for input(s): LABBLOO in the last 48 hours.  BMP:   Recent Labs   Lab  09/08/18   2046  09/09/18   0523   GLU  208*   --    NA  138   --    K  5.0   --    CL  104   --    CO2  25   --    BUN  60*   --    CREATININE  7.1*   --    CALCIUM  8.5*   --    MG   --   2.2     CBC:   Recent Labs   Lab  09/08/18   0531  09/09/18   0523   WBC  5.47  6.51   HGB  7.4*  7.7*   HCT  24.2*  25.0*   PLT  112*  140*     CMP:   Recent Labs   Lab  09/07/18   1952  09/08/18   1515  09/08/18   2046   NA  139  140  138   K  4.1  4.4  5.0   CL  106  106  104   CO2  28  26  25   GLU  134*  80  208*   BUN  40*  59*  60*   CREATININE  5.0*  6.6*  7.1*   CALCIUM  8.2*  8.6*  8.5*   ANIONGAP  5*  8  9   EGFRNONAA  12.2*  8.7*  8.0*     Cardiac Markers: No results for input(s): CKMB, MYOGLOBIN, BNP, TROPISTAT in the last 48 hours.  Coagulation: No results for input(s): PT, INR, APTT in the last 48 hours.  Lactic Acid: No results for input(s): LACTATE in the last 48 hours.  Lipase: No results for input(s): LIPASE in the last 48 hours.  Lipid Panel: No results for input(s): CHOL, HDL, LDLCALC, TRIG, CHOLHDL in the last 48 hours.  Magnesium:   Recent Labs   Lab  09/08/18   0531  09/09/18   0523   MG  2.1  2.2     Pathology Results  (Last 10 years)    None        POCT Glucose:   Recent Labs   Lab  09/08/18   1813  09/08/18   2154  09/09/18   0738   POCTGLUCOSE  185*  215*  230*     Prealbumin: No results for input(s): PREALBUMIN in the last 48 hours.  Respiratory Culture: No results for input(s): GSRESP, RESPIRATORYC in the last 48 hours.  Troponin:   Recent Labs   Lab  09/08/18   1515   TROPONINI  0.031*     TSH: No results for input(s): TSH in the last 4320 hours.  Urine Culture: No results for input(s): LABURIN in the last 48 hours.  Urine  Studies: No results for input(s): COLORU, APPEARANCEUA, PHUR, SPECGRAV, PROTEINUA, GLUCUA, KETONESU, BILIRUBINUA, OCCULTUA, NITRITE, UROBILINOGEN, LEUKOCYTESUR, RBCUA, WBCUA, BACTERIA, SQUAMEPITHEL, HYALINECASTS in the last 48 hours.    Invalid input(s): WRIGHTSUR  All pertinent labs within the past 24 hours have been reviewed.    Significant Imaging: I have reviewed all pertinent imaging results/findings within the past 24 hours.

## 2018-09-09 NOTE — PROGRESS NOTES
. MD notified if findings. Pt asymptomatic w/o any complaints. Scheduled insulin w/ SS dose to be admin. Will continue to monitor.
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
## 2018-09-09 NOTE — PLAN OF CARE
Problem: Patient Care Overview  Goal: Plan of Care Review  Outcome: Ongoing (interventions implemented as appropriate)  Plan of care reviewed. Pt refused BLE US during shift. Remains dyspneic w/ O2 3L NC in place. HD session ordered for today related to SOB. CBG remains high > 450. MD aware. IV abx continue w/ HD sessions.

## 2018-09-09 NOTE — PLAN OF CARE
Problem: Patient Care Overview  Goal: Plan of Care Review  Outcome: Revised  Pt free of falls/trauma/injuries.  Denies c/o CP, or discomfort. Pt stated having some SOB had to resume 2 liters of oxygen due to O2 levels of 88 without it.   Generalized skin remains CDI; No edema noted.  Pt tolerating plan of care.

## 2018-09-09 NOTE — PROGRESS NOTES
. Pt asymptomatic. No acute distress noted. Scheduled and SSI administered. MD notified of findings. Will recheck CBG x 1 hr. Will continue to monitor.

## 2018-09-10 LAB
ANION GAP SERPL CALC-SCNC: 5 MMOL/L
ANION GAP SERPL CALC-SCNC: 6 MMOL/L
ANION GAP SERPL CALC-SCNC: 9 MMOL/L
ANION GAP SERPL CALC-SCNC: 9 MMOL/L
BASOPHILS # BLD AUTO: 0.03 K/UL
BASOPHILS NFR BLD: 0.5 %
BUN SERPL-MCNC: 30 MG/DL
BUN SERPL-MCNC: 35 MG/DL
BUN SERPL-MCNC: 48 MG/DL
BUN SERPL-MCNC: 53 MG/DL
CALCIUM SERPL-MCNC: 8.4 MG/DL
CALCIUM SERPL-MCNC: 8.5 MG/DL
CALCIUM SERPL-MCNC: 8.6 MG/DL
CALCIUM SERPL-MCNC: 8.6 MG/DL
CHLORIDE SERPL-SCNC: 103 MMOL/L
CHLORIDE SERPL-SCNC: 105 MMOL/L
CHLORIDE SERPL-SCNC: 105 MMOL/L
CHLORIDE SERPL-SCNC: 106 MMOL/L
CO2 SERPL-SCNC: 24 MMOL/L
CO2 SERPL-SCNC: 24 MMOL/L
CO2 SERPL-SCNC: 25 MMOL/L
CO2 SERPL-SCNC: 28 MMOL/L
CREAT SERPL-MCNC: 4.3 MG/DL
CREAT SERPL-MCNC: 5 MG/DL
CREAT SERPL-MCNC: 5.7 MG/DL
CREAT SERPL-MCNC: 5.9 MG/DL
DIFFERENTIAL METHOD: ABNORMAL
EOSINOPHIL # BLD AUTO: 0.6 K/UL
EOSINOPHIL NFR BLD: 10.4 %
ERYTHROCYTE [DISTWIDTH] IN BLOOD BY AUTOMATED COUNT: 16.4 %
EST. GFR  (AFRICAN AMERICAN): 11.6 ML/MIN/1.73 M^2
EST. GFR  (AFRICAN AMERICAN): 12.1 ML/MIN/1.73 M^2
EST. GFR  (AFRICAN AMERICAN): 14.1 ML/MIN/1.73 M^2
EST. GFR  (AFRICAN AMERICAN): 17 ML/MIN/1.73 M^2
EST. GFR  (NON AFRICAN AMERICAN): 10 ML/MIN/1.73 M^2
EST. GFR  (NON AFRICAN AMERICAN): 10.4 ML/MIN/1.73 M^2
EST. GFR  (NON AFRICAN AMERICAN): 12.2 ML/MIN/1.73 M^2
EST. GFR  (NON AFRICAN AMERICAN): 14.7 ML/MIN/1.73 M^2
GLUCOSE SERPL-MCNC: 123 MG/DL
GLUCOSE SERPL-MCNC: 181 MG/DL
GLUCOSE SERPL-MCNC: 245 MG/DL
GLUCOSE SERPL-MCNC: 259 MG/DL
HCT VFR BLD AUTO: 24.1 %
HGB BLD-MCNC: 7.6 G/DL
IMM GRANULOCYTES # BLD AUTO: 0.02 K/UL
IMM GRANULOCYTES NFR BLD AUTO: 0.3 %
LYMPHOCYTES # BLD AUTO: 0.9 K/UL
LYMPHOCYTES NFR BLD: 14.9 %
MAGNESIUM SERPL-MCNC: 2.1 MG/DL
MCH RBC QN AUTO: 30.9 PG
MCHC RBC AUTO-ENTMCNC: 31.5 G/DL
MCV RBC AUTO: 98 FL
MONOCYTES # BLD AUTO: 0.6 K/UL
MONOCYTES NFR BLD: 9.8 %
NEUTROPHILS # BLD AUTO: 3.8 K/UL
NEUTROPHILS NFR BLD: 64.1 %
NRBC BLD-RTO: 0 /100 WBC
PHOSPHATE SERPL-MCNC: 2.9 MG/DL
PLATELET # BLD AUTO: 143 K/UL
PMV BLD AUTO: 11 FL
POCT GLUCOSE: 138 MG/DL (ref 70–110)
POCT GLUCOSE: 159 MG/DL (ref 70–110)
POCT GLUCOSE: 164 MG/DL (ref 70–110)
POCT GLUCOSE: 168 MG/DL (ref 70–110)
POCT GLUCOSE: 174 MG/DL (ref 70–110)
POCT GLUCOSE: 202 MG/DL (ref 70–110)
POCT GLUCOSE: 209 MG/DL (ref 70–110)
POCT GLUCOSE: 250 MG/DL (ref 70–110)
POCT GLUCOSE: 278 MG/DL (ref 70–110)
POCT GLUCOSE: 296 MG/DL (ref 70–110)
POCT GLUCOSE: 336 MG/DL (ref 70–110)
POCT GLUCOSE: 433 MG/DL (ref 70–110)
POCT GLUCOSE: >500 MG/DL (ref 70–110)
POCT GLUCOSE: >500 MG/DL (ref 70–110)
POTASSIUM SERPL-SCNC: 4.4 MMOL/L
POTASSIUM SERPL-SCNC: 4.5 MMOL/L
POTASSIUM SERPL-SCNC: 4.5 MMOL/L
POTASSIUM SERPL-SCNC: 4.7 MMOL/L
RBC # BLD AUTO: 2.46 M/UL
SODIUM SERPL-SCNC: 134 MMOL/L
SODIUM SERPL-SCNC: 138 MMOL/L
SODIUM SERPL-SCNC: 138 MMOL/L
SODIUM SERPL-SCNC: 139 MMOL/L
WBC # BLD AUTO: 5.89 K/UL

## 2018-09-10 PROCEDURE — 25000003 PHARM REV CODE 250: Performed by: STUDENT IN AN ORGANIZED HEALTH CARE EDUCATION/TRAINING PROGRAM

## 2018-09-10 PROCEDURE — 63600175 PHARM REV CODE 636 W HCPCS: Performed by: STUDENT IN AN ORGANIZED HEALTH CARE EDUCATION/TRAINING PROGRAM

## 2018-09-10 PROCEDURE — 90935 HEMODIALYSIS ONE EVALUATION: CPT | Mod: ,,, | Performed by: INTERNAL MEDICINE

## 2018-09-10 PROCEDURE — 25000003 PHARM REV CODE 250: Performed by: INTERNAL MEDICINE

## 2018-09-10 PROCEDURE — 25000242 PHARM REV CODE 250 ALT 637 W/ HCPCS: Performed by: HOSPITALIST

## 2018-09-10 PROCEDURE — 99232 SBSQ HOSP IP/OBS MODERATE 35: CPT | Mod: ,,, | Performed by: HOSPITALIST

## 2018-09-10 PROCEDURE — 80048 BASIC METABOLIC PNL TOTAL CA: CPT

## 2018-09-10 PROCEDURE — 83735 ASSAY OF MAGNESIUM: CPT

## 2018-09-10 PROCEDURE — 20600001 HC STEP DOWN PRIVATE ROOM

## 2018-09-10 PROCEDURE — 85025 COMPLETE CBC W/AUTO DIFF WBC: CPT

## 2018-09-10 PROCEDURE — 84100 ASSAY OF PHOSPHORUS: CPT

## 2018-09-10 PROCEDURE — 90935 HEMODIALYSIS ONE EVALUATION: CPT

## 2018-09-10 PROCEDURE — 25000003 PHARM REV CODE 250: Performed by: HOSPITALIST

## 2018-09-10 PROCEDURE — 80048 BASIC METABOLIC PNL TOTAL CA: CPT | Mod: 91

## 2018-09-10 RX ORDER — IBUPROFEN 200 MG
24 TABLET ORAL
Status: DISCONTINUED | OUTPATIENT
Start: 2018-09-10 | End: 2018-09-21 | Stop reason: HOSPADM

## 2018-09-10 RX ORDER — INSULIN ASPART 100 [IU]/ML
0-5 INJECTION, SOLUTION INTRAVENOUS; SUBCUTANEOUS
Status: DISCONTINUED | OUTPATIENT
Start: 2018-09-10 | End: 2018-09-12

## 2018-09-10 RX ORDER — IBUPROFEN 200 MG
16 TABLET ORAL
Status: DISCONTINUED | OUTPATIENT
Start: 2018-09-10 | End: 2018-09-21 | Stop reason: HOSPADM

## 2018-09-10 RX ORDER — GLUCAGON 1 MG
1 KIT INJECTION
Status: DISCONTINUED | OUTPATIENT
Start: 2018-09-10 | End: 2018-09-10

## 2018-09-10 RX ORDER — INSULIN ASPART 100 [IU]/ML
14 INJECTION, SOLUTION INTRAVENOUS; SUBCUTANEOUS
Status: DISCONTINUED | OUTPATIENT
Start: 2018-09-10 | End: 2018-09-11

## 2018-09-10 RX ADMIN — HYDROCODONE BITARTRATE AND ACETAMINOPHEN 1 TABLET: 7.5; 325 TABLET ORAL at 10:09

## 2018-09-10 RX ADMIN — PATIROMER 8.4 G: 8.4 POWDER, FOR SUSPENSION ORAL at 12:09

## 2018-09-10 RX ADMIN — CARVEDILOL 25 MG: 25 TABLET, FILM COATED ORAL at 10:09

## 2018-09-10 RX ADMIN — PREDNISOLONE ACETATE 1 DROP: 10 SUSPENSION/ DROPS OPHTHALMIC at 12:09

## 2018-09-10 RX ADMIN — INSULIN DETEMIR 20 UNITS: 100 INJECTION, SOLUTION SUBCUTANEOUS at 07:09

## 2018-09-10 RX ADMIN — SODIUM CHLORIDE 3 UNITS/HR: 9 INJECTION, SOLUTION INTRAVENOUS at 12:09

## 2018-09-10 RX ADMIN — METHADONE HYDROCHLORIDE 10 MG: 5 TABLET ORAL at 12:09

## 2018-09-10 RX ADMIN — SODIUM CHLORIDE 0.4 UNITS/HR: 9 INJECTION, SOLUTION INTRAVENOUS at 05:09

## 2018-09-10 RX ADMIN — INSULIN ASPART 14 UNITS: 100 INJECTION, SOLUTION INTRAVENOUS; SUBCUTANEOUS at 05:09

## 2018-09-10 RX ADMIN — CETIRIZINE HYDROCHLORIDE 5 MG: 5 TABLET ORAL at 12:09

## 2018-09-10 RX ADMIN — SENNOSIDES AND DOCUSATE SODIUM 2 TABLET: 8.6; 5 TABLET ORAL at 12:09

## 2018-09-10 RX ADMIN — INSULIN ASPART 14 UNITS: 100 INJECTION, SOLUTION INTRAVENOUS; SUBCUTANEOUS at 12:09

## 2018-09-10 RX ADMIN — AMLODIPINE BESYLATE 10 MG: 10 TABLET ORAL at 12:09

## 2018-09-10 RX ADMIN — CALCIUM ACETATE 1334 MG: 667 CAPSULE ORAL at 05:09

## 2018-09-10 RX ADMIN — SERTRALINE HYDROCHLORIDE 25 MG: 25 TABLET ORAL at 12:09

## 2018-09-10 RX ADMIN — INSULIN ASPART 2 UNITS: 100 INJECTION, SOLUTION INTRAVENOUS; SUBCUTANEOUS at 09:09

## 2018-09-10 RX ADMIN — HYDROCODONE BITARTRATE AND ACETAMINOPHEN 1 TABLET: 7.5; 325 TABLET ORAL at 12:09

## 2018-09-10 RX ADMIN — PREDNISOLONE ACETATE 1 DROP: 10 SUSPENSION/ DROPS OPHTHALMIC at 04:09

## 2018-09-10 RX ADMIN — HYDRALAZINE HYDROCHLORIDE 100 MG: 50 TABLET ORAL at 10:09

## 2018-09-10 RX ADMIN — SODIUM CHLORIDE 0.8 UNITS/HR: 9 INJECTION, SOLUTION INTRAVENOUS at 04:09

## 2018-09-10 RX ADMIN — ATORVASTATIN CALCIUM 80 MG: 20 TABLET, FILM COATED ORAL at 12:09

## 2018-09-10 RX ADMIN — INSULIN ASPART 14 UNITS: 100 INJECTION, SOLUTION INTRAVENOUS; SUBCUTANEOUS at 09:09

## 2018-09-10 RX ADMIN — METHADONE HYDROCHLORIDE 10 MG: 5 TABLET ORAL at 10:09

## 2018-09-10 RX ADMIN — ASPIRIN 81 MG: 81 TABLET, COATED ORAL at 12:09

## 2018-09-10 RX ADMIN — FLUTICASONE PROPIONATE 100 MCG: 50 SPRAY, METERED NASAL at 12:09

## 2018-09-10 RX ADMIN — HYDRALAZINE HYDROCHLORIDE 100 MG: 50 TABLET ORAL at 02:09

## 2018-09-10 RX ADMIN — INSULIN DETEMIR 20 UNITS: 100 INJECTION, SOLUTION SUBCUTANEOUS at 10:09

## 2018-09-10 RX ADMIN — INSULIN ASPART 1 UNITS: 100 INJECTION, SOLUTION INTRAVENOUS; SUBCUTANEOUS at 10:09

## 2018-09-10 RX ADMIN — CARVEDILOL 25 MG: 25 TABLET, FILM COATED ORAL at 12:09

## 2018-09-10 RX ADMIN — CALCIUM ACETATE 1334 MG: 667 CAPSULE ORAL at 01:09

## 2018-09-10 RX ADMIN — CALCIUM ACETATE 1334 MG: 667 CAPSULE ORAL at 09:09

## 2018-09-10 RX ADMIN — HYDRALAZINE HYDROCHLORIDE 100 MG: 50 TABLET ORAL at 05:09

## 2018-09-10 NOTE — NURSING TRANSFER
Nursing Transfer Note      9/10/2018     Transfer From: HD    Transfer via stretcher    Transfer with 2L to O2, cardiac monitoring    Transported by transport    Medicines sent: yes    Chart send with patient: Yes    Patient reassessed at: 9/10/18 12:45pm     Upon arrival to floor:  Bed in lowest position; siderails up x2; call bell in reach. Administered held AM meds

## 2018-09-10 NOTE — ASSESSMENT & PLAN NOTE
- Long term diabetic.. Takes Basglar 20 units BID and Novolog 10 units premeal  - A1C 8.2  - Overnight BG was 618 and insulin dripped started at 8 PM on 9/9. Avoided D5 as concern for fluid overload. Transitioned to subcutaneous insulin this morning around 7 am.   - Concern for patient not sticking to diet and snacking in the evenings.   - Continue Lev 20 BID with Asp 14 TIDWM , LDSSI.   - diabetic and renal diet

## 2018-09-10 NOTE — PHYSICIAN QUERY
"PT Name: Mickey Ross  MR #: [id number]     Physician Query Form - Documentation Clarification      CDS: Fe Suazo RN, CCDS         Contact information :[phone]  [email or web address]       This form is a permanent document in the medical record.     Query Date: September 10, 2018                                                                              By submitting this query, we are merely seeking further clarification of documentation. Please utilize your independent clinical judgment when addressing the question(s) below.    The Medical record reflects the following:    Supporting Clinical Findings Location in Medical Record     "9/7: Still complains of shortness of breath and chest tightness which is stable from yesterday. CXR shows pulmonary  interstitial edema."    "- CXR shows diffuse interstitial infiltrates.  - DDx at this point includes fluid overload due to ESRD vs prolonged hypertension causing pul edema"    Hemodialysis inpatient     "likely pulmonary edema 2/2 ESRD with fluid overload and subsequent acute HFpEF seen on 9/2 echo"     Progress note 9/10/18                    MD order     Pulmonary Consult 9/13/18                                                                                Doctor, Please specify diagnosis or diagnoses associated with above clinical findings.      Provider Use Only    Pulmonary Edema (please specify acuity and type)   Acuity    Type   [ x] Acute  [ ] Cardiac (Specify cause) ____________________________   [ ] Chronic   [ ] Non Cardiac (Specify cause) ________________________   [ ] Acute on Chronic [ ] Unspecified    [ ] Other Respiratory Diagnosis (please specify): _________________________________    [ ] Clinically Undetermined                                                                                                                           "

## 2018-09-10 NOTE — PROGRESS NOTES
Dialysis initiated via Left AVF without difficulty. BFR at 400. Pt complaining of shortness of breath upon start of tx. UF goal set to 3L. WIll continue to monitor for 2 hrs.

## 2018-09-10 NOTE — NURSING TRANSFER
Nursing Transfer Note      9/10/2018     Transfer To: ultrasound    Transfer via stretcher    Transfer with 2L to O2, cardiac monitoring    Transported by transport    Medicines sent: no    Chart send with patient:no

## 2018-09-10 NOTE — PROGRESS NOTES
Lab called critical value on Glucose 618 and Calcium 6.3 advised Dr Tomas, she is placing orders for Insulin drip. Will carry out orderss and continue to monitor.

## 2018-09-10 NOTE — SUBJECTIVE & OBJECTIVE
Interval History: Patient evaluated bedside at HD unit and found in no acute distress, tolerating well treatment.  No chest pain, SOB, palpitations.      Review of patient's allergies indicates:   Allergen Reactions    Vancomycin analogues Rash     Red Man Syndrome    Penicillins      Current Facility-Administered Medications   Medication Frequency    0.9%  NaCl infusion (for blood administration) Q24H PRN    0.9%  NaCl infusion Once    0.9%  NaCl infusion PRN    0.9%  NaCl infusion PRN    0.9%  NaCl infusion Once    acetaminophen tablet 650 mg Q4H PRN    albuterol-ipratropium 2.5 mg-0.5 mg/3 mL nebulizer solution 3 mL Q4H PRN    amLODIPine tablet 10 mg Daily    aspirin EC tablet 81 mg Daily    atorvastatin tablet 80 mg Daily    atropine 1% ophthalmic solution 1 drop Daily    calcium acetate capsule 1,334 mg TID WM    carvedilol tablet 25 mg BID    cetirizine tablet 5 mg Daily    clonazePAM tablet 0.5 mg BID PRN    DAPTOmycin (CUBICIN) 905 mg in sodium chloride 0.9% IVPB Q48H    dextrose 50% injection 12.5 g PRN    dextrose 50% injection 25 g PRN    dextrose 50% injection 25 g PRN    dextrose 50% injection 25 g PRN    dextrose 50% injection 25 g PRN    epoetin umer injection 8,000 Units Every Tues, Thurs, Sat    fluticasone 50 mcg/actuation nasal spray 100 mcg Daily    glucagon (human recombinant) injection 1 mg PRN    glucose chewable tablet 16 g PRN    glucose chewable tablet 24 g PRN    heparin (porcine) injection 5,000 Units Q8H    hydrALAZINE tablet 100 mg Q8H    hydrALAZINE tablet 50 mg Q8H PRN    HYDROcodone-acetaminophen 7.5-325 mg per tablet 1 tablet Q6H PRN    hydrOXYzine HCl tablet 25 mg TID PRN    insulin aspart U-100 pen 0-5 Units QID (AC + HS) PRN    insulin aspart U-100 pen 14 Units TIDWM    insulin detemir U-100 pen 20 Units BID    methadone tablet 10 mg BID    naloxone 0.4 mg/mL injection 0.4 mg PRN    ondansetron disintegrating tablet 8 mg Q8H PRN     ondansetron injection 4 mg Q8H PRN    patiromer calcium sorbitex PwPk 8.4 g Once per day on Sun Mon Wed Fri    polyethylene glycol packet 17 g Daily    prednisoLONE acetate 1 % ophthalmic suspension 1 drop QID    senna-docusate 8.6-50 mg per tablet 2 tablet BID    sertraline tablet 25 mg Daily       Objective:     Vital Signs (Most Recent):  Temp: 97.6 °F (36.4 °C) (09/10/18 1237)  Pulse: 79 (09/10/18 1237)  Resp: 18 (09/10/18 1237)  BP: (!) 145/65 (09/10/18 1458)  SpO2: (!) 93 % (09/10/18 1237)  O2 Device (Oxygen Therapy): nasal cannula (09/10/18 1237) Vital Signs (24h Range):  Temp:  [97.1 °F (36.2 °C)-98.2 °F (36.8 °C)] 97.6 °F (36.4 °C)  Pulse:  [66-86] 79  Resp:  [16-20] 18  SpO2:  [90 %-93 %] 93 %  BP: (136-173)/(56-79) 145/65     Weight: 106.6 kg (235 lb) (09/10/18 0700)  Body mass index is 32.78 kg/m².  Body surface area is 2.31 meters squared.    I/O last 3 completed shifts:  In: 860 [P.O.:360; Other:500]  Out: 3520 [Urine:20; Other:3500]    Physical Exam  Constitutional: He is oriented to person, place, and time. He appears well-developed and well-nourished. No distress.   HENT:   Head: Normocephalic and atraumatic.   Eyes: Conjunctivae and EOM are normal. Pupils are equal, round, and reactive to light.   Neck: Normal range of motion. Neck supple.   Cardiovascular: Normal rate, regular rhythm and intact distal pulses. Exam reveals no gallop and no friction rub.   No murmur heard.  Pulmonary/Chest: Effort normal. No stridor. No respiratory distress. He has no wheezes. He has no rhonchi. He has rales in the right lower field and the left lower field.   Abdominal: Soft. He exhibits no distension. There is no tenderness.   Musculoskeletal: Normal range of motion.   Neurological: He is alert and oriented to person, place, and time.   Skin: Skin is warm and dry. He is not diaphoretic.   Psychiatric: He has a normal mood and affect. His behavior is normal. Judgment and thought content normal.   Nursing note  and vitals reviewed.       Significant Labs:  CBC:   Recent Labs   Lab  09/10/18   0346   WBC  5.89   RBC  2.46*   HGB  7.6*   HCT  24.1*   PLT  143*   MCV  98   MCH  30.9   MCHC  31.5*     CMP:   Recent Labs   Lab  09/10/18   0845   GLU  181*   CALCIUM  8.6*   NA  139   K  4.5   CO2  24   CL  106   BUN  48*   CREATININE  5.7*     All labs within the past 24 hours have been reviewed.    Chest Xray personally reviewed.
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
## 2018-09-10 NOTE — PLAN OF CARE
Problem: Patient Care Overview  Goal: Plan of Care Review  Outcome: Revised  Pt free of falls/trauma/injuries.  Denies c/o CP. Pt's back pain treated with PO analgesics.  Pt remains on oxygen. Pt had dialysis in room tonight and stated SOB improved. Pt started on IV insulin for BG.  Generalized skin remains CDI; Moderate edema noted.  Pt tolerating plan of care

## 2018-09-10 NOTE — ASSESSMENT & PLAN NOTE
- Patient with diabetic nephropathy and concomitant ESRD, HD (MWF via LUE AVF) last dialyzed 9/10  -  Undergoing HD on TTS  - patiromer 8.4 gm on nondialysis days  - renal and diabetic diet   - Patient received dialysis yesterday evening (9/9) and this morning

## 2018-09-10 NOTE — ASSESSMENT & PLAN NOTE
- Complaining of SOB since 3-4 days  - Oxygen sats 91% on 2.5 L oxygen  - EKG shows NSR,  - Cardiology consulted given 2 prior episodes of hypotension and bradycardia with SOB. Troponin were elevated intially but then started to trend down.  *Recommended BP control and no further cardiac workup  - CXR shows diffuse interstitial infiltrates.  - DDx at this point includes fluid overload due to ESRD vs prolonged hypertension causing pul edema  - Patient dialyzed yesterday evening 9/9 and this morning.   - US LE pending

## 2018-09-10 NOTE — PLAN OF CARE
Problem: Patient Care Overview  Goal: Plan of Care Review  Outcome: Ongoing (interventions implemented as appropriate)  Pt remain free of falls, trauma, injury. Pt had HD today with 3 L fluid removed. Pt had lower extremity ultrasound today that showed patent lower extremity veins. Pt also had stat chest xray for stated SOB. Pt continues on 2 L nasal cannula with oxygen saturations of 93%. Pt daibetes managed with ACCU checks AC/HS insulin of 14 Units per meal with additional needing coverage per sliding scale.

## 2018-09-10 NOTE — PLAN OF CARE
[place] updated Stacey with [facility] that Pt may d/c tomorrow, [facility] also notified.

## 2018-09-10 NOTE — SUBJECTIVE & OBJECTIVE
Interval History: 9/10: Overnight , AGAP 17, and bicarb 17 and was started on insulin drip and transitioned to subcutaneous insulin this morning. Still complaints of SOB but improved, on 2.5 L oxygen.     Review of Systems   Constitutional: Negative for chills, fatigue and fever.   HENT: Negative for congestion, sinus pressure and sinus pain.    Eyes: Negative for visual disturbance.   Respiratory: Positive for shortness of breath. Negative for apnea, cough, choking, chest tightness, wheezing and stridor.    Cardiovascular: Negative for chest pain, palpitations and leg swelling.   Gastrointestinal: Negative for abdominal distention, abdominal pain, diarrhea, nausea and vomiting.   Endocrine: Negative for polydipsia, polyphagia and polyuria.   Genitourinary: Negative for dysuria and flank pain.   Musculoskeletal: Negative for arthralgias and back pain.   Skin: Negative for color change, pallor, rash and wound.   Allergic/Immunologic: Negative.    Neurological: Negative for dizziness, tremors, seizures, syncope, facial asymmetry, speech difficulty, weakness, light-headedness, numbness and headaches.   Psychiatric/Behavioral: Negative for agitation and confusion.     Objective:     Vital Signs (Most Recent):  Temp: 97.6 °F (36.4 °C) (09/10/18 1237)  Pulse: 79 (09/10/18 1237)  Resp: 18 (09/10/18 1237)  BP: (!) 158/70 (09/10/18 1237)  SpO2: (!) 93 % (09/10/18 1237) Vital Signs (24h Range):  Temp:  [97.1 °F (36.2 °C)-98.2 °F (36.8 °C)] 97.6 °F (36.4 °C)  Pulse:  [66-86] 79  Resp:  [16-20] 18  SpO2:  [90 %-93 %] 93 %  BP: (136-173)/(56-79) 158/70     Weight: 106.6 kg (235 lb)  Body mass index is 32.78 kg/m².    Intake/Output Summary (Last 24 hours) at 9/10/2018 1329  Last data filed at 9/9/2018 2315  Gross per 24 hour   Intake 500 ml   Output 3500 ml   Net -3000 ml      Physical Exam   Constitutional: He is oriented to person, place, and time. He appears well-developed and well-nourished. No distress.   HENT:   Head:  Normocephalic and atraumatic.   Eyes: Conjunctivae and EOM are normal. Pupils are equal, round, and reactive to light.   Neck: Normal range of motion. Neck supple.   Cardiovascular: Normal rate, regular rhythm and intact distal pulses. Exam reveals no gallop and no friction rub.   No murmur heard.  Pulmonary/Chest: Effort normal. No stridor. No respiratory distress. He has no wheezes. He has no rhonchi. He has rales in the right lower field and the left lower field.   Abdominal: Soft. He exhibits no distension. There is no tenderness.   Musculoskeletal: Normal range of motion.   Neurological: He is alert and oriented to person, place, and time.   Skin: Skin is warm and dry. He is not diaphoretic.   Psychiatric: He has a normal mood and affect. His behavior is normal. Judgment and thought content normal.   Nursing note and vitals reviewed.      Significant Labs:   Recent Lab Results       09/10/18  1239 09/10/18  1149 09/10/18  0955 09/10/18  0845 09/10/18  0801      Immature Granulocytes          Immature Grans (Abs)          Anion Gap    9      Baso #          Basophil%          BUN, Bld    48      Calcium    8.6      Chloride    106      CO2    24      Creatinine    5.7      Differential Method          eGFR if     12.1      eGFR if non     10.4  Comment:  Calculation used to obtain the estimated glomerular filtration  rate (eGFR) is the CKD-EPI equation.         Eos #          Eosinophil%          Glucose    181      Gran # (ANC)          Gran%          Hematocrit          Hemoglobin          Lymph #          Lymph%          Magnesium          MCH          MCHC          MCV          Mono #          Mono%          MPV          nRBC          Phosphorus          Platelets          POCT Glucose 168 174 209  138     Potassium    4.5      RBC          RDW          Sodium    139      WBC                      09/10/18  0633 09/10/18  0504 09/10/18  0358 09/10/18  0346 09/10/18  0303       Immature Granulocytes    0.3      Immature Grans (Abs)    0.02  Comment:  Mild elevation in immature granulocytes is non specific and   can be seen in a variety of conditions including stress response,   acute inflammation, trauma and pregnancy. Correlation with other   laboratory and clinical findings is essential.        Anion Gap    9      Baso #    0.03      Basophil%    0.5      BUN, Bld    53      Calcium    8.4      Chloride    105      CO2    24      Creatinine    5.9      Differential Method    Automated      eGFR if     11.6      eGFR if non     10.0  Comment:  Calculation used to obtain the estimated glomerular filtration  rate (eGFR) is the CKD-EPI equation.         Eos #    0.6      Eosinophil%    10.4      Glucose    245      Gran # (ANC)    3.8      Gran%    64.1      Hematocrit    24.1      Hemoglobin    7.6      Lymph #    0.9      Lymph%    14.9      Magnesium    2.1      MCH    30.9      MCHC    31.5      MCV    98      Mono #    0.6      Mono%    9.8      MPV    11.0      nRBC    0      Phosphorus    2.9      Platelets    143      POCT Glucose 159 202 250  296     Potassium    4.7      RBC    2.46      RDW    16.4      Sodium    138      WBC    5.89                  09/10/18  0204 09/10/18  0044 09/09/18  2240 09/09/18  2210 09/09/18  1907      Immature Granulocytes          Immature Grans (Abs)          Anion Gap   10  7        10       Baso #          Basophil%          BUN, Bld   55  64        55       Calcium   8.5  6.3  Comment:  *Critical value -  Results called to and read back by:Trista Rayo, RN          8.5       Chloride   102  112        102       CO2   23  17        23       Creatinine   6.1  6.4        6.1       Differential Method          eGFR if    11.1  10.5        11.1       eGFR if non    9.6  Comment:  Calculation used to obtain the estimated glomerular filtration  rate (eGFR) is the CKD-EPI equation.      9.1  Comment:  Calculation used to obtain the estimated glomerular filtration  rate (eGFR) is the CKD-EPI equation.           9.6  Comment:  Calculation used to obtain the estimated glomerular filtration  rate (eGFR) is the CKD-EPI equation.          Eos #          Eosinophil%          Glucose   645  Comment:  *Critical value -   Results called to and read back by:Trista Rayo RN.     618  Comment:  *Critical value -   Results called to and read back by:Trista Rayo RN.           64  Comment:  *Critical value -   Results called to and read back by:Trista Rayo RN.          Gran # (ANC)          Gran%          Hematocrit          Hemoglobin          Lymph #          Lymph%          Magnesium          MCH          MCHC          MCV          Mono #          Mono%          MPV          nRBC          Phosphorus          Platelets          POCT Glucose 336 433  >500      Potassium   4.7  4.3        4.7       RBC          RDW          Sodium   135  136        135       WBC                      09/09/18  1827 09/09/18  1647      Immature Granulocytes       Immature Grans (Abs)       Anion Gap       Baso #       Basophil%       BUN, Bld       Calcium       Chloride       CO2       Creatinine       Differential Method       eGFR if        eGFR if non        Eos #       Eosinophil%       Glucose       Gran # (ANC)       Gran%       Hematocrit       Hemoglobin       Lymph #       Lymph%       Magnesium       MCH       MCHC       MCV       Mono #       Mono%       MPV       nRBC       Phosphorus       Platelets       POCT Glucose >500 479     Potassium       RBC       RDW       Sodium       WBC             Significant Imaging: I have reviewed all pertinent imaging results/findings within the past 24 hours.
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
## 2018-09-10 NOTE — ASSESSMENT & PLAN NOTE
iHD TTS    [facility]   Followed by Dr. Cruz  Duration 3 hrs with 15 minutes  Accesss on MIESHA AVF  No residual renal function    End-Stage Renal Disease on HD  - Will provide dialysis for metabolic clearance and volume management  - Seen and examined today during hemodialysis; tolerating treatment well without issues. Denied headaches, chest pain, abdominal pain, or muscle cramps   - Target ultrafiltration 3L  - Dialysate adjusted to current labs   - Avoid nephrotoxic medications  - Medications to renal parameters  - Strict Input and Output and chart  - Daily standing weights    Anemia of Chronic Kidney Disease   - Hb > 7 gm/dL  - Will continue EPO     Mineral Bone Disease in CKD   - Renal Function Panel Daily for electrolytes monitoring  - Ca stable levels; PO4 normal range would continue with binders     HTN   - mild elevated BP, will increase UF for improvement and continue to monitor. Goal for BP is <140 mmHg SBP and BDP <90 mmHg, maintain MAP > 65.    Nutrition   - Renal Diet    Case discussed with staff further recs with attestation.

## 2018-09-10 NOTE — PROGRESS NOTES
HD treatment complete. Duration of treatment 3 hours and 3 L removed. Treatment was tolerated well and no complications with access to left upper arm. Needles removed and hemostasis achieved. Dressing intact and no drainage noted. Thrill and bruit present.

## 2018-09-10 NOTE — NURSING TRANSFER
Nursing Transfer Note      9/10/2018     Transfer To: dialysis    Transfer via stretcher    Transfer with 2L to O2, cardiac monitoring    Transported by transport    Medicines sent: yes insulin    Chart send with patient: Yes

## 2018-09-10 NOTE — PROGRESS NOTES
2-hr dialysis tx completed with net UF of 3L. Tolerated well. Rinsed back blood. Pulled out needles. Pressure applied and hemostasis achieved. Positive thrill and bruit. Gauzed taped. Report given to primary RN.

## 2018-09-10 NOTE — PROGRESS NOTES
HD treatment started. No complications with access to left upper arm. Lines secured and telemetry in place. Complains of SOB. O2 at 2 L per NC.

## 2018-09-10 NOTE — NURSING TRANSFER
Nursing Transfer Note      9/10/2018     Transfer From: ultrasound    Transfer via stretcher    Transfer with 2L to O2, cardiac monitoring    Transported by transport  Medicines sent: no    Chart send with patient: No      Patient reassessed at: 9/10 14:35

## 2018-09-10 NOTE — PLAN OF CARE
RN called with critical at 7:53 pm with blood sugar of 618, AGAP of 7, and bicarb of 17. Patient received 20 units determir at 7:30 pm    Patient is in DKA with metabolic acidosis.    Denies chest pain, dyspnea, headache, blurry vision. Excessive thirst. Was scheduled for dialysis at night, will hold because of emergent hyperglycemia.    Plan:   - will initiate Humalin (Regular Insulin) at 4 units/hr AT 8 PM. I called the RN myself to initiate the infusion.   - BMP Q4H  - Glucose Q1 glucose checks  - NPO status  - will continue to monitor until GAP closes and bicarb normalizes.  - will stop infusion when glucose is in 150-200 range and initiate feedings followed by long acting coverage  - will avoid excessive fluid infusion; given ESRD on HD and evidence of pulmonary edema on CXR    Ralph Tomas MD  Internal Medicine PGY II  [address] [facility]

## 2018-09-11 LAB
ANION GAP SERPL CALC-SCNC: 10 MMOL/L
BASOPHILS # BLD AUTO: 0.05 K/UL
BASOPHILS NFR BLD: 0.8 %
BUN SERPL-MCNC: 41 MG/DL
CALCIUM SERPL-MCNC: 8.8 MG/DL
CHLORIDE SERPL-SCNC: 106 MMOL/L
CO2 SERPL-SCNC: 24 MMOL/L
CREAT SERPL-MCNC: 5.8 MG/DL
DIFFERENTIAL METHOD: ABNORMAL
EOSINOPHIL # BLD AUTO: 0.7 K/UL
EOSINOPHIL NFR BLD: 11.1 %
ERYTHROCYTE [DISTWIDTH] IN BLOOD BY AUTOMATED COUNT: 16.8 %
EST. GFR  (AFRICAN AMERICAN): 11.8 ML/MIN/1.73 M^2
EST. GFR  (NON AFRICAN AMERICAN): 10.2 ML/MIN/1.73 M^2
GLUCOSE SERPL-MCNC: 146 MG/DL
HCT VFR BLD AUTO: 24.2 %
HGB BLD-MCNC: 7.2 G/DL
IMM GRANULOCYTES # BLD AUTO: 0.03 K/UL
IMM GRANULOCYTES NFR BLD AUTO: 0.5 %
LYMPHOCYTES # BLD AUTO: 1.4 K/UL
LYMPHOCYTES NFR BLD: 21.7 %
MAGNESIUM SERPL-MCNC: 2.1 MG/DL
MCH RBC QN AUTO: 29.9 PG
MCHC RBC AUTO-ENTMCNC: 29.8 G/DL
MCV RBC AUTO: 100 FL
MONOCYTES # BLD AUTO: 0.6 K/UL
MONOCYTES NFR BLD: 9.2 %
NEUTROPHILS # BLD AUTO: 3.7 K/UL
NEUTROPHILS NFR BLD: 56.7 %
NRBC BLD-RTO: 0 /100 WBC
PHOSPHATE SERPL-MCNC: 2.7 MG/DL
PLATELET # BLD AUTO: 179 K/UL
PMV BLD AUTO: 10.9 FL
POCT GLUCOSE: 139 MG/DL (ref 70–110)
POCT GLUCOSE: 158 MG/DL (ref 70–110)
POCT GLUCOSE: 272 MG/DL (ref 70–110)
POCT GLUCOSE: 292 MG/DL (ref 70–110)
POTASSIUM SERPL-SCNC: 4.8 MMOL/L
RBC # BLD AUTO: 2.41 M/UL
SODIUM SERPL-SCNC: 140 MMOL/L
WBC # BLD AUTO: 6.5 K/UL

## 2018-09-11 PROCEDURE — 25000003 PHARM REV CODE 250: Performed by: STUDENT IN AN ORGANIZED HEALTH CARE EDUCATION/TRAINING PROGRAM

## 2018-09-11 PROCEDURE — 80048 BASIC METABOLIC PNL TOTAL CA: CPT

## 2018-09-11 PROCEDURE — 25000003 PHARM REV CODE 250: Performed by: INTERNAL MEDICINE

## 2018-09-11 PROCEDURE — 83735 ASSAY OF MAGNESIUM: CPT

## 2018-09-11 PROCEDURE — 85025 COMPLETE CBC W/AUTO DIFF WBC: CPT

## 2018-09-11 PROCEDURE — 84100 ASSAY OF PHOSPHORUS: CPT

## 2018-09-11 PROCEDURE — 63600175 PHARM REV CODE 636 W HCPCS: Mod: JG | Performed by: STUDENT IN AN ORGANIZED HEALTH CARE EDUCATION/TRAINING PROGRAM

## 2018-09-11 PROCEDURE — 20600001 HC STEP DOWN PRIVATE ROOM

## 2018-09-11 PROCEDURE — 25000003 PHARM REV CODE 250: Performed by: HOSPITALIST

## 2018-09-11 PROCEDURE — 63600175 PHARM REV CODE 636 W HCPCS: Mod: JG | Performed by: HOSPITALIST

## 2018-09-11 PROCEDURE — 99232 SBSQ HOSP IP/OBS MODERATE 35: CPT | Mod: ,,, | Performed by: INTERNAL MEDICINE

## 2018-09-11 PROCEDURE — 63600175 PHARM REV CODE 636 W HCPCS: Performed by: STUDENT IN AN ORGANIZED HEALTH CARE EDUCATION/TRAINING PROGRAM

## 2018-09-11 RX ORDER — INSULIN GLARGINE 100 [IU]/ML
40 INJECTION, SOLUTION SUBCUTANEOUS 2 TIMES DAILY
Status: ON HOLD | COMMUNITY
End: 2018-09-16 | Stop reason: HOSPADM

## 2018-09-11 RX ORDER — INSULIN ASPART 100 [IU]/ML
13 INJECTION, SOLUTION INTRAVENOUS; SUBCUTANEOUS
COMMUNITY

## 2018-09-11 RX ORDER — INSULIN ASPART 100 [IU]/ML
7 INJECTION, SOLUTION INTRAVENOUS; SUBCUTANEOUS ONCE
Status: COMPLETED | OUTPATIENT
Start: 2018-09-11 | End: 2018-09-11

## 2018-09-11 RX ORDER — SODIUM CHLORIDE 9 MG/ML
INJECTION, SOLUTION INTRAVENOUS
Status: DISCONTINUED | OUTPATIENT
Start: 2018-09-12 | End: 2018-09-13

## 2018-09-11 RX ORDER — SODIUM CHLORIDE 9 MG/ML
INJECTION, SOLUTION INTRAVENOUS ONCE
Status: COMPLETED | OUTPATIENT
Start: 2018-09-12 | End: 2018-09-12

## 2018-09-11 RX ADMIN — PREDNISOLONE ACETATE 1 DROP: 10 SUSPENSION/ DROPS OPHTHALMIC at 12:09

## 2018-09-11 RX ADMIN — INSULIN ASPART 14 UNITS: 100 INJECTION, SOLUTION INTRAVENOUS; SUBCUTANEOUS at 12:09

## 2018-09-11 RX ADMIN — INSULIN ASPART 1 UNITS: 100 INJECTION, SOLUTION INTRAVENOUS; SUBCUTANEOUS at 09:09

## 2018-09-11 RX ADMIN — HYDROCODONE BITARTRATE AND ACETAMINOPHEN 1 TABLET: 7.5; 325 TABLET ORAL at 05:09

## 2018-09-11 RX ADMIN — INSULIN ASPART 7 UNITS: 100 INJECTION, SOLUTION INTRAVENOUS; SUBCUTANEOUS at 01:09

## 2018-09-11 RX ADMIN — PREDNISOLONE ACETATE 1 DROP: 10 SUSPENSION/ DROPS OPHTHALMIC at 08:09

## 2018-09-11 RX ADMIN — SERTRALINE HYDROCHLORIDE 25 MG: 25 TABLET ORAL at 08:09

## 2018-09-11 RX ADMIN — DAPTOMYCIN 905 MG: 500 INJECTION, POWDER, LYOPHILIZED, FOR SOLUTION INTRAVENOUS at 09:09

## 2018-09-11 RX ADMIN — HYDRALAZINE HYDROCHLORIDE 100 MG: 50 TABLET ORAL at 05:09

## 2018-09-11 RX ADMIN — METHADONE HYDROCHLORIDE 10 MG: 5 TABLET ORAL at 08:09

## 2018-09-11 RX ADMIN — HYDRALAZINE HYDROCHLORIDE 100 MG: 50 TABLET ORAL at 09:09

## 2018-09-11 RX ADMIN — HEPARIN SODIUM 5000 UNITS: 5000 INJECTION, SOLUTION INTRAVENOUS; SUBCUTANEOUS at 01:09

## 2018-09-11 RX ADMIN — HYDRALAZINE HYDROCHLORIDE 100 MG: 50 TABLET ORAL at 01:09

## 2018-09-11 RX ADMIN — AMLODIPINE BESYLATE 10 MG: 10 TABLET ORAL at 08:09

## 2018-09-11 RX ADMIN — ATORVASTATIN CALCIUM 80 MG: 20 TABLET, FILM COATED ORAL at 08:09

## 2018-09-11 RX ADMIN — ONDANSETRON 8 MG: 8 TABLET, ORALLY DISINTEGRATING ORAL at 05:09

## 2018-09-11 RX ADMIN — FLUTICASONE PROPIONATE 100 MCG: 50 SPRAY, METERED NASAL at 08:09

## 2018-09-11 RX ADMIN — HEPARIN SODIUM 5000 UNITS: 5000 INJECTION, SOLUTION INTRAVENOUS; SUBCUTANEOUS at 09:09

## 2018-09-11 RX ADMIN — SENNOSIDES AND DOCUSATE SODIUM 2 TABLET: 8.6; 5 TABLET ORAL at 08:09

## 2018-09-11 RX ADMIN — INSULIN ASPART 3 UNITS: 100 INJECTION, SOLUTION INTRAVENOUS; SUBCUTANEOUS at 12:09

## 2018-09-11 RX ADMIN — CALCIUM ACETATE 1334 MG: 667 CAPSULE ORAL at 12:09

## 2018-09-11 RX ADMIN — INSULIN DETEMIR 20 UNITS: 100 INJECTION, SOLUTION SUBCUTANEOUS at 08:09

## 2018-09-11 RX ADMIN — ASPIRIN 81 MG: 81 TABLET, COATED ORAL at 08:09

## 2018-09-11 RX ADMIN — CARVEDILOL 25 MG: 25 TABLET, FILM COATED ORAL at 08:09

## 2018-09-11 RX ADMIN — METHADONE HYDROCHLORIDE 10 MG: 5 TABLET ORAL at 09:09

## 2018-09-11 RX ADMIN — CALCIUM ACETATE 1334 MG: 667 CAPSULE ORAL at 08:09

## 2018-09-11 RX ADMIN — CARVEDILOL 25 MG: 25 TABLET, FILM COATED ORAL at 09:09

## 2018-09-11 RX ADMIN — SENNOSIDES AND DOCUSATE SODIUM 2 TABLET: 8.6; 5 TABLET ORAL at 09:09

## 2018-09-11 RX ADMIN — CETIRIZINE HYDROCHLORIDE 5 MG: 5 TABLET ORAL at 08:09

## 2018-09-11 RX ADMIN — CLONAZEPAM 0.5 MG: 0.5 TABLET ORAL at 09:09

## 2018-09-11 RX ADMIN — INSULIN DETEMIR 20 UNITS: 100 INJECTION, SOLUTION SUBCUTANEOUS at 09:09

## 2018-09-11 RX ADMIN — CALCIUM ACETATE 1334 MG: 667 CAPSULE ORAL at 05:09

## 2018-09-11 RX ADMIN — PREDNISOLONE ACETATE 1 DROP: 10 SUSPENSION/ DROPS OPHTHALMIC at 09:09

## 2018-09-11 RX ADMIN — ERYTHROPOIETIN 8000 UNITS: 4000 INJECTION, SOLUTION INTRAVENOUS; SUBCUTANEOUS at 12:09

## 2018-09-11 RX ADMIN — INSULIN ASPART 14 UNITS: 100 INJECTION, SOLUTION INTRAVENOUS; SUBCUTANEOUS at 08:09
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
## 2018-09-11 NOTE — ASSESSMENT & PLAN NOTE
- Patient with diabetic nephropathy and concomitant ESRD, HD (MWF via LUE AVF) last dialyzed 9/10  -  Undergoing HD on TTS  - patiromer 8.4 gm on nondialysis days  - renal and diabetic diet

## 2018-09-11 NOTE — PLAN OF CARE
09/11/18 1248   Discharge Reassessment   Assessment Type Discharge Planning Reassessment   Provided patient/caregiver education on the expected discharge date and the discharge plan Yes   Do you have any problems affording any of your prescribed medications? No   Discharge Plan A Home Health   Discharge Plan B Home Health   Patient choice form signed by patient/caregiver Yes   Can the patient answer the patient profile reliably? Yes, cognitively intact   How does the patient rate their overall health at the present time? Fair   Describe the patient's ability to walk at the present time. Walks with the help of equipment   How often would a person be available to care for the patient? Occasionally   Number of comorbid conditions (as recorded on the chart) Five or more   During the past month, has the patient often been bothered by feeling down, depressed or hopeless? No   During the past month, has the patient often been bothered by little interest or pleasure in doing things? No     On Today's CM visit today patient was in the room, resting quietly. Pt currently has his O2 off seeing how he manages without it on. CM reiterated Discharge plans with patient, pt was able to verbalize understanding. CM will continue to follow.

## 2018-09-11 NOTE — PLAN OF CARE
Problem: Patient Care Overview  Goal: Plan of Care Review  Outcome: Ongoing (interventions implemented as appropriate)  Pt remain free of falls, trauma, injury. Pt complained of SOB will remain on 2L NS for comfort. Plan to have HD tomorrow. Pt to have home oxygen test prior to discharge. Pt daibetes managed with ACCU checks AC/HS insulin of 14 Units per meal with additional needing coverage per sliding scale.

## 2018-09-11 NOTE — PLAN OF CARE
[place] did place call to Stacey liaison for Clarice at , informed Pt will not d/c today. Selma updated home health with [facility] as well to hold of d/c.

## 2018-09-11 NOTE — SUBJECTIVE & OBJECTIVE
Interval History: Continues to be short of breath. Currently on 2 L oxygen. Has a difficulty walking due to shortness of breath and left foot pain.    Review of Systems   Constitutional: Negative for chills, fatigue and fever.   HENT: Negative for congestion.    Eyes: Negative for visual disturbance.   Respiratory: Positive for shortness of breath. Negative for apnea, cough, choking, chest tightness, wheezing and stridor.    Cardiovascular: Negative for chest pain, palpitations and leg swelling.   Gastrointestinal: Negative for abdominal pain, diarrhea, nausea and vomiting.   Endocrine: Negative for polydipsia, polyphagia and polyuria.   Genitourinary: Negative for flank pain.   Musculoskeletal: Negative for arthralgias.   Skin: Positive for wound. Negative for color change, pallor and rash.   Allergic/Immunologic: Negative for immunocompromised state.   Neurological: Negative for dizziness, facial asymmetry, light-headedness, numbness and headaches.   Hematological: Negative.    Psychiatric/Behavioral: Negative.      Objective:     Vital Signs (Most Recent):  Temp: 97.6 °F (36.4 °C) (09/11/18 1559)  Pulse: 75 (09/11/18 1559)  Resp: 20 (09/11/18 1559)  BP: 139/62 (09/11/18 1559)  SpO2: 95 % (09/11/18 1559) Vital Signs (24h Range):  Temp:  [97.6 °F (36.4 °C)-98.3 °F (36.8 °C)] 97.6 °F (36.4 °C)  Pulse:  [68-87] 75  Resp:  [16-20] 20  SpO2:  [90 %-96 %] 95 %  BP: (134-154)/(62-73) 139/62     Weight: 102.9 kg (226 lb 14.4 oz)  Body mass index is 31.65 kg/m².    Intake/Output Summary (Last 24 hours) at 9/11/2018 1614  Last data filed at 9/11/2018 1400  Gross per 24 hour   Intake 960 ml   Output 0 ml   Net 960 ml      Physical Exam   Constitutional: He is oriented to person, place, and time. He appears well-developed and well-nourished. No distress.   HENT:   Head: Normocephalic and atraumatic.   Neck: Normal range of motion. Neck supple.   Cardiovascular: Normal rate, regular rhythm and intact distal pulses.   No murmur  heard.  Pulmonary/Chest: Effort normal and breath sounds normal. No stridor. No respiratory distress. He has no wheezes.   Abdominal: Soft. He exhibits no distension. There is no tenderness.   Musculoskeletal: Normal range of motion.   Neurological: He is alert and oriented to person, place, and time.   Skin: Skin is warm and dry. He is not diaphoretic.   Nursing note and vitals reviewed.      Significant Labs:   Recent Lab Results       09/11/18  1240 09/11/18  0820 09/11/18  0735 09/11/18  0435 09/10/18  2217      Immature Granulocytes    0.5      Immature Grans (Abs)    0.03  Comment:  Mild elevation in immature granulocytes is non specific and   can be seen in a variety of conditions including stress response,   acute inflammation, trauma and pregnancy. Correlation with other   laboratory and clinical findings is essential.        Anion Gap  10   6     Baso #    0.05      Basophil%    0.8      BUN, Bld  41   35     Calcium  8.8   8.5     Chloride  106   103     CO2  24   25     Creatinine  5.8   5.0     Differential Method    Automated      eGFR if   11.8   14.1     eGFR if non   10.2  Comment:  Calculation used to obtain the estimated glomerular filtration  rate (eGFR) is the CKD-EPI equation.      12.2  Comment:  Calculation used to obtain the estimated glomerular filtration  rate (eGFR) is the CKD-EPI equation.        Eos #    0.7      Eosinophil%    11.1      Glucose  146   259     Gran # (ANC)    3.7      Gran%    56.7      Hematocrit    24.2      Hemoglobin    7.2      Lymph #    1.4      Lymph%    21.7      Magnesium    2.1      MCH    29.9      MCHC    29.8      MCV    100      Mono #    0.6      Mono%    9.2      MPV    10.9      nRBC    0      Phosphorus    2.7      Platelets    179      POCT Glucose 272  139       Potassium  4.8   4.5     RBC    2.41      RDW    16.8      Sodium  140   134     WBC    6.50                  09/10/18  2208 09/10/18  1748 09/10/18  1632       Immature Granulocytes        Immature Grans (Abs)        Anion Gap   5     Baso #        Basophil%        BUN, Bld   30     Calcium   8.6     Chloride   105     CO2   28     Creatinine   4.3     Differential Method        eGFR if African American   17.0     eGFR if non    14.7  Comment:  Calculation used to obtain the estimated glomerular filtration  rate (eGFR) is the CKD-EPI equation.        Eos #        Eosinophil%        Glucose   123     Gran # (ANC)        Gran%        Hematocrit        Hemoglobin        Lymph #        Lymph%        Magnesium        MCH        MCHC        MCV        Mono #        Mono%        MPV        nRBC        Phosphorus        Platelets        POCT Glucose 278 164      Potassium   4.4     RBC        RDW        Sodium   138     WBC              Significant Imaging: I have reviewed all pertinent imaging results/findings within the past 24 hours.

## 2018-09-11 NOTE — ASSESSMENT & PLAN NOTE
- Complaining of SOB since 6 days  - EKG shows NSR,  - Cardiology consulted given 2 prior episodes of hypotension and bradycardia with SOB. Troponin were elevated intially but then started to trend down.  *Recommended BP control and no further cardiac workup  - CXR shows diffuse interstitial infiltrates.  - DDx at this point includes fluid overload due to ESRD vs prolonged hypertension causing pul edema  - Oxygen sats 94% on 2.5 L oxygen (9/11)

## 2018-09-11 NOTE — PLAN OF CARE
Problem: Patient Care Overview  Goal: Plan of Care Review  Outcome: Revised  Pt free of falls/trauma/injuries.  Denies c/o CP. Pt's back pain treated with PO analgesics.  Pt remains on oxygen. Pt had dialysis in room tonight and stated SOB improved. Pt  BG being treated with subq insulins. Pt had HD today also  Generalized skin remains CDI; Moderate edema noted.  Pt tolerating plan of care.

## 2018-09-11 NOTE — ASSESSMENT & PLAN NOTE
- Long term diabetic.. Takes Basglar 20 units BID and Novolog 10 units premeal  - A1C 8.2  - Concern for patient not sticking to diet and snacking in the evenings.   - Continue Lev 20 BID with Asp 14 TIDWM , LDSSI.   - diabetic and renal diet

## 2018-09-11 NOTE — PLAN OF CARE
Awaiting results of six minute walk test for O2 orders. Spoke to Nurse Colvin, results should be in shortly.

## 2018-09-11 NOTE — ASSESSMENT & PLAN NOTE
- Osteomyelitis of left 5th metatarsal taken for washout by Orthopedic surgery at [facility] on 08/15  - Patient being treated outside facility with Flagyl, linezolid, gentamicin with HD.  - Refusing amputation which would be curative.   - Podiatry consulted, appreciate recs. Advised BKA but patient refused. Recommended CAM boot and abx per ID. D/tyler wound vac  - ESR elevated at 58 upon admission  - X-ray left foot and ankle shows partial amputation the 1st, 2nd, 3rd, and 4th digits with fusion of the 2nd and 3rd MTP joints and throughout the midfoot, severe deformity and joint space loss the tibiotalar joint with a lapse of the talus likely 2/2 to chronic osteomyelitis, arthritis, or chronic Charcot foot.   - BCx2 from [facility] grew MRSA. Cultures from [facility] were NGTD.    PLAN:  - On Daptomycin 8mg/kg after HD per ID's recs. Planned for 6 weeks total, last dose 9/29  - ID follow up at 2 weeks.  - Podiatry consulted, appreciate recs. Recommend follow up with [facility] at discharge.

## 2018-09-12 PROBLEM — J96.21 ACUTE ON CHRONIC RESPIRATORY FAILURE WITH HYPOXIA: Status: ACTIVE | Noted: 2018-09-07

## 2018-09-12 PROBLEM — E87.5 HYPERKALEMIA: Status: RESOLVED | Noted: 2018-09-02 | Resolved: 2018-09-12

## 2018-09-12 LAB
ALBUMIN SERPL BCP-MCNC: 2.2 G/DL
ANION GAP SERPL CALC-SCNC: 7 MMOL/L
ANION GAP SERPL CALC-SCNC: 9 MMOL/L
BASOPHILS # BLD AUTO: 0.05 K/UL
BASOPHILS NFR BLD: 0.9 %
BUN SERPL-MCNC: 21 MG/DL
BUN SERPL-MCNC: 59 MG/DL
CALCIUM SERPL-MCNC: 8.2 MG/DL
CALCIUM SERPL-MCNC: 8.3 MG/DL
CHLORIDE SERPL-SCNC: 105 MMOL/L
CHLORIDE SERPL-SCNC: 106 MMOL/L
CO2 SERPL-SCNC: 23 MMOL/L
CO2 SERPL-SCNC: 27 MMOL/L
CREAT SERPL-MCNC: 3.7 MG/DL
CREAT SERPL-MCNC: 7.4 MG/DL
DIFFERENTIAL METHOD: ABNORMAL
EOSINOPHIL # BLD AUTO: 0.6 K/UL
EOSINOPHIL NFR BLD: 10.2 %
ERYTHROCYTE [DISTWIDTH] IN BLOOD BY AUTOMATED COUNT: 16.7 %
EST. GFR  (AFRICAN AMERICAN): 20.3 ML/MIN/1.73 M^2
EST. GFR  (AFRICAN AMERICAN): 8.8 ML/MIN/1.73 M^2
EST. GFR  (NON AFRICAN AMERICAN): 17.6 ML/MIN/1.73 M^2
EST. GFR  (NON AFRICAN AMERICAN): 7.6 ML/MIN/1.73 M^2
GLUCOSE SERPL-MCNC: 208 MG/DL
GLUCOSE SERPL-MCNC: 372 MG/DL
HCT VFR BLD AUTO: 24.5 %
HGB BLD-MCNC: 7.2 G/DL
IMM GRANULOCYTES # BLD AUTO: 0.04 K/UL
IMM GRANULOCYTES NFR BLD AUTO: 0.7 %
LDH SERPL L TO P-CCNC: 143 U/L
LYMPHOCYTES # BLD AUTO: 1.7 K/UL
LYMPHOCYTES NFR BLD: 29.7 %
MAGNESIUM SERPL-MCNC: 2.3 MG/DL
MCH RBC QN AUTO: 29.9 PG
MCHC RBC AUTO-ENTMCNC: 29.4 G/DL
MCV RBC AUTO: 102 FL
MONOCYTES # BLD AUTO: 0.6 K/UL
MONOCYTES NFR BLD: 10.2 %
NEUTROPHILS # BLD AUTO: 2.8 K/UL
NEUTROPHILS NFR BLD: 48.3 %
NRBC BLD-RTO: 0 /100 WBC
PHOSPHATE SERPL-MCNC: 3.9 MG/DL
PLATELET # BLD AUTO: 149 K/UL
PMV BLD AUTO: 11.3 FL
POCT GLUCOSE: 207 MG/DL (ref 70–110)
POCT GLUCOSE: 214 MG/DL (ref 70–110)
POCT GLUCOSE: 285 MG/DL (ref 70–110)
POCT GLUCOSE: 395 MG/DL (ref 70–110)
POCT GLUCOSE: 450 MG/DL (ref 70–110)
POCT GLUCOSE: 95 MG/DL (ref 70–110)
POTASSIUM SERPL-SCNC: 3.9 MMOL/L
POTASSIUM SERPL-SCNC: 5.3 MMOL/L
RBC # BLD AUTO: 2.41 M/UL
SODIUM SERPL-SCNC: 137 MMOL/L
SODIUM SERPL-SCNC: 140 MMOL/L
WBC # BLD AUTO: 5.8 K/UL

## 2018-09-12 PROCEDURE — 83735 ASSAY OF MAGNESIUM: CPT

## 2018-09-12 PROCEDURE — 63600175 PHARM REV CODE 636 W HCPCS: Performed by: STUDENT IN AN ORGANIZED HEALTH CARE EDUCATION/TRAINING PROGRAM

## 2018-09-12 PROCEDURE — 90935 HEMODIALYSIS ONE EVALUATION: CPT | Mod: ,,, | Performed by: INTERNAL MEDICINE

## 2018-09-12 PROCEDURE — 82040 ASSAY OF SERUM ALBUMIN: CPT

## 2018-09-12 PROCEDURE — 80048 BASIC METABOLIC PNL TOTAL CA: CPT

## 2018-09-12 PROCEDURE — 90935 HEMODIALYSIS ONE EVALUATION: CPT

## 2018-09-12 PROCEDURE — 84100 ASSAY OF PHOSPHORUS: CPT

## 2018-09-12 PROCEDURE — 25000003 PHARM REV CODE 250: Performed by: STUDENT IN AN ORGANIZED HEALTH CARE EDUCATION/TRAINING PROGRAM

## 2018-09-12 PROCEDURE — 25000003 PHARM REV CODE 250: Performed by: GENERAL PRACTICE

## 2018-09-12 PROCEDURE — 99232 SBSQ HOSP IP/OBS MODERATE 35: CPT | Mod: ,,, | Performed by: HOSPITALIST

## 2018-09-12 PROCEDURE — 10060 I&D ABSCESS SIMPLE/SINGLE: CPT | Mod: ,,, | Performed by: PODIATRIST

## 2018-09-12 PROCEDURE — 87077 CULTURE AEROBIC IDENTIFY: CPT

## 2018-09-12 PROCEDURE — 80048 BASIC METABOLIC PNL TOTAL CA: CPT | Mod: 91

## 2018-09-12 PROCEDURE — 87075 CULTR BACTERIA EXCEPT BLOOD: CPT

## 2018-09-12 PROCEDURE — 87186 SC STD MICRODIL/AGAR DIL: CPT

## 2018-09-12 PROCEDURE — 20600001 HC STEP DOWN PRIVATE ROOM

## 2018-09-12 PROCEDURE — 25000003 PHARM REV CODE 250: Performed by: HOSPITALIST

## 2018-09-12 PROCEDURE — 87070 CULTURE OTHR SPECIMN AEROBIC: CPT

## 2018-09-12 PROCEDURE — 25000003 PHARM REV CODE 250: Performed by: INTERNAL MEDICINE

## 2018-09-12 PROCEDURE — 99233 SBSQ HOSP IP/OBS HIGH 50: CPT | Mod: 25,,, | Performed by: PODIATRIST

## 2018-09-12 PROCEDURE — 83615 LACTATE (LD) (LDH) ENZYME: CPT

## 2018-09-12 PROCEDURE — 85025 COMPLETE CBC W/AUTO DIFF WBC: CPT

## 2018-09-12 RX ORDER — INSULIN ASPART 100 [IU]/ML
15 INJECTION, SOLUTION INTRAVENOUS; SUBCUTANEOUS
Status: DISCONTINUED | OUTPATIENT
Start: 2018-09-12 | End: 2018-09-21 | Stop reason: HOSPADM

## 2018-09-12 RX ORDER — INSULIN ASPART 100 [IU]/ML
0-10 INJECTION, SOLUTION INTRAVENOUS; SUBCUTANEOUS
Status: DISCONTINUED | OUTPATIENT
Start: 2018-09-12 | End: 2018-09-21 | Stop reason: HOSPADM

## 2018-09-12 RX ADMIN — SODIUM CHLORIDE: 0.9 INJECTION, SOLUTION INTRAVENOUS at 07:09

## 2018-09-12 RX ADMIN — HYDRALAZINE HYDROCHLORIDE 100 MG: 50 TABLET ORAL at 03:09

## 2018-09-12 RX ADMIN — INSULIN ASPART 15 UNITS: 100 INJECTION, SOLUTION INTRAVENOUS; SUBCUTANEOUS at 09:09

## 2018-09-12 RX ADMIN — PREDNISOLONE ACETATE 1 DROP: 10 SUSPENSION/ DROPS OPHTHALMIC at 05:09

## 2018-09-12 RX ADMIN — INSULIN ASPART 15 UNITS: 100 INJECTION, SOLUTION INTRAVENOUS; SUBCUTANEOUS at 11:09

## 2018-09-12 RX ADMIN — INSULIN DETEMIR 20 UNITS: 100 INJECTION, SOLUTION SUBCUTANEOUS at 11:09

## 2018-09-12 RX ADMIN — PATIROMER 8.4 G: 8.4 POWDER, FOR SUSPENSION ORAL at 11:09

## 2018-09-12 RX ADMIN — HEPARIN SODIUM 5000 UNITS: 5000 INJECTION, SOLUTION INTRAVENOUS; SUBCUTANEOUS at 09:09

## 2018-09-12 RX ADMIN — PREDNISOLONE ACETATE 1 DROP: 10 SUSPENSION/ DROPS OPHTHALMIC at 11:09

## 2018-09-12 RX ADMIN — HYDROCODONE BITARTRATE AND ACETAMINOPHEN 1 TABLET: 7.5; 325 TABLET ORAL at 07:09

## 2018-09-12 RX ADMIN — CETIRIZINE HYDROCHLORIDE 5 MG: 5 TABLET ORAL at 11:09

## 2018-09-12 RX ADMIN — SERTRALINE HYDROCHLORIDE 25 MG: 25 TABLET ORAL at 11:09

## 2018-09-12 RX ADMIN — ATORVASTATIN CALCIUM 80 MG: 20 TABLET, FILM COATED ORAL at 11:09

## 2018-09-12 RX ADMIN — INSULIN ASPART 2 UNITS: 100 INJECTION, SOLUTION INTRAVENOUS; SUBCUTANEOUS at 11:09

## 2018-09-12 RX ADMIN — INSULIN ASPART 15 UNITS: 100 INJECTION, SOLUTION INTRAVENOUS; SUBCUTANEOUS at 08:09

## 2018-09-12 RX ADMIN — CALCIUM ACETATE 1334 MG: 667 CAPSULE ORAL at 05:09

## 2018-09-12 RX ADMIN — AMLODIPINE BESYLATE 10 MG: 10 TABLET ORAL at 11:09

## 2018-09-12 RX ADMIN — CLONAZEPAM 0.5 MG: 0.5 TABLET ORAL at 09:09

## 2018-09-12 RX ADMIN — CALCIUM ACETATE 1334 MG: 667 CAPSULE ORAL at 12:09

## 2018-09-12 RX ADMIN — HEPARIN SODIUM 5000 UNITS: 5000 INJECTION, SOLUTION INTRAVENOUS; SUBCUTANEOUS at 05:09

## 2018-09-12 RX ADMIN — CARVEDILOL 25 MG: 25 TABLET, FILM COATED ORAL at 11:09

## 2018-09-12 RX ADMIN — PREDNISOLONE ACETATE 1 DROP: 10 SUSPENSION/ DROPS OPHTHALMIC at 09:09

## 2018-09-12 RX ADMIN — HEPARIN SODIUM 5000 UNITS: 5000 INJECTION, SOLUTION INTRAVENOUS; SUBCUTANEOUS at 03:09

## 2018-09-12 RX ADMIN — ASPIRIN 81 MG: 81 TABLET, COATED ORAL at 11:09

## 2018-09-12 RX ADMIN — METHADONE HYDROCHLORIDE 10 MG: 5 TABLET ORAL at 11:09

## 2018-09-12 RX ADMIN — HYDRALAZINE HYDROCHLORIDE 100 MG: 50 TABLET ORAL at 09:09

## 2018-09-12 RX ADMIN — INSULIN DETEMIR 20 UNITS: 100 INJECTION, SOLUTION SUBCUTANEOUS at 09:09

## 2018-09-12 RX ADMIN — METHADONE HYDROCHLORIDE 10 MG: 5 TABLET ORAL at 09:09

## 2018-09-12 RX ADMIN — CARVEDILOL 25 MG: 25 TABLET, FILM COATED ORAL at 09:09

## 2018-09-12 RX ADMIN — HYDRALAZINE HYDROCHLORIDE 100 MG: 50 TABLET ORAL at 05:09

## 2018-09-12 RX ADMIN — INSULIN ASPART 3 UNITS: 100 INJECTION, SOLUTION INTRAVENOUS; SUBCUTANEOUS at 08:09

## 2018-09-12 NOTE — ASSESSMENT & PLAN NOTE
- Complaining of SOB past week   - EKG shows NSR  - Cardiology consulted given 2 prior episodes of hypotension and bradycardia with SOB. Troponin were elevated intially but then started to trend down. Recommended BP control and no further cardiac workup  - CXR shows diffuse interstitial infiltrates.  - DDx likely non-cardiogenic pulmonary edema from ESRD vs prolonged hypertension causing pulmonary edema.  - Back on room air on 9/12 following HD session.

## 2018-09-12 NOTE — SUBJECTIVE & OBJECTIVE
Interval History:   Seen following dialysis. Complains of being winded intermittently but states it is significantly improved from yesterday. No off of nasal cannula.    Review of Systems   Constitutional: Negative for chills and fever.   HENT: Negative for congestion and rhinorrhea.    Eyes: Positive for visual disturbance.   Respiratory: Positive for shortness of breath. Negative for cough and chest tightness.    Cardiovascular: Negative for chest pain, palpitations and leg swelling.   Gastrointestinal: Negative for nausea and vomiting.   Genitourinary: Positive for decreased urine volume.   Musculoskeletal: Positive for arthralgias and back pain.        Left ankle deformity.   Skin: Positive for color change and wound (Surgical incisions to the medial and lateral ankle).     Objective:     Vital Signs (Most Recent):  Temp: 98.2 °F (36.8 °C) (09/12/18 1600)  Pulse: 74 (09/12/18 1600)  Resp: 18 (09/12/18 1600)  BP: (!) 118/56 (09/12/18 1600)  SpO2: 98 % (09/12/18 1600) Vital Signs (24h Range):  Temp:  [97 °F (36.1 °C)-98.2 °F (36.8 °C)] 98.2 °F (36.8 °C)  Pulse:  [66-82] 74  Resp:  [18] 18  SpO2:  [93 %-98 %] 98 %  BP: (100-165)/(49-74) 118/56     Weight: 102.9 kg (226 lb 14.4 oz)  Body mass index is 31.65 kg/m².    Intake/Output Summary (Last 24 hours) at 9/12/2018 1711  Last data filed at 9/12/2018 1200  Gross per 24 hour   Intake 1680 ml   Output 3508 ml   Net -1828 ml      Physical Exam   Constitutional: He is oriented to person, place, and time. He appears well-developed and well-nourished. No distress.   HENT:   Head: Normocephalic and atraumatic.   Eyes:   Blind in left eye; cloudy vision on right eye but able to discern number of fingers.    Neck: Normal range of motion. Neck supple.   Cardiovascular: Normal rate, regular rhythm and intact distal pulses.   No murmur heard.  Pulmonary/Chest: Effort normal. No stridor. No respiratory distress. He has no wheezes. He has rales (Mild diffuse inspiratory  crackles. ).   Without use of accessory muscles.    Abdominal: Soft. He exhibits no distension. There is no tenderness.   Musculoskeletal: Normal range of motion.   Neurological: He is alert and oriented to person, place, and time.   Skin: Skin is warm and dry. He is not diaphoretic.   Left foot wrapped.    Nursing note and vitals reviewed.      Significant Labs:   BMP:   Recent Labs   Lab  09/12/18   0514  09/12/18   1125   GLU  372*  208*   NA  137  140   K  5.3*  3.9   CL  105  106   CO2  23  27   BUN  59*  21*   CREATININE  7.4*  3.7*   CALCIUM  8.2*  8.3*   MG  2.3   --      CBC:   Recent Labs   Lab  09/11/18   0435  09/12/18   0514   WBC  6.50  5.80   HGB  7.2*  7.2*   HCT  24.2*  24.5*   PLT  179  149*

## 2018-09-12 NOTE — PLAN OF CARE
Problem: Patient Care Overview  Goal: Plan of Care Review       Recommendations     Recommendation/Intervention:   1. Add renal restriction to current 2000 diabetic diet.   2. Consider discontinuing Novasource as meal intake has been good.   3. RD following.     Goals: nutrient intake >/= 85% EEN/EPN   Nutrition Goal Status: goal met

## 2018-09-12 NOTE — ASSESSMENT & PLAN NOTE
- Long term diabetic. Takes Basglar 20 units BID and Novolog 10 units premeal  - A1C 8.2  - Concern for patient not sticking to diet and snacking in the evenings.   - Continue Lev 20 BID with Asp 15 TIDWM , MDSSI.   - Diabetic and renal diet

## 2018-09-12 NOTE — PROGRESS NOTES
I personally saw and examined the patient on hemodialysis, tolerating treatment, see hemodyalisis flowsheet. I also reviewed the chart and current medication. The dialysis bath was adjusted.   Unfortunately his weights are not recorded right (bed-weights). Aimed for target UF 4 liters but he started cramping at UF 2.8 liter. He seems optimized with his weight not from a dialysis standpoint.

## 2018-09-12 NOTE — SUBJECTIVE & OBJECTIVE
Interval History: Patient evaluated bedside at HD unit while on treatment, found in no acute distress, tolerating treatment well.  No chest pain, SOB, palpitations, muscle pain/cramps.  Night was uneventful.    Review of patient's allergies indicates:   Allergen Reactions    Vancomycin analogues Rash     Red Man Syndrome    Penicillins      Current Facility-Administered Medications   Medication Frequency    0.9%  NaCl infusion (for blood administration) Q24H PRN    0.9%  NaCl infusion PRN    0.9%  NaCl infusion Once    acetaminophen tablet 650 mg Q4H PRN    albuterol-ipratropium 2.5 mg-0.5 mg/3 mL nebulizer solution 3 mL Q4H PRN    amLODIPine tablet 10 mg Daily    aspirin EC tablet 81 mg Daily    atorvastatin tablet 80 mg Daily    atropine 1% ophthalmic solution 1 drop Daily    calcium acetate capsule 1,334 mg TID WM    carvedilol tablet 25 mg BID    cetirizine tablet 5 mg Daily    clonazePAM tablet 0.5 mg BID PRN    DAPTOmycin (CUBICIN) 905 mg in sodium chloride 0.9% IVPB Q48H    dextrose 50% injection 12.5 g PRN    dextrose 50% injection 25 g PRN    dextrose 50% injection 25 g PRN    dextrose 50% injection 25 g PRN    dextrose 50% injection 25 g PRN    epoetin umer injection 8,000 Units Every Tues, Thurs, Sat    fluticasone 50 mcg/actuation nasal spray 100 mcg Daily    glucagon (human recombinant) injection 1 mg PRN    glucose chewable tablet 16 g PRN    glucose chewable tablet 24 g PRN    heparin (porcine) injection 5,000 Units Q8H    hydrALAZINE tablet 100 mg Q8H    hydrALAZINE tablet 50 mg Q8H PRN    HYDROcodone-acetaminophen 7.5-325 mg per tablet 1 tablet Q6H PRN    hydrOXYzine HCl tablet 25 mg TID PRN    insulin aspart U-100 pen 0-10 Units QID (AC + HS) PRN    insulin aspart U-100 pen 15 Units TIDWM    insulin detemir U-100 pen 20 Units BID    methadone tablet 10 mg BID    naloxone 0.4 mg/mL injection 0.4 mg PRN    ondansetron disintegrating tablet 8 mg Q8H PRN     ondansetron injection 4 mg Q8H PRN    patiromer calcium sorbitex PwPk 8.4 g Once per day on Sun Mon Wed Fri    polyethylene glycol packet 17 g Daily    prednisoLONE acetate 1 % ophthalmic suspension 1 drop QID    senna-docusate 8.6-50 mg per tablet 2 tablet BID    sertraline tablet 25 mg Daily       Objective:     Vital Signs (Most Recent):  Temp: 97.8 °F (36.6 °C) (09/12/18 0700)  Pulse: (P) 69 (09/12/18 0800)  Resp: (P) 18 (09/12/18 0800)  BP: (P) 132/63 (09/12/18 0800)  SpO2: 98 % (09/12/18 0529)  O2 Device (Oxygen Therapy): nasal cannula (09/12/18 0529) Vital Signs (24h Range):  Temp:  [97.6 °F (36.4 °C)-98.1 °F (36.7 °C)] 97.8 °F (36.6 °C)  Pulse:  [66-82] (P) 69  Resp:  [18-20] (P) 18  SpO2:  [93 %-98 %] 98 %  BP: (114-156)/(57-74) (P) 132/63     Weight: 102.9 kg (226 lb 14.4 oz) (09/11/18 0700)  Body mass index is 31.65 kg/m².  Body surface area is 2.27 meters squared.    I/O last 3 completed shifts:  In: 1320 [P.O.:1320]  Out: 40 [Urine:40]    Physical Exam  Constitutional: He is oriented to person, place, and time. He appears well-developed and well-nourished. No distress.   HENT:   Head: Normocephalic and atraumatic.   Eyes: Conjunctivae and EOM are normal. Pupils are equal, round, and reactive to light.   Neck: Normal range of motion. Neck supple.   Cardiovascular: Normal rate, regular rhythm and intact distal pulses. Exam reveals no gallop and no friction rub.   No murmur heard.  Pulmonary/Chest: Effort normal. No stridor. No respiratory distress. He has no wheezes. He has no rhonchi. He has scanty bibasilar rales.  Abdominal: Soft. He exhibits no distension. There is no tenderness.   Musculoskeletal: Normal range of motion.   Neurological: He is alert and oriented to person, place, and time.   Skin: Skin is warm and dry. He is not diaphoretic.   Psychiatric: He has a normal mood and affect. His behavior is normal. Judgment and thought content normal.   Nursing note and  vitals reviewed.      Significant Labs:  CBC:   Recent Labs   Lab  09/12/18   0514   WBC  5.80   RBC  2.41*   HGB  7.2*   HCT  24.5*   PLT  149*   MCV  102*   MCH  29.9   MCHC  29.4*     CMP:   Recent Labs   Lab  09/12/18   0514   GLU  372*   CALCIUM  8.2*   NA  137   K  5.3*   CO2  23   CL  105   BUN  59*   CREATININE  7.4*     All labs within the past 24 hours have been reviewed.

## 2018-09-12 NOTE — PLAN OF CARE
[place] informed Stacey Oneil that Pt will be able to d/c today, updated hh orders will be placed for wound care and uploaded to [facility]. Pt's d/c held, Stacey Oneil updated on held [place]ma gibson to follow in am with updated wound care hh orders and d.c.

## 2018-09-12 NOTE — PROGRESS NOTES
HD treatment complete. Duration of treatment 3.5 hours and 2.8 L removed. Treatment was tolerated well and no complications with access to left upper arm. Needles removed and hemostasis achieved. Dressing intact and no drainage noted. Thrill and bruit present.
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
## 2018-09-12 NOTE — SUBJECTIVE & OBJECTIVE
Interval Hx:  Patient Seen at bedside for dressing change.  Patient denies F/C/N/V.  Dressings clean, dry, and intact.     Scheduled Meds:   amLODIPine  10 mg Oral Daily    aspirin  81 mg Oral Daily    atorvastatin  80 mg Oral Daily    atropine 1%  1 drop Right Eye Daily    calcium acetate  1,334 mg Oral TID WM    carvedilol  25 mg Oral BID    cetirizine  5 mg Oral Daily    DAPTOmycin (CUBICIN)  IV  8 mg/kg Intravenous Q48H    epoetin umer (PROCRIT) injection  8,000 Units Intravenous Every Tues, Thurs, Sat    fluticasone  2 spray Each Nare Daily    heparin (porcine)  5,000 Units Subcutaneous Q8H    hydrALAZINE  100 mg Oral Q8H    insulin aspart U-100  15 Units Subcutaneous TIDWM    insulin detemir U-100  20 Units Subcutaneous BID    methadone  10 mg Oral BID    patiromer calcium sorbitex  8.4 g Oral Once per day on Sun Mon Wed Fri    polyethylene glycol  17 g Oral Daily    prednisoLONE acetate  1 drop Right Eye QID    senna-docusate 8.6-50 mg  2 tablet Oral BID    sertraline  25 mg Oral Daily     Continuous Infusions:    PRN Meds:sodium chloride, sodium chloride 0.9%, acetaminophen, albuterol-ipratropium, clonazePAM, dextrose 50%, dextrose 50%, glucagon (human recombinant), glucose, glucose, hydrALAZINE, HYDROcodone-acetaminophen, hydrOXYzine HCl, insulin aspart U-100, naloxone, ondansetron, ondansetron    Review of patient's allergies indicates:   Allergen Reactions    Vancomycin analogues Rash     Red Man Syndrome    Penicillins         Past Medical History:   Diagnosis Date    Allergic drug rash - due to Vancomycin 8/19/2018    Allergic drug rash - due to Vancomycin 8/19/2018    Anxiety 8/17/2018    Arthritis     Blind left eye     Charcot's joint of foot     Chronic back pain 8/17/2018    Chronic, continuous use of opioids 8/17/2018    Debility 8/17/2018    Diabetes mellitus     GERD (gastroesophageal reflux disease)     Glaucoma     Hypertension     Methadone dependence  8/18/2018    MRSA (methicillin resistant staph aureus) culture positive     Osteomyelitis     Renal disorder     Sepsis due to methicillin resistant Staphylococcus aureus (MRSA) 8/17/2018    Subretinal abscess 8/17/2018     Past Surgical History:   Procedure Laterality Date    AVF left upper arm      INSERTION OF SANZ CATHETER Right 9/1/2018    Procedure: INSERTION, CATHETER, CENTRAL VENOUS, SANZ;  Surgeon: Rafi Churchill MD;  Location: [facility] OR [address] [place] [address];  Service: General;  Laterality: Right;    INSERTION, CATHETER, CENTRAL VENOUS, SANZ Right 9/1/2018    [facility]    left ankle surgery      lumbar back surgery         Family History     Problem Relation (Age of Onset)    Pneumonia Mother        Tobacco Use    Smoking status: Never Smoker   Substance and Sexual Activity    Alcohol use: No     Frequency: Never    Drug use: No    Sexual activity: Not Currently     Review of Systems   Constitutional: Negative for chills and fever.   Gastrointestinal: Negative for nausea and vomiting.   Musculoskeletal:        Left ankle deformity.   Skin: Positive for color change and wound (Surgical incisions to the medial and lateral ankle).     Objective:     Vital Signs (Most Recent):  Temp: 97.7 °F (36.5 °C) (09/12/18 1136)  Pulse: 79 (09/12/18 1136)  Resp: 18 (09/12/18 1136)  BP: (!) 165/71 (09/12/18 1136)  SpO2: 95 % (09/12/18 1136) Vital Signs (24h Range):  Temp:  [97 °F (36.1 °C)-98.1 °F (36.7 °C)] 97.7 °F (36.5 °C)  Pulse:  [66-82] 79  Resp:  [18-20] 18  SpO2:  [93 %-98 %] 95 %  BP: (100-165)/(49-74) 165/71     Weight: 102.9 kg (226 lb 14.4 oz)  Body mass index is 31.65 kg/m².    Foot Exam    General  General Appearance: appears stated age and healthy   Orientation: alert and oriented to person, place, and time       Left Foot/Ankle      Inspection and Palpation  Swelling: (Diffuse edema to the left LE, non pitting.)  Skin Exam: erythema (To the medial ankle  incision); skin not intact (Surgical incisions to the medial and lateral ankle.)     Neurovascular  Dorsalis pedis: absent  Posterior tibial: absent  Saphenous nerve sensation: diminished  Tibial nerve sensation: diminished  Superficial peroneal nerve sensation: diminished  Deep peroneal nerve sensation: diminished  Sural nerve sensation: diminished    Comments  Ortho: Severe non reducible varus deformity of the left ankle.    Lateral Left ankle: Incision present with sutures in tact. No signs of acute infection. Skin edges well coapted.     Medial Left ankle: Surgical incision left open with granular wound beds to edges. Mild erythema, no drainage no purulence, no malodor, no streaking. Positive pain to palpation.     See clinic images below.      Laboratory:  A1C:   Recent Labs   Lab  08/17/18   2159   HGBA1C  8.2*     Blood Cultures:   No results for input(s): LABBLOO in the last 48 hours.  CBC:   Recent Labs   Lab  09/12/18 0514   WBC  5.80   RBC  2.41*   HGB  7.2*   HCT  24.5*   PLT  149*   MCV  102*   MCH  29.9   MCHC  29.4*     CMP:   Recent Labs   Lab  09/12/18 0514   GLU  372*   CALCIUM  8.2*   ALBUMIN  2.2*   NA  137   K  5.3*   CO2  23   CL  105   BUN  59*   CREATININE  7.4*     CRP:   No results for input(s): CRP in the last 168 hours.  ESR:   No results for input(s): SEDRATE in the last 168 hours.  Microbiology Results (last 7 days)     Procedure Component Value Units Date/Time    Culture, Anaerobe [[id number]]     Order Status:  No result Specimen:  Wound from Foot, Left     Aerobic culture [[id number]]     Order Status:  No result Specimen:  Wound from Foot, Left     Blood culture [[id number]] Collected:  09/02/18 0518    Order Status:  Completed Specimen:  Blood Updated:  09/07/18 0812     Blood Culture, Routine No growth after 5 days.    Blood culture [[id number]] Collected:  09/02/18 0518    Order Status:  Completed Specimen:  Blood Updated:  09/07/18 0812     Blood Culture, Routine No growth after  5 days.        Specimen (12h ago, onward)    None          Diagnostic Results:  X-ray:  Bony destruction, impaction and probable osseous fusion involving the hindfoot and midfoot.  There appears to be medial angulation and displacement of the hindfoot with respect to the adjacent tibia and fibula.  Comparison to prior films and correlation with clinical findings will be needed.    Clinical findings  Wound on left medial ankle measures approximately  With granular base and viable margins.   Negative ptb, No purulence, erythema, edema, or malodor    abscess formation of medial left ankle    Mild dehisence of lateral ankle incision.

## 2018-09-12 NOTE — ASSESSMENT & PLAN NOTE
iHD TTS    [facility]  Followed by Dr. Cruz  Duration 3 hrs with 15 minutes  Accesss on MIESHA AVF  No residual renal function    End-Stage Renal Disease on HD  - Will provide dialysis for metabolic clearance and volume management  - Seen and examined today during hemodialysis; tolerating treatment well without issues. 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 Goal for BP is <140 mmHg SBP and BDP <90 mmHg, maintain MAP > 65.    Nutrition   - Renal Diet    Case discussed with staff further recs with attestation.
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
## 2018-09-12 NOTE — ASSESSMENT & PLAN NOTE
- Osteomyelitis of left 5th metatarsal taken for washout by Orthopedic surgery at [facility] on 08/15.  - X-ray left foot and ankle shows partial amputation the 1st, 2nd, 3rd, and 4th digits with fusion of the 2nd and 3rd MTP joints and throughout the midfoot, severe deformity and joint space loss the tibiotalar joint with a lapse of the talus likely 2/2 to chronic osteomyelitis, arthritis, or chronic Charcot foot.   - Patient being treated outside facility with Flagyl, linezolid, gentamicin with HD.  - Podiatry consulted, appreciate recs. Advised BKA but patient refused. Recommended CAM boot and abx per ID. D/tyler wound vac  - ESR elevated at 58 upon admission  - BCx2 from [facility] grew MRSA (last positive 8/15. Cultures from [facility] were NGTD.    PLAN:  - On Daptomycin 8mg/kg after HD per ID's recs. Planned for 6 weeks total, last dose 9/29  - ID follow up at 2 weeks.  - Podiatry consulted, appreciate recs. Recommend follow up with [facility] at discharge.

## 2018-09-12 NOTE — NURSING
Spoke with physician in regards to patient hydralazine order for 2pm. Pt blood pressure 121/55. MD still ordered to take scheduled medication.

## 2018-09-12 NOTE — PLAN OF CARE
Problem: Patient Care Overview  Goal: Plan of Care Review  Outcome: Ongoing (interventions implemented as appropriate)  Pt remain free of falls, trauma, injury, or SOB. Pt had dialysis today with 2.8 L fluid removed. Pt daibetes managed with ACCU checks AC/HS insulin of 14 Units per meal with additional needing coverage per sliding scale as well as long acting insulin twice a day. Pt had cultures taken of wound on L foot with wound care performed.

## 2018-09-12 NOTE — PROGRESS NOTES
" [facility]  Adult Nutrition  Progress Note    SUMMARY       Recommendations    Recommendation/Intervention:   1. Add renal restriction to current 2000 diabetic diet.   2. Consider discontinuing Novasource as meal intake has been good.   3. RD following.    Goals: nutrient intake >/= 85% EEN/EPN   Nutrition Goal Status: goal met  Communication of RD Recs: other (comment)(POC)    Reason for Assessment    Reason for Assessment: RD follow-up  Diagnosis: (osteomyelitis left 5th metatarsal)  Relevant Medical History: ESRD on HD, opioid dependent on methadone, DM2, GERD, HTN, left ankle osteomyelitis secondary to MRSA bacteremia    General Information Comments: Pt LONG at time of visit. Per RN documentation, eating % of meals with Novasource ordered. Remains on diabetic diet without renal restriction. Last HD this am.    Nutrition Discharge Planning: Renal diet w/ po intake > 75%    Nutrition Risk Screen    Nutrition Risk Screen: no indicators present    Nutrition/Diet History    Patient Reported Diet/Restrictions/Preferences: diabetic diet, renal  Typical Food/Fluid Intake: adequate PTA  Do you have any cultural, spiritual, Synagogue conflicts, given your current situation?: None stated  Food Allergies: NKFA  Factors Affecting Nutritional Intake: None identified at this time    Anthropometrics    Temp: 97.7 °F (36.5 °C)  Height Method: Stated  Height: 5' 11" (180.3 cm)  Height (inches): 71 in  Weight Method: Bed Scale  Weight: 102.9 kg (226 lb 14.4 oz)  Weight (lb): 226.9 lb  Ideal Body Weight (IBW), Male: 172 lb  % Ideal Body Weight, Male (lb): 131.92 lb  BMI (Calculated): 31.7  BMI Grade: 30 - 34.9- obesity - grade I       Lab/Procedures/Meds    Pertinent Labs Reviewed: reviewed  Pertinent Labs Comments: K 5.3, BUN 59, Crt 7.4, Glu 372, POCT Glu 139-292  Pertinent Medications Reviewed: reviewed  Pertinent Medications Comments: statin, Ca acetate, epoetin, insulin, patiromer Ca sorbitex, " polyethylene glycol, senna docusate    Physical Findings/Assessment    Overall Physical Appearance: obese, nourished  Oral/Mouth Cavity: WDL  Skin: other (see comments)(osteomyelitis left foot, ankle)    Estimated/Assessed Needs    Weight Used For Calorie Calculations: 102.4 kg (225 lb 12 oz)  Energy Calorie Requirements (kcal): 2269  Energy Need Method: [place]-St Jeor(x 1.2)  Protein Requirements: 124-144 gm (1.2-1.4 gm/kg)  Weight Used For Protein Calculations: 102.9 kg (226 lb 13.7 oz)     Fluid Need Method: RDA Method(PER MD)  RDA Method (mL): 2269  CHO Requirement: 45-50% total intake    Nutrition Prescription Ordered    Current Diet Order: 2000 diabetic  Oral Nutrition Supplement: Novasource ONS    Evaluation of Received Nutrient/Fluid Intake    I/O: -100ml since admit  Energy Calories Required: meeting needs  Protein Required: meeting needs  Fluid Required: meeting needs  Comments: LBM 9/11  Tolerance: tolerating  % Intake of Estimated Energy Needs: 75 - 100 %  % Meal Intake: 75 - 100 %    Nutrition Risk    Level of Risk/Frequency of Follow-up: low     Assessment and Plan    Nutrition Problem  Altered nutritional labs     Related to (etiology):   ESRD on HD w/ T2DM     Signs and Symptoms (as evidenced by):   Phos-5.3, Na-129, Glu-235, A1c -8.5, Ca-7.4, GFR-7.1     Interventions/Recommendations (treatment strategy):  See recs above.     Nutrition Diagnosis Status:   Continues      Monitor and Evaluation    Food and Nutrient Intake: energy intake, food and beverage intake  Food and Nutrient Adminstration: diet order  Physical Activity and Function: nutrition-related ADLs and IADLs  Anthropometric Measurements: weight, weight change  Biochemical Data, Medical Tests and Procedures: (All labs)  Nutrition-Focused Physical Findings: overall appearance     Nutrition Follow-Up    RD Follow-up?: Yes

## 2018-09-12 NOTE — PLAN OF CARE
Problem: Patient Care Overview  Goal: Plan of Care Review  Outcome: Ongoing (interventions implemented as appropriate)  Pt remained free of falls and injury. POC reviewed with pt. VS stable. No acute events noted at this time. POC BG of 292, covered with 1 unit SS insulin. Pt is planned to receive HD in the A.M. No complaints. Bed low and locked, call light with in reach. Will continue to monitor.

## 2018-09-12 NOTE — ASSESSMENT & PLAN NOTE
- Likely seeded from MRSA bacteremia (source osteomyelitis)  - Received intravitreal vancomycin and ceftazidime at admit.  - Opthmology following, daily assessments ongoing - received 3rd dose of intravitreal vancomycin on 8/24  - Per Ophthalmology, lesion appears to be consolidating but no significant improvement in vision. Will need daily assessment for atleast 1 week from date of last intravitreal injection.  - Advised follow up twice weekly.  - Vision improving since admit.

## 2018-09-12 NOTE — PROGRESS NOTES
[facility]  Podiatry  Progress Note    Patient Name: Mickey Ross  MRN: [id number]  Admission Date: 8/17/2018  Hospital Length of Stay: 26 days  Attending Physician: Mukesh Foley MD  Primary Care Provider: Rosalina Moncada MD   Interval Hx:  Patient Seen at bedside for dressing change.  Patient denies F/C/N/V.  Dressings clean, dry, and intact.     Scheduled Meds:   amLODIPine  10 mg Oral Daily    aspirin  81 mg Oral Daily    atorvastatin  80 mg Oral Daily    atropine 1%  1 drop Right Eye Daily    calcium acetate  1,334 mg Oral TID WM    carvedilol  25 mg Oral BID    cetirizine  5 mg Oral Daily    DAPTOmycin (CUBICIN)  IV  8 mg/kg Intravenous Q48H    epoetin umer (PROCRIT) injection  8,000 Units Intravenous Every Tues, Thurs, Sat    fluticasone  2 spray Each Nare Daily    heparin (porcine)  5,000 Units Subcutaneous Q8H    hydrALAZINE  100 mg Oral Q8H    insulin aspart U-100  15 Units Subcutaneous TIDWM    insulin detemir U-100  20 Units Subcutaneous BID    methadone  10 mg Oral BID    patiromer calcium sorbitex  8.4 g Oral Once per day on Sun Mon Wed Fri    polyethylene glycol  17 g Oral Daily    prednisoLONE acetate  1 drop Right Eye QID    senna-docusate 8.6-50 mg  2 tablet Oral BID    sertraline  25 mg Oral Daily     Continuous Infusions:    PRN Meds:sodium chloride, sodium chloride 0.9%, acetaminophen, albuterol-ipratropium, clonazePAM, dextrose 50%, dextrose 50%, glucagon (human recombinant), glucose, glucose, hydrALAZINE, HYDROcodone-acetaminophen, hydrOXYzine HCl, insulin aspart U-100, naloxone, ondansetron, ondansetron    Review of patient's allergies indicates:   Allergen Reactions    Vancomycin analogues Rash     Red Man Syndrome    Penicillins         Past Medical History:   Diagnosis Date    Allergic drug rash - due to Vancomycin 8/19/2018    Allergic drug rash - due to Vancomycin 8/19/2018    Anxiety 8/17/2018    Arthritis     Blind left eye      Charcot's joint of foot     Chronic back pain 8/17/2018    Chronic, continuous use of opioids 8/17/2018    Debility 8/17/2018    Diabetes mellitus     GERD (gastroesophageal reflux disease)     Glaucoma     Hypertension     Methadone dependence 8/18/2018    MRSA (methicillin resistant staph aureus) culture positive     Osteomyelitis     Renal disorder     Sepsis due to methicillin resistant Staphylococcus aureus (MRSA) 8/17/2018    Subretinal abscess 8/17/2018     Past Surgical History:   Procedure Laterality Date    AVF left upper arm      INSERTION OF SANZ CATHETER Right 9/1/2018    Procedure: INSERTION, CATHETER, CENTRAL VENOUS, SANZ;  Surgeon: Rafi Churchill MD;  Location: [facility] OR [address] [place] [address];  Service: General;  Laterality: Right;    INSERTION, CATHETER, CENTRAL VENOUS, SANZ Right 9/1/2018    Performed by Rafi Churchill MD at [facility] OR [address] [place] [address]    left ankle surgery      lumbar back surgery         Family History     Problem Relation (Age of Onset)    Pneumonia Mother        Tobacco Use    Smoking status: Never Smoker   Substance and Sexual Activity    Alcohol use: No     Frequency: Never    Drug use: No    Sexual activity: Not Currently     Review of Systems   Constitutional: Negative for chills and fever.   Gastrointestinal: Negative for nausea and vomiting.   Musculoskeletal:        Left ankle deformity.   Skin: Positive for color change and wound (Surgical incisions to the medial and lateral ankle).     Objective:     Vital Signs (Most Recent):  Temp: 97.7 °F (36.5 °C) (09/12/18 1136)  Pulse: 79 (09/12/18 1136)  Resp: 18 (09/12/18 1136)  BP: (!) 165/71 (09/12/18 1136)  SpO2: 95 % (09/12/18 1136) Vital Signs (24h Range):  Temp:  [97 °F (36.1 °C)-98.1 °F (36.7 °C)] 97.7 °F (36.5 °C)  Pulse:  [66-82] 79  Resp:  [18-20] 18  SpO2:  [93 %-98 %] 95 %  BP: (100-165)/(49-74) 165/71     Weight: 102.9 kg (226 lb 14.4 oz)  Body mass index is 31.65 kg/m².    Foot Exam    General  General  Appearance: appears stated age and healthy   Orientation: alert and oriented to person, place, and time       Left Foot/Ankle      Inspection and Palpation  Swelling: (Diffuse edema to the left LE, non pitting.)  Skin Exam: erythema (To the medial ankle incision); skin not intact (Surgical incisions to the medial and lateral ankle.)     Neurovascular  Dorsalis pedis: absent  Posterior tibial: absent  Saphenous nerve sensation: diminished  Tibial nerve sensation: diminished  Superficial peroneal nerve sensation: diminished  Deep peroneal nerve sensation: diminished  Sural nerve sensation: diminished    Comments  Ortho: Severe non reducible varus deformity of the left ankle.    Lateral Left ankle: Incision present with sutures in tact. No signs of acute infection. Skin edges well coapted.     Medial Left ankle: Surgical incision left open with granular wound beds to edges. Mild erythema, no drainage no purulence, no malodor, no streaking. Positive pain to palpation.     See clinic images below.      Laboratory:  A1C:   Recent Labs   Lab  08/17/18   2159   HGBA1C  8.2*     Blood Cultures:   No results for input(s): LABBLOO in the last 48 hours.  CBC:   Recent Labs   Lab  09/12/18 0514   WBC  5.80   RBC  2.41*   HGB  7.2*   HCT  24.5*   PLT  149*   MCV  102*   MCH  29.9   MCHC  29.4*     CMP:   Recent Labs   Lab  09/12/18 0514   GLU  372*   CALCIUM  8.2*   ALBUMIN  2.2*   NA  137   K  5.3*   CO2  23   CL  105   BUN  59*   CREATININE  7.4*     CRP:   No results for input(s): CRP in the last 168 hours.  ESR:   No results for input(s): SEDRATE in the last 168 hours.  Microbiology Results (last 7 days)     Procedure Component Value Units Date/Time    Culture, Anaerobe [[id number]]     Order Status:  No result Specimen:  Wound from Foot, Left     Aerobic culture [[id number]]     Order Status:  No result Specimen:  Wound from Foot, Left     Blood culture [[id number]] Collected:  09/02/18 0518    Order Status:  Completed  Specimen:  Blood Updated:  09/07/18 0812     Blood Culture, Routine No growth after 5 days.    Blood culture [[id number]] Collected:  09/02/18 0518    Order Status:  Completed Specimen:  Blood Updated:  09/07/18 0812     Blood Culture, Routine No growth after 5 days.        Specimen (12h ago, onward)    None          Diagnostic Results:  X-ray:  Bony destruction, impaction and probable osseous fusion involving the hindfoot and midfoot.  There appears to be medial angulation and displacement of the hindfoot with respect to the adjacent tibia and fibula.  Comparison to prior films and correlation with clinical findings will be needed.    Clinical findings  Wound on left medial ankle measures approximately  With granular base and viable margins.   Negative ptb, No purulence, erythema, edema, or malodor    abscess formation of medial left ankle    Mild dehisence of lateral ankle incision.                                              Assessment/Plan:     * Acute hematogenous osteomyelitis of left foot    Mickye Ross is a 53 y.o. male with Right ankle wound, with new abcess formation.  -Abscess was drained at bedside, and cultures taken  -recommend long term abx per ID recs, per Id Anticipate 6 weeks of IV antibiotics.  Estimated end date 9/28/18  -Patient to follow up with [facility] podiatry within 3-5 days of discharge.  Patient will need home health to do dressing changes as follows:  Pull packing from wound, rinse with saline, pat dry,  Pack medial ankle wound with betadine soaked packing,  Place iodosorb on incision sites, Dress with 4x4's, cast padding, kerlix, and coban.  Dressing changes to be done every 2-3 days.   -Podiatry will continue to follow.    Procedure note:   Procedure: Incision and drainage  Diagnosis: Abscess    Surgeon: Dr. Giordano  Assisting  Surgeon: Dr. Romeo Moreira, DPM, PGY2  Post op diagnosis: Abscess    Findings: Upon verbal and written consent the medial left ankle was prepped with betadine in  a sterile manner.  15 blade was used to gagandeep abscess, serous-purulent drainage was noted.  No probing was found.  Wound was lavaged with saline, and packed with betadine soaked gauze, and dressed in football.  Patient tolerated procedure well    Estimated blood loss: less than 5ml                ESRD on hemodialysis    Per primary            Romeo Hoyt MD  Podiatry  [facility]

## 2018-09-12 NOTE — ASSESSMENT & PLAN NOTE
Mickey Ross is a 53 y.o. male with Right ankle wound, with new abcess formation.  -Abscess was drained at bedside, and cultures taken  -recommend long term abx per ID recs, per Id Anticipate 6 weeks of IV antibiotics.  Estimated end date 9/28/18  -Patient to follow up with [facility] podiatry within 3-5 days of discharge.  Patient will need home health to do dressing changes as follows:  Pull packing from wound, rinse with saline, pat dry,  Pack medial ankle wound with betadine soaked packing,  Place iodosorb on incision sites, Dress with 4x4's, cast padding, kerlix, and coban.  Dressing changes to be done every 2-3 days.   -Podiatry will continue to follow.    Procedure note:   Procedure: Incision and drainage  Diagnosis: Abscess    Surgeon: Dr. Giordano  Assisting  Surgeon: Dr. Romeo Moreira, DPM, PGY2  Post op diagnosis: Abscess    Findings: Upon verbal and written consent the medial left ankle was prepped with betadine in a sterile manner.  15 blade was used to gagandeep abscess, serous-purulent drainage was noted.  No probing was found.  Wound was lavaged with saline, and packed with betadine soaked gauze, and dressed in football.  Patient tolerated procedure well    Estimated blood loss: less than 5ml

## 2018-09-13 LAB
ANION GAP SERPL CALC-SCNC: 3 MMOL/L
BASOPHILS # BLD AUTO: 0.04 K/UL
BASOPHILS NFR BLD: 0.7 %
BUN SERPL-MCNC: 38 MG/DL
CALCIUM SERPL-MCNC: 8.1 MG/DL
CHLORIDE SERPL-SCNC: 106 MMOL/L
CK SERPL-CCNC: 13 U/L
CO2 SERPL-SCNC: 29 MMOL/L
CREAT SERPL-MCNC: 5.5 MG/DL
DIFFERENTIAL METHOD: ABNORMAL
EOSINOPHIL # BLD AUTO: 0.7 K/UL
EOSINOPHIL NFR BLD: 11.6 %
ERYTHROCYTE [DISTWIDTH] IN BLOOD BY AUTOMATED COUNT: 16.6 %
EST. GFR  (AFRICAN AMERICAN): 12.6 ML/MIN/1.73 M^2
EST. GFR  (NON AFRICAN AMERICAN): 10.9 ML/MIN/1.73 M^2
GLUCOSE SERPL-MCNC: 290 MG/DL
HCT VFR BLD AUTO: 23.2 %
HGB BLD-MCNC: 7 G/DL
IMM GRANULOCYTES # BLD AUTO: 0.06 K/UL
IMM GRANULOCYTES NFR BLD AUTO: 1 %
LYMPHOCYTES # BLD AUTO: 1.3 K/UL
LYMPHOCYTES NFR BLD: 22.4 %
MAGNESIUM SERPL-MCNC: 2.2 MG/DL
MCH RBC QN AUTO: 30.6 PG
MCHC RBC AUTO-ENTMCNC: 30.2 G/DL
MCV RBC AUTO: 101 FL
MONOCYTES # BLD AUTO: 0.7 K/UL
MONOCYTES NFR BLD: 11.2 %
NEUTROPHILS # BLD AUTO: 3.2 K/UL
NEUTROPHILS NFR BLD: 53.1 %
NRBC BLD-RTO: 0 /100 WBC
PHOSPHATE SERPL-MCNC: 3.2 MG/DL
PLATELET # BLD AUTO: 200 K/UL
PMV BLD AUTO: 10.9 FL
POCT GLUCOSE: 198 MG/DL (ref 70–110)
POCT GLUCOSE: 200 MG/DL (ref 70–110)
POCT GLUCOSE: 83 MG/DL (ref 70–110)
POTASSIUM SERPL-SCNC: 4.7 MMOL/L
RBC # BLD AUTO: 2.29 M/UL
SODIUM SERPL-SCNC: 138 MMOL/L
WBC # BLD AUTO: 5.97 K/UL

## 2018-09-13 PROCEDURE — 25000003 PHARM REV CODE 250: Performed by: STUDENT IN AN ORGANIZED HEALTH CARE EDUCATION/TRAINING PROGRAM

## 2018-09-13 PROCEDURE — 63600175 PHARM REV CODE 636 W HCPCS: Performed by: STUDENT IN AN ORGANIZED HEALTH CARE EDUCATION/TRAINING PROGRAM

## 2018-09-13 PROCEDURE — 85025 COMPLETE CBC W/AUTO DIFF WBC: CPT

## 2018-09-13 PROCEDURE — 25000003 PHARM REV CODE 250: Performed by: INTERNAL MEDICINE

## 2018-09-13 PROCEDURE — 63600175 PHARM REV CODE 636 W HCPCS: Mod: JG | Performed by: STUDENT IN AN ORGANIZED HEALTH CARE EDUCATION/TRAINING PROGRAM

## 2018-09-13 PROCEDURE — 20600001 HC STEP DOWN PRIVATE ROOM

## 2018-09-13 PROCEDURE — 80048 BASIC METABOLIC PNL TOTAL CA: CPT

## 2018-09-13 PROCEDURE — 84100 ASSAY OF PHOSPHORUS: CPT

## 2018-09-13 PROCEDURE — 99233 SBSQ HOSP IP/OBS HIGH 50: CPT | Mod: ,,, | Performed by: HOSPITALIST

## 2018-09-13 PROCEDURE — 25000003 PHARM REV CODE 250: Performed by: HOSPITALIST

## 2018-09-13 PROCEDURE — 99233 SBSQ HOSP IP/OBS HIGH 50: CPT | Mod: ,,, | Performed by: INTERNAL MEDICINE

## 2018-09-13 PROCEDURE — 82550 ASSAY OF CK (CPK): CPT

## 2018-09-13 PROCEDURE — 83735 ASSAY OF MAGNESIUM: CPT

## 2018-09-13 RX ORDER — SODIUM CHLORIDE 9 MG/ML
INJECTION, SOLUTION INTRAVENOUS ONCE
Status: COMPLETED | OUTPATIENT
Start: 2018-09-14 | End: 2018-09-14

## 2018-09-13 RX ORDER — SODIUM CHLORIDE 9 MG/ML
INJECTION, SOLUTION INTRAVENOUS
Status: DISCONTINUED | OUTPATIENT
Start: 2018-09-14 | End: 2018-09-16

## 2018-09-13 RX ADMIN — PREDNISOLONE ACETATE 1 DROP: 10 SUSPENSION/ DROPS OPHTHALMIC at 09:09

## 2018-09-13 RX ADMIN — ATORVASTATIN CALCIUM 80 MG: 20 TABLET, FILM COATED ORAL at 08:09

## 2018-09-13 RX ADMIN — HYDRALAZINE HYDROCHLORIDE 100 MG: 50 TABLET ORAL at 09:09

## 2018-09-13 RX ADMIN — METHADONE HYDROCHLORIDE 10 MG: 5 TABLET ORAL at 09:09

## 2018-09-13 RX ADMIN — INSULIN ASPART 15 UNITS: 100 INJECTION, SOLUTION INTRAVENOUS; SUBCUTANEOUS at 05:09

## 2018-09-13 RX ADMIN — HYDROCODONE BITARTRATE AND ACETAMINOPHEN 1 TABLET: 7.5; 325 TABLET ORAL at 08:09

## 2018-09-13 RX ADMIN — CALCIUM ACETATE 1334 MG: 667 CAPSULE ORAL at 08:09

## 2018-09-13 RX ADMIN — CETIRIZINE HYDROCHLORIDE 5 MG: 5 TABLET ORAL at 08:09

## 2018-09-13 RX ADMIN — INSULIN DETEMIR 20 UNITS: 100 INJECTION, SOLUTION SUBCUTANEOUS at 08:09

## 2018-09-13 RX ADMIN — CALCIUM ACETATE 1334 MG: 667 CAPSULE ORAL at 05:09

## 2018-09-13 RX ADMIN — HYDROCODONE BITARTRATE AND ACETAMINOPHEN 1 TABLET: 7.5; 325 TABLET ORAL at 09:09

## 2018-09-13 RX ADMIN — HYDROCODONE BITARTRATE AND ACETAMINOPHEN 1 TABLET: 7.5; 325 TABLET ORAL at 02:09

## 2018-09-13 RX ADMIN — HEPARIN SODIUM 5000 UNITS: 5000 INJECTION, SOLUTION INTRAVENOUS; SUBCUTANEOUS at 02:09

## 2018-09-13 RX ADMIN — DAPTOMYCIN 905 MG: 500 INJECTION, POWDER, LYOPHILIZED, FOR SOLUTION INTRAVENOUS at 09:09

## 2018-09-13 RX ADMIN — AMLODIPINE BESYLATE 10 MG: 10 TABLET ORAL at 08:09

## 2018-09-13 RX ADMIN — HYDRALAZINE HYDROCHLORIDE 100 MG: 50 TABLET ORAL at 02:09

## 2018-09-13 RX ADMIN — HEPARIN SODIUM 5000 UNITS: 5000 INJECTION, SOLUTION INTRAVENOUS; SUBCUTANEOUS at 09:09

## 2018-09-13 RX ADMIN — SERTRALINE HYDROCHLORIDE 25 MG: 25 TABLET ORAL at 08:09

## 2018-09-13 RX ADMIN — HEPARIN SODIUM 5000 UNITS: 5000 INJECTION, SOLUTION INTRAVENOUS; SUBCUTANEOUS at 05:09

## 2018-09-13 RX ADMIN — ASPIRIN 81 MG: 81 TABLET, COATED ORAL at 08:09

## 2018-09-13 RX ADMIN — INSULIN DETEMIR 20 UNITS: 100 INJECTION, SOLUTION SUBCUTANEOUS at 09:09

## 2018-09-13 RX ADMIN — INSULIN ASPART 15 UNITS: 100 INJECTION, SOLUTION INTRAVENOUS; SUBCUTANEOUS at 11:09

## 2018-09-13 RX ADMIN — CARVEDILOL 25 MG: 25 TABLET, FILM COATED ORAL at 09:09

## 2018-09-13 RX ADMIN — PREDNISOLONE ACETATE 1 DROP: 10 SUSPENSION/ DROPS OPHTHALMIC at 02:09

## 2018-09-13 RX ADMIN — PREDNISOLONE ACETATE 1 DROP: 10 SUSPENSION/ DROPS OPHTHALMIC at 08:09

## 2018-09-13 RX ADMIN — METHADONE HYDROCHLORIDE 10 MG: 5 TABLET ORAL at 08:09

## 2018-09-13 RX ADMIN — SENNOSIDES AND DOCUSATE SODIUM 2 TABLET: 8.6; 5 TABLET ORAL at 08:09

## 2018-09-13 RX ADMIN — INSULIN ASPART 15 UNITS: 100 INJECTION, SOLUTION INTRAVENOUS; SUBCUTANEOUS at 08:09

## 2018-09-13 RX ADMIN — CLONAZEPAM 0.5 MG: 0.5 TABLET ORAL at 09:09

## 2018-09-13 RX ADMIN — CARVEDILOL 25 MG: 25 TABLET, FILM COATED ORAL at 08:09

## 2018-09-13 RX ADMIN — HYDRALAZINE HYDROCHLORIDE 100 MG: 50 TABLET ORAL at 05:09

## 2018-09-13 NOTE — PROGRESS NOTES
Notified MD, Dr. Jerry, W/ IM2. Pt not tolerating being off of 02, de sat in the 80's. Pt placed on 2l NC. Md acknowledged findings. Will continue to monitor.

## 2018-09-13 NOTE — ASSESSMENT & PLAN NOTE
- Osteomyelitis of left 5th metatarsal taken for washout by Orthopedic surgery at [facility] on 08/15.  - X-ray left foot and ankle shows partial amputation the 1st, 2nd, 3rd, and 4th digits with fusion of the 2nd and 3rd MTP joints and throughout the midfoot, severe deformity and joint space loss the tibiotalar joint with a lapse of the talus likely 2/2 to chronic osteomyelitis, arthritis, or chronic Charcot foot.   - Patient being treated outside facility with Flagyl, linezolid, gentamicin with HD.  - Podiatry consulted, appreciate recs. Advised BKA but patient refused. Recommended CAM boot and abx per ID. D/tyler wound vac  - ESR elevated at 58 upon admission  - BCx2 from [facility] grew MRSA (last positive 8/15. Cultures from [facility] were NGTD.    PLAN:  - On Daptomycin 8mg/kg after HD per ID's recs. Planned for 6 weeks total, last dose 9/29  - ID follow up at 2 weeks.  - Podiatry consulted, appreciate recs. Recommend follow up with [facility] Podiatry at discharge.

## 2018-09-13 NOTE — PLAN OF CARE
Plan of care discussed with patient. Continue daptomycin q48. CTA completed. D/c anticipated pending home O2 setup. Fall precautions in place. Patient has no complaints of pain. Patient free of falls and injuries. Patient has no questions at this time. Patient is in NAD. White board updated. Bed locked in lowest position. Side rails up x2. Will continue to monitor.

## 2018-09-13 NOTE — SUBJECTIVE & OBJECTIVE
Past Medical History:   Diagnosis Date    Allergic drug rash - due to Vancomycin 8/19/2018    Allergic drug rash - due to Vancomycin 8/19/2018    Anxiety 8/17/2018    Arthritis     Blind left eye     Charcot's joint of foot     Chronic back pain 8/17/2018    Chronic, continuous use of opioids 8/17/2018    Debility 8/17/2018    Diabetes mellitus     GERD (gastroesophageal reflux disease)     Glaucoma     Hypertension     Methadone dependence 8/18/2018    MRSA (methicillin resistant staph aureus) culture positive     Osteomyelitis     Renal disorder     Sepsis due to methicillin resistant Staphylococcus aureus (MRSA) 8/17/2018    Subretinal abscess 8/17/2018       Past Surgical History:   Procedure Laterality Date    AVF left upper arm      INSERTION OF SANZ CATHETER Right 9/1/2018    Procedure: INSERTION, CATHETER, CENTRAL VENOUS, SANZ;  Surgeon: Rafi Churchill MD;  Location: [facility] OR [address] [place] [address];  Service: General;  Laterality: Right;    INSERTION, CATHETER, CENTRAL VENOUS, SANZ Right 9/1/2018    Performed by Rafi Churchill MD at [facility] OR [address] [place] [address]    left ankle surgery      lumbar back surgery         Review of patient's allergies indicates:   Allergen Reactions    Vancomycin analogues Rash     Red Man Syndrome    Penicillins        Family History     Problem Relation (Age of Onset)    Pneumonia Mother        Tobacco Use    Smoking status: Never Smoker   Substance and Sexual Activity    Alcohol use: No     Frequency: Never    Drug use: No    Sexual activity: Not Currently         Review of Systems   Constitutional: Negative for chills and fever.   HENT: Negative for congestion and sore throat.    Eyes: Negative for visual disturbance.   Respiratory: Positive for shortness of breath. Negative for cough, chest tightness, wheezing and stridor.    Cardiovascular: Negative for chest pain and palpitations.   Gastrointestinal: Negative for abdominal pain, diarrhea, nausea and  vomiting.   Genitourinary: Negative for dysuria, flank pain and hematuria.   Musculoskeletal: Negative for back pain, myalgias, neck pain and neck stiffness.   Skin: Positive for color change (L lower limb charcot joint + osteomyelitis), rash and wound.   Neurological: Negative for dizziness, syncope, weakness, light-headedness and headaches.   Hematological: Does not bruise/bleed easily.   Psychiatric/Behavioral: Negative for agitation, confusion and decreased concentration.     Objective:     Vital Signs (Most Recent):  Temp: 96.2 °F (35.7 °C) (09/13/18 1556)  Pulse: 73 (09/13/18 1556)  Resp: 18 (09/13/18 1556)  BP: (!) 142/65 (09/13/18 1556)  SpO2: (!) 93 % (09/13/18 1556) Vital Signs (24h Range):  Temp:  [96.2 °F (35.7 °C)-98.3 °F (36.8 °C)] 96.2 °F (35.7 °C)  Pulse:  [70-88] 73  Resp:  [16-19] 18  SpO2:  [92 %-98 %] 93 %  BP: (142-150)/(65-71) 142/65     Weight: 102.9 kg (226 lb 14.4 oz)  Body mass index is 31.65 kg/m².      Intake/Output Summary (Last 24 hours) at 9/13/2018 1608  Last data filed at 9/13/2018 0500  Gross per 24 hour   Intake 240 ml   Output --   Net 240 ml       Physical Exam   Constitutional: He is oriented to person, place, and time. He appears well-developed and well-nourished. No distress.   HENT:   Head: Normocephalic and atraumatic.   Eyes: Conjunctivae are normal. No scleral icterus.   Neck: Normal range of motion. Neck supple. No JVD present.   Cardiovascular: Normal rate, regular rhythm, normal heart sounds and intact distal pulses.   No murmur heard.  Pulmonary/Chest: Effort normal and breath sounds normal. No respiratory distress. He has no wheezes.   Abdominal: Soft. Bowel sounds are normal. There is no guarding.   Musculoskeletal: He exhibits edema (non-pitting edema L lower leg with charcot foot, tender to deep palpation of generalized lower limb), tenderness and deformity.   Neurological: He is alert and oriented to person, place, and time.   Skin: Skin is warm and dry. Rash  noted. He is not diaphoretic. There is erythema.   Psychiatric: He has a normal mood and affect.   Vitals reviewed.      Vents:       Lines/Drains/Airways     Central Venous Catheter Line                 Port A Cath Single Lumen 09/01/18 0905 right subclavian 12 days          Drain                 Hemodialysis AV Fistula Left upper arm -- days                Significant Labs:    CBC/Anemia Profile:  Recent Labs   Lab  09/12/18   0514  09/13/18   0404   WBC  5.80  5.97   HGB  7.2*  7.0*   HCT  24.5*  23.2*   PLT  149*  200   MCV  102*  101*   RDW  16.7*  16.6*        Chemistries:  Recent Labs   Lab  09/12/18   0514  09/12/18   1125  09/13/18   0404   NA  137  140  138   K  5.3*  3.9  4.7   CL  105  106  106   CO2  23  27  29   BUN  59*  21*  38*   CREATININE  7.4*  3.7*  5.5*   CALCIUM  8.2*  8.3*  8.1*   ALBUMIN  2.2*   --    --    MG  2.3   --   2.2   PHOS  3.9   --   3.2       Recent Lab Results       09/13/18  1124 09/13/18  0404 09/12/18  2314 09/12/18  2117 09/12/18  1716      Immature Granulocytes  1.0        Immature Grans (Abs)  0.06  Comment:  Mild elevation in immature granulocytes is non specific and   can be seen in a variety of conditions including stress response,   acute inflammation, trauma and pregnancy. Correlation with other   laboratory and clinical findings is essential.          Anion Gap  3        Baso #  0.04        Basophil%  0.7        BUN, Bld  38        Calcium  8.1        Chloride  106        CO2  29        CPK  13        Creatinine  5.5        Differential Method  Automated        eGFR if   12.6        eGFR if non   10.9  Comment:  Calculation used to obtain the estimated glomerular filtration  rate (eGFR) is the CKD-EPI equation.           Eos #  0.7        Eosinophil%  11.6        Glucose  290        Gran # (ANC)  3.2        Gran%  53.1        Hematocrit  23.2        Hemoglobin  7.0        Lymph #  1.3        Lymph%  22.4        Magnesium  2.2         MCH  30.6        MCHC  30.2        MCV  101        Mono #  0.7        Mono%  11.2        MPV  10.9        nRBC  0        Phosphorus  3.2        Platelets  200        POCT Glucose 200  395 450 95     Potassium  4.7        RBC  2.29        RDW  16.6        Sodium  138        WBC  5.97                      All pertinent labs within the past 24 hours have been reviewed.    Significant Imaging:   I have reviewed all pertinent imaging results/findings within the past 24 hours.

## 2018-09-13 NOTE — HPI
Consult: SoB + pulmonary edema    HPI: 53M presents to [place] w/ osteomyelitis L ankle 2/2 MRSA bacteremia on b/g DM2 and charcot foot. S/p debridement 8/15, xfer to [facility] for ophthal f/u after subretinal abscess found. This presentation is c/b ESRD IHD TTS, and labile HTN with SBP to 220 with intermittent hypotensive episodes throughout prolonged hospital stay. Over the past 1-2wks pt states he has been experiencing worsening shortness of breath on exertion and at rest. Denies any associated cough, cp, fever, n/v, presyncope. Unsure if legs have been more swollen than usual (chronic L leg swelling 2/2 osteomyelitis) Endorses mild orthopnea. SoB was at its worst about 1 wk ago, since which time he has had 6L fluid removed via IHD and now feels his SoB has improved. He has no known hx CHF and denies ever experiencing similar SoB in the past. Most recent echo 9/2 showed HFpEF with EF60, NWMA, grade II diastolic dysfunction, PAH36, echo 2 weeks prior to that was unremarkable. CXR showed diffusely increased interstitial lung markings consistent with pulmonary edema. EKG NSR without ischemic changes, trops 0.238 and downtrending, BNP 1140.

## 2018-09-13 NOTE — ASSESSMENT & PLAN NOTE
53M c/o worsening SoBoE and rest ~1-2wks on b/g DM2+charcot joint+osteomyelitis s/p debridement c/b MRSA bacteremia and labile BP with SBP to 220 during admission as well as hypotensive episodes. Sats currently 92% RA, BP <160 /48hrs    A/P  - Given pt endorses mild orthopnea, states SoB has been improving over past week and is net -6L via IHD over same time frame, likely pulmonary edema 2/2 ESRD with fluid overload and subsequent acute HFpEF seen on 9/2 echo and BNP 1140. Possible element FPE from hypertensive episodes.   - Recommend fluid restriction and maintenance of euvolemia via IHD  - BP control <140 systolic  - If SoB does not continue to improve recommend f/u OP pulmonary clinic

## 2018-09-13 NOTE — NURSING
"Pt preprandial glucose 83. Offered to hold meal insulin of 15 units; pt insists giving 15 units of insulin due to "same episode happening last night after my blood sugar was low they held it and at 10pm my sugar was 400." See MAR for administration. Will continue to monitor.   "
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
## 2018-09-13 NOTE — PLAN OF CARE
Sw did speak with Stacey at [facility] and informed Pt will not d/c today and likely not tomorrow, Sw to follow with medical stability.

## 2018-09-13 NOTE — PROGRESS NOTES
Notified Md, Dr. Mcgrath, of pt status. Bedtime Blood glucose checked, results  450. Day shift nurse held scheduled novolog, 15 units, due to Blood glucose being 95. Md stated to give the missed 15 units of novolog, will recheck sugar in 30 mins. Will implement and continue to monitor.

## 2018-09-13 NOTE — SUBJECTIVE & OBJECTIVE
Interval History: shortness of breath improving today, 9/13, but still requires 2 L oxygen. Oxygen sats drop to low 80's on room air.     Review of Systems   Constitutional: Negative for chills, fatigue and fever.   HENT: Negative for congestion.    Eyes: Positive for visual disturbance.   Respiratory: Positive for shortness of breath. Negative for apnea, cough, choking, chest tightness, wheezing and stridor.    Cardiovascular: Negative for chest pain, palpitations and leg swelling.   Gastrointestinal: Negative for diarrhea, rectal pain and vomiting.   Endocrine: Negative.    Genitourinary: Negative for dysuria and flank pain.   Musculoskeletal: Negative.    Skin: Positive for wound. Negative for color change, pallor and rash.   Allergic/Immunologic: Negative.    Neurological: Negative for dizziness, facial asymmetry, light-headedness and headaches.   Hematological: Negative.    Psychiatric/Behavioral: Negative.      Objective:     Vital Signs (Most Recent):  Temp: 96.2 °F (35.7 °C) (09/13/18 1556)  Pulse: 73 (09/13/18 1556)  Resp: 18 (09/13/18 1556)  BP: (!) 142/65 (09/13/18 1556)  SpO2: (!) 93 % (09/13/18 1556) Vital Signs (24h Range):  Temp:  [96.2 °F (35.7 °C)-98.3 °F (36.8 °C)] 96.2 °F (35.7 °C)  Pulse:  [70-88] 73  Resp:  [16-19] 18  SpO2:  [92 %-98 %] 93 %  BP: (142-150)/(65-71) 142/65     Weight: 102.9 kg (226 lb 14.4 oz)  Body mass index is 31.65 kg/m².    Intake/Output Summary (Last 24 hours) at 9/13/2018 1652  Last data filed at 9/13/2018 1400  Gross per 24 hour   Intake 960 ml   Output --   Net 960 ml      Physical Exam   Constitutional: He is oriented to person, place, and time. He appears well-developed and well-nourished. No distress.   HENT:   Head: Normocephalic and atraumatic.   Eyes: EOM are normal.   Neck: Normal range of motion. Neck supple.   Cardiovascular: Normal rate, regular rhythm and intact distal pulses. Exam reveals no gallop and no friction rub.   No murmur heard.  Pulmonary/Chest:  Effort normal and breath sounds normal. No stridor. No respiratory distress. He has no wheezes. He has no rales. He exhibits no tenderness.   Abdominal: Soft. He exhibits no distension.   Musculoskeletal: Normal range of motion.   Neurological: He is alert and oriented to person, place, and time.   Skin: Skin is warm and dry. He is not diaphoretic.   Psychiatric: He has a normal mood and affect. His behavior is normal. Judgment and thought content normal.   Nursing note and vitals reviewed.      Significant Labs:   Recent Lab Results       09/13/18  1124 09/13/18  0404 09/12/18  2314 09/12/18  2117 09/12/18  1716      Immature Granulocytes  1.0        Immature Grans (Abs)  0.06  Comment:  Mild elevation in immature granulocytes is non specific and   can be seen in a variety of conditions including stress response,   acute inflammation, trauma and pregnancy. Correlation with other   laboratory and clinical findings is essential.          Anion Gap  3        Baso #  0.04        Basophil%  0.7        BUN, Bld  38        Calcium  8.1        Chloride  106        CO2  29        CPK  13        Creatinine  5.5        Differential Method  Automated        eGFR if   12.6        eGFR if non   10.9  Comment:  Calculation used to obtain the estimated glomerular filtration  rate (eGFR) is the CKD-EPI equation.           Eos #  0.7        Eosinophil%  11.6        Glucose  290        Gran # (ANC)  3.2        Gran%  53.1        Hematocrit  23.2        Hemoglobin  7.0        Lymph #  1.3        Lymph%  22.4        Magnesium  2.2        MCH  30.6        MCHC  30.2        MCV  101        Mono #  0.7        Mono%  11.2        MPV  10.9        nRBC  0        Phosphorus  3.2        Platelets  200        POCT Glucose 200  395 450 95     Potassium  4.7        RBC  2.29        RDW  16.6        Sodium  138        WBC  5.97                        Significant Imaging: I have reviewed all pertinent imaging  results/findings within the past 24 hours.

## 2018-09-13 NOTE — ASSESSMENT & PLAN NOTE
- Complaining of SOB past week   - EKG shows NSR  - Cardiology consulted given 2 prior episodes of hypotension and bradycardia with SOB. Troponin were elevated intially but then started to trend down. Recommended BP control and no further cardiac workup  - CXR shows diffuse interstitial infiltrates.  - DDx likely non-cardiogenic pulmonary edema from ESRD vs prolonged hypertension causing pulmonary edema.  - Continues to have shortness of breath, currently on 2 L oxygen. Oxygen sats. Drop to low 80's on room air.   - CXR today shows slight progression of pulmonary edema.   - Will consult pulmonary and consider CT chest.

## 2018-09-13 NOTE — PLAN OF CARE
Problem: Patient Care Overview  Goal: Plan of Care Review  Outcome: Ongoing (interventions implemented as appropriate)  Plan of care discussed with patient, no new questions at this time. VSS, denies SOB, pain well managed. Abcess on L foot cultured, podiatry cleaned diabetic ulcer and dressed w/ rolled gauzed. IV abx continued q48hrs.  Safety precautions taken, no falls/trauma during the shift. Will continue to monitor.

## 2018-09-14 LAB
ANION GAP SERPL CALC-SCNC: 9 MMOL/L
BACTERIA SPEC AEROBE CULT: NORMAL
BASOPHILS # BLD AUTO: 0.04 K/UL
BASOPHILS NFR BLD: 0.7 %
BNP SERPL-MCNC: 1228 PG/ML
BUN SERPL-MCNC: 54 MG/DL
CALCIUM SERPL-MCNC: 8.6 MG/DL
CHLORIDE SERPL-SCNC: 108 MMOL/L
CO2 SERPL-SCNC: 24 MMOL/L
CREAT SERPL-MCNC: 7.1 MG/DL
DIFFERENTIAL METHOD: ABNORMAL
EOSINOPHIL # BLD AUTO: 0.6 K/UL
EOSINOPHIL NFR BLD: 10.1 %
ERYTHROCYTE [DISTWIDTH] IN BLOOD BY AUTOMATED COUNT: 16.7 %
EST. GFR  (AFRICAN AMERICAN): 9.2 ML/MIN/1.73 M^2
EST. GFR  (NON AFRICAN AMERICAN): 8 ML/MIN/1.73 M^2
GLUCOSE SERPL-MCNC: 93 MG/DL
HCT VFR BLD AUTO: 24.2 %
HGB BLD-MCNC: 7.3 G/DL
IMM GRANULOCYTES # BLD AUTO: 0.04 K/UL
IMM GRANULOCYTES NFR BLD AUTO: 0.7 %
LYMPHOCYTES # BLD AUTO: 1.8 K/UL
LYMPHOCYTES NFR BLD: 29.6 %
MAGNESIUM SERPL-MCNC: 2.2 MG/DL
MCH RBC QN AUTO: 30 PG
MCHC RBC AUTO-ENTMCNC: 30.2 G/DL
MCV RBC AUTO: 100 FL
MONOCYTES # BLD AUTO: 0.5 K/UL
MONOCYTES NFR BLD: 8.8 %
NEUTROPHILS # BLD AUTO: 3 K/UL
NEUTROPHILS NFR BLD: 50.1 %
NRBC BLD-RTO: 0 /100 WBC
PHOSPHATE SERPL-MCNC: 3.3 MG/DL
PLATELET # BLD AUTO: 182 K/UL
PMV BLD AUTO: 10.7 FL
POCT GLUCOSE: 256 MG/DL (ref 70–110)
POCT GLUCOSE: 342 MG/DL (ref 70–110)
POCT GLUCOSE: 352 MG/DL (ref 70–110)
POTASSIUM SERPL-SCNC: 4.8 MMOL/L
RBC # BLD AUTO: 2.43 M/UL
SODIUM SERPL-SCNC: 141 MMOL/L
WBC # BLD AUTO: 6.02 K/UL

## 2018-09-14 PROCEDURE — 25000003 PHARM REV CODE 250: Performed by: HOSPITALIST

## 2018-09-14 PROCEDURE — 25000003 PHARM REV CODE 250: Performed by: INTERNAL MEDICINE

## 2018-09-14 PROCEDURE — 20600001 HC STEP DOWN PRIVATE ROOM

## 2018-09-14 PROCEDURE — 99232 SBSQ HOSP IP/OBS MODERATE 35: CPT | Mod: ,,, | Performed by: HOSPITALIST

## 2018-09-14 PROCEDURE — 25000003 PHARM REV CODE 250: Performed by: GENERAL PRACTICE

## 2018-09-14 PROCEDURE — 63600175 PHARM REV CODE 636 W HCPCS: Performed by: STUDENT IN AN ORGANIZED HEALTH CARE EDUCATION/TRAINING PROGRAM

## 2018-09-14 PROCEDURE — 25000003 PHARM REV CODE 250: Performed by: STUDENT IN AN ORGANIZED HEALTH CARE EDUCATION/TRAINING PROGRAM

## 2018-09-14 PROCEDURE — 36415 COLL VENOUS BLD VENIPUNCTURE: CPT

## 2018-09-14 PROCEDURE — 84100 ASSAY OF PHOSPHORUS: CPT

## 2018-09-14 PROCEDURE — 83735 ASSAY OF MAGNESIUM: CPT

## 2018-09-14 PROCEDURE — 83880 ASSAY OF NATRIURETIC PEPTIDE: CPT

## 2018-09-14 PROCEDURE — 85025 COMPLETE CBC W/AUTO DIFF WBC: CPT

## 2018-09-14 PROCEDURE — 80048 BASIC METABOLIC PNL TOTAL CA: CPT

## 2018-09-14 PROCEDURE — 90935 HEMODIALYSIS ONE EVALUATION: CPT

## 2018-09-14 RX ADMIN — HYDROCODONE BITARTRATE AND ACETAMINOPHEN 1 TABLET: 7.5; 325 TABLET ORAL at 08:09

## 2018-09-14 RX ADMIN — METHADONE HYDROCHLORIDE 10 MG: 5 TABLET ORAL at 12:09

## 2018-09-14 RX ADMIN — ATORVASTATIN CALCIUM 80 MG: 20 TABLET, FILM COATED ORAL at 12:09

## 2018-09-14 RX ADMIN — SODIUM CHLORIDE 300 ML: 0.9 INJECTION, SOLUTION INTRAVENOUS at 10:09

## 2018-09-14 RX ADMIN — HEPARIN SODIUM 5000 UNITS: 5000 INJECTION, SOLUTION INTRAVENOUS; SUBCUTANEOUS at 05:09

## 2018-09-14 RX ADMIN — CALCIUM ACETATE 1334 MG: 667 CAPSULE ORAL at 10:09

## 2018-09-14 RX ADMIN — HYDRALAZINE HYDROCHLORIDE 100 MG: 50 TABLET ORAL at 08:09

## 2018-09-14 RX ADMIN — ONDANSETRON 8 MG: 8 TABLET, ORALLY DISINTEGRATING ORAL at 09:09

## 2018-09-14 RX ADMIN — CARVEDILOL 25 MG: 25 TABLET, FILM COATED ORAL at 08:09

## 2018-09-14 RX ADMIN — INSULIN ASPART 15 UNITS: 100 INJECTION, SOLUTION INTRAVENOUS; SUBCUTANEOUS at 12:09

## 2018-09-14 RX ADMIN — AMLODIPINE BESYLATE 10 MG: 10 TABLET ORAL at 12:09

## 2018-09-14 RX ADMIN — ASPIRIN 81 MG: 81 TABLET, COATED ORAL at 12:09

## 2018-09-14 RX ADMIN — CETIRIZINE HYDROCHLORIDE 5 MG: 5 TABLET ORAL at 12:09

## 2018-09-14 RX ADMIN — INSULIN DETEMIR 20 UNITS: 100 INJECTION, SOLUTION SUBCUTANEOUS at 10:09

## 2018-09-14 RX ADMIN — HYDRALAZINE HYDROCHLORIDE 100 MG: 50 TABLET ORAL at 02:09

## 2018-09-14 RX ADMIN — HEPARIN SODIUM 5000 UNITS: 5000 INJECTION, SOLUTION INTRAVENOUS; SUBCUTANEOUS at 08:09

## 2018-09-14 RX ADMIN — SENNOSIDES AND DOCUSATE SODIUM 2 TABLET: 8.6; 5 TABLET ORAL at 12:09

## 2018-09-14 RX ADMIN — CALCIUM ACETATE 1334 MG: 667 CAPSULE ORAL at 12:09

## 2018-09-14 RX ADMIN — HYDROCODONE BITARTRATE AND ACETAMINOPHEN 1 TABLET: 7.5; 325 TABLET ORAL at 02:09

## 2018-09-14 RX ADMIN — SERTRALINE HYDROCHLORIDE 25 MG: 25 TABLET ORAL at 12:09

## 2018-09-14 RX ADMIN — PREDNISOLONE ACETATE 1 DROP: 10 SUSPENSION/ DROPS OPHTHALMIC at 08:09

## 2018-09-14 RX ADMIN — INSULIN DETEMIR 20 UNITS: 100 INJECTION, SOLUTION SUBCUTANEOUS at 12:09

## 2018-09-14 RX ADMIN — CARVEDILOL 25 MG: 25 TABLET, FILM COATED ORAL at 12:09

## 2018-09-14 RX ADMIN — CALCIUM ACETATE 1334 MG: 667 CAPSULE ORAL at 05:09

## 2018-09-14 RX ADMIN — HYDRALAZINE HYDROCHLORIDE 100 MG: 50 TABLET ORAL at 05:09

## 2018-09-14 RX ADMIN — INSULIN ASPART 15 UNITS: 100 INJECTION, SOLUTION INTRAVENOUS; SUBCUTANEOUS at 05:09

## 2018-09-14 RX ADMIN — HEPARIN SODIUM 5000 UNITS: 5000 INJECTION, SOLUTION INTRAVENOUS; SUBCUTANEOUS at 02:09

## 2018-09-14 RX ADMIN — INSULIN ASPART 4 UNITS: 100 INJECTION, SOLUTION INTRAVENOUS; SUBCUTANEOUS at 05:09

## 2018-09-14 RX ADMIN — CLONAZEPAM 0.5 MG: 0.5 TABLET ORAL at 08:09

## 2018-09-14 RX ADMIN — HYDROCODONE BITARTRATE AND ACETAMINOPHEN 1 TABLET: 7.5; 325 TABLET ORAL at 05:09

## 2018-09-14 RX ADMIN — METHADONE HYDROCHLORIDE 10 MG: 5 TABLET ORAL at 08:09

## 2018-09-14 NOTE — DISCHARGE INSTRUCTIONS
You have ID and podiatry follow-up on 9/17/2018    We have placed referral to opthalmology but if they have not contacted you by next week, call the number on your after-visit-summary to set-up an appointment with them.

## 2018-09-14 NOTE — NURSING
Received pt post dialysis, no distress noted. VSS. Dsg to lt foot intact. Will treat pt for sugar of 352.

## 2018-09-14 NOTE — PROGRESS NOTES
Patient arrived on unit via stretcher. Weight obtained in bed @ 102.7kg. Dialysis initiated via left arm AVF with 15g needles x 2 without difficulty. Lines connected and secured. Orders and machine settings verified. tx started. Good blood flow established.
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
## 2018-09-14 NOTE — SUBJECTIVE & OBJECTIVE
Interval History: NAEON. Patient's oxygen sats drop to 86% on RA while receiving dialysis this morning. Patient is otherwise doing well.     Review of Systems   Constitutional: Negative for chills, fatigue and fever.   HENT: Negative for congestion.    Eyes: Negative.    Respiratory: Positive for shortness of breath. Negative for apnea, cough, choking, chest tightness, wheezing and stridor.    Cardiovascular: Negative for chest pain, palpitations and leg swelling.   Gastrointestinal: Negative for abdominal pain, diarrhea, nausea and vomiting.   Endocrine: Negative.    Genitourinary: Negative for dysuria.   Musculoskeletal: Negative.    Skin: Negative.    Allergic/Immunologic: Negative.    Neurological: Negative for dizziness, seizures, facial asymmetry, light-headedness and headaches.   Hematological: Negative.      Objective:     Vital Signs (Most Recent):  Temp: 97.9 °F (36.6 °C) (09/14/18 0730)  Pulse: 93 (09/14/18 1100)  Resp: 16 (09/14/18 0730)  BP: 134/69 (09/14/18 1030)  SpO2: 98 % (09/14/18 0412) Vital Signs (24h Range):  Temp:  [96.2 °F (35.7 °C)-98 °F (36.7 °C)] 97.9 °F (36.6 °C)  Pulse:  [69-93] 93  Resp:  [16-18] 16  SpO2:  [89 %-98 %] 98 %  BP: (134-164)/(57-80) 134/69     Weight: 102.9 kg (226 lb 14.4 oz)  Body mass index is 31.65 kg/m².    Intake/Output Summary (Last 24 hours) at 9/14/2018 1112  Last data filed at 9/14/2018 0400  Gross per 24 hour   Intake 720 ml   Output --   Net 720 ml      Physical Exam   Constitutional: He is oriented to person, place, and time. He appears well-developed and well-nourished. No distress.   HENT:   Head: Normocephalic and atraumatic.   Eyes: EOM are normal.   Neck: Normal range of motion. Neck supple.   Cardiovascular: Normal rate, regular rhythm, normal heart sounds and intact distal pulses.   No murmur heard.  Pulmonary/Chest: Effort normal and breath sounds normal. No stridor. No respiratory distress. He has no wheezes. He has no rales.   Abdominal: Soft. He  exhibits no distension.   Musculoskeletal: Normal range of motion.   Neurological: He is alert and oriented to person, place, and time.   Skin: Skin is warm and dry. He is not diaphoretic.   Nursing note and vitals reviewed.      Significant Labs:   Recent Lab Results       09/14/18  0934 09/14/18  0503 09/13/18  2136 09/13/18  1708 09/13/18  1124      Immature Granulocytes  0.7        Immature Grans (Abs)  0.04  Comment:  Mild elevation in immature granulocytes is non specific and   can be seen in a variety of conditions including stress response,   acute inflammation, trauma and pregnancy. Correlation with other   laboratory and clinical findings is essential.          Anion Gap  9        Baso #  0.04        Basophil%  0.7        BNP  1,228  Comment:  Values of less than 100 pg/ml are consistent with non-CHF populations.        BUN, Bld  54        Calcium  8.6        Chloride  108        CO2  24        Creatinine  7.1        Differential Method  Automated        eGFR if   9.2        eGFR if non   8.0  Comment:  Calculation used to obtain the estimated glomerular filtration  rate (eGFR) is the CKD-EPI equation.           Eos #  0.6        Eosinophil%  10.1        Glucose  93        Gran # (ANC)  3.0        Gran%  50.1        Hematocrit  24.2        Hemoglobin  7.3        Lymph #  1.8        Lymph%  29.6        Magnesium  2.2        MCH  30.0        MCHC  30.2        MCV  100        Mono #  0.5        Mono%  8.8        MPV  10.7        nRBC  0        Phosphorus  3.3        Platelets  182        POCT Glucose 256  198 83 200     Potassium  4.8        RBC  2.43        RDW  16.7        Sodium  141        WBC  6.02                        Significant Imaging: I have reviewed all pertinent imaging results/findings within the past 24 hours.

## 2018-09-14 NOTE — PLAN OF CARE
[facility]    HOME HEALTH ORDERS  FACE TO FACE ENCOUNTER    Patient Name: Mickey Ross  YOB: 1965    PCP: Rosalina Moncada MD   PCP Address: [address] / [place] [address]  PCP Phone Number: [phone]  PCP Fax: [phone]    Encounter Date: 09/14/2018    Admit to Home Health    Diagnoses:  Active Hospital Problems    Diagnosis  POA    *Acute hematogenous osteomyelitis of left foot [M86.072]  Yes     Priority: 1 - High     Chronic    Acute on chronic respiratory failure with hypoxia [J96.21]  Yes     Priority: 2     Type 2 diabetes mellitus with chronic kidney disease on chronic dialysis, with long-term current use of insulin [E11.22, N18.6, Z99.2, Z79.4]  Not Applicable     Priority: 2     Subretinal abscess [L02.91]  Yes     Priority: 3     ESRD on hemodialysis [N18.6, Z99.2]  Not Applicable     Chronic    Renovascular hypertension [I15.0]  Yes      Resolved Hospital Problems    Diagnosis Date Resolved POA    Hyperglycemia [R73.9] 09/05/2018 Yes     Priority: 1 - High    NSTEMI (non-ST elevated myocardial infarction) [I21.4] 09/05/2018 Unknown    Hyperkalemia [E87.5] 09/12/2018 Yes    Osteomyelitis [M86.9] 09/02/2018 Yes    Allergic drug rash - due to Vancomycin [L27.0] 09/02/2018 Yes    Methadone dependence [F11.20] 09/02/2018 Yes    Sepsis due to methicillin resistant Staphylococcus aureus (MRSA) [A41.02] 09/02/2018 Yes    Anxiety [F41.9] 09/02/2018 Yes    Debility [R53.81] 09/02/2018 Yes    Chronic back pain [M54.9, G89.29] 09/02/2018 Yes    Chronic, continuous use of opioids [F11.90] 09/02/2018 Yes       Future Appointments   Date Time Provider Department Center   9/17/2018  2:00 PM [facility] MD [facility] [address]   9/17/2018  2:45 PM Barbara Means DPM [facility] [place]           I have seen and examined this patient face to face today. My clinical findings that support the need for the home health skilled services and home bound status  are the following:  Weakness/numbness causing balance and gait disturbance due to Weakness/Debility making it taxing to leave home.    Allergies:  Review of patient's allergies indicates:   Allergen Reactions    Vancomycin analogues Rash     Red Man Syndrome    Penicillins        Diet: diabetic diet: 1800 calorie and renal diet, low potassium, phosphorus diet     Activities: activity as tolerated     Nursing:   SN to complete comprehensive assessment including routine vital signs. Instruct on disease process and s/s of complications to report to MD. Review/verify medication list sent home with the patient at time of discharge  and instruct patient/caregiver as needed. Frequency may be adjusted depending on start of care date.     Notify MD if SBP > 160 or < 90; DBP > 90 or < 50; HR > 120 or < 50; Temp > 101         CONSULTS:    Physical Therapy to evaluate and treat. Evaluate for home safety and equipment needs; Establish/upgrade home exercise program. Perform / instruct on therapeutic exercises, gait training, transfer training, and Range of Motion.  Occupational Therapy to evaluate and treat. Evaluate home environment for safety and equipment needs. Perform/Instruct on transfers, ADL training, ROM, and therapeutic exercises.     MISCELLANEOUS CARE:  Home Infusion Therapy:   SN to perform Infusion Therapy/Central Line Care.  Review Central Line Care & Central Line Flush with patient.     Administer (drug and dose):   IV Daptomycin 1g on Monday, Wednesday, Friday AFTER returning from dialysis  Start Date: 9/17/18, End Date: 9/28/18                     Will need weekly cbc, cmp, crp, esr and cpk and fax results to [facility] at [phone]       MISCELLANEOUS CARE:  Wound Care Orders:  yes:  Arterial Wound:  Location: LEFT METTARSAL JOINT    WOUND CARE ORDERS  WEEKLY DRESSING CHANGES    Follow up:  Ophthalmology -   7th Sept with Dr. Corrales.    Medications: Review discharge medications with patient and family  and provide education.      Current Discharge Medication List      START taking these medications    Details   amLODIPine (NORVASC) 10 MG tablet Take 1 tablet (10 mg total) by mouth once daily.  Qty: 30 tablet, Refills: 11      aspirin (ECOTRIN) 81 MG EC tablet Take 1 tablet (81 mg total) by mouth once daily.  Refills: 0      atorvastatin (LIPITOR) 40 MG tablet Take 1 tablet (40 mg total) by mouth once daily.  Qty: 90 tablet, Refills: 3      carvedilol (COREG) 12.5 MG tablet Take 1 tablet (12.5 mg total) by mouth 2 (two) times daily.  Qty: 60 tablet, Refills: 11      hydrALAZINE (APRESOLINE) 25 MG tablet Take 1 tablet (25 mg total) by mouth every 8 (eight) hours.  Qty: 90 tablet, Refills: 11      insulin aspart U-100 (NOVOLOG) 100 unit/mL InPn pen Inject 10 Units into the skin 3 (three) times daily with meals.  Qty: 9 mL, Refills: 11      sodium chloride 0.9% SolP 50 mL with DAPTOmycin 500 mg SolR 1,000 mg Inject 1,000 mg into the vein every M,W,,F. Administer 1gm of antibiotic after HD on M, W, F  Start: 9/17/18, End:9/28/18            CONTINUE these medications which have CHANGED    Details   !! insulin glargine (BASAGLAR KWIKPEN U-100 INSULIN) 100 unit/mL (3 mL) InPn pen Inject 25 Units into the skin 2 (two) times daily.  Qty: 15 mL, Refills: 1       !! - Potential duplicate medications found. Please discuss with provider.      CONTINUE these medications which have NOT CHANGED    Details   calcium acetate (PHOSLO) 667 mg capsule Take 667 mg by mouth 3 (three) times daily with meals.      clonazePAM (KLONOPIN) 0.5 MG tablet Take 0.5 mg by mouth 2 (two) times daily as needed for Anxiety.      insulin aspart U-100 (NOVOLOG) 100 unit/mL injection Inject 13 Units into the skin 3 (three) times daily before meals.      !! insulin glargine (BASAGLAR KWIKPEN U-100 INSULIN) 100 unit/mL (3 mL) InPn pen Inject 40 Units into the skin 2 (two) times daily.      omeprazole (PRILOSEC) 20 MG capsule Take 20 mg by mouth once daily.       sertraline (ZOLOFT) 25 MG tablet Take 25 mg by mouth once daily.       !! - Potential duplicate medications found. Please discuss with provider.      STOP taking these medications       diltiaZEM (CARDIZEM CD) 300 MG 24 hr capsule Comments:   Reason for Stopping:         HYDROcodone-acetaminophen (NORCO) 7.5-325 mg per tablet Comments:   Reason for Stopping:         losartan (COZAAR) 100 MG tablet Comments:   Reason for Stopping:         methadone (DOLOPHINE) 10 MG tablet Comments:   Reason for Stopping:

## 2018-09-14 NOTE — SUBJECTIVE & OBJECTIVE
Past Medical History:   Diagnosis Date    Allergic drug rash - due to Vancomycin 8/19/2018    Allergic drug rash - due to Vancomycin 8/19/2018    Anxiety 8/17/2018    Arthritis     Blind left eye     Charcot's joint of foot     Chronic back pain 8/17/2018    Chronic, continuous use of opioids 8/17/2018    Debility 8/17/2018    Diabetes mellitus     GERD (gastroesophageal reflux disease)     Glaucoma     Hypertension     Methadone dependence 8/18/2018    MRSA (methicillin resistant staph aureus) culture positive     Osteomyelitis     Renal disorder     Sepsis due to methicillin resistant Staphylococcus aureus (MRSA) 8/17/2018    Subretinal abscess 8/17/2018       Past Surgical History:   Procedure Laterality Date    AVF left upper arm      INSERTION OF SANZ CATHETER Right 9/1/2018    Procedure: INSERTION, CATHETER, CENTRAL VENOUS, SANZ;  Surgeon: Rafi Churchill MD;  Location: [facility] OR [address] [place] [address];  Service: General;  Laterality: Right;    INSERTION, CATHETER, CENTRAL VENOUS, SANZ Right 9/1/2018    Performed by Rafi Churchill MD at [facility] OR [address] [place] [address]    left ankle surgery      lumbar back surgery         Review of patient's allergies indicates:   Allergen Reactions    Vancomycin analogues Rash     Red Man Syndrome    Penicillins        Family History     Problem Relation (Age of Onset)    Pneumonia Mother        Tobacco Use    Smoking status: Never Smoker   Substance and Sexual Activity    Alcohol use: No     Frequency: Never    Drug use: No    Sexual activity: Not Currently         Review of Systems   Constitutional: Negative for chills and fever.   HENT: Negative for congestion and sore throat.    Eyes: Negative for visual disturbance.   Respiratory: Positive for shortness of breath. Negative for cough, chest tightness, wheezing and stridor.    Cardiovascular: Negative for chest pain and palpitations.   Gastrointestinal: Negative for abdominal pain, diarrhea, nausea and  vomiting.   Genitourinary: Negative for dysuria, flank pain and hematuria.   Musculoskeletal: Negative for back pain, myalgias, neck pain and neck stiffness.   Skin: Positive for color change (L lower limb charcot joint + osteomyelitis), rash and wound.   Neurological: Negative for dizziness, syncope, weakness, light-headedness and headaches.   Hematological: Does not bruise/bleed easily.   Psychiatric/Behavioral: Negative for agitation, confusion and decreased concentration.     Objective:     Vital Signs (Most Recent):  Temp: 97.9 °F (36.6 °C) (09/14/18 0730)  Pulse: 83 (09/14/18 0930)  Resp: 16 (09/14/18 0730)  BP: (!) 138/57 (09/14/18 0930)  SpO2: 98 % (09/14/18 0412) Vital Signs (24h Range):  Temp:  [96.2 °F (35.7 °C)-98 °F (36.7 °C)] 97.9 °F (36.6 °C)  Pulse:  [69-87] 83  Resp:  [16-18] 16  SpO2:  [89 %-98 %] 98 %  BP: (138-164)/(57-80) 138/57     Weight: 102.9 kg (226 lb 14.4 oz)  Body mass index is 31.65 kg/m².      Intake/Output Summary (Last 24 hours) at 9/14/2018 0952  Last data filed at 9/14/2018 0400  Gross per 24 hour   Intake 720 ml   Output --   Net 720 ml       Physical Exam   Constitutional: He is oriented to person, place, and time. He appears well-developed and well-nourished. No distress.   HENT:   Head: Normocephalic and atraumatic.   Eyes: Conjunctivae are normal. No scleral icterus.   Neck: Normal range of motion. Neck supple. No JVD present.   Cardiovascular: Normal rate, regular rhythm, normal heart sounds and intact distal pulses.   No murmur heard.  Pulmonary/Chest: Breath sounds normal. He is in respiratory distress. He has no wheezes.   Abdominal: Soft. Bowel sounds are normal. There is no guarding.   Musculoskeletal: He exhibits edema (non-pitting edema L lower leg with charcot foot, tender to deep palpation of generalized lower limb), tenderness and deformity.   Neurological: He is alert and oriented to person, place, and time.   Skin: Skin is warm and dry. Rash noted. He is not  diaphoretic. There is erythema.   Psychiatric: He has a normal mood and affect.   Vitals reviewed.      Vents:       Lines/Drains/Airways     Central Venous Catheter Line                 Port A Cath Single Lumen 09/01/18 0905 right subclavian 13 days          Drain                 Hemodialysis AV Fistula Left upper arm -- days                Significant Labs:    CBC/Anemia Profile:  Recent Labs   Lab  09/13/18   0404  09/14/18   0503   WBC  5.97  6.02   HGB  7.0*  7.3*   HCT  23.2*  24.2*   PLT  200  182   MCV  101*  100*   RDW  16.6*  16.7*        Chemistries:  Recent Labs   Lab  09/12/18   1125  09/13/18   0404  09/14/18   0503   NA  140  138  141   K  3.9  4.7  4.8   CL  106  106  108   CO2  27  29  24   BUN  21*  38*  54*   CREATININE  3.7*  5.5*  7.1*   CALCIUM  8.3*  8.1*  8.6*   MG   --   2.2  2.2   PHOS   --   3.2  3.3       Recent Lab Results       09/14/18  0934 09/14/18  0503 09/13/18  2136 09/13/18  1708 09/13/18  1124      Immature Granulocytes  0.7        Immature Grans (Abs)  0.04  Comment:  Mild elevation in immature granulocytes is non specific and   can be seen in a variety of conditions including stress response,   acute inflammation, trauma and pregnancy. Correlation with other   laboratory and clinical findings is essential.          Anion Gap  9        Baso #  0.04        Basophil%  0.7        BNP  1,228  Comment:  Values of less than 100 pg/ml are consistent with non-CHF populations.        BUN, Bld  54        Calcium  8.6        Chloride  108        CO2  24        Creatinine  7.1        Differential Method  Automated        eGFR if   9.2        eGFR if non   8.0  Comment:  Calculation used to obtain the estimated glomerular filtration  rate (eGFR) is the CKD-EPI equation.           Eos #  0.6        Eosinophil%  10.1        Glucose  93        Gran # (ANC)  3.0        Gran%  50.1        Hematocrit  24.2        Hemoglobin  7.3        Lymph #  1.8        Lymph%   29.6        Magnesium  2.2        MCH  30.0        MCHC  30.2        MCV  100        Mono #  0.5        Mono%  8.8        MPV  10.7        nRBC  0        Phosphorus  3.3        Platelets  182        POCT Glucose 256  198 83 200     Potassium  4.8        RBC  2.43        RDW  16.7        Sodium  141        WBC  6.02                      All pertinent labs within the past 24 hours have been reviewed.

## 2018-09-14 NOTE — PROGRESS NOTES
Looked at pt's CT chest. Appears consistent with pulmonary edema. Recommend to continue diurese during HD sessions. Pt should get a PFT and re-imaging in 4-6 weeks.    Pulmonary will sign off at this time.

## 2018-09-14 NOTE — PLAN OF CARE
Problem: Patient Care Overview  Goal: Plan of Care Review  Outcome: Ongoing (interventions implemented as appropriate)  Plan of Care reviewed/acknowledged by patient. Pt dialyzed this shift  With 3L taken off. Dressing to lt. Foot changed by the nurse. Pt had uneventful shift. O2 continues PRN keeping sats >92%. Rt subclavian Jama intact/clamped. Lt AV fistula with cdi dsg with +bruit/thrill. Sugar maintained on scheduled/sliding scale. VSS. Rhythm continues at NS. Fall precautions maintained. Possible d/c tomorrow.

## 2018-09-14 NOTE — NURSING
Received telephone report from Dialysis: pt dialyzed for 3.5 hours taking off 3L. Pt tolerated dialysis well with post /62, HR 93 per dialysis nurse.

## 2018-09-14 NOTE — ASSESSMENT & PLAN NOTE
[facility] TTS    [facility]  Followed by Dr. Cruz  Duration 3 hrs with 15 minutes  Accesss on MIESHA AVF  No residual renal function    End-Stage Renal Disease on HD  - Will provide dialysis for metabolic clearance and volume management  - Seen and examined today during hemodialysis; tolerating treatment well without issues. 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 Goal for BP is <140 mmHg SBP and BDP <90 mmHg, maintain MAP > 65.    Nutrition   - Renal Diet    Case discussed with staff further recs with attestation.

## 2018-09-14 NOTE — ASSESSMENT & PLAN NOTE
- Patient with diabetic nephropathy and concomitant ESRD, HD (MWF via LUE AVF)  - patiromer 8.4 gm on nondialysis days  - renal and diabetic diet

## 2018-09-14 NOTE — SUBJECTIVE & OBJECTIVE
Interval History: Patient evaluated bedside at HD unit currently on treatment, experiencing no distress, no chest pain, cramps, SOB.  Night was uneventful.  UF target ~3L.    Review of patient's allergies indicates:   Allergen Reactions    Vancomycin analogues Rash     Red Man Syndrome    Penicillins      Current Facility-Administered Medications   Medication Frequency    0.9%  NaCl infusion (for blood administration) Q24H PRN    0.9%  NaCl infusion PRN    0.9%  NaCl infusion Once    acetaminophen tablet 650 mg Q4H PRN    albuterol-ipratropium 2.5 mg-0.5 mg/3 mL nebulizer solution 3 mL Q4H PRN    amLODIPine tablet 10 mg Daily    aspirin EC tablet 81 mg Daily    atorvastatin tablet 80 mg Daily    atropine 1% ophthalmic solution 1 drop Daily    calcium acetate capsule 1,334 mg TID WM    carvedilol tablet 25 mg BID    cetirizine tablet 5 mg Daily    clonazePAM tablet 0.5 mg BID PRN    DAPTOmycin (CUBICIN) 905 mg in sodium chloride 0.9% IVPB Q48H    dextrose 50% injection 12.5 g PRN    dextrose 50% injection 25 g PRN    epoetin umer injection 8,000 Units Every Tues, Thurs, Sat    fluticasone 50 mcg/actuation nasal spray 100 mcg Daily    glucagon (human recombinant) injection 1 mg PRN    glucose chewable tablet 16 g PRN    glucose chewable tablet 24 g PRN    heparin (porcine) injection 5,000 Units Q8H    hydrALAZINE tablet 100 mg Q8H    hydrALAZINE tablet 50 mg Q8H PRN    HYDROcodone-acetaminophen 7.5-325 mg per tablet 1 tablet Q6H PRN    hydrOXYzine HCl tablet 25 mg TID PRN    insulin aspart U-100 pen 0-10 Units QID (AC + HS) PRN    insulin aspart U-100 pen 15 Units TIDWM    insulin detemir U-100 pen 20 Units BID    methadone tablet 10 mg BID    naloxone 0.4 mg/mL injection 0.4 mg PRN    ondansetron disintegrating tablet 8 mg Q8H PRN    ondansetron injection 4 mg Q8H PRN    patiromer calcium sorbitex PwPk 8.4 g Once per day on Sun Mon Wed Fri    polyethylene glycol packet 17 g Daily     prednisoLONE acetate 1 % ophthalmic suspension 1 drop QID    senna-docusate 8.6-50 mg per tablet 2 tablet BID    sertraline tablet 25 mg Daily       Objective:     Vital Signs (Most Recent):  Temp: 97.9 °F (36.6 °C) (09/14/18 0730)  Pulse: 79 (09/14/18 0845)  Resp: 16 (09/14/18 0730)  BP: (!) 153/72 (09/14/18 0845)  SpO2: 98 % (09/14/18 0412)  O2 Device (Oxygen Therapy): nasal cannula (09/14/18 0412) Vital Signs (24h Range):  Temp:  [96.2 °F (35.7 °C)-98 °F (36.7 °C)] 97.9 °F (36.6 °C)  Pulse:  [69-87] 79  Resp:  [16-18] 16  SpO2:  [89 %-98 %] 98 %  BP: (139-164)/(65-79) 153/72     Weight: 102.9 kg (226 lb 14.4 oz) (09/11/18 0700)  Body mass index is 31.65 kg/m².  Body surface area is 2.27 meters squared.    I/O last 3 completed shifts:  In: 1200 [P.O.:1200]  Out: -     Physical Exam  Constitutional: He is oriented to person, place, and time. He appears well-developed and well-nourished. No distress.   HENT:   Head: Normocephalic and atraumatic.   Eyes: Conjunctivae and EOM are normal. Pupils are equal, round, and reactive to light.   Neck: Normal range of motion. Neck supple.   Cardiovascular: Normal rate, regular rhythm and intact distal pulses. Exam reveals no gallop and no friction rub.   No murmur heard.  Pulmonary/Chest: Effort normal. No stridor. No respiratory distress. He has no wheezes. He has no rhonchi. He has scanty bibasilar rales.  Abdominal: Soft. He exhibits no distension. There is no tenderness.   Musculoskeletal: Normal range of motion.   Neurological: He is alert and oriented to person, place, and time.   Skin: Skin is warm and dry. He is not diaphoretic.   Psychiatric: He has a normal mood and affect. His behavior is normal. Judgment and thought content normal.   Nursing note and vitals reviewed.      Significant Labs:  CBC:   Recent Labs   Lab  09/14/18   0503   WBC  6.02   RBC  2.43*   HGB  7.3*   HCT  24.2*   PLT  182   MCV  100*   MCH  30.0   MCHC  30.2*     CMP:   Recent Labs   Lab   09/12/18   0514   09/14/18   0503   GLU  372*   < >  93   CALCIUM  8.2*   < >  8.6*   ALBUMIN  2.2*   --    --    NA  137   < >  141   K  5.3*   < >  4.8   CO2  23   < >  24   CL  105   < >  108   BUN  59*   < >  54*   CREATININE  7.4*   < >  7.1*    < > = values in this interval not displayed.     All labs within the past 24 hours have been reviewed.

## 2018-09-14 NOTE — PROGRESS NOTES
Dialysis tx completed. 3.5 hours. 3 liters removed. Needles pulled from left arm AVF. Hemostasis achieved. Gauze and tape to sites. Tolerated tx well. Report given to Fe VALDEZ.
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
## 2018-09-14 NOTE — ASSESSMENT & PLAN NOTE
53M c/o worsening SoBoE and rest ~1-2wks on b/g DM2+charcot joint+osteomyelitis s/p debridement c/b MRSA bacteremia and labile BP with SBP to 220 during admission as well as hypotensive episodes. Sats currently 92% RA, BP <160 /48hrs    A/P  - Given pt endorses mild orthopnea, states SoB has been improving over past week and is net -6L via IHD over same time frame, likely pulmonary edema 2/2 ESRD with fluid overload and subsequent acute HFpEF seen on 9/2 echo and BNP 1140. Possible element FPE from hypertensive episodes.   - Recommend fluid restriction and maintenance of euvolemia via IHD  - BP control <140 systolic  - If SoB does not continue to improve recommend f/u OP pulmonary clinic    9/14: CT chest demonstrates ground glass opacification

## 2018-09-14 NOTE — ASSESSMENT & PLAN NOTE
- Complaining of SOB past week   - EKG shows NSR  - Cardiology consulted given 2 prior episodes of hypotension and bradycardia with SOB. Troponin were elevated intially but then started to trend down. Recommended BP control and no further cardiac workup  - CXR shows diffuse interstitial infiltrates.  - DDx likely non-cardiogenic pulmonary edema from ESRD vs prolonged hypertension causing pulmonary edema.  - Continues to have shortness of breath, currently on 2 L oxygen. Oxygen sats. Drops to mid 80's on room air.   - CXR shows slight progression of pulmonary edema.   - Consulted pulmonary and will get CT chest as per their recs.   - Will trial Bipap tonight and schedule sleep study as outpatient. No history of SWAPNA.

## 2018-09-14 NOTE — PLAN OF CARE
Problem: Patient Care Overview  Goal: Plan of Care Review  Outcome: Ongoing (interventions implemented as appropriate)  Daptomycin administered overnight. CBG monitored, scheduled insulin administered. No SSI indicated per order set. Pt educated on fall risks, Pt remained free from falls/trauma/injury. VSS. Denies any CP, SOB, palpitations, dizziness, pain and discomfort. Turning/repositioning independently in bed. Plan of care reviewed with patient and all questions answered, verbalizes understanding. No acute distress noted. Will continue to monitor.

## 2018-09-14 NOTE — PLAN OF CARE
[place] updated Patsy Francisco with [facility] insurance on Pt's medical barriers to d/c. CM updated to send clinicals per Patsy's request, [place] to follow with medical stability and d/c. [place] informed Pt may d.c over weekend, on call nurse from [place] needs to be notified and [facility] home health as well. CM weekend informed.

## 2018-09-14 NOTE — ASSESSMENT & PLAN NOTE
- Osteomyelitis of left 5th metatarsal taken for washout by Orthopedic surgery at [facility] on 08/15.  - X-ray left foot and ankle shows partial amputation the 1st, 2nd, 3rd, and 4th digits with fusion of the 2nd and 3rd MTP joints and throughout the midfoot, severe deformity and joint space loss the tibiotalar joint with a lapse of the talus likely 2/2 to chronic osteomyelitis, arthritis, or chronic Charcot foot.   - Patient being treated outside facility with Flagyl, linezolid, gentamicin with HD.  - Podiatry consulted, appreciate recs. Advised BKA but patient refused. Recommended CAM boot and abx per ID. D/tyler wound vac  - ESR elevated at 58 upon admission  - BCx2 from [facility] grew MRSA (last positive 8/15. Cultures from [facility] [place] [facility] [place] were NGTD.    PLAN:  - On Daptomycin 8mg/kg after HD per ID's recs. Planned for 6 weeks total, last dose 9/29  - ID follow up at 2 weeks.  - Podiatry consulted, appreciate recs. Recommend follow up with [facility] Podiatry at discharge.

## 2018-09-15 LAB
BASOPHILS # BLD AUTO: 0.05 K/UL
BASOPHILS NFR BLD: 0.9 %
DIFFERENTIAL METHOD: ABNORMAL
EOSINOPHIL # BLD AUTO: 0.5 K/UL
EOSINOPHIL NFR BLD: 8.6 %
ERYTHROCYTE [DISTWIDTH] IN BLOOD BY AUTOMATED COUNT: 16.6 %
HCT VFR BLD AUTO: 24.3 %
HGB BLD-MCNC: 7 G/DL
IMM GRANULOCYTES # BLD AUTO: 0.04 K/UL
IMM GRANULOCYTES NFR BLD AUTO: 0.7 %
LYMPHOCYTES # BLD AUTO: 1.6 K/UL
LYMPHOCYTES NFR BLD: 28.1 %
MAGNESIUM SERPL-MCNC: 2.3 MG/DL
MCH RBC QN AUTO: 29.5 PG
MCHC RBC AUTO-ENTMCNC: 28.8 G/DL
MCV RBC AUTO: 103 FL
MONOCYTES # BLD AUTO: 0.5 K/UL
MONOCYTES NFR BLD: 9.5 %
NEUTROPHILS # BLD AUTO: 2.9 K/UL
NEUTROPHILS NFR BLD: 52.2 %
NRBC BLD-RTO: 0 /100 WBC
PHOSPHATE SERPL-MCNC: 3.8 MG/DL
PLATELET # BLD AUTO: 207 K/UL
PMV BLD AUTO: 10.5 FL
POCT GLUCOSE: 124 MG/DL (ref 70–110)
POCT GLUCOSE: 128 MG/DL (ref 70–110)
POCT GLUCOSE: 194 MG/DL (ref 70–110)
POCT GLUCOSE: 233 MG/DL (ref 70–110)
POCT GLUCOSE: 251 MG/DL (ref 70–110)
RBC # BLD AUTO: 2.37 M/UL
WBC # BLD AUTO: 5.55 K/UL

## 2018-09-15 PROCEDURE — 25000003 PHARM REV CODE 250: Performed by: STUDENT IN AN ORGANIZED HEALTH CARE EDUCATION/TRAINING PROGRAM

## 2018-09-15 PROCEDURE — 25000003 PHARM REV CODE 250: Performed by: HOSPITALIST

## 2018-09-15 PROCEDURE — 20600001 HC STEP DOWN PRIVATE ROOM

## 2018-09-15 PROCEDURE — 85025 COMPLETE CBC W/AUTO DIFF WBC: CPT

## 2018-09-15 PROCEDURE — 83735 ASSAY OF MAGNESIUM: CPT

## 2018-09-15 PROCEDURE — 63600175 PHARM REV CODE 636 W HCPCS: Performed by: HOSPITALIST

## 2018-09-15 PROCEDURE — 63600175 PHARM REV CODE 636 W HCPCS: Performed by: STUDENT IN AN ORGANIZED HEALTH CARE EDUCATION/TRAINING PROGRAM

## 2018-09-15 PROCEDURE — 84100 ASSAY OF PHOSPHORUS: CPT

## 2018-09-15 PROCEDURE — 25000003 PHARM REV CODE 250: Performed by: INTERNAL MEDICINE

## 2018-09-15 PROCEDURE — 99233 SBSQ HOSP IP/OBS HIGH 50: CPT | Mod: ,,, | Performed by: INTERNAL MEDICINE

## 2018-09-15 RX ADMIN — INSULIN ASPART 15 UNITS: 100 INJECTION, SOLUTION INTRAVENOUS; SUBCUTANEOUS at 05:09

## 2018-09-15 RX ADMIN — INSULIN DETEMIR 20 UNITS: 100 INJECTION, SOLUTION SUBCUTANEOUS at 09:09

## 2018-09-15 RX ADMIN — PREDNISOLONE ACETATE 1 DROP: 10 SUSPENSION/ DROPS OPHTHALMIC at 08:09

## 2018-09-15 RX ADMIN — AMLODIPINE BESYLATE 10 MG: 10 TABLET ORAL at 08:09

## 2018-09-15 RX ADMIN — CALCIUM ACETATE 1334 MG: 667 CAPSULE ORAL at 05:09

## 2018-09-15 RX ADMIN — HYDROCODONE BITARTRATE AND ACETAMINOPHEN 1 TABLET: 7.5; 325 TABLET ORAL at 09:09

## 2018-09-15 RX ADMIN — CARVEDILOL 25 MG: 25 TABLET, FILM COATED ORAL at 08:09

## 2018-09-15 RX ADMIN — METHADONE HYDROCHLORIDE 10 MG: 5 TABLET ORAL at 09:09

## 2018-09-15 RX ADMIN — HEPARIN SODIUM 5000 UNITS: 5000 INJECTION, SOLUTION INTRAVENOUS; SUBCUTANEOUS at 09:09

## 2018-09-15 RX ADMIN — ERYTHROPOIETIN 8000 UNITS: 4000 INJECTION, SOLUTION INTRAVENOUS; SUBCUTANEOUS at 09:09

## 2018-09-15 RX ADMIN — SERTRALINE HYDROCHLORIDE 25 MG: 25 TABLET ORAL at 08:09

## 2018-09-15 RX ADMIN — CLONAZEPAM 0.5 MG: 0.5 TABLET ORAL at 05:09

## 2018-09-15 RX ADMIN — HYDRALAZINE HYDROCHLORIDE 100 MG: 50 TABLET ORAL at 05:09

## 2018-09-15 RX ADMIN — CARVEDILOL 25 MG: 25 TABLET, FILM COATED ORAL at 09:09

## 2018-09-15 RX ADMIN — HYDRALAZINE HYDROCHLORIDE 100 MG: 50 TABLET ORAL at 02:09

## 2018-09-15 RX ADMIN — HEPARIN SODIUM 5000 UNITS: 5000 INJECTION, SOLUTION INTRAVENOUS; SUBCUTANEOUS at 05:09

## 2018-09-15 RX ADMIN — INSULIN DETEMIR 20 UNITS: 100 INJECTION, SOLUTION SUBCUTANEOUS at 08:09

## 2018-09-15 RX ADMIN — ATORVASTATIN CALCIUM 80 MG: 20 TABLET, FILM COATED ORAL at 08:09

## 2018-09-15 RX ADMIN — CALCIUM ACETATE 1334 MG: 667 CAPSULE ORAL at 08:09

## 2018-09-15 RX ADMIN — CALCIUM ACETATE 1334 MG: 667 CAPSULE ORAL at 12:09

## 2018-09-15 RX ADMIN — HEPARIN SODIUM 5000 UNITS: 5000 INJECTION, SOLUTION INTRAVENOUS; SUBCUTANEOUS at 02:09

## 2018-09-15 RX ADMIN — INSULIN ASPART 15 UNITS: 100 INJECTION, SOLUTION INTRAVENOUS; SUBCUTANEOUS at 07:09

## 2018-09-15 RX ADMIN — CETIRIZINE HYDROCHLORIDE 5 MG: 5 TABLET ORAL at 08:09

## 2018-09-15 RX ADMIN — METHADONE HYDROCHLORIDE 10 MG: 5 TABLET ORAL at 08:09

## 2018-09-15 RX ADMIN — SENNOSIDES AND DOCUSATE SODIUM 2 TABLET: 8.6; 5 TABLET ORAL at 08:09

## 2018-09-15 RX ADMIN — INSULIN ASPART 15 UNITS: 100 INJECTION, SOLUTION INTRAVENOUS; SUBCUTANEOUS at 12:09

## 2018-09-15 RX ADMIN — HYDRALAZINE HYDROCHLORIDE 100 MG: 50 TABLET ORAL at 09:09

## 2018-09-15 RX ADMIN — ASPIRIN 81 MG: 81 TABLET, COATED ORAL at 08:09

## 2018-09-15 RX ADMIN — HYDROCODONE BITARTRATE AND ACETAMINOPHEN 1 TABLET: 7.5; 325 TABLET ORAL at 05:09

## 2018-09-15 NOTE — PLAN OF CARE
Problem: Patient Care Overview  Goal: Plan of Care Review  Outcome: Ongoing (interventions implemented as appropriate)  CBG monitored, scheduled Levemir administered, Pt refused SSI. Pt remained free from falls/trauma/injury. VSS. Denies any CP, SOB, palpitations, and dizziness. Pt c/o pain that is moderately manage by PRN pain medication. Turning/repositioning independently in bed. Plan of care reviewed with patient and all questions answered, verbalizes understanding. No acute distress noted. Will continue to monitor.

## 2018-09-15 NOTE — SUBJECTIVE & OBJECTIVE
Interval History: NAEON. Shortness of breath improving today. Patient off oxygen most of night and this morning. Denies chest pain    Review of Systems   Constitutional: Negative for chills, fatigue and fever.   HENT: Negative.    Eyes: Positive for visual disturbance.   Respiratory: Positive for shortness of breath. Negative for apnea, cough, choking, chest tightness, wheezing and stridor.    Cardiovascular: Negative for chest pain, palpitations and leg swelling.   Gastrointestinal: Negative for abdominal pain, diarrhea, nausea and vomiting.   Endocrine: Negative.    Genitourinary: Negative for dysuria and flank pain.   Musculoskeletal: Negative.    Skin: Negative.    Allergic/Immunologic: Negative.    Neurological: Negative.    Hematological: Negative.    Psychiatric/Behavioral: Negative.      Objective:     Vital Signs (Most Recent):  Temp: 97.4 °F (36.3 °C) (09/15/18 0730)  Pulse: 71 (09/15/18 0730)  Resp: 16 (09/15/18 0730)  BP: (!) 141/65 (09/15/18 0730)  SpO2: (!) 91 % (09/15/18 0730) Vital Signs (24h Range):  Temp:  [96.5 °F (35.8 °C)-98.5 °F (36.9 °C)] 97.4 °F (36.3 °C)  Pulse:  [67-93] 71  Resp:  [16-18] 16  SpO2:  [91 %-93 %] 91 %  BP: (120-157)/(52-72) 141/65     Weight: 102.9 kg (226 lb 14.4 oz)  Body mass index is 31.65 kg/m².    Intake/Output Summary (Last 24 hours) at 9/15/2018 1044  Last data filed at 9/15/2018 0600  Gross per 24 hour   Intake 1200 ml   Output 3726 ml   Net -2526 ml      Physical Exam   Constitutional: He is oriented to person, place, and time. He appears well-developed and well-nourished. No distress.   HENT:   Head: Normocephalic and atraumatic.   Eyes: EOM are normal.   Neck: Normal range of motion. Neck supple.   Cardiovascular: Normal rate, regular rhythm, normal heart sounds and intact distal pulses. Exam reveals no gallop and no friction rub.   No murmur heard.  Pulmonary/Chest: Effort normal and breath sounds normal. No stridor. No respiratory distress. He has no wheezes. He  has no rales.   Abdominal: Soft. He exhibits no distension.   Musculoskeletal: Normal range of motion.   Neurological: He is alert and oriented to person, place, and time.   Skin: Skin is warm and dry. He is not diaphoretic.   Psychiatric: He has a normal mood and affect. His behavior is normal. Judgment and thought content normal.   Nursing note and vitals reviewed.      Significant Labs:   Recent Lab Results       09/15/18  0737 09/15/18  0500 09/14/18  2201 09/14/18  1700      Immature Granulocytes  0.7       Immature Grans (Abs)  0.04  Comment:  Mild elevation in immature granulocytes is non specific and   can be seen in a variety of conditions including stress response,   acute inflammation, trauma and pregnancy. Correlation with other   laboratory and clinical findings is essential.         Baso #  0.05       Basophil%  0.9       Differential Method  Automated       Eos #  0.5       Eosinophil%  8.6       Gran # (ANC)  2.9       Gran%  52.2       Hematocrit  24.3       Hemoglobin  7.0       Lymph #  1.6       Lymph%  28.1       Magnesium  2.3       MCH  29.5       MCHC  28.8       MCV  103       Mono #  0.5       Mono%  9.5       MPV  10.5       nRBC  0       Phosphorus  3.8       Platelets  207       POCT Glucose 124  251 342     RBC  2.37       RDW  16.6       WBC  5.55             Significant Imaging: I have reviewed all pertinent imaging results/findings within the past 24 hours.

## 2018-09-15 NOTE — PHYSICIAN QUERY
"PT Name: Mickey Ross  MR #: [id number]     Physician Query Form - Diagnosis Clarification      CDS: Fe Suazo RN, CCDS         Contact information :ext [phone])  [email or web address]       This form is a permanent document in the medical record.     Query Date: September 15, 2018    By submitting this query, we are merely seeking further clarification of documentation.  Please utilize your independent clinical judgment when addressing the question(s) below.     The medical record contains the following:      Findings Supporting Clinical Information Location in Medical Record     "Concern for type II NSTEMI  initially"           "PLAN:  - Troponin trending down  - EKG shows junctional rhythms.  - ECHO shows diastolic dysfunction with bi atrial enlargement.  - If recurs, will consult Cardiology  - Monitor for now on telemetry"    Troponin 0.172  Troponin 0.083    "Normal sinus rhythm  Normal ECG  When compared with ECG of 02-SEP-2018 16:08,  No significant change was found"    "Cardiology consulted given 2 prior episodes of hypotension and bradycardia with SOB. Troponin were elevated intially but then started to trend down. Recommended BP control and no further cardiac workup"     Progress note 9/3/18                    Lab 9/2/18  Lab 9/6/18    EKG 9/6/18            Progress note 9/12/18     Please clarify if the  type II NSTEMI  diagnosis has been:    [ ] Ruled In    [  ] Ruled Out    [x  ] Clinically undetermined    [  ] Other/Clarification of findings (please specify)_______________________________    Please document in your progress notes daily for the duration of treatment, until resolved, and include in your discharge summary.                                                                                "

## 2018-09-15 NOTE — PHYSICIAN QUERY
"PT Name: Mickey Ross  MR #: [id number]    Physician Query Form - Heart  Condition Clarification     CDS: Fe Suazo RN, CCDS         Contact information :ext [phone])  [email or web address]     This form is a permanent document in the medical record.     Query Date: September 15, 2018    By submitting this query, we are merely seeking further clarification of documentation. Please utilize your independent clinical judgment when addressing the question(s) below.    The medical record contains the following   Indicators     Supporting Clinical Findings Location in Medical Record   x BNP BNP 1228 Lab 9/14/18   x EF EF 60-65% Echo 9/2/18    Radiology findings     x Echo Results grade 2 diastolic dysfunction Echo 9/2/18    "Ascites" documented     x "SOB" or "REED" documented "SOB for 3 or 4 days" Progress note 9/10/18    "Hypoxia" documented     x Heart Failure documented "HFpEF/ fluid overload"  "likely pulmonary edema 2/2 ESRD with fluid overload and subsequent acute HFpEF seen on 9/2 echo" Progress note 9/14/18  Pulmonary consult 9/13/18   x "Edema" documented "generalized edema" Nurses note 8/25/18    Diuretics/Meds     x Treatment: Hemodialysis  MD orders     Other:      Heart failure (HF) can be acute, chronic or both. It is generally further specificed as systolic, diastolic, or combined. Lastly, it is important to identify an underlying etiology if known or suspected.   Common clues to acute exacerbation:  Rapidly progressive symptoms (w/in 2 weeks of presentation), using IV diuretics to treat, using supplemental O2, pulmonary edema on Xray, MI w/in 4 weeks, and/or BNP >500  Systolic Heart Failure: is defined as chart documentation of a left ventricular ejection fraction (LVEF) less than 40%   Diastolic Heart Failure: is defined as a left ventricular ejection fraction (LVEF) greater than 40%   +      Evidence of diastolic dysfunction on echocardiography OR    Right heart catheterization wedge " pressure above 12 mm Hg OR    Left heart catheterization left ventricular end diastolic pressure 18 mm Hg or above.  References: *American Heart Association    The clinical guidelines noted below are only system guidelines, and do not replace the providers clinical judgment.       Doctor, please specify the diagnosis associated with above clinical findings    [   ] Acute on Chronic Diastolic Heart Failure -    Pre-existing diastoic HF diagnosis.  EF > 40%  and acute HF symptoms documented        [ x  ] Acute Diastolic Heart Failure - New diagnosis.  EF > 40%  and acute HF symptoms documente             [   ] Chronic Diastolic Heart Failure - Pre-existing diastolic HF diagnosis.  EF > 40%  without  acute HF symptoms documented  [   ] Other Type of Heart Failure (please specify type): _________________________  [   ] Other (please specify): ___________________________________  [   ] Clinically Undetermined                          Please document in your progress notes daily for the duration of treatment until resolved and include in your discharge summary.

## 2018-09-15 NOTE — ASSESSMENT & PLAN NOTE
- Complaining of SOB past week   - EKG shows NSR  - Cardiology consulted given 2 prior episodes of hypotension and bradycardia with SOB. Troponin were elevated intially but then started to trend down. Recommended BP control and no further cardiac workup  - DDx likely non-cardiogenic pulmonary edema from ESRD vs prolonged hypertension causing pulmonary edema.  - CXR shows slight progression of pulmonary edema.  - CT chest consistent with pulmonary edema.   - Shortness of breath has improved and oxygen sats in low 90's on room air.

## 2018-09-15 NOTE — ASSESSMENT & PLAN NOTE
- Osteomyelitis of left 5th metatarsal taken for washout by Orthopedic surgery at [facility] on 08/15.  - X-ray left foot and ankle shows partial amputation the 1st, 2nd, 3rd, and 4th digits with fusion of the 2nd and 3rd MTP joints and throughout the midfoot, severe deformity and joint space loss the tibiotalar joint with a lapse of the talus likely 2/2 to chronic osteomyelitis, arthritis, or chronic Charcot foot.   - Patient being treated outside facility with Flagyl, linezolid, gentamicin with HD.  - Podiatry consulted, appreciate recs. Advised BKA but patient refused. Recommended CAM boot and abx per ID. D/tyler wound vac  - ESR elevated at 58 upon admission  - BCx2 from [facility] grew MRSA (last positive 8/15. Cultures from [facility] were NGTD.    PLAN:  - On Daptomycin 8mg/kg after HD per ID's recs. Planned for 6 weeks total, last dose 9/29  - ID follow up at 2 weeks.  - Podiatry consulted, appreciate recs. Recommend follow up with [facility] Podiatry at discharge.
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
## 2018-09-15 NOTE — ASSESSMENT & PLAN NOTE
- Patient with diabetic nephropathy and concomitant ESRD, HD (MWF via LUE AVF)  - patiromer 8.4 gm on nondialysis days  - renal and diabetic diet   - Will dialyze on Monday before discharge and patient will resume T,TH,S dialysis schedule

## 2018-09-15 NOTE — PLAN OF CARE
Problem: Patient Care Overview  Goal: Plan of Care Review  Outcome: Ongoing (interventions implemented as appropriate)  Plan of care reviewed with pt. Pt possible discharge on Monday. His insurance has approved his stay over the weekend. Dialysis is scheduled M,W,F. Pain medication has given him mild relief. Pt on room air with oxygen saturation 91%. No further questions at this time. Will continue to monitor.

## 2018-09-16 LAB
ANION GAP SERPL CALC-SCNC: 12 MMOL/L
BASOPHILS # BLD AUTO: 0.03 K/UL
BASOPHILS NFR BLD: 0.6 %
BUN SERPL-MCNC: 54 MG/DL
CALCIUM SERPL-MCNC: 8.6 MG/DL
CHLORIDE SERPL-SCNC: 108 MMOL/L
CO2 SERPL-SCNC: 23 MMOL/L
CREAT SERPL-MCNC: 7.5 MG/DL
DIFFERENTIAL METHOD: ABNORMAL
EOSINOPHIL # BLD AUTO: 0.4 K/UL
EOSINOPHIL NFR BLD: 7.2 %
ERYTHROCYTE [DISTWIDTH] IN BLOOD BY AUTOMATED COUNT: 16.7 %
EST. GFR  (AFRICAN AMERICAN): 8.7 ML/MIN/1.73 M^2
EST. GFR  (NON AFRICAN AMERICAN): 7.5 ML/MIN/1.73 M^2
GLUCOSE SERPL-MCNC: 200 MG/DL
HCT VFR BLD AUTO: 24.8 %
HGB BLD-MCNC: 7.1 G/DL
IMM GRANULOCYTES # BLD AUTO: 0.03 K/UL
IMM GRANULOCYTES NFR BLD AUTO: 0.6 %
LYMPHOCYTES # BLD AUTO: 1.7 K/UL
LYMPHOCYTES NFR BLD: 33.8 %
MAGNESIUM SERPL-MCNC: 2.6 MG/DL
MCH RBC QN AUTO: 29.8 PG
MCHC RBC AUTO-ENTMCNC: 28.6 G/DL
MCV RBC AUTO: 104 FL
MONOCYTES # BLD AUTO: 0.5 K/UL
MONOCYTES NFR BLD: 10 %
NEUTROPHILS # BLD AUTO: 2.3 K/UL
NEUTROPHILS NFR BLD: 47.8 %
NRBC BLD-RTO: 0 /100 WBC
PHOSPHATE SERPL-MCNC: 4.1 MG/DL
PLATELET # BLD AUTO: 207 K/UL
PMV BLD AUTO: 10.4 FL
POCT GLUCOSE: 113 MG/DL (ref 70–110)
POCT GLUCOSE: 122 MG/DL (ref 70–110)
POCT GLUCOSE: 194 MG/DL (ref 70–110)
POCT GLUCOSE: 239 MG/DL (ref 70–110)
POTASSIUM SERPL-SCNC: 4.7 MMOL/L
RBC # BLD AUTO: 2.38 M/UL
SODIUM SERPL-SCNC: 143 MMOL/L
WBC # BLD AUTO: 4.88 K/UL

## 2018-09-16 PROCEDURE — 25000003 PHARM REV CODE 250: Performed by: INTERNAL MEDICINE

## 2018-09-16 PROCEDURE — 80048 BASIC METABOLIC PNL TOTAL CA: CPT

## 2018-09-16 PROCEDURE — 20600001 HC STEP DOWN PRIVATE ROOM

## 2018-09-16 PROCEDURE — 25000003 PHARM REV CODE 250: Performed by: HOSPITALIST

## 2018-09-16 PROCEDURE — 85025 COMPLETE CBC W/AUTO DIFF WBC: CPT

## 2018-09-16 PROCEDURE — 83735 ASSAY OF MAGNESIUM: CPT

## 2018-09-16 PROCEDURE — 25000003 PHARM REV CODE 250: Performed by: STUDENT IN AN ORGANIZED HEALTH CARE EDUCATION/TRAINING PROGRAM

## 2018-09-16 PROCEDURE — 84100 ASSAY OF PHOSPHORUS: CPT

## 2018-09-16 PROCEDURE — 99233 SBSQ HOSP IP/OBS HIGH 50: CPT | Mod: ,,, | Performed by: INTERNAL MEDICINE

## 2018-09-16 RX ORDER — SODIUM CHLORIDE 9 MG/ML
INJECTION, SOLUTION INTRAVENOUS
Status: DISCONTINUED | OUTPATIENT
Start: 2018-09-17 | End: 2018-09-18

## 2018-09-16 RX ORDER — SODIUM CHLORIDE 9 MG/ML
INJECTION, SOLUTION INTRAVENOUS ONCE
Status: COMPLETED | OUTPATIENT
Start: 2018-09-17 | End: 2018-09-17

## 2018-09-16 RX ADMIN — HYDROCODONE BITARTRATE AND ACETAMINOPHEN 1 TABLET: 7.5; 325 TABLET ORAL at 08:09

## 2018-09-16 RX ADMIN — PREDNISOLONE ACETATE 1 DROP: 10 SUSPENSION/ DROPS OPHTHALMIC at 01:09

## 2018-09-16 RX ADMIN — PATIROMER 8.4 G: 8.4 POWDER, FOR SUSPENSION ORAL at 11:09

## 2018-09-16 RX ADMIN — INSULIN ASPART 15 UNITS: 100 INJECTION, SOLUTION INTRAVENOUS; SUBCUTANEOUS at 11:09

## 2018-09-16 RX ADMIN — HYDRALAZINE HYDROCHLORIDE 100 MG: 50 TABLET ORAL at 05:09

## 2018-09-16 RX ADMIN — CALCIUM ACETATE 1334 MG: 667 CAPSULE ORAL at 08:09

## 2018-09-16 RX ADMIN — SENNOSIDES AND DOCUSATE SODIUM 2 TABLET: 8.6; 5 TABLET ORAL at 08:09

## 2018-09-16 RX ADMIN — AMLODIPINE BESYLATE 10 MG: 10 TABLET ORAL at 08:09

## 2018-09-16 RX ADMIN — CLONAZEPAM 0.5 MG: 0.5 TABLET ORAL at 08:09

## 2018-09-16 RX ADMIN — HYDRALAZINE HYDROCHLORIDE 100 MG: 50 TABLET ORAL at 08:09

## 2018-09-16 RX ADMIN — CLONAZEPAM 0.5 MG: 0.5 TABLET ORAL at 11:09

## 2018-09-16 RX ADMIN — INSULIN ASPART 15 UNITS: 100 INJECTION, SOLUTION INTRAVENOUS; SUBCUTANEOUS at 04:09

## 2018-09-16 RX ADMIN — METHADONE HYDROCHLORIDE 10 MG: 5 TABLET ORAL at 08:09

## 2018-09-16 RX ADMIN — INSULIN DETEMIR 20 UNITS: 100 INJECTION, SOLUTION SUBCUTANEOUS at 08:09

## 2018-09-16 RX ADMIN — PREDNISOLONE ACETATE 1 DROP: 10 SUSPENSION/ DROPS OPHTHALMIC at 08:09

## 2018-09-16 RX ADMIN — CARVEDILOL 25 MG: 25 TABLET, FILM COATED ORAL at 08:09

## 2018-09-16 RX ADMIN — PREDNISOLONE ACETATE 1 DROP: 10 SUSPENSION/ DROPS OPHTHALMIC at 04:09

## 2018-09-16 RX ADMIN — INSULIN ASPART 15 UNITS: 100 INJECTION, SOLUTION INTRAVENOUS; SUBCUTANEOUS at 07:09

## 2018-09-16 RX ADMIN — HYDROCODONE BITARTRATE AND ACETAMINOPHEN 1 TABLET: 7.5; 325 TABLET ORAL at 11:09

## 2018-09-16 RX ADMIN — HYDROCODONE BITARTRATE AND ACETAMINOPHEN 1 TABLET: 7.5; 325 TABLET ORAL at 05:09

## 2018-09-16 RX ADMIN — ATORVASTATIN CALCIUM 80 MG: 20 TABLET, FILM COATED ORAL at 08:09

## 2018-09-16 RX ADMIN — CALCIUM ACETATE 1334 MG: 667 CAPSULE ORAL at 04:09

## 2018-09-16 RX ADMIN — CLONAZEPAM 0.5 MG: 0.5 TABLET ORAL at 05:09

## 2018-09-16 RX ADMIN — SERTRALINE HYDROCHLORIDE 25 MG: 25 TABLET ORAL at 08:09

## 2018-09-16 RX ADMIN — INSULIN ASPART 2 UNITS: 100 INJECTION, SOLUTION INTRAVENOUS; SUBCUTANEOUS at 07:09

## 2018-09-16 RX ADMIN — CALCIUM ACETATE 1334 MG: 667 CAPSULE ORAL at 11:09

## 2018-09-16 RX ADMIN — ASPIRIN 81 MG: 81 TABLET, COATED ORAL at 08:09

## 2018-09-16 RX ADMIN — CETIRIZINE HYDROCHLORIDE 5 MG: 5 TABLET ORAL at 08:09

## 2018-09-16 NOTE — SUBJECTIVE & OBJECTIVE
Interval History: NAEON. Patient is doing well. Required oxygen once overnight. Oxygen sats between 87-91% on room air.     Review of Systems   Constitutional: Negative for chills, fatigue and fever.   HENT: Negative.    Eyes: Positive for visual disturbance.   Respiratory: Positive for shortness of breath. Negative for apnea, cough, choking, chest tightness, wheezing and stridor.    Cardiovascular: Negative for chest pain, palpitations and leg swelling.   Gastrointestinal: Negative for abdominal pain, diarrhea, nausea and vomiting.   Endocrine: Negative.    Genitourinary: Negative for dysuria and flank pain.   Musculoskeletal: Negative.    Skin: Negative.    Allergic/Immunologic: Negative.    Neurological: Negative.    Hematological: Negative.    Psychiatric/Behavioral: Negative.      Objective:     Vital Signs (Most Recent):  Temp: 97.8 °F (36.6 °C) (09/16/18 0715)  Pulse: 71 (09/16/18 0715)  Resp: 16 (09/16/18 0715)  BP: 139/64 (09/16/18 0715)  SpO2: (!) 90 % (09/16/18 0715) Vital Signs (24h Range):  Temp:  [96.8 °F (36 °C)-98 °F (36.7 °C)] 97.8 °F (36.6 °C)  Pulse:  [69-82] 71  Resp:  [16-18] 16  SpO2:  [87 %-91 %] 90 %  BP: (117-141)/(56-81) 139/64     Weight: 102.9 kg (226 lb 14.4 oz)  Body mass index is 31.65 kg/m².    Intake/Output Summary (Last 24 hours) at 9/16/2018 0936  Last data filed at 9/16/2018 0500  Gross per 24 hour   Intake 840 ml   Output 51 ml   Net 789 ml      Physical Exam   Constitutional: He is oriented to person, place, and time. He appears well-developed and well-nourished. No distress.   HENT:   Head: Normocephalic and atraumatic.   Eyes: EOM are normal.   Neck: Normal range of motion. Neck supple.   Cardiovascular: Normal rate, regular rhythm, normal heart sounds and intact distal pulses. Exam reveals no gallop and no friction rub.   No murmur heard.  Pulmonary/Chest: Effort normal and breath sounds normal. No stridor. No respiratory distress. He has no wheezes. He has no rales.    Abdominal: Soft. He exhibits no distension.   Musculoskeletal: Normal range of motion.   Neurological: He is alert and oriented to person, place, and time.   Skin: Skin is warm and dry. He is not diaphoretic.   Psychiatric: He has a normal mood and affect. His behavior is normal. Judgment and thought content normal.   Nursing note and vitals reviewed.      Significant Labs:   Recent Lab Results       09/16/18  0731 09/16/18  0526 09/15/18  2140 09/15/18  1725 09/15/18  1225      Immature Granulocytes  0.6        Immature Grans (Abs)  0.03  Comment:  Mild elevation in immature granulocytes is non specific and   can be seen in a variety of conditions including stress response,   acute inflammation, trauma and pregnancy. Correlation with other   laboratory and clinical findings is essential.          Baso #  0.03        Basophil%  0.6        Differential Method  Automated        Eos #  0.4        Eosinophil%  7.2        Gran # (ANC)  2.3        Gran%  47.8        Hematocrit  24.8        Hemoglobin  7.1        Lymph #  1.7        Lymph%  33.8        Magnesium  2.6        MCH  29.8        MCHC  28.6        MCV  104        Mono #  0.5        Mono%  10.0        MPV  10.4        nRBC  0        Phosphorus  4.1        Platelets  207        POCT Glucose 239  233 128 194     RBC  2.38        RDW  16.7        WBC  4.88                        Significant Imaging: I have reviewed all pertinent imaging results/findings within the past 24 hours.

## 2018-09-16 NOTE — PROGRESS NOTES
/51. Dr. Gracia notified. Further orders are to hold hydralazine. No further orders at this time. Will continue to monitor.

## 2018-09-16 NOTE — PLAN OF CARE
[facility]    HOME HEALTH ORDERS  FACE TO FACE ENCOUNTER    Patient Name: Mickey Ross  YOB: 1965    PCP: Rosalina Moncada MD   PCP Address: [address] / [place] [address]  PCP Phone Number: [phone]  PCP Fax: [phone]    Encounter Date: 09/21/2018    Admit to Home Health    Diagnoses:  Active Hospital Problems    Diagnosis  POA    *Acute hematogenous osteomyelitis of left foot [M86.072]  Yes     Priority: 1 - High     Chronic    Acute on chronic respiratory failure with hypoxia [J96.21]  Yes     Priority: 2     Type 2 diabetes mellitus with chronic kidney disease on chronic dialysis, with long-term current use of insulin [E11.22, N18.6, Z99.2, Z79.4]  Not Applicable     Priority: 2     Subretinal abscess [L02.91]  Yes     Priority: 3     ESRD on hemodialysis [N18.6, Z99.2]  Not Applicable     Chronic    Renovascular hypertension [I15.0]  Yes      Resolved Hospital Problems    Diagnosis Date Resolved POA    Hyperglycemia [R73.9] 09/05/2018 Yes     Priority: 1 - High    NSTEMI (non-ST elevated myocardial infarction) [I21.4] 09/05/2018 Unknown    Hyperkalemia [E87.5] 09/12/2018 Yes    Osteomyelitis [M86.9] 09/02/2018 Yes    Allergic drug rash - due to Vancomycin [L27.0] 09/02/2018 Yes    Methadone dependence [F11.20] 09/02/2018 Yes    Sepsis due to methicillin resistant Staphylococcus aureus (MRSA) [A41.02] 09/02/2018 Yes    Anxiety [F41.9] 09/02/2018 Yes    Debility [R53.81] 09/02/2018 Yes    Chronic back pain [M54.9, G89.29] 09/02/2018 Yes    Chronic, continuous use of opioids [F11.90] 09/02/2018 Yes       Future Appointments   Date Time Provider Department Center   9/21/2018 10:15 AM Barbara Means DPM [facility] [place]     Follow-up Information     Call Tuan Rangel - Podiatry.    Specialty:  Podiatry  Why:  Call to schedule f/u appointment one week after discharge.   Contact information:  [address]  [facility]  [address] [phone]  Additional information:  [facility] - [address] Floor, [address]           FALLON Deleon, ANP.    Specialty:  Infectious Diseases  Why:  ID appointment for 09/17/18 at 2 pm   Contact information:  [address]  [facility] [id number] [phone]             [facility]uan [facility] Carlos - Ophthalmology.    Specialty:  Ophthalmology  Why:  Call to follow-up with appointment  Contact information:  [address]  [facility] [address] [phone]  Additional information:  10th Floor    Dr. Amanda patients please go to the 1st Floor [facility] for your appointment.            [facility]    Specialties:  Pharmacist, DME Provider, IV Infusion  Contact information:  [address] [facility] [address]  [facility] [address] [phone]                     I have seen and examined this patient face to face today. My clinical findings that support the need for the home health skilled services and home bound status are the following:  Weakness/numbness causing balance and gait disturbance due to Infection and Weakness/Debility making it taxing to leave home.    Allergies:  Review of patient's allergies indicates:   Allergen Reactions    Vancomycin analogues Rash     Red Man Syndrome    Penicillins        Diet: diabetic diet: 2200 calorie and renal diet    Activities: activity as tolerated    Nursing:   SN to complete comprehensive assessment including routine vital signs. Instruct on disease process and s/s of complications to report to MD. Review/verify medication list sent home with the patient at time of discharge  and instruct patient/caregiver as needed. Frequency may be adjusted depending on start of care date.    Notify MD if SBP > 160 or < 90; DBP > 90 or < 50; HR > 120 or < 50; Temp > 101    Nurse to draw weekly cbc, cmp, crp, esr and cpk and fax results to [facility] at [phone]    CONSULTS:    Physical Therapy to evaluate and treat. Evaluate for home safety and equipment needs;  Establish/upgrade home exercise program. Perform / instruct on therapeutic exercises, gait training, transfer training, and Range of Motion.  Occupational Therapy to evaluate and treat. Evaluate home environment for safety and equipment needs. Perform/Instruct on transfers, ADL training, ROM, and therapeutic exercises.  Aide to provide assistance with personal care, ADLs, and vital signs.    MISCELLANEOUS CARE:  Home Infusion Therapy:   SN to perform Infusion Therapy/Central Line Care.  Review Central Line Care & Central Line Flush with patient.    Administer (drug and dose): sodium chloride 0.9% SolP 50 mL with DAPTOmycin 500 mg SolR 1,000 mg administered on HD days Tu/Thursday/Sat AFTER dialysis     Home dose due: 9/22/18                       Lase dose due: 9/29/18    Scrub the Hub: Prior to accessing the line, always perform a 30 second alcohol scrub  Each lumen of the central line is to be flushed at least daily with 10 mL Normal Saline and 3 mL Heparin flush (10 units/mL)  Skilled Nurse (SN) may draw blood from IV access  Blood Draw Procedure:   - Aspirate at least 5 mL of blood   - Discard   - Obtain specimen   - Change injection cap   - Flush with 20 mL Normal Saline followed by a                 3-5 mL Heparin flush (10 units/mL)  Central :   - Sterile dressing changes are done weekly and as needed.   - Use chlor-hexadine scrub to cleanse site, apply Biopatch to insertion site,       apply securement device dressing   - Injection caps are changed weekly and after EVERY lab draw.   - If sterile gauze is under dressing to control oozing,                 dressing change must be performed every 24 hours until gauze is not needed.    WOUND CARE ORDERS  yes:  Arterial Wound:  Location: LEFT METTARSAL JOINT  - Dressed with estefania, betadine, 4x4's, cast padding, and ace.  - Nursing to do dressing changes every other day.    Medications: Review discharge medications with patient and family and  provide education.      Current Discharge Medication List      START taking these medications    Details   amLODIPine (NORVASC) 10 MG tablet Take 1 tablet (10 mg total) by mouth once daily.  Qty: 30 tablet, Refills: 11      aspirin (ECOTRIN) 81 MG EC tablet Take 1 tablet (81 mg total) by mouth once daily.  Refills: 0      atorvastatin (LIPITOR) 40 MG tablet Take 1 tablet (40 mg total) by mouth once daily.  Qty: 90 tablet, Refills: 3      carvedilol (COREG) 12.5 MG tablet Take 1 tablet (12.5 mg total) by mouth 2 (two) times daily.  Qty: 60 tablet, Refills: 11      ciprofloxacin HCl (CIPRO) 500 MG tablet Take 1 tablet (500 mg total) by mouth once daily.  Qty: 10 tablet, Refills: 0      hydrALAZINE (APRESOLINE) 25 MG tablet Take 1 tablet (25 mg total) by mouth every 8 (eight) hours.  Qty: 90 tablet, Refills: 11      sodium chloride 0.9% SolP 50 mL with DAPTOmycin 500 mg SolR 1,000 mg Inject 1,000 mg into the vein every Mon, Wed, Fri. Administer 1gm of antibiotic after HD on Tu/Thu/Sat for 12 days  Qty: 50248 mg, Refills: 0         CONTINUE these medications which have CHANGED    Details   insulin glargine (BASAGLAR KWIKPEN U-100 INSULIN) 100 unit/mL (3 mL) InPn pen Inject 25 Units into the skin 2 (two) times daily.  Qty: 15 mL, Refills: 1         CONTINUE these medications which have NOT CHANGED    Details   calcium acetate (PHOSLO) 667 mg capsule Take 667 mg by mouth 3 (three) times daily with meals.      clonazePAM (KLONOPIN) 0.5 MG tablet Take 0.5 mg by mouth 2 (two) times daily as needed for Anxiety.      insulin aspart U-100 (NOVOLOG) 100 unit/mL injection Inject 13 Units into the skin 3 (three) times daily before meals.      omeprazole (PRILOSEC) 20 MG capsule Take 20 mg by mouth once daily.      sertraline (ZOLOFT) 25 MG tablet Take 25 mg by mouth once daily.     methadone (DOLOPHINE) 10 MG tablet      HYDROcodone-acetaminophen (NORCO) 7.5-325 mg per tablet         STOP taking these medications       diltiaZEM  (CARDIMELBA CURRIE) 300 MG 24 hr capsule Comments:   Reason for Stopping:                  losartan (COZAAR) 100 MG tablet Comments:   Reason for Stopping:                      I certify that this patient is confined to his home and needs intermittent skilled nursing care, physical therapy and occupational therapy.

## 2018-09-16 NOTE — PLAN OF CARE
Problem: Patient Care Overview  Goal: Plan of Care Review  Outcome: Ongoing (interventions implemented as appropriate)  Plan of care reviewed with pt. Pt potential discharge on Monday 9/17. Dialysis to continue T, TH and Saturday outpt. He is MWF dialized in patient.  Pt on room air. No further questions at this time. Will continue to monitor.
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
## 2018-09-16 NOTE — ASSESSMENT & PLAN NOTE
- Osteomyelitis of left 5th metatarsal taken for washout by Orthopedic surgery at [facility] on 08/15.  - X-ray left foot and ankle shows partial amputation the 1st, 2nd, 3rd, and 4th digits with fusion of the 2nd and 3rd MTP joints and throughout the midfoot, severe deformity and joint space loss the tibiotalar joint with a lapse of the talus likely 2/2 to chronic osteomyelitis, arthritis, or chronic Charcot foot.   - Patient being treated outside facility with Flagyl, linezolid, gentamicin with HD.  - Podiatry consulted, appreciate recs. Advised BKA but patient refused. Recommended CAM boot and abx per ID. D/tlyer wound vac  - ESR elevated at 58 upon admission  - BCx2 from [facility] grew MRSA (last positive 8/15. Cultures from [facility] were NGTD.    PLAN:  - On Daptomycin 8mg/kg after HD per ID's recs. Planned for 6 weeks total, last dose 9/29  - ID follow up at 2 weeks.  - Podiatry consulted, appreciate recs. Recommend follow up with [facility] Podiatry at discharge.

## 2018-09-17 ENCOUNTER — ANESTHESIA EVENT (OUTPATIENT)
Dept: SURGERY | Facility: HOSPITAL | Age: 53
DRG: 116 | End: 2018-09-17
Payer: MEDICAID

## 2018-09-17 ENCOUNTER — TELEPHONE (OUTPATIENT)
Dept: PODIATRY | Facility: CLINIC | Age: 53
End: 2018-09-17

## 2018-09-17 ENCOUNTER — TELEPHONE (OUTPATIENT)
Dept: OPHTHALMOLOGY | Facility: CLINIC | Age: 53
End: 2018-09-17

## 2018-09-17 DIAGNOSIS — H33.011 RETINAL DETACHMENT OF RIGHT EYE WITH SINGLE RETINAL TEAR: Primary | ICD-10-CM

## 2018-09-17 PROBLEM — H33.22 LEFT RETINAL DETACHMENT: Status: ACTIVE | Noted: 2018-09-17

## 2018-09-17 LAB
ANION GAP SERPL CALC-SCNC: 9 MMOL/L
BACTERIA SPEC ANAEROBE CULT: NORMAL
BASOPHILS # BLD AUTO: 0.04 K/UL
BASOPHILS NFR BLD: 0.7 %
BUN SERPL-MCNC: 70 MG/DL
CALCIUM SERPL-MCNC: 8.1 MG/DL
CHLORIDE SERPL-SCNC: 107 MMOL/L
CO2 SERPL-SCNC: 23 MMOL/L
CREAT SERPL-MCNC: 9.1 MG/DL
DIFFERENTIAL METHOD: ABNORMAL
EOSINOPHIL # BLD AUTO: 0.4 K/UL
EOSINOPHIL NFR BLD: 6.6 %
ERYTHROCYTE [DISTWIDTH] IN BLOOD BY AUTOMATED COUNT: 16.2 %
EST. GFR  (AFRICAN AMERICAN): 6.9 ML/MIN/1.73 M^2
EST. GFR  (NON AFRICAN AMERICAN): 5.9 ML/MIN/1.73 M^2
GLUCOSE SERPL-MCNC: 297 MG/DL
HCT VFR BLD AUTO: 24.7 %
HGB BLD-MCNC: 7.3 G/DL
IMM GRANULOCYTES # BLD AUTO: 0.05 K/UL
IMM GRANULOCYTES NFR BLD AUTO: 0.9 %
LYMPHOCYTES # BLD AUTO: 1.6 K/UL
LYMPHOCYTES NFR BLD: 28.2 %
MAGNESIUM SERPL-MCNC: 2.5 MG/DL
MCH RBC QN AUTO: 30.2 PG
MCHC RBC AUTO-ENTMCNC: 29.6 G/DL
MCV RBC AUTO: 102 FL
MONOCYTES # BLD AUTO: 0.5 K/UL
MONOCYTES NFR BLD: 9.3 %
NEUTROPHILS # BLD AUTO: 3 K/UL
NEUTROPHILS NFR BLD: 54.3 %
NRBC BLD-RTO: 0 /100 WBC
PHOSPHATE SERPL-MCNC: 4.2 MG/DL
PLATELET # BLD AUTO: 179 K/UL
PMV BLD AUTO: 10.8 FL
POCT GLUCOSE: 145 MG/DL (ref 70–110)
POCT GLUCOSE: 220 MG/DL (ref 70–110)
POCT GLUCOSE: 250 MG/DL (ref 70–110)
POCT GLUCOSE: 259 MG/DL (ref 70–110)
POTASSIUM SERPL-SCNC: 5.4 MMOL/L
RBC # BLD AUTO: 2.42 M/UL
SODIUM SERPL-SCNC: 139 MMOL/L
WBC # BLD AUTO: 5.49 K/UL

## 2018-09-17 PROCEDURE — 25000003 PHARM REV CODE 250: Performed by: GENERAL PRACTICE

## 2018-09-17 PROCEDURE — 25000003 PHARM REV CODE 250: Performed by: STUDENT IN AN ORGANIZED HEALTH CARE EDUCATION/TRAINING PROGRAM

## 2018-09-17 PROCEDURE — 83735 ASSAY OF MAGNESIUM: CPT

## 2018-09-17 PROCEDURE — 85025 COMPLETE CBC W/AUTO DIFF WBC: CPT

## 2018-09-17 PROCEDURE — 90935 HEMODIALYSIS ONE EVALUATION: CPT | Mod: ,,, | Performed by: NURSE PRACTITIONER

## 2018-09-17 PROCEDURE — 84100 ASSAY OF PHOSPHORUS: CPT

## 2018-09-17 PROCEDURE — 63600175 PHARM REV CODE 636 W HCPCS: Performed by: STUDENT IN AN ORGANIZED HEALTH CARE EDUCATION/TRAINING PROGRAM

## 2018-09-17 PROCEDURE — 90935 HEMODIALYSIS ONE EVALUATION: CPT

## 2018-09-17 PROCEDURE — 20600001 HC STEP DOWN PRIVATE ROOM

## 2018-09-17 PROCEDURE — 25000003 PHARM REV CODE 250: Performed by: HOSPITALIST

## 2018-09-17 PROCEDURE — 63600175 PHARM REV CODE 636 W HCPCS: Mod: JG | Performed by: STUDENT IN AN ORGANIZED HEALTH CARE EDUCATION/TRAINING PROGRAM

## 2018-09-17 PROCEDURE — 25000003 PHARM REV CODE 250: Performed by: INTERNAL MEDICINE

## 2018-09-17 PROCEDURE — 80048 BASIC METABOLIC PNL TOTAL CA: CPT

## 2018-09-17 PROCEDURE — 99024 POSTOP FOLLOW-UP VISIT: CPT | Mod: ,,, | Performed by: PODIATRIST

## 2018-09-17 PROCEDURE — 99233 SBSQ HOSP IP/OBS HIGH 50: CPT | Mod: ,,, | Performed by: INTERNAL MEDICINE

## 2018-09-17 PROCEDURE — 94761 N-INVAS EAR/PLS OXIMETRY MLT: CPT

## 2018-09-17 RX ORDER — CIPROFLOXACIN 500 MG/1
500 TABLET ORAL DAILY
Qty: 10 TABLET | Refills: 0 | Status: SHIPPED | OUTPATIENT
Start: 2018-09-17 | End: 2018-09-21 | Stop reason: SDUPTHER

## 2018-09-17 RX ORDER — CIPROFLOXACIN 500 MG/1
500 TABLET ORAL EVERY 24 HOURS
Status: DISCONTINUED | OUTPATIENT
Start: 2018-09-18 | End: 2018-09-21 | Stop reason: HOSPADM

## 2018-09-17 RX ADMIN — METHADONE HYDROCHLORIDE 10 MG: 5 TABLET ORAL at 09:09

## 2018-09-17 RX ADMIN — INSULIN ASPART 15 UNITS: 100 INJECTION, SOLUTION INTRAVENOUS; SUBCUTANEOUS at 07:09

## 2018-09-17 RX ADMIN — HYDRALAZINE HYDROCHLORIDE 100 MG: 50 TABLET ORAL at 05:09

## 2018-09-17 RX ADMIN — PREDNISOLONE ACETATE 1 DROP: 10 SUSPENSION/ DROPS OPHTHALMIC at 01:09

## 2018-09-17 RX ADMIN — HYDROCODONE BITARTRATE AND ACETAMINOPHEN 1 TABLET: 7.5; 325 TABLET ORAL at 05:09

## 2018-09-17 RX ADMIN — INSULIN ASPART 15 UNITS: 100 INJECTION, SOLUTION INTRAVENOUS; SUBCUTANEOUS at 05:09

## 2018-09-17 RX ADMIN — SENNOSIDES AND DOCUSATE SODIUM 2 TABLET: 8.6; 5 TABLET ORAL at 05:09

## 2018-09-17 RX ADMIN — CETIRIZINE HYDROCHLORIDE 5 MG: 5 TABLET ORAL at 05:09

## 2018-09-17 RX ADMIN — ATORVASTATIN CALCIUM 80 MG: 20 TABLET, FILM COATED ORAL at 01:09

## 2018-09-17 RX ADMIN — CARVEDILOL 25 MG: 25 TABLET, FILM COATED ORAL at 09:09

## 2018-09-17 RX ADMIN — SERTRALINE HYDROCHLORIDE 25 MG: 25 TABLET ORAL at 01:09

## 2018-09-17 RX ADMIN — CARVEDILOL 25 MG: 25 TABLET, FILM COATED ORAL at 12:09

## 2018-09-17 RX ADMIN — HYDROCODONE BITARTRATE AND ACETAMINOPHEN 1 TABLET: 7.5; 325 TABLET ORAL at 11:09

## 2018-09-17 RX ADMIN — INSULIN ASPART 15 UNITS: 100 INJECTION, SOLUTION INTRAVENOUS; SUBCUTANEOUS at 11:09

## 2018-09-17 RX ADMIN — CLONAZEPAM 0.5 MG: 0.5 TABLET ORAL at 07:09

## 2018-09-17 RX ADMIN — ASPIRIN 81 MG: 81 TABLET, COATED ORAL at 01:09

## 2018-09-17 RX ADMIN — AMLODIPINE BESYLATE 10 MG: 10 TABLET ORAL at 12:09

## 2018-09-17 RX ADMIN — HYDROCODONE BITARTRATE AND ACETAMINOPHEN 1 TABLET: 7.5; 325 TABLET ORAL at 07:09

## 2018-09-17 RX ADMIN — CALCIUM ACETATE 1334 MG: 667 CAPSULE ORAL at 05:09

## 2018-09-17 RX ADMIN — SODIUM CHLORIDE 300 ML: 0.9 INJECTION, SOLUTION INTRAVENOUS at 10:09

## 2018-09-17 RX ADMIN — METHADONE HYDROCHLORIDE 10 MG: 5 TABLET ORAL at 01:09

## 2018-09-17 RX ADMIN — INSULIN DETEMIR 20 UNITS: 100 INJECTION, SOLUTION SUBCUTANEOUS at 07:09

## 2018-09-17 RX ADMIN — CALCIUM ACETATE 1334 MG: 667 CAPSULE ORAL at 11:09

## 2018-09-17 RX ADMIN — INSULIN ASPART 2 UNITS: 100 INJECTION, SOLUTION INTRAVENOUS; SUBCUTANEOUS at 05:09

## 2018-09-17 RX ADMIN — CLONAZEPAM 0.5 MG: 0.5 TABLET ORAL at 09:09

## 2018-09-17 RX ADMIN — DAPTOMYCIN 905 MG: 500 INJECTION, POWDER, LYOPHILIZED, FOR SOLUTION INTRAVENOUS at 09:09

## 2018-09-17 RX ADMIN — INSULIN ASPART 1 UNITS: 100 INJECTION, SOLUTION INTRAVENOUS; SUBCUTANEOUS at 11:09

## 2018-09-17 RX ADMIN — INSULIN ASPART 3 UNITS: 100 INJECTION, SOLUTION INTRAVENOUS; SUBCUTANEOUS at 07:09

## 2018-09-17 RX ADMIN — HYDRALAZINE HYDROCHLORIDE 100 MG: 50 TABLET ORAL at 09:09

## 2018-09-17 RX ADMIN — INSULIN DETEMIR 20 UNITS: 100 INJECTION, SOLUTION SUBCUTANEOUS at 09:09

## 2018-09-17 NOTE — ASSESSMENT & PLAN NOTE
Mickey Ross is a 53 y.o. male with Left ankle wound, stable  -Stab incision where prior abscess was drained draining only sanguinous discharge.  Cultures from abscess growing klebsiella, discussed with medicine team, will add 10 day course of cipro to abx regimen.  -recommend long term abx per ID recs, per Id Anticipate 6 weeks of IV antibiotics.  Estimated end date 9/28/18  -Patient to follow up with [facility] podiatry within 3-5 days of discharge, will need close follow up to monitor abscess.  Patient will need home health to do dressing changes as follows:  Pull packing from wound, rinse with saline, pat dry,  Pack medial ankle wound with betadine soaked packing,  Place iodosorb on incision sites, Dress with 4x4's, cast padding, kerlix, and coban or ace bandage.  Dressing changes to be done every 2-3 days.   -Podiatry will continue to follow.    Procedure note:   Procedure: Incision and drainage  Diagnosis: Abscess    Surgeon: Dr. Giordano  Assisting  Surgeon: Dr. Romeo Moreira, DPM, PGY2  Post op diagnosis: Abscess    Findings: Upon verbal and written consent the medial left ankle was prepped with betadine in a sterile manner.  15 blade was used to gagandeep abscess, serous-purulent drainage was noted.  No probing was found.  Wound was lavaged with saline, and packed with betadine soaked gauze, and dressed in football.  Patient tolerated procedure well    Estimated blood loss: less than 5ml

## 2018-09-17 NOTE — ASSESSMENT & PLAN NOTE
- Patient with diabetic nephropathy and concomitant ESRD, HD (MWF via LUE AVF)  - patiromer 8.4 gm on nondialysis days  - renal and diabetic diet   - T,TH,S dialysis schedule outpatient.

## 2018-09-17 NOTE — PROGRESS NOTES
"Attempt to change central line drsg  Pt refused  Stated to do in morning   Also with c/o decreased vision.  States that it is because he did not received abx   Informed pt that it is scheduled for after hd and he will get today.  Informed pt that I will call md for vision change and he refused.  Stated that he did not want "strange Doctor" looking into his eye and that it is probably just tears.  Will continue to monitor  "

## 2018-09-17 NOTE — ASSESSMENT & PLAN NOTE
- Osteomyelitis of left 5th metatarsal taken for washout by Orthopedic surgery at [facility] on 08/15.  - X-ray left foot and ankle shows partial amputation the 1st, 2nd, 3rd, and 4th digits with fusion of the 2nd and 3rd MTP joints and throughout the midfoot, severe deformity and joint space loss the tibiotalar joint with a lapse of the talus likely 2/2 to chronic osteomyelitis, arthritis, or chronic Charcot foot.   - Patient being treated outside facility with Flagyl, linezolid, gentamicin with HD.  - Podiatry consulted, appreciate recs. Advised BKA but patient refused. Recommended CAM boot and abx per ID. D/tyler wound vac  - ESR elevated at 58 upon admission  - BCx2 from [facility] grew MRSA (last positive 8/15. Cultures from [facility] were NGTD.    PLAN:  - On Daptomycin 8mg/kg after HD per ID's recs. Planned for 6 weeks total, last dose 9/29  - ID follow up at 2 weeks.  - Wound culture by podiatry on 9/12 growing Klebsiella- started on 10 day course of ciprofloxacin starting 9/17.   - Podiatry consulted, appreciate recs. Recommend follow up with [facility] Podiatry at discharge.
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
## 2018-09-17 NOTE — SUBJECTIVE & OBJECTIVE
Interval Hx:  Patient Seen at bedside for dressing change.  Patient denies F/C/N/V.  Dressings clean, dry, and intact.     Scheduled Meds:   sodium chloride 0.9%   Intravenous Once    amLODIPine  10 mg Oral Daily    aspirin  81 mg Oral Daily    atorvastatin  80 mg Oral Daily    atropine 1%  1 drop Right Eye Daily    calcium acetate  1,334 mg Oral TID WM    carvedilol  25 mg Oral BID    cetirizine  5 mg Oral Daily    DAPTOmycin (CUBICIN)  IV  8 mg/kg Intravenous Q48H    epoetin umer (PROCRIT) injection  8,000 Units Intravenous Every Tues, Thurs, Sat    fluticasone  2 spray Each Nare Daily    heparin (porcine)  5,000 Units Subcutaneous Q8H    hydrALAZINE  100 mg Oral Q8H    insulin aspart U-100  15 Units Subcutaneous TIDWM    insulin detemir U-100  20 Units Subcutaneous BID    methadone  10 mg Oral BID    patiromer calcium sorbitex  8.4 g Oral Once per day on Sun Mon Wed Fri    polyethylene glycol  17 g Oral Daily    prednisoLONE acetate  1 drop Right Eye QID    senna-docusate 8.6-50 mg  2 tablet Oral BID    sertraline  25 mg Oral Daily     Continuous Infusions:    PRN Meds:sodium chloride, sodium chloride 0.9%, acetaminophen, albuterol-ipratropium, clonazePAM, dextrose 50%, dextrose 50%, glucagon (human recombinant), glucose, glucose, hydrALAZINE, HYDROcodone-acetaminophen, hydrOXYzine HCl, insulin aspart U-100, naloxone, ondansetron, ondansetron    Review of patient's allergies indicates:   Allergen Reactions    Vancomycin analogues Rash     Red Man Syndrome    Penicillins         Past Medical History:   Diagnosis Date    Allergic drug rash - due to Vancomycin 8/19/2018    Allergic drug rash - due to Vancomycin 8/19/2018    Anxiety 8/17/2018    Arthritis     Blind left eye     Charcot's joint of foot     Chronic back pain 8/17/2018    Chronic, continuous use of opioids 8/17/2018    Debility 8/17/2018    Diabetes mellitus     GERD (gastroesophageal reflux disease)     Glaucoma      Hypertension     Methadone dependence 8/18/2018    MRSA (methicillin resistant staph aureus) culture positive     Osteomyelitis     Renal disorder     Sepsis due to methicillin resistant Staphylococcus aureus (MRSA) 8/17/2018    Subretinal abscess 8/17/2018     Past Surgical History:   Procedure Laterality Date    AVF left upper arm      INSERTION OF SANZ CATHETER Right 9/1/2018    Procedure: INSERTION, CATHETER, CENTRAL VENOUS, SANZ;  Surgeon: Rafi Churchill MD;  Location: [facility] OR [address] [place] [address];  Service: General;  Laterality: Right;    INSERTION, CATHETER, CENTRAL VENOUS, SANZ Right 9/1/2018    [facility]    left ankle surgery      lumbar back surgery         Family History     Problem Relation (Age of Onset)    Pneumonia Mother        Tobacco Use    Smoking status: Never Smoker   Substance and Sexual Activity    Alcohol use: No     Frequency: Never    Drug use: No    Sexual activity: Not Currently     Review of Systems   Constitutional: Negative for chills and fever.   Gastrointestinal: Negative for nausea and vomiting.   Musculoskeletal:        Left ankle deformity.   Skin: Positive for color change and wound (Surgical incisions to the medial and lateral ankle).     Objective:     Vital Signs (Most Recent):  Temp: 98.1 °F (36.7 °C) (09/17/18 0830)  Pulse: 83 (09/17/18 0945)  Resp: 18 (09/17/18 0715)  BP: (!) 141/64 (09/17/18 0945)  SpO2: 95 % (09/17/18 0755) Vital Signs (24h Range):  Temp:  [96.6 °F (35.9 °C)-98.2 °F (36.8 °C)] 98.1 °F (36.7 °C)  Pulse:  [68-87] 83  Resp:  [16-18] 18  SpO2:  [85 %-95 %] 95 %  BP: (100-162)/(49-72) 141/64     Weight: 102.9 kg (226 lb 14.4 oz)  Body mass index is 31.65 kg/m².    Foot Exam    General  General Appearance: appears stated age and healthy   Orientation: alert and oriented to person, place, and time       Left Foot/Ankle      Inspection and Palpation  Swelling: (Diffuse edema to the left LE, non  pitting.)  Skin Exam: erythema (To the medial ankle incision); skin not intact (Surgical incisions to the medial and lateral ankle.)     Neurovascular  Dorsalis pedis: absent  Posterior tibial: absent  Saphenous nerve sensation: diminished  Tibial nerve sensation: diminished  Superficial peroneal nerve sensation: diminished  Deep peroneal nerve sensation: diminished  Sural nerve sensation: diminished    Comments  Ortho: Severe non reducible varus deformity of the left ankle.    Lateral Left ankle: Incision present with sutures in tact. No signs of acute infection. Skin edges well coapted.     Medial Left ankle: Surgical incision left open with granular wound beds to edges. Mild erythema, no drainage no purulence, no malodor, no streaking. Positive pain to palpation.     See clinic images below.      Laboratory:  A1C:   Recent Labs   Lab  08/17/18   2159   HGBA1C  8.2*     Blood Cultures:   No results for input(s): LABBLOO in the last 48 hours.  CBC:   Recent Labs   Lab  09/17/18   0426   WBC  5.49   RBC  2.42*   HGB  7.3*   HCT  24.7*   PLT  179   MCV  102*   MCH  30.2   MCHC  29.6*     CMP:   Recent Labs   Lab  09/12/18   0514   09/17/18   0426   GLU  372*   < >  297*   CALCIUM  8.2*   < >  8.1*   ALBUMIN  2.2*   --    --    NA  137   < >  139   K  5.3*   < >  5.4*   CO2  23   < >  23   CL  105   < >  107   BUN  59*   < >  70*   CREATININE  7.4*   < >  9.1*    < > = values in this interval not displayed.     CRP:   No results for input(s): CRP in the last 168 hours.  ESR:   No results for input(s): SEDRATE in the last 168 hours.  Microbiology Results (last 7 days)     Procedure Component Value Units Date/Time    Aerobic culture [[id number]]  (Susceptibility) Collected:  09/12/18 1319    Order Status:  Completed Specimen:  Wound from Foot, Left Updated:  09/14/18 1128     Aerobic Bacterial Culture --     KLEBSIELLA OXYTOCA  Moderate      Culture, Anaerobe [[id number]] Collected:  09/12/18 1319    Order Status:   Completed Specimen:  Wound from Foot, Left Updated:  09/14/18 1122     Anaerobic Culture Culture in progress        Specimen (12h ago, onward)    None          Diagnostic Results:  X-ray:  Bony destruction, impaction and probable osseous fusion involving the hindfoot and midfoot.  There appears to be medial angulation and displacement of the hindfoot with respect to the adjacent tibia and fibula.  Comparison to prior films and correlation with clinical findings will be needed.    Clinical findings  Wound on left medial ankle measures approximately  With granular base and viable margins.   Negative ptb, No purulence, erythema, edema, or malodor    Stab incision left medial ankle with sanguinous discharge.    Mild dehisence of lateral ankle incision.

## 2018-09-17 NOTE — ASSESSMENT & PLAN NOTE
- Followed by primary physician  - Continue IV antibiotics as prescribed. Possible discharge today with home antibiotics after HD treatments; Last dose due 9/29 per primary team.

## 2018-09-17 NOTE — ASSESSMENT & PLAN NOTE
- Osteomyelitis of left 5th metatarsal taken for washout by Orthopedic surgery at [facility] on 08/15.  - X-ray left foot and ankle shows partial amputation the 1st, 2nd, 3rd, and 4th digits with fusion of the 2nd and 3rd MTP joints and throughout the midfoot, severe deformity and joint space loss the tibiotalar joint with a lapse of the talus likely 2/2 to chronic osteomyelitis, arthritis, or chronic Charcot foot.   - Patient being treated outside facility with Flagyl, linezolid, gentamicin with HD.  - Podiatry consulted, appreciate recs. Advised BKA but patient refused. Recommended CAM boot and abx per ID. D/tyler wound vac  - ESR elevated at 58 upon admission  - BCx2 from [facility] grew MRSA (last positive 8/15. Cultures from [facility] were NGTD.    PLAN:  - On Daptomycin 8mg/kg after HD per ID's recs. Planned for 6 weeks total, last dose 9/29  - ID follow up at 2 weeks.  - Podiatry consulted, appreciate recs. Recommend follow up with [facility] at discharge.

## 2018-09-17 NOTE — SUBJECTIVE & OBJECTIVE
Interval History:   Patient with worsening vision overnight from 9/16-9/17. Opthalmology brought patient back into clinic later in the afternoon and found retinal detachment and plan for surgery tomorrow. Patient remains on room air. No fevers, chills, nausea, vomiting     Review of Systems   Constitutional: Negative for chills, fatigue and fever.   HENT: Negative.    Eyes: Positive for visual disturbance.   Respiratory: Negative for apnea, cough, choking, chest tightness, shortness of breath, wheezing and stridor.    Cardiovascular: Negative for chest pain, palpitations and leg swelling.   Gastrointestinal: Negative for abdominal pain, diarrhea, nausea and vomiting.   Endocrine: Negative.    Genitourinary: Negative for dysuria and flank pain.   Musculoskeletal: Negative.    Skin: Negative.    Allergic/Immunologic: Negative.    Neurological: Negative.    Hematological: Negative.    Psychiatric/Behavioral: Negative.      Objective:     Vital Signs (Most Recent):  Temp: 98 °F (36.7 °C) (09/17/18 1655)  Pulse: 77 (09/17/18 1655)  Resp: 18 (09/17/18 1655)  BP: (!) 163/87 (09/17/18 1655)  SpO2: (!) 94 % (09/17/18 1655) Vital Signs (24h Range):  Temp:  [96.6 °F (35.9 °C)-98.1 °F (36.7 °C)] 98 °F (36.7 °C)  Pulse:  [71-87] 77  Resp:  [16-20] 18  SpO2:  [85 %-95 %] 94 %  BP: (111-174)/(51-87) 163/87     Weight: 102.9 kg (226 lb 14.4 oz)  Body mass index is 31.65 kg/m².    Intake/Output Summary (Last 24 hours) at 9/17/2018 1746  Last data filed at 9/17/2018 1745  Gross per 24 hour   Intake 1730 ml   Output 3650 ml   Net -1920 ml      Physical Exam   Constitutional: He is oriented to person, place, and time. He appears well-developed and well-nourished. No distress.   HENT:   Head: Normocephalic and atraumatic.   Eyes: EOM are normal.   Blurry vision.   Neck: Normal range of motion. Neck supple.   Cardiovascular: Normal rate, regular rhythm, normal heart sounds and intact distal pulses. Exam reveals no gallop and no friction  rub.   No murmur heard.  Pulmonary/Chest: Effort normal and breath sounds normal. No stridor. No respiratory distress. He has no wheezes. He has no rales.   Abdominal: Soft. He exhibits no distension.   Musculoskeletal: Normal range of motion. He exhibits edema.   Ace bandage and kerlix dressing noted to L foot with dried drainage. Edema noted to LLE.   Partial amputation of L foot 1st, 2nd, 3rd, and 4th digits.    Neurological: He is alert and oriented to person, place, and time.   Skin: Skin is warm and dry. He is not diaphoretic.   Psychiatric: He has a normal mood and affect. His behavior is normal. Judgment and thought content normal.   Nursing note and vitals reviewed.      Significant Labs:   BMP:   Recent Labs   Lab  09/17/18   0426   GLU  297*   NA  139   K  5.4*   CL  107   CO2  23   BUN  70*   CREATININE  9.1*   CALCIUM  8.1*   MG  2.5     CBC:   Recent Labs   Lab  09/16/18   0526  09/17/18   0426   WBC  4.88  5.49   HGB  7.1*  7.3*   HCT  24.8*  24.7*   PLT  207  179

## 2018-09-17 NOTE — PLAN OF CARE
DAVID called and spoke with Stacey with [facility] and informed him that pt is expected to DC today. DAVID will keep him informed . DAVID sent message to Maira  to provide possible dc update
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
## 2018-09-17 NOTE — PROGRESS NOTES
Dialysis tx completed. 3.5 hours. 3 liters removed. Needles pulled from left arm AVF. Hemostasis achieved. Gauze and tape to sites. Tolerated tx well. Report called to Laurent VALDEZ.

## 2018-09-17 NOTE — TELEPHONE ENCOUNTER
----- Message from Romeo Moreira MD sent at 9/17/2018 10:01 AM CDT -----  The above patient will be discharged today.  Will you please make an appointment with him to be seen on or before 9/21 for foot ulcer?      Thanks!    Romeo Moreira

## 2018-09-17 NOTE — PLAN OF CARE
DAVID called Anjana with [facility] to inform her of possible dc today but no dc orders yet. DAVID informed that she will began looking for his chart.

## 2018-09-17 NOTE — ASSESSMENT & PLAN NOTE
iHD TTS    [facility]   Followed by Dr. Cruz  Duration 3 hrs with 15 minutes  Accesss on MIESHA AVF  No residual renal function    End-Stage Renal Disease on HD  - Will provide dialysis today for metabolic clearance and volume management.  - Seen and examined today during hemodialysis; tolerating treatment well without issues. Denied headaches, chest pain, abdominal pain, or muscle cramps   - Target ultrafiltration ~3L  - Dialysate adjusted to current labs   - Avoid nephrotoxic medications  - Medications to renal parameters  - Strict Input and Output and chart  - Daily standing weights    Anemia of Chronic Kidney Disease   - Hb to target 10 gm/dL; Hgb 7.3 today.   - Will continue EPO during HD TiW    Mineral Bone Disease in CKD   - Renal Function Panel Daily for electrolytes monitoring  - Receiving Calcium Acetate TID WM    HTN   - Stable BP, will continue to monitor. Goal for BP is <140 mmHg SBP and BDP <90 mmHg, maintain MAP > 65.    Nutrition   - Consider Renal Diet

## 2018-09-17 NOTE — PHYSICIAN QUERY
"PT Name: Mickey Ross  MR #: [id number]    Physician Query Form - Respiratory Condition Clarification      CDS: Fe Suazo RN, CCDS         Contact information :[phone])  [email or web address]       This form is a permanent document in the medical record.    Query Date: September 17, 2018    By submitting this query, we are merely seeking further clarification of documentation. Please utilize your independent clinical judgment when addressing the question(s) below.    The Medical record contains the following   Indicators   Supporting Clinical Findings Location in Medical Record   x   SOB, REED, Wheezing, Productive Cough, Use of Accessory Muscles, etc. "Negative for cough, chest tightness, shortness of breath and wheezing."  "SOB for 3 or 4 days"  "Complaining of SOB past week" H&P 8/18/18      Progress note 9/10/18  Progress note 9/16/18   x   Acute/Chronic Illness "admitted to [facility] 8/10 with fever and left ankle osteomyelitis 2/2 MRSA bacteremia"  "transfer to [facility] for evaluation by retinal specialist"  "Subretinal abscess" H&P 8/18/18      Opthalmology consult 8/18/18      Radiology Findings     x   Respiratory Distress or Failure "Acute on chronic respiratory failure with hypoxia" Progress note 9/16/18      Hypoxia or Hypercapnia     x   RR         ABGs         O2 sat RR 20, O2Sat 96%  RR 18, O2Sat 87% VS 8/17/18  VS 9/17/18        BiPAP/Intubation     x   Supplemental O2 O2 @ 2-3L/NC PRN VS record       Home O2, Oxygen Dependence        Treatment     x   Other "patient occasionally needed intermittent supplemental oxygen; CXR demonstrable for pulmonary congestion with likely cause as non-cardiogenic pulmonary edema due to ESRD. Following dialysis session on morning of 9/12, back returned back to . Shortness of breath improving today, 9/13, but still requires 2 L oxygen. Oxygen sats drop to low 80's on room air."  "Acute Diastolic Heart Failure"   "acute pulmonary edema"       Progress note " 9/16/18                  Physician query responses 9/15/18     Respiratory failure can be acute, chronic or both. It is generally further specificed as hypoxic, hypercapnic or both. Lastly, it is important to identify an etiology, if known or suspected.   References:: https://www.acphospitalist.org/archives/2013/10/coding; htm; http://[employer]/acute-respiratory-failure-know    The clinical guidelines noted below are only system guidelines, and do not replace the providers clinical judgment.    Provider, please specify diagnosis or diagnoses associated with above clinical findings.     [ x   ] Acute and (on) Chronic Respiratory Failure with Hypoxia - pO2 >10 mmHg below baseline OR SpO2 < 91% on usual home O2 OR O2 ? 2L/min over baseline home O2   [    ] Acute Respiratory Insufficiency - Generally describes less severe respiratory symptoms and measurements (pO2, SpO2, pH, and pCO2) NOT meeting criteria for respiratory failure    [    ] Acute Respiratory Distress - Generally describes less severe respiratory symptoms (tachypnea, in respiratory distress, increased work of breathing, unable to speak in complete sentences, labored breathing, use of accessory muscles, RR> 24, cyanosis, dyspnea, wheezing, stridor, lethargy) without sufficient measurements (pO2, SpO2, pH, and pCO2) to meet criteria for respiratory failure   [    ] Hypoxia Only  [    ] No Respiratory Failure  [    ] Other Respiratory Diagnosis (please specify): _________________________________  [    ] Clinically Undetermined    Please document in your progress notes daily for the duration of treatment until resolved and include in your discharge summary.

## 2018-09-17 NOTE — SUBJECTIVE & OBJECTIVE
Interval History: Patient evaluated today at bedside during HD treatment. Patient tolerating treatment. Denies c/o SOB, CP, abdominal pain, N/V/D, or muscle cramps. Night uneventful. UF target ~ 3L. The patient stated that there's a possibility that he will be discharged today with IV antibiotics to be administered on HD days TTS after dialysis.     Review of patient's allergies indicates:   Allergen Reactions    Vancomycin analogues Rash     Red Man Syndrome    Penicillins      Current Facility-Administered Medications   Medication Frequency    0.9%  NaCl infusion (for blood administration) Q24H PRN    0.9%  NaCl infusion PRN    0.9%  NaCl infusion Once    acetaminophen tablet 650 mg Q4H PRN    albuterol-ipratropium 2.5 mg-0.5 mg/3 mL nebulizer solution 3 mL Q4H PRN    amLODIPine tablet 10 mg Daily    aspirin EC tablet 81 mg Daily    atorvastatin tablet 80 mg Daily    atropine 1% ophthalmic solution 1 drop Daily    calcium acetate capsule 1,334 mg TID WM    carvedilol tablet 25 mg BID    cetirizine tablet 5 mg Daily    clonazePAM tablet 0.5 mg BID PRN    DAPTOmycin (CUBICIN) 905 mg in sodium chloride 0.9% IVPB Q48H    dextrose 50% injection 12.5 g PRN    dextrose 50% injection 25 g PRN    epoetin umer injection 8,000 Units Every Tues, Thurs, Sat    fluticasone 50 mcg/actuation nasal spray 100 mcg Daily    glucagon (human recombinant) injection 1 mg PRN    glucose chewable tablet 16 g PRN    glucose chewable tablet 24 g PRN    heparin (porcine) injection 5,000 Units Q8H    hydrALAZINE tablet 100 mg Q8H    hydrALAZINE tablet 50 mg Q8H PRN    HYDROcodone-acetaminophen 7.5-325 mg per tablet 1 tablet Q6H PRN    hydrOXYzine HCl tablet 25 mg TID PRN    insulin aspart U-100 pen 0-10 Units QID (AC + HS) PRN    insulin aspart U-100 pen 15 Units TIDWM    insulin detemir U-100 pen 20 Units BID    methadone tablet 10 mg BID    naloxone 0.4 mg/mL injection 0.4 mg PRN    ondansetron  disintegrating tablet 8 mg Q8H PRN    ondansetron injection 4 mg Q8H PRN    patiromer calcium sorbitex PwPk 8.4 g Once per day on Sun Mon Wed Fri    polyethylene glycol packet 17 g Daily    prednisoLONE acetate 1 % ophthalmic suspension 1 drop QID    senna-docusate 8.6-50 mg per tablet 2 tablet BID    sertraline tablet 25 mg Daily       Objective:     Vital Signs (Most Recent):  Temp: 98.1 °F (36.7 °C) (09/17/18 0830)  Pulse: 83 (09/17/18 0915)  Resp: 18 (09/17/18 0715)  BP: (!) 111/51 (09/17/18 0900)  SpO2: 95 % (09/17/18 0755)  O2 Device (Oxygen Therapy): room air (09/17/18 0830) Vital Signs (24h Range):  Temp:  [96.6 °F (35.9 °C)-98.2 °F (36.8 °C)] 98.1 °F (36.7 °C)  Pulse:  [68-87] 83  Resp:  [16-18] 18  SpO2:  [85 %-95 %] 95 %  BP: (100-162)/(49-72) 111/51     Weight: 102.9 kg (226 lb 14.4 oz) (09/11/18 0700)  Body mass index is 31.65 kg/m².  Body surface area is 2.27 meters squared.    I/O last 3 completed shifts:  In: 1360 [P.O.:1360]  Out: 100 [Urine:100]    Physical Exam   Constitutional: He is oriented to person, place, and time. He appears well-developed and well-nourished. No distress.   HENT:   Head: Normocephalic.   Neck: Normal range of motion.   Cardiovascular: Normal rate and regular rhythm. Exam reveals no friction rub.   No murmur heard.  Pulmonary/Chest: Effort normal and breath sounds normal. No stridor. No respiratory distress. He has no wheezes. He has no rales.   Abdominal: Soft. He exhibits no distension.   Musculoskeletal: Normal range of motion. He exhibits edema.   Ace bandage and kerlix dressing noted to L foot with dried drainage. Edema noted to LLE.   Partial amputation of L foot 1st, 2nd, 3rd, and 4th digits.    Neurological: He is alert and oriented to person, place, and time.   Skin: Skin is warm and dry. He is not diaphoretic.   Psychiatric: He has a normal mood and affect. His behavior is normal. Judgment and thought content normal.   Nursing note and vitals  reviewed.      Significant Labs:  CBC:   Recent Labs   Lab  09/17/18 0426   WBC  5.49   RBC  2.42*   HGB  7.3*   HCT  24.7*   PLT  179   MCV  102*   MCH  30.2   MCHC  29.6*     CMP:   Recent Labs   Lab  09/12/18   0514   09/17/18 0426   GLU  372*   < >  297*   CALCIUM  8.2*   < >  8.1*   ALBUMIN  2.2*   --    --    NA  137   < >  139   K  5.3*   < >  5.4*   CO2  23   < >  23   CL  105   < >  107   BUN  59*   < >  70*   CREATININE  7.4*   < >  9.1*    < > = values in this interval not displayed.

## 2018-09-17 NOTE — PROGRESS NOTES
[facility]  Podiatry  Progress Note    Patient Name: Mickey Ross  MRN: [id number]  Admission Date: 8/17/2018  Hospital Length of Stay: 31 days  Attending Physician: Venkat Chew MD  Primary Care Provider: Rosalina Moncada MD   Interval Hx:  Patient Seen at bedside for dressing change.  Patient denies F/C/N/V.  Dressings clean, dry, and intact.     Scheduled Meds:   sodium chloride 0.9%   Intravenous Once    amLODIPine  10 mg Oral Daily    aspirin  81 mg Oral Daily    atorvastatin  80 mg Oral Daily    atropine 1%  1 drop Right Eye Daily    calcium acetate  1,334 mg Oral TID WM    carvedilol  25 mg Oral BID    cetirizine  5 mg Oral Daily    DAPTOmycin (CUBICIN)  IV  8 mg/kg Intravenous Q48H    epoetin umer (PROCRIT) injection  8,000 Units Intravenous Every Tues, Thurs, Sat    fluticasone  2 spray Each Nare Daily    heparin (porcine)  5,000 Units Subcutaneous Q8H    hydrALAZINE  100 mg Oral Q8H    insulin aspart U-100  15 Units Subcutaneous TIDWM    insulin detemir U-100  20 Units Subcutaneous BID    methadone  10 mg Oral BID    patiromer calcium sorbitex  8.4 g Oral Once per day on Sun Mon Wed Fri    polyethylene glycol  17 g Oral Daily    prednisoLONE acetate  1 drop Right Eye QID    senna-docusate 8.6-50 mg  2 tablet Oral BID    sertraline  25 mg Oral Daily     Continuous Infusions:    PRN Meds:sodium chloride, sodium chloride 0.9%, acetaminophen, albuterol-ipratropium, clonazePAM, dextrose 50%, dextrose 50%, glucagon (human recombinant), glucose, glucose, hydrALAZINE, HYDROcodone-acetaminophen, hydrOXYzine HCl, insulin aspart U-100, naloxone, ondansetron, ondansetron    Review of patient's allergies indicates:   Allergen Reactions    Vancomycin analogues Rash     Red Man Syndrome    Penicillins         Past Medical History:   Diagnosis Date    Allergic drug rash - due to Vancomycin 8/19/2018    Allergic drug rash - due to Vancomycin 8/19/2018    Anxiety  8/17/2018    Arthritis     Blind left eye     Charcot's joint of foot     Chronic back pain 8/17/2018    Chronic, continuous use of opioids 8/17/2018    Debility 8/17/2018    Diabetes mellitus     GERD (gastroesophageal reflux disease)     Glaucoma     Hypertension     Methadone dependence 8/18/2018    MRSA (methicillin resistant staph aureus) culture positive     Osteomyelitis     Renal disorder     Sepsis due to methicillin resistant Staphylococcus aureus (MRSA) 8/17/2018    Subretinal abscess 8/17/2018     Past Surgical History:   Procedure Laterality Date    AVF left upper arm      INSERTION OF SANZ CATHETER Right 9/1/2018    Procedure: INSERTION, CATHETER, CENTRAL VENOUS, SANZ;  Surgeon: Rafi Churchill MD;  Location: [facility] OR [address] [place] [address];  Service: General;  Laterality: Right;    INSERTION, CATHETER, CENTRAL VENOUS, SANZ Right 9/1/2018    Performed by Rafi Churchill MD at [facility] OR [address] [place] [address]    left ankle surgery      lumbar back surgery         Family History     Problem Relation (Age of Onset)    Pneumonia Mother        Tobacco Use    Smoking status: Never Smoker   Substance and Sexual Activity    Alcohol use: No     Frequency: Never    Drug use: No    Sexual activity: Not Currently     Review of Systems   Constitutional: Negative for chills and fever.   Gastrointestinal: Negative for nausea and vomiting.   Musculoskeletal:        Left ankle deformity.   Skin: Positive for color change and wound (Surgical incisions to the medial and lateral ankle).     Objective:     Vital Signs (Most Recent):  Temp: 98.1 °F (36.7 °C) (09/17/18 0830)  Pulse: 83 (09/17/18 0945)  Resp: 18 (09/17/18 0715)  BP: (!) 141/64 (09/17/18 0945)  SpO2: 95 % (09/17/18 0755) Vital Signs (24h Range):  Temp:  [96.6 °F (35.9 °C)-98.2 °F (36.8 °C)] 98.1 °F (36.7 °C)  Pulse:  [68-87] 83  Resp:  [16-18] 18  SpO2:  [85 %-95 %] 95 %  BP: (100-162)/(49-72) 141/64     Weight: 102.9 kg (226 lb 14.4 oz)  Body mass  index is 31.65 kg/m².    Foot Exam    General  General Appearance: appears stated age and healthy   Orientation: alert and oriented to person, place, and time       Left Foot/Ankle      Inspection and Palpation  Swelling: (Diffuse edema to the left LE, non pitting.)  Skin Exam: erythema (To the medial ankle incision); skin not intact (Surgical incisions to the medial and lateral ankle.)     Neurovascular  Dorsalis pedis: absent  Posterior tibial: absent  Saphenous nerve sensation: diminished  Tibial nerve sensation: diminished  Superficial peroneal nerve sensation: diminished  Deep peroneal nerve sensation: diminished  Sural nerve sensation: diminished    Comments  Ortho: Severe non reducible varus deformity of the left ankle.    Lateral Left ankle: Incision present with sutures in tact. No signs of acute infection. Skin edges well coapted.     Medial Left ankle: Surgical incision left open with granular wound beds to edges. Mild erythema, no drainage no purulence, no malodor, no streaking. Positive pain to palpation.     See clinic images below.      Laboratory:  A1C:   Recent Labs   Lab  08/17/18   2159   HGBA1C  8.2*     Blood Cultures:   No results for input(s): LABBLOO in the last 48 hours.  CBC:   Recent Labs   Lab  09/17/18   0426   WBC  5.49   RBC  2.42*   HGB  7.3*   HCT  24.7*   PLT  179   MCV  102*   MCH  30.2   MCHC  29.6*     CMP:   Recent Labs   Lab  09/12/18   0514   09/17/18   0426   GLU  372*   < >  297*   CALCIUM  8.2*   < >  8.1*   ALBUMIN  2.2*   --    --    NA  137   < >  139   K  5.3*   < >  5.4*   CO2  23   < >  23   CL  105   < >  107   BUN  59*   < >  70*   CREATININE  7.4*   < >  9.1*    < > = values in this interval not displayed.     CRP:   No results for input(s): CRP in the last 168 hours.  ESR:   No results for input(s): SEDRATE in the last 168 hours.  Microbiology Results (last 7 days)     Procedure Component Value Units Date/Time    Aerobic culture [[id number]]  (Susceptibility)  Collected:  09/12/18 1319    Order Status:  Completed Specimen:  Wound from Foot, Left Updated:  09/14/18 1128     Aerobic Bacterial Culture --     KLEBSIELLA OXYTOCA  Moderate      Culture, Anaerobe [[id number]] Collected:  09/12/18 1319    Order Status:  Completed Specimen:  Wound from Foot, Left Updated:  09/14/18 1122     Anaerobic Culture Culture in progress        Specimen (12h ago, onward)    None          Diagnostic Results:  X-ray:  Bony destruction, impaction and probable osseous fusion involving the hindfoot and midfoot.  There appears to be medial angulation and displacement of the hindfoot with respect to the adjacent tibia and fibula.  Comparison to prior films and correlation with clinical findings will be needed.    Clinical findings  Wound on left medial ankle measures approximately  With granular base and viable margins.   Negative ptb, No purulence, erythema, edema, or malodor    Stab incision left medial ankle with sanguinous discharge.    Mild dehisence of lateral ankle incision.                                                      Assessment/Plan:     * Acute hematogenous osteomyelitis of left foot    Mickey Ross is a 53 y.o. male with Left ankle wound, stable  -Stab incision where prior abscess was drained draining only sanguinous discharge.  Cultures from abscess growing klebsiella, discussed with medicine team, will add 10 day course of cipro to abx regimen.  -recommend long term abx per ID recs, per Id Anticipate 6 weeks of IV antibiotics.  Estimated end date 9/28/18  -Patient to follow up with [facility] podiatry within 3-5 days of discharge, will need close follow up to monitor abscess.  Patient will need home health to do dressing changes as follows:  Pull packing from wound, rinse with saline, pat dry,  Pack medial ankle wound with betadine soaked packing,  Place iodosorb on incision sites, Dress with 4x4's, cast padding, kerlix, and coban or ace bandage.  Dressing changes to be done  every 2-3 days.   -Podiatry will continue to follow.          ESRD on hemodialysis    Per primary            Romeo Hoyt MD  Podiatry  [facility]

## 2018-09-17 NOTE — ASSESSMENT & PLAN NOTE
- Likely seeded from MRSA bacteremia (source osteomyelitis)  - Received intravitreal vancomycin and ceftazidime at admit.  - Opthmology following, daily assessments ongoing - received 3rd dose of intravitreal vancomycin on 8/24  - Per Ophthalmology, lesion appears to be consolidating but no significant improvement in vision. Will need daily assessment for atleast 1 week from date of last intravitreal injection.  - Advised follow up twice weekly.  - Now with retinal detachment and plan for surgery by opthalmology on 9/17; NPO at midnight.

## 2018-09-17 NOTE — PROGRESS NOTES
Patient arrived on unit via stretcher. Weight obtained in bed @ 105.3kg. Dialysis tx initiated via left arm AVF with 15g needles x 2 without difficulty. Lines connected and secured. Orders and machine settings verified. Tx started. VSS.

## 2018-09-18 ENCOUNTER — ANESTHESIA (OUTPATIENT)
Dept: SURGERY | Facility: HOSPITAL | Age: 53
DRG: 116 | End: 2018-09-18
Payer: MEDICAID

## 2018-09-18 LAB
ANION GAP SERPL CALC-SCNC: 7 MMOL/L
BASOPHILS # BLD AUTO: 0.05 K/UL
BASOPHILS NFR BLD: 0.8 %
BUN SERPL-MCNC: 40 MG/DL
CALCIUM SERPL-MCNC: 8.4 MG/DL
CHLORIDE SERPL-SCNC: 108 MMOL/L
CO2 SERPL-SCNC: 26 MMOL/L
CREAT SERPL-MCNC: 5.8 MG/DL
DIFFERENTIAL METHOD: ABNORMAL
EOSINOPHIL # BLD AUTO: 0.5 K/UL
EOSINOPHIL NFR BLD: 7.5 %
ERYTHROCYTE [DISTWIDTH] IN BLOOD BY AUTOMATED COUNT: 16.5 %
EST. GFR  (AFRICAN AMERICAN): 11.8 ML/MIN/1.73 M^2
EST. GFR  (NON AFRICAN AMERICAN): 10.2 ML/MIN/1.73 M^2
GLUCOSE SERPL-MCNC: 47 MG/DL
HCT VFR BLD AUTO: 26.9 %
HGB BLD-MCNC: 8 G/DL
IMM GRANULOCYTES # BLD AUTO: 0.04 K/UL
IMM GRANULOCYTES NFR BLD AUTO: 0.6 %
LYMPHOCYTES # BLD AUTO: 1.9 K/UL
LYMPHOCYTES NFR BLD: 29.1 %
MAGNESIUM SERPL-MCNC: 2.2 MG/DL
MCH RBC QN AUTO: 30 PG
MCHC RBC AUTO-ENTMCNC: 29.7 G/DL
MCV RBC AUTO: 101 FL
MONOCYTES # BLD AUTO: 0.6 K/UL
MONOCYTES NFR BLD: 9.2 %
NEUTROPHILS # BLD AUTO: 3.4 K/UL
NEUTROPHILS NFR BLD: 52.8 %
NRBC BLD-RTO: 0 /100 WBC
PHOSPHATE SERPL-MCNC: 3.2 MG/DL
PLATELET # BLD AUTO: 165 K/UL
PMV BLD AUTO: 10.7 FL
POCT GLUCOSE: 109 MG/DL (ref 70–110)
POCT GLUCOSE: 113 MG/DL (ref 70–110)
POCT GLUCOSE: 129 MG/DL (ref 70–110)
POCT GLUCOSE: 157 MG/DL (ref 70–110)
POCT GLUCOSE: 71 MG/DL (ref 70–110)
POCT GLUCOSE: 77 MG/DL (ref 70–110)
POCT GLUCOSE: 86 MG/DL (ref 70–110)
POTASSIUM SERPL-SCNC: 4.8 MMOL/L
RBC # BLD AUTO: 2.67 M/UL
SODIUM SERPL-SCNC: 141 MMOL/L
WBC # BLD AUTO: 6.42 K/UL

## 2018-09-18 PROCEDURE — 3E0C3GC INTRODUCTION OF OTHER THERAPEUTIC SUBSTANCE INTO EYE, PERCUTANEOUS APPROACH: ICD-10-PCS | Performed by: OPHTHALMOLOGY

## 2018-09-18 PROCEDURE — 27600004 OPTIME MED/SURG SUP & DEVICES INTRAOCULAR LENS: Performed by: OPHTHALMOLOGY

## 2018-09-18 PROCEDURE — 63600175 PHARM REV CODE 636 W HCPCS: Performed by: OPHTHALMOLOGY

## 2018-09-18 PROCEDURE — 37000009 HC ANESTHESIA EA ADD 15 MINS: Performed by: OPHTHALMOLOGY

## 2018-09-18 PROCEDURE — 36000706: Performed by: OPHTHALMOLOGY

## 2018-09-18 PROCEDURE — 25000003 PHARM REV CODE 250: Performed by: STUDENT IN AN ORGANIZED HEALTH CARE EDUCATION/TRAINING PROGRAM

## 2018-09-18 PROCEDURE — 25000003 PHARM REV CODE 250: Performed by: NURSE PRACTITIONER

## 2018-09-18 PROCEDURE — 36000707: Performed by: OPHTHALMOLOGY

## 2018-09-18 PROCEDURE — 80048 BASIC METABOLIC PNL TOTAL CA: CPT

## 2018-09-18 PROCEDURE — 99232 SBSQ HOSP IP/OBS MODERATE 35: CPT | Mod: ,,, | Performed by: HOSPITALIST

## 2018-09-18 PROCEDURE — 85025 COMPLETE CBC W/AUTO DIFF WBC: CPT

## 2018-09-18 PROCEDURE — D9220A PRA ANESTHESIA: Mod: CRNA,,, | Performed by: NURSE ANESTHETIST, CERTIFIED REGISTERED

## 2018-09-18 PROCEDURE — C1814 RETINAL TAMP, SILICONE OIL: HCPCS | Performed by: OPHTHALMOLOGY

## 2018-09-18 PROCEDURE — 63600175 PHARM REV CODE 636 W HCPCS: Performed by: NURSE ANESTHETIST, CERTIFIED REGISTERED

## 2018-09-18 PROCEDURE — 82962 GLUCOSE BLOOD TEST: CPT | Performed by: OPHTHALMOLOGY

## 2018-09-18 PROCEDURE — 63600175 PHARM REV CODE 636 W HCPCS: Mod: JG | Performed by: HOSPITALIST

## 2018-09-18 PROCEDURE — 71000039 HC RECOVERY, EACH ADD'L HOUR: Performed by: OPHTHALMOLOGY

## 2018-09-18 PROCEDURE — 25000003 PHARM REV CODE 250: Performed by: INTERNAL MEDICINE

## 2018-09-18 PROCEDURE — 37000008 HC ANESTHESIA 1ST 15 MINUTES: Performed by: OPHTHALMOLOGY

## 2018-09-18 PROCEDURE — 71000033 HC RECOVERY, INTIAL HOUR: Performed by: OPHTHALMOLOGY

## 2018-09-18 PROCEDURE — 25000003 PHARM REV CODE 250: Performed by: OPHTHALMOLOGY

## 2018-09-18 PROCEDURE — D9220A PRA ANESTHESIA: Mod: ANES,,, | Performed by: ANESTHESIOLOGY

## 2018-09-18 PROCEDURE — 63600175 PHARM REV CODE 636 W HCPCS

## 2018-09-18 PROCEDURE — 20600001 HC STEP DOWN PRIVATE ROOM

## 2018-09-18 PROCEDURE — 08QE3ZZ REPAIR RIGHT RETINA, PERCUTANEOUS APPROACH: ICD-10-PCS | Performed by: OPHTHALMOLOGY

## 2018-09-18 PROCEDURE — 25000003 PHARM REV CODE 250

## 2018-09-18 PROCEDURE — 83735 ASSAY OF MAGNESIUM: CPT

## 2018-09-18 PROCEDURE — 63600175 PHARM REV CODE 636 W HCPCS: Performed by: STUDENT IN AN ORGANIZED HEALTH CARE EDUCATION/TRAINING PROGRAM

## 2018-09-18 PROCEDURE — 67108 REPAIR DETACHED RETINA: CPT | Mod: RT,,, | Performed by: OPHTHALMOLOGY

## 2018-09-18 PROCEDURE — 84100 ASSAY OF PHOSPHORUS: CPT

## 2018-09-18 PROCEDURE — 25000003 PHARM REV CODE 250: Performed by: HOSPITALIST

## 2018-09-18 PROCEDURE — 08T43ZZ RESECTION OF RIGHT VITREOUS, PERCUTANEOUS APPROACH: ICD-10-PCS | Performed by: OPHTHALMOLOGY

## 2018-09-18 PROCEDURE — 27201423 OPTIME MED/SURG SUP & DEVICES STERILE SUPPLY: Performed by: OPHTHALMOLOGY

## 2018-09-18 RX ORDER — NEOMYCIN SULFATE, POLYMYXIN B SULFATE, AND DEXAMETHASONE 3.5; 10000; 1 MG/G; [USP'U]/G; MG/G
OINTMENT OPHTHALMIC
Status: DISPENSED
Start: 2018-09-18 | End: 2018-09-19

## 2018-09-18 RX ORDER — SODIUM CHLORIDE 9 MG/ML
INJECTION, SOLUTION INTRAVENOUS ONCE
Status: COMPLETED | OUTPATIENT
Start: 2018-09-19 | End: 2018-09-18

## 2018-09-18 RX ORDER — VANCOMYCIN HYDROCHLORIDE 500 MG/10ML
INJECTION, POWDER, LYOPHILIZED, FOR SOLUTION INTRAVENOUS
Status: DISPENSED
Start: 2018-09-18 | End: 2018-09-19

## 2018-09-18 RX ORDER — TETRACAINE HYDROCHLORIDE 5 MG/ML
SOLUTION OPHTHALMIC
Status: COMPLETED
Start: 2018-09-18 | End: 2018-09-18

## 2018-09-18 RX ORDER — FENTANYL CITRATE 50 UG/ML
INJECTION, SOLUTION INTRAMUSCULAR; INTRAVENOUS
Status: COMPLETED
Start: 2018-09-18 | End: 2018-09-18

## 2018-09-18 RX ORDER — FENTANYL CITRATE 50 UG/ML
INJECTION, SOLUTION INTRAMUSCULAR; INTRAVENOUS
Status: DISCONTINUED | OUTPATIENT
Start: 2018-09-18 | End: 2018-09-18

## 2018-09-18 RX ORDER — TROPICAMIDE 10 MG/ML
1 SOLUTION/ DROPS OPHTHALMIC
Status: DISCONTINUED | OUTPATIENT
Start: 2018-09-18 | End: 2018-09-18 | Stop reason: HOSPADM

## 2018-09-18 RX ORDER — MOXIFLOXACIN 5 MG/ML
1 SOLUTION/ DROPS OPHTHALMIC
Status: DISCONTINUED | OUTPATIENT
Start: 2018-09-18 | End: 2018-09-18 | Stop reason: HOSPADM

## 2018-09-18 RX ORDER — DEXAMETHASONE SODIUM PHOSPHATE 4 MG/ML
INJECTION, SOLUTION INTRA-ARTICULAR; INTRALESIONAL; INTRAMUSCULAR; INTRAVENOUS; SOFT TISSUE
Status: DISPENSED
Start: 2018-09-18 | End: 2018-09-19

## 2018-09-18 RX ORDER — PHENYLEPHRINE HYDROCHLORIDE 25 MG/ML
1 SOLUTION/ DROPS OPHTHALMIC
Status: DISCONTINUED | OUTPATIENT
Start: 2018-09-18 | End: 2018-09-18 | Stop reason: HOSPADM

## 2018-09-18 RX ORDER — HYDROCODONE BITARTRATE AND ACETAMINOPHEN 5; 325 MG/1; MG/1
TABLET ORAL
Status: DISPENSED
Start: 2018-09-18 | End: 2018-09-19

## 2018-09-18 RX ORDER — ACETAMINOPHEN 325 MG/1
650 TABLET ORAL EVERY 4 HOURS PRN
Status: DISCONTINUED | OUTPATIENT
Start: 2018-09-18 | End: 2018-09-21 | Stop reason: HOSPADM

## 2018-09-18 RX ORDER — MEPERIDINE HYDROCHLORIDE 25 MG/ML
12.5 INJECTION INTRAMUSCULAR; INTRAVENOUS; SUBCUTANEOUS EVERY 10 MIN PRN
Status: DISCONTINUED | OUTPATIENT
Start: 2018-09-18 | End: 2018-09-18 | Stop reason: HOSPADM

## 2018-09-18 RX ORDER — TETRACAINE HYDROCHLORIDE 5 MG/ML
1 SOLUTION OPHTHALMIC
Status: DISCONTINUED | OUTPATIENT
Start: 2018-09-18 | End: 2018-09-18 | Stop reason: HOSPADM

## 2018-09-18 RX ORDER — CYCLOPENTOLATE HYDROCHLORIDE 10 MG/ML
SOLUTION/ DROPS OPHTHALMIC
Status: COMPLETED
Start: 2018-09-18 | End: 2018-09-18

## 2018-09-18 RX ORDER — PREDNISOLONE ACETATE 10 MG/ML
SUSPENSION/ DROPS OPHTHALMIC
Status: COMPLETED
Start: 2018-09-18 | End: 2018-09-18

## 2018-09-18 RX ORDER — TROPICAMIDE 10 MG/ML
SOLUTION/ DROPS OPHTHALMIC
Status: COMPLETED
Start: 2018-09-18 | End: 2018-09-18

## 2018-09-18 RX ORDER — ONDANSETRON 2 MG/ML
INJECTION INTRAMUSCULAR; INTRAVENOUS
Status: DISCONTINUED | OUTPATIENT
Start: 2018-09-18 | End: 2018-09-18

## 2018-09-18 RX ORDER — CYCLOPENTOLATE HYDROCHLORIDE 10 MG/ML
1 SOLUTION/ DROPS OPHTHALMIC
Status: DISCONTINUED | OUTPATIENT
Start: 2018-09-18 | End: 2018-09-18 | Stop reason: HOSPADM

## 2018-09-18 RX ORDER — FENTANYL CITRATE 50 UG/ML
50 INJECTION, SOLUTION INTRAMUSCULAR; INTRAVENOUS EVERY 5 MIN PRN
Status: DISCONTINUED | OUTPATIENT
Start: 2018-09-18 | End: 2018-09-18 | Stop reason: HOSPADM

## 2018-09-18 RX ORDER — PROPOFOL 10 MG/ML
VIAL (ML) INTRAVENOUS
Status: DISCONTINUED | OUTPATIENT
Start: 2018-09-18 | End: 2018-09-18

## 2018-09-18 RX ORDER — SODIUM CHLORIDE 0.9 % (FLUSH) 0.9 %
3 SYRINGE (ML) INJECTION
Status: DISCONTINUED | OUTPATIENT
Start: 2018-09-18 | End: 2018-09-21 | Stop reason: HOSPADM

## 2018-09-18 RX ORDER — NEOMYCIN SULFATE, POLYMYXIN B SULFATE, AND DEXAMETHASONE 3.5; 10000; 1 MG/G; [USP'U]/G; MG/G
OINTMENT OPHTHALMIC
Status: DISCONTINUED | OUTPATIENT
Start: 2018-09-18 | End: 2018-09-18 | Stop reason: HOSPADM

## 2018-09-18 RX ORDER — DEXAMETHASONE SODIUM PHOSPHATE 4 MG/ML
INJECTION, SOLUTION INTRA-ARTICULAR; INTRALESIONAL; INTRAMUSCULAR; INTRAVENOUS; SOFT TISSUE
Status: DISCONTINUED | OUTPATIENT
Start: 2018-09-18 | End: 2018-09-18 | Stop reason: HOSPADM

## 2018-09-18 RX ORDER — PREDNISOLONE ACETATE 10 MG/ML
1 SUSPENSION/ DROPS OPHTHALMIC
Status: DISCONTINUED | OUTPATIENT
Start: 2018-09-18 | End: 2018-09-18 | Stop reason: HOSPADM

## 2018-09-18 RX ORDER — SODIUM CHLORIDE 9 MG/ML
INJECTION, SOLUTION INTRAVENOUS
Status: DISCONTINUED | OUTPATIENT
Start: 2018-09-19 | End: 2018-09-20

## 2018-09-18 RX ORDER — LIDOCAINE HCL/PF 100 MG/5ML
SYRINGE (ML) INTRAVENOUS
Status: DISCONTINUED | OUTPATIENT
Start: 2018-09-18 | End: 2018-09-18

## 2018-09-18 RX ORDER — MOXIFLOXACIN 5 MG/ML
SOLUTION/ DROPS OPHTHALMIC
Status: COMPLETED
Start: 2018-09-18 | End: 2018-09-18

## 2018-09-18 RX ORDER — PHENYLEPHRINE HYDROCHLORIDE 10 MG/ML
INJECTION INTRAVENOUS
Status: DISCONTINUED | OUTPATIENT
Start: 2018-09-18 | End: 2018-09-18

## 2018-09-18 RX ORDER — EPINEPHRINE 1 MG/ML
INJECTION, SOLUTION INTRACARDIAC; INTRAMUSCULAR; INTRAVENOUS; SUBCUTANEOUS
Status: DISCONTINUED | OUTPATIENT
Start: 2018-09-18 | End: 2018-09-18 | Stop reason: HOSPADM

## 2018-09-18 RX ORDER — PHENYLEPHRINE HYDROCHLORIDE 25 MG/ML
SOLUTION/ DROPS OPHTHALMIC
Status: COMPLETED
Start: 2018-09-18 | End: 2018-09-18

## 2018-09-18 RX ORDER — EPINEPHRINE 1 MG/ML
INJECTION, SOLUTION INTRACARDIAC; INTRAMUSCULAR; INTRAVENOUS; SUBCUTANEOUS
Status: DISPENSED
Start: 2018-09-18 | End: 2018-09-19

## 2018-09-18 RX ORDER — HYDROCODONE BITARTRATE AND ACETAMINOPHEN 5; 325 MG/1; MG/1
1 TABLET ORAL EVERY 4 HOURS PRN
Status: DISCONTINUED | OUTPATIENT
Start: 2018-09-18 | End: 2018-09-21 | Stop reason: HOSPADM

## 2018-09-18 RX ORDER — MIDAZOLAM HYDROCHLORIDE 1 MG/ML
INJECTION, SOLUTION INTRAMUSCULAR; INTRAVENOUS
Status: DISCONTINUED | OUTPATIENT
Start: 2018-09-18 | End: 2018-09-18

## 2018-09-18 RX ADMIN — CYCLOPENTOLATE HYDROCHLORIDE 1 DROP: 10 SOLUTION/ DROPS OPHTHALMIC at 05:09

## 2018-09-18 RX ADMIN — TROPICAMIDE 1 DROP: 10 SOLUTION/ DROPS OPHTHALMIC at 05:09

## 2018-09-18 RX ADMIN — PHENYLEPHRINE HYDROCHLORIDE 200 MCG: 10 INJECTION INTRAVENOUS at 08:09

## 2018-09-18 RX ADMIN — HYDRALAZINE HYDROCHLORIDE 100 MG: 50 TABLET ORAL at 05:09

## 2018-09-18 RX ADMIN — TETRACAINE HYDROCHLORIDE 1 DROP: 5 SOLUTION OPHTHALMIC at 05:09

## 2018-09-18 RX ADMIN — CLONAZEPAM 0.5 MG: 0.5 TABLET ORAL at 11:09

## 2018-09-18 RX ADMIN — PREDNISOLONE ACETATE 1 DROP: 10 SUSPENSION/ DROPS OPHTHALMIC at 05:09

## 2018-09-18 RX ADMIN — SODIUM CHLORIDE: 0.9 INJECTION, SOLUTION INTRAVENOUS at 07:09

## 2018-09-18 RX ADMIN — AMLODIPINE BESYLATE 10 MG: 10 TABLET ORAL at 09:09

## 2018-09-18 RX ADMIN — CARVEDILOL 25 MG: 25 TABLET, FILM COATED ORAL at 09:09

## 2018-09-18 RX ADMIN — MOXIFLOXACIN 1 DROP: 5 SOLUTION/ DROPS OPHTHALMIC at 05:09

## 2018-09-18 RX ADMIN — CARVEDILOL 25 MG: 25 TABLET, FILM COATED ORAL at 11:09

## 2018-09-18 RX ADMIN — INSULIN DETEMIR 20 UNITS: 100 INJECTION, SOLUTION SUBCUTANEOUS at 09:09

## 2018-09-18 RX ADMIN — MIDAZOLAM HYDROCHLORIDE 2 MG: 1 INJECTION, SOLUTION INTRAMUSCULAR; INTRAVENOUS at 07:09

## 2018-09-18 RX ADMIN — ATORVASTATIN CALCIUM 80 MG: 20 TABLET, FILM COATED ORAL at 09:09

## 2018-09-18 RX ADMIN — FENTANYL CITRATE 50 MCG: 50 INJECTION, SOLUTION INTRAMUSCULAR; INTRAVENOUS at 07:09

## 2018-09-18 RX ADMIN — PHENYLEPHRINE HYDROCHLORIDE 1 DROP: 25 SOLUTION/ DROPS OPHTHALMIC at 05:09

## 2018-09-18 RX ADMIN — HOMATROPINE HYDROBROMIDE 1 DROP: 50 SOLUTION OPHTHALMIC at 05:09

## 2018-09-18 RX ADMIN — HYDROCODONE BITARTRATE AND ACETAMINOPHEN 1 TABLET: 7.5; 325 TABLET ORAL at 11:09

## 2018-09-18 RX ADMIN — LIDOCAINE HYDROCHLORIDE 100 MG: 20 INJECTION, SOLUTION INTRAVENOUS at 07:09

## 2018-09-18 RX ADMIN — HYDROCODONE BITARTRATE AND ACETAMINOPHEN 1 TABLET: 7.5; 325 TABLET ORAL at 03:09

## 2018-09-18 RX ADMIN — ASPIRIN 81 MG: 81 TABLET, COATED ORAL at 09:09

## 2018-09-18 RX ADMIN — PROPOFOL 130 MG: 10 INJECTION, EMULSION INTRAVENOUS at 07:09

## 2018-09-18 RX ADMIN — ONDANSETRON 4 MG: 2 INJECTION INTRAMUSCULAR; INTRAVENOUS at 05:09

## 2018-09-18 RX ADMIN — FENTANYL CITRATE 50 MCG: 50 INJECTION INTRAMUSCULAR; INTRAVENOUS at 09:09

## 2018-09-18 RX ADMIN — FENTANYL CITRATE 50 MCG: 50 INJECTION, SOLUTION INTRAMUSCULAR; INTRAVENOUS at 08:09

## 2018-09-18 RX ADMIN — FENTANYL CITRATE 50 MCG: 50 INJECTION, SOLUTION INTRAMUSCULAR; INTRAVENOUS at 09:09

## 2018-09-18 RX ADMIN — ERYTHROPOIETIN 8000 UNITS: 4000 INJECTION, SOLUTION INTRAVENOUS; SUBCUTANEOUS at 11:09

## 2018-09-18 RX ADMIN — PHENYLEPHRINE HYDROCHLORIDE 100 MCG: 10 INJECTION INTRAVENOUS at 08:09

## 2018-09-18 RX ADMIN — CETIRIZINE HYDROCHLORIDE 5 MG: 5 TABLET ORAL at 09:09

## 2018-09-18 RX ADMIN — HYDRALAZINE HYDROCHLORIDE 100 MG: 50 TABLET ORAL at 11:09

## 2018-09-18 RX ADMIN — METHADONE HYDROCHLORIDE 10 MG: 5 TABLET ORAL at 11:09

## 2018-09-18 RX ADMIN — METHADONE HYDROCHLORIDE 10 MG: 5 TABLET ORAL at 09:09

## 2018-09-18 RX ADMIN — HYDROCODONE BITARTRATE AND ACETAMINOPHEN 1 TABLET: 5; 325 TABLET ORAL at 09:09

## 2018-09-18 RX ADMIN — SERTRALINE HYDROCHLORIDE 25 MG: 25 TABLET ORAL at 09:09

## 2018-09-18 RX ADMIN — HYDRALAZINE HYDROCHLORIDE 100 MG: 50 TABLET ORAL at 03:09

## 2018-09-18 RX ADMIN — HYDROCODONE BITARTRATE AND ACETAMINOPHEN 1 TABLET: 7.5; 325 TABLET ORAL at 09:09

## 2018-09-18 RX ADMIN — PHENYLEPHRINE HYDROCHLORIDE 100 MCG: 10 INJECTION INTRAVENOUS at 07:09

## 2018-09-18 RX ADMIN — ONDANSETRON 4 MG: 2 INJECTION INTRAMUSCULAR; INTRAVENOUS at 09:09

## 2018-09-18 RX ADMIN — CIPROFLOXACIN 500 MG: 500 TABLET, FILM COATED ORAL at 09:09

## 2018-09-18 NOTE — SUBJECTIVE & OBJECTIVE
Interval History: NAEON. Oxygen saturation improving. Surgery for retinal detachment today.     Review of Systems   Constitutional: Negative for chills and fever.   HENT: Negative.    Eyes: Positive for visual disturbance.   Respiratory: Positive for shortness of breath.    Cardiovascular: Negative for chest pain, palpitations and leg swelling.   Gastrointestinal: Negative for abdominal pain, diarrhea, nausea and vomiting.   Endocrine: Negative.    Genitourinary: Negative.    Musculoskeletal: Negative.    Skin: Positive for wound.   Allergic/Immunologic: Negative.    Neurological: Negative.    Hematological: Negative.    Psychiatric/Behavioral: Negative.      Objective:     Vital Signs (Most Recent):  Temp: 97.5 °F (36.4 °C) (09/18/18 0803)  Pulse: 76 (09/18/18 0803)  Resp: 20 (09/18/18 0803)  BP: (!) 149/70 (09/18/18 0803)  SpO2: 95 % (09/18/18 0803) Vital Signs (24h Range):  Temp:  [97.5 °F (36.4 °C)-98.1 °F (36.7 °C)] 97.5 °F (36.4 °C)  Pulse:  [73-90] 76  Resp:  [18-20] 20  SpO2:  [92 %-95 %] 95 %  BP: (111-174)/(51-87) 149/70     Weight: 102.9 kg (226 lb 14.4 oz)  Body mass index is 31.65 kg/m².    Intake/Output Summary (Last 24 hours) at 9/18/2018 0838  Last data filed at 9/18/2018 0600  Gross per 24 hour   Intake 1430 ml   Output 3675 ml   Net -2245 ml      Physical Exam   Constitutional: He is oriented to person, place, and time. He appears well-developed and well-nourished. No distress.   HENT:   Head: Normocephalic and atraumatic.   Eyes: EOM are normal.   Neck: Normal range of motion. Neck supple.   Cardiovascular: Normal rate, regular rhythm, normal heart sounds and intact distal pulses. Exam reveals no gallop and no friction rub.   No murmur heard.  Pulmonary/Chest: Effort normal and breath sounds normal. No stridor. No respiratory distress. He has no wheezes. He has no rales.   Abdominal: Soft. He exhibits no distension.   Musculoskeletal: Normal range of motion.   Neurological: He is alert and  oriented to person, place, and time.   Skin: Skin is warm and dry. He is not diaphoretic.   Psychiatric: He has a normal mood and affect. His behavior is normal. Judgment and thought content normal.   Nursing note and vitals reviewed.      Significant Labs:   Recent Lab Results       09/18/18  0730 09/18/18  0512 09/17/18  2132 09/17/18  1700 09/17/18  1133      Immature Granulocytes  0.6        Immature Grans (Abs)  0.04  Comment:  Mild elevation in immature granulocytes is non specific and   can be seen in a variety of conditions including stress response,   acute inflammation, trauma and pregnancy. Correlation with other   laboratory and clinical findings is essential.          Anion Gap  7        Baso #  0.05        Basophil%  0.8        BUN, Bld  40        Calcium  8.4        Chloride  108        CO2  26        Creatinine  5.8        Differential Method  Automated        eGFR if   11.8        eGFR if non   10.2  Comment:  Calculation used to obtain the estimated glomerular filtration  rate (eGFR) is the CKD-EPI equation.           Eos #  0.5        Eosinophil%  7.5        Glucose  47  Comment:  *Critical value -  Results called to and read back by:nicolasa hernández   rn          Gran # (ANC)  3.4        Gran%  52.8        Hematocrit  26.9        Hemoglobin  8.0        Lymph #  1.9        Lymph%  29.1        Magnesium  2.2        MCH  30.0        MCHC  29.7        MCV  101        Mono #  0.6        Mono%  9.2        MPV  10.7        nRBC  0        Phosphorus  3.2        Platelets  165        POCT Glucose 86  145 220 250     Potassium  4.8        RBC  2.67        RDW  16.5        Sodium  141        WBC  6.42                        Significant Imaging: I have reviewed all pertinent imaging results/findings within the past 24 hours.

## 2018-09-18 NOTE — ASSESSMENT & PLAN NOTE
- Complaining of SOB past week   - EKG shows NSR  - Cardiology consulted given 2 prior episodes of hypotension and bradycardia with SOB. Troponin were elevated intially but then started to trend down. Recommended BP control and no further cardiac workup  - DDx likely non-cardiogenic pulmonary edema from ESRD vs prolonged hypertension causing pulmonary edema.  - CXR shows slight progression of pulmonary edema.  - CT chest consistent with pulmonary edema.   - Shortness of breath has improved and oxygen sats in mid 90's on room air.

## 2018-09-18 NOTE — INTERVAL H&P NOTE
The patient has been examined and the H&P has been reviewed:    I concur with the findings and no changes have occurred since H&P was written.    Anesthesia/Surgery risks, benefits and alternative options discussed and understood by patient/family.          Active Hospital Problems    Diagnosis  POA    *Acute hematogenous osteomyelitis of left foot [M86.072]  Yes     Chronic    Left retinal detachment [H33.22]  Yes    Acute on chronic respiratory failure with hypoxia [J96.21]  Yes    ESRD on hemodialysis [N18.6, Z99.2]  Not Applicable     Chronic    Renovascular hypertension [I15.0]  Yes    Subretinal abscess [L02.91]  Yes    Type 2 diabetes mellitus with chronic kidney disease on chronic dialysis, with long-term current use of insulin [E11.22, N18.6, Z99.2, Z79.4]  Not Applicable      Resolved Hospital Problems    Diagnosis Date Resolved POA    NSTEMI (non-ST elevated myocardial infarction) [I21.4] 09/05/2018 Unknown    Hyperkalemia [E87.5] 09/12/2018 Yes    Hyperglycemia [R73.9] 09/05/2018 Yes    Osteomyelitis [M86.9] 09/02/2018 Yes    Allergic drug rash - due to Vancomycin [L27.0] 09/02/2018 Yes    Methadone dependence [F11.20] 09/02/2018 Yes    Sepsis due to methicillin resistant Staphylococcus aureus (MRSA) [A41.02] 09/02/2018 Yes    Anxiety [F41.9] 09/02/2018 Yes    Debility [R53.81] 09/02/2018 Yes    Chronic back pain [M54.9, G89.29] 09/02/2018 Yes    Chronic, continuous use of opioids [F11.90] 09/02/2018 Yes

## 2018-09-18 NOTE — PLAN OF CARE
Pt prepared for procedure.    Pt resting in room, lights off per pt request, call light within reach.    Will continue to monitor.

## 2018-09-18 NOTE — H&P
Pre-Operative History & Physical  Ophthalmology      SUBJECTIVE:     History of Present Illness:  Patient is a 53 y.o. male presents with Blurred vision, right eye [H53.8]  MRSA bacteremia [R78.81]  Osteomyelitis, unspecified site, unspecified type [M86.9]  Osteomyelitis, unspecified site, unspecified type [M86.9]  Osteomyelitis, unspecified site, unspecified type [M86.9].    MEDICATIONS:   PTA Medications   Medication Sig    calcium acetate (PHOSLO) 667 mg capsule Take 667 mg by mouth 3 (three) times daily with meals.    clonazePAM (KLONOPIN) 0.5 MG tablet Take 0.5 mg by mouth 2 (two) times daily as needed for Anxiety.    insulin aspart U-100 (NOVOLOG) 100 unit/mL injection Inject 13 Units into the skin 3 (three) times daily before meals.    omeprazole (PRILOSEC) 20 MG capsule Take 20 mg by mouth once daily.    sertraline (ZOLOFT) 25 MG tablet Take 25 mg by mouth once daily.    [DISCONTINUED] diltiaZEM (CARDIZEM CD) 300 MG 24 hr capsule Take 300 mg by mouth once daily.    [DISCONTINUED] HYDROcodone-acetaminophen (NORCO) 7.5-325 mg per tablet Take 1 tablet by mouth every 6 (six) hours as needed for Pain.    [DISCONTINUED] insulin glargine (BASAGLAR KWIKPEN U-100 INSULIN) 100 unit/mL (3 mL) InPn pen Inject 40 Units into the skin 2 (two) times daily.    [DISCONTINUED] losartan (COZAAR) 100 MG tablet Take 100 mg by mouth once daily.    [DISCONTINUED] methadone (DOLOPHINE) 10 MG tablet Take 10 mg by mouth 2 (two) times daily.        ALLERGIES:   Review of patient's allergies indicates:   Allergen Reactions    Vancomycin analogues Rash     Red Man Syndrome    Penicillins        PAST MEDICAL HISTORY:   Past Medical History:   Diagnosis Date    Allergic drug rash - due to Vancomycin 8/19/2018    Allergic drug rash - due to Vancomycin 8/19/2018    Anxiety 8/17/2018    Arthritis     Blind left eye     Charcot's joint of foot     Chronic back pain 8/17/2018    Chronic, continuous use of opioids 8/17/2018     Debility 8/17/2018    Diabetes mellitus     GERD (gastroesophageal reflux disease)     Glaucoma     Hypertension     Methadone dependence 8/18/2018    MRSA (methicillin resistant staph aureus) culture positive     Osteomyelitis     Renal disorder     Sepsis due to methicillin resistant Staphylococcus aureus (MRSA) 8/17/2018    Subretinal abscess 8/17/2018     PAST SURGICAL HISTORY:   Past Surgical History:   Procedure Laterality Date    AVF left upper arm      INSERTION OF SANZ CATHETER Right 9/1/2018    Procedure: INSERTION, CATHETER, CENTRAL VENOUS, SANZ;  Surgeon: Rafi Churchill MD;  Location: [facility] OR [address] [place] [address];  Service: General;  Laterality: Right;    INSERTION, CATHETER, CENTRAL VENOUS, SANZ Right 9/1/2018    [facility]    left ankle surgery      lumbar back surgery       PAST FAMILY HISTORY:   Family History   Problem Relation Age of Onset    Pneumonia Mother      SOCIAL HISTORY:   Social History     Tobacco Use    Smoking status: Never Smoker   Substance Use Topics    Alcohol use: No     Frequency: Never    Drug use: No        MENTAL STATUS: Alert    REVIEW OF SYSTEMS: Negative    OBJECTIVE:     Vital Signs (Most Recent)  Temp: 97.8 °F (36.6 °C) (09/17/18 2001)  Pulse: 73 (09/18/18 0500)  Resp: 18 (09/18/18 0500)  BP: (!) 152/72 (09/18/18 0500)  SpO2: (!) 92 % (09/18/18 0500)    Physical Exam:  General: NAD  HEENT: Atraumatic  Lungs: Adequate respirations, LCTAB  Heart: RRR, No murmur  Abdomen: Soft NT    ASSESSMENT/PLAN:     Patient is a 53 y.o. male with Blurred vision, right eye [H53.8]  MRSA bacteremia [R78.81]  Osteomyelitis, unspecified site, unspecified type [M86.9]  Osteomyelitis, unspecified site, unspecified type [M86.9]  Osteomyelitis, unspecified site, unspecified type [M86.9].     - Plan for surgical correction 25G PPV/FAX/EL/Silicone oil  GETA  60 minutes     - Risks/benefits/alternatives of the procedure including,  but not limited to scarring, bleeding, infection, loss or decreased vision, and/or need for possible repeat surgery discussed with the patient and family.   - Informed consent obtained prior to surgery and the patient/family voiced good understanding.    Sal Villanueva  9/18/2018  5:40 AM

## 2018-09-18 NOTE — ASSESSMENT & PLAN NOTE
- Likely seeded from MRSA bacteremia (source osteomyelitis)  - Received intravitreal vancomycin and ceftazidime at admit.  - Opthmology following, daily assessments ongoing - received 3rd dose of intravitreal vancomycin on 8/24  - Per Ophthalmology, lesion appears to be consolidating but no significant improvement in vision. Will need daily assessment for atleast 1 week from date of last intravitreal injection.  - Advised follow up twice weekly.  - Now with retinal detachment and plan for surgery by opthalmology on 9/18
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
## 2018-09-18 NOTE — PLAN OF CARE
Problem: Patient Care Overview  Goal: Plan of Care Review  Outcome: Ongoing (interventions implemented as appropriate)  Pt is ALISETH, Ox4. Calm, cooperative. Free of falls, trauma, and injuries. Pt educated on treatment plan. Pt demonstrates and verbalizes understanding. VSS. Plan for retinal surgery today, pt NPO since mn. BG monitored ACHS and stable. BP stable. Heart in NSR on tele. Wound to L foot; dressings by MDs. Abx per MAR; Cipro started today. Pain managed per MAR. Plan of care reviewed with pt.

## 2018-09-18 NOTE — NURSING TRANSFER
Nursing Transfer Note      9/18/2018     Transfer To: [facility]    Transfer via stretcher    Transfer with cardiac monitoring    Transported by transport tech    Medicines sent: n/a    Chart send with patient: Yes

## 2018-09-18 NOTE — PROGRESS NOTES
09/18/18 0737   Critical Value Communication   Notified Physician/Designee Dr Longoria   Date Result Received 09/18/18   Time Result Received 0737   Resulting Department of Critical Value lab   Critical Test #1 glucose   Critical Test #1 Result 47   Received critical glucose result from lab. Glucose checked with glucometer. Current glucose 86. Will continue to monitor. Waiting on call from Dr Longoria.

## 2018-09-19 LAB
ANION GAP SERPL CALC-SCNC: 8 MMOL/L
BASOPHILS # BLD AUTO: 0.06 K/UL
BASOPHILS NFR BLD: 0.8 %
BUN SERPL-MCNC: 55 MG/DL
CALCIUM SERPL-MCNC: 7.6 MG/DL
CHLORIDE SERPL-SCNC: 106 MMOL/L
CO2 SERPL-SCNC: 23 MMOL/L
CREAT SERPL-MCNC: 7.5 MG/DL
DIFFERENTIAL METHOD: ABNORMAL
EOSINOPHIL # BLD AUTO: 0.1 K/UL
EOSINOPHIL NFR BLD: 1.8 %
ERYTHROCYTE [DISTWIDTH] IN BLOOD BY AUTOMATED COUNT: 16.5 %
EST. GFR  (AFRICAN AMERICAN): 8.7 ML/MIN/1.73 M^2
EST. GFR  (NON AFRICAN AMERICAN): 7.5 ML/MIN/1.73 M^2
GLUCOSE SERPL-MCNC: 380 MG/DL
HCT VFR BLD AUTO: 26.5 %
HGB BLD-MCNC: 7.5 G/DL
IMM GRANULOCYTES # BLD AUTO: 0.07 K/UL
IMM GRANULOCYTES NFR BLD AUTO: 0.9 %
LYMPHOCYTES # BLD AUTO: 1.5 K/UL
LYMPHOCYTES NFR BLD: 19.1 %
MAGNESIUM SERPL-MCNC: 2.2 MG/DL
MCH RBC QN AUTO: 30 PG
MCHC RBC AUTO-ENTMCNC: 28.3 G/DL
MCV RBC AUTO: 106 FL
MONOCYTES # BLD AUTO: 0.4 K/UL
MONOCYTES NFR BLD: 5.4 %
NEUTROPHILS # BLD AUTO: 5.7 K/UL
NEUTROPHILS NFR BLD: 72 %
NRBC BLD-RTO: 0 /100 WBC
PHOSPHATE SERPL-MCNC: 4.9 MG/DL
PLATELET # BLD AUTO: 222 K/UL
PMV BLD AUTO: 10.5 FL
POCT GLUCOSE: 222 MG/DL (ref 70–110)
POCT GLUCOSE: 278 MG/DL (ref 70–110)
POCT GLUCOSE: 327 MG/DL (ref 70–110)
POCT GLUCOSE: 345 MG/DL (ref 70–110)
POCT GLUCOSE: 363 MG/DL (ref 70–110)
POTASSIUM SERPL-SCNC: 3.9 MMOL/L
POTASSIUM SERPL-SCNC: 6.3 MMOL/L
POTASSIUM SERPL-SCNC: 7.1 MMOL/L
RBC # BLD AUTO: 2.5 M/UL
SODIUM SERPL-SCNC: 137 MMOL/L
WBC # BLD AUTO: 7.89 K/UL

## 2018-09-19 PROCEDURE — 25000003 PHARM REV CODE 250: Performed by: HOSPITALIST

## 2018-09-19 PROCEDURE — 25000003 PHARM REV CODE 250: Performed by: NURSE PRACTITIONER

## 2018-09-19 PROCEDURE — 93005 ELECTROCARDIOGRAM TRACING: CPT

## 2018-09-19 PROCEDURE — 83735 ASSAY OF MAGNESIUM: CPT

## 2018-09-19 PROCEDURE — 85025 COMPLETE CBC W/AUTO DIFF WBC: CPT

## 2018-09-19 PROCEDURE — 63600175 PHARM REV CODE 636 W HCPCS: Mod: JG | Performed by: STUDENT IN AN ORGANIZED HEALTH CARE EDUCATION/TRAINING PROGRAM

## 2018-09-19 PROCEDURE — 25000003 PHARM REV CODE 250: Performed by: STUDENT IN AN ORGANIZED HEALTH CARE EDUCATION/TRAINING PROGRAM

## 2018-09-19 PROCEDURE — 90935 HEMODIALYSIS ONE EVALUATION: CPT

## 2018-09-19 PROCEDURE — 84132 ASSAY OF SERUM POTASSIUM: CPT

## 2018-09-19 PROCEDURE — 99232 SBSQ HOSP IP/OBS MODERATE 35: CPT | Mod: ,,, | Performed by: HOSPITALIST

## 2018-09-19 PROCEDURE — 90935 HEMODIALYSIS ONE EVALUATION: CPT | Mod: ,,, | Performed by: NURSE PRACTITIONER

## 2018-09-19 PROCEDURE — 25000003 PHARM REV CODE 250: Performed by: OPHTHALMOLOGY

## 2018-09-19 PROCEDURE — 96372 THER/PROPH/DIAG INJ SC/IM: CPT

## 2018-09-19 PROCEDURE — 80048 BASIC METABOLIC PNL TOTAL CA: CPT

## 2018-09-19 PROCEDURE — 93010 ELECTROCARDIOGRAM REPORT: CPT | Mod: ,,, | Performed by: INTERNAL MEDICINE

## 2018-09-19 PROCEDURE — 36415 COLL VENOUS BLD VENIPUNCTURE: CPT

## 2018-09-19 PROCEDURE — 84100 ASSAY OF PHOSPHORUS: CPT

## 2018-09-19 PROCEDURE — 20600001 HC STEP DOWN PRIVATE ROOM

## 2018-09-19 RX ORDER — HEPARIN SODIUM 5000 [USP'U]/ML
5000 INJECTION, SOLUTION INTRAVENOUS; SUBCUTANEOUS EVERY 8 HOURS
Status: DISCONTINUED | OUTPATIENT
Start: 2018-09-19 | End: 2018-09-21 | Stop reason: HOSPADM

## 2018-09-19 RX ORDER — MOXIFLOXACIN 5 MG/ML
1 SOLUTION/ DROPS OPHTHALMIC 4 TIMES DAILY
Status: DISCONTINUED | OUTPATIENT
Start: 2018-09-19 | End: 2018-09-21 | Stop reason: HOSPADM

## 2018-09-19 RX ORDER — HYDROMORPHONE HYDROCHLORIDE 1 MG/ML
1 INJECTION, SOLUTION INTRAMUSCULAR; INTRAVENOUS; SUBCUTANEOUS ONCE
Status: DISCONTINUED | OUTPATIENT
Start: 2018-09-19 | End: 2018-09-21 | Stop reason: HOSPADM

## 2018-09-19 RX ADMIN — MOXIFLOXACIN 1 DROP: 5 SOLUTION/ DROPS OPHTHALMIC at 05:09

## 2018-09-19 RX ADMIN — INSULIN ASPART 2 UNITS: 100 INJECTION, SOLUTION INTRAVENOUS; SUBCUTANEOUS at 09:09

## 2018-09-19 RX ADMIN — CALCIUM ACETATE 1334 MG: 667 CAPSULE ORAL at 05:09

## 2018-09-19 RX ADMIN — METHADONE HYDROCHLORIDE 10 MG: 5 TABLET ORAL at 09:09

## 2018-09-19 RX ADMIN — CLONAZEPAM 0.5 MG: 0.5 TABLET ORAL at 09:09

## 2018-09-19 RX ADMIN — CALCIUM ACETATE 1334 MG: 667 CAPSULE ORAL at 01:09

## 2018-09-19 RX ADMIN — PREDNISOLONE ACETATE 1 DROP: 10 SUSPENSION/ DROPS OPHTHALMIC at 09:09

## 2018-09-19 RX ADMIN — INSULIN ASPART 4 UNITS: 100 INJECTION, SOLUTION INTRAVENOUS; SUBCUTANEOUS at 09:09

## 2018-09-19 RX ADMIN — METHADONE HYDROCHLORIDE 10 MG: 5 TABLET ORAL at 01:09

## 2018-09-19 RX ADMIN — SERTRALINE HYDROCHLORIDE 25 MG: 25 TABLET ORAL at 01:09

## 2018-09-19 RX ADMIN — CALCIUM ACETATE 1334 MG: 667 CAPSULE ORAL at 09:09

## 2018-09-19 RX ADMIN — PREDNISOLONE ACETATE 1 DROP: 10 SUSPENSION/ DROPS OPHTHALMIC at 01:09

## 2018-09-19 RX ADMIN — INSULIN DETEMIR 20 UNITS: 100 INJECTION, SOLUTION SUBCUTANEOUS at 09:09

## 2018-09-19 RX ADMIN — HYDROCODONE BITARTRATE AND ACETAMINOPHEN 1 TABLET: 7.5; 325 TABLET ORAL at 05:09

## 2018-09-19 RX ADMIN — CIPROFLOXACIN 500 MG: 500 TABLET, FILM COATED ORAL at 01:09

## 2018-09-19 RX ADMIN — SODIUM CHLORIDE: 0.9 INJECTION, SOLUTION INTRAVENOUS at 08:09

## 2018-09-19 RX ADMIN — INSULIN DETEMIR 20 UNITS: 100 INJECTION, SOLUTION SUBCUTANEOUS at 02:09

## 2018-09-19 RX ADMIN — HYDROCODONE BITARTRATE AND ACETAMINOPHEN 1 TABLET: 7.5; 325 TABLET ORAL at 09:09

## 2018-09-19 RX ADMIN — HYDRALAZINE HYDROCHLORIDE 100 MG: 50 TABLET ORAL at 01:09

## 2018-09-19 RX ADMIN — HYDRALAZINE HYDROCHLORIDE 100 MG: 50 TABLET ORAL at 09:09

## 2018-09-19 RX ADMIN — INSULIN ASPART 15 UNITS: 100 INJECTION, SOLUTION INTRAVENOUS; SUBCUTANEOUS at 01:09

## 2018-09-19 RX ADMIN — INSULIN ASPART 15 UNITS: 100 INJECTION, SOLUTION INTRAVENOUS; SUBCUTANEOUS at 05:09

## 2018-09-19 RX ADMIN — ACETAMINOPHEN 650 MG: 325 TABLET ORAL at 08:09

## 2018-09-19 RX ADMIN — CARVEDILOL 25 MG: 25 TABLET, FILM COATED ORAL at 09:09

## 2018-09-19 RX ADMIN — MOXIFLOXACIN 1 DROP: 5 SOLUTION/ DROPS OPHTHALMIC at 09:09

## 2018-09-19 RX ADMIN — DAPTOMYCIN 905 MG: 500 INJECTION, POWDER, LYOPHILIZED, FOR SOLUTION INTRAVENOUS at 09:09

## 2018-09-19 RX ADMIN — HYDROCODONE BITARTRATE AND ACETAMINOPHEN 1 TABLET: 5; 325 TABLET ORAL at 12:09

## 2018-09-19 RX ADMIN — MOXIFLOXACIN 1 DROP: 5 SOLUTION/ DROPS OPHTHALMIC at 01:09

## 2018-09-19 RX ADMIN — INSULIN ASPART 5 UNITS: 100 INJECTION, SOLUTION INTRAVENOUS; SUBCUTANEOUS at 01:09

## 2018-09-19 RX ADMIN — PREDNISOLONE ACETATE 1 DROP: 10 SUSPENSION/ DROPS OPHTHALMIC at 05:09

## 2018-09-19 RX ADMIN — INSULIN ASPART 3 UNITS: 100 INJECTION, SOLUTION INTRAVENOUS; SUBCUTANEOUS at 05:09

## 2018-09-19 RX ADMIN — HYDRALAZINE HYDROCHLORIDE 100 MG: 50 TABLET ORAL at 05:09

## 2018-09-19 NOTE — PROGRESS NOTES
CC: Retinal detachment    HPI: Mickey Ross is a 53 y.o. male who is POD #1 s/p PPV with silicone oil OD. Patient reports having some vision this morning and seeing outline of shapes on television.      Past Medical History:   Diagnosis Date    Allergic drug rash - due to Vancomycin 8/19/2018    Allergic drug rash - due to Vancomycin 8/19/2018    Anxiety 8/17/2018    Arthritis     Blind left eye     Charcot's joint of foot     Chronic back pain 8/17/2018    Chronic, continuous use of opioids 8/17/2018    Debility 8/17/2018    Diabetes mellitus     GERD (gastroesophageal reflux disease)     Glaucoma     Hypertension     Methadone dependence 8/18/2018    MRSA (methicillin resistant staph aureus) culture positive     Osteomyelitis     Renal disorder     Sepsis due to methicillin resistant Staphylococcus aureus (MRSA) 8/17/2018    Subretinal abscess 8/17/2018         Family History   Problem Relation Age of Onset    Pneumonia Mother            Current Facility-Administered Medications:     0.9%  NaCl infusion (for blood administration), , Intravenous, Q24H PRN, Ralph Tomas MD    0.9%  NaCl infusion, , Intravenous, PRN, Giovana Baron NP, Last Rate: 100 mL/hr at 09/19/18 0845    acetaminophen tablet 650 mg, 650 mg, Oral, Q4H PRN, Alexander Corrales MD, 650 mg at 09/19/18 0855    albuterol-ipratropium 2.5 mg-0.5 mg/3 mL nebulizer solution 3 mL, 3 mL, Nebulization, Q4H PRN, Freddie Huston MD    amLODIPine tablet 10 mg, 10 mg, Oral, Daily, Carrol García MD, 10 mg at 09/18/18 0902    aspirin EC tablet 81 mg, 81 mg, Oral, Daily, Ralph Tomas MD, 81 mg at 09/18/18 0902    atorvastatin tablet 80 mg, 80 mg, Oral, Daily, Carrol García MD, 80 mg at 09/18/18 0902    calcium acetate capsule 1,334 mg, 1,334 mg, Oral, TID WM, Ralph Tomas MD, 1,334 mg at 09/19/18 0903    carvedilol tablet 25 mg, 25 mg, Oral, BID, Carrol García MD, 25 mg at 09/18/18 4792    cetirizine tablet 5 mg,  5 mg, Oral, Daily, Bhargav Chacon MD, 5 mg at 09/18/18 0902    ciprofloxacin HCl tablet 500 mg, 500 mg, Oral, Daily, Sarika Longoria MD, 500 mg at 09/18/18 0904    clonazePAM tablet 0.5 mg, 0.5 mg, Oral, BID PRN, Freddie Huston MD, 0.5 mg at 09/18/18 2354    DAPTOmycin (CUBICIN) 905 mg in sodium chloride 0.9% IVPB, 8 mg/kg, Intravenous, Q48H, Armando Madrid MD, Last Rate: 100 mL/hr at 09/17/18 2128, 905 mg at 09/17/18 2128    dextrose 50% injection 12.5 g, 12.5 g, Intravenous, PRN, Kyle Gracia MD    dextrose 50% injection 25 g, 25 g, Intravenous, PRN, Mukesh Foley MD    epoetin umer injection 8,000 Units, 8,000 Units, Intravenous, Every Tues, Thurs, Sat, Carrol García MD, 8,000 Units at 09/18/18 1105    fluticasone 50 mcg/actuation nasal spray 100 mcg, 2 spray, Each Nare, Daily, Carrol García MD, 100 mcg at 09/11/18 0856    glucagon (human recombinant) injection 1 mg, 1 mg, Intramuscular, PRN, Bhargav Chacon MD    glucose chewable tablet 16 g, 16 g, Oral, PRN, Kyle Gracia MD    glucose chewable tablet 24 g, 24 g, Oral, PRN, Kyle Gracia MD    heparin (porcine) injection 5,000 Units, 5,000 Units, Subcutaneous, Q8H, Sarika Longoria MD    homatropine 5 % ophthalmic solution 1 drop, 1 drop, Right Eye, Daily, Sal Villanueva MD    hydrALAZINE tablet 100 mg, 100 mg, Oral, Q8H, Armando Madrid MD, 100 mg at 09/19/18 0543    hydrALAZINE tablet 50 mg, 50 mg, Oral, Q8H PRN, Armando Madrid MD, 50 mg at 09/09/18 0845    HYDROcodone-acetaminophen 5-325 mg per tablet 1 tablet, 1 tablet, Oral, Q4H PRN, Alexander Corrales MD, 1 tablet at 09/18/18 5308    HYDROcodone-acetaminophen 7.5-325 mg per tablet 1 tablet, 1 tablet, Oral, Q6H PRN, Freddie Huston MD, 1 tablet at 09/19/18 0542    HYDROmorphone injection 1 mg, 1 mg, Intravenous, Once, Kyle Gracia MD    hydrOXYzine HCl tablet 25 mg, 25 mg, Oral, TID PRN, Osiris Canela MD, 25 mg at 08/21/18  1004    insulin aspart U-100 pen 0-10 Units, 0-10 Units, Subcutaneous, QID (AC + HS) PRN, Francisco J Jeong MD, 4 Units at 09/19/18 0934    insulin aspart U-100 pen 15 Units, 15 Units, Subcutaneous, TIDWM, Francisco J Jeong MD, Stopped at 09/18/18 0715    insulin detemir U-100 pen 20 Units, 20 Units, Subcutaneous, BID, Kyle Gracia MD, 20 Units at 09/19/18 0935    methadone tablet 10 mg, 10 mg, Oral, BID, Freddie Huston MD, 10 mg at 09/18/18 2354    moxifloxacin 0.5 % ophthalmic solution 1 drop, 1 drop, Right Eye, QID, Sal Villanueva MD    naloxone 0.4 mg/mL injection 0.4 mg, 0.4 mg, Intravenous, PRN, Freddie Huston MD    ondansetron disintegrating tablet 8 mg, 8 mg, Oral, Q8H PRN, Freddie Huston MD, 8 mg at 09/14/18 2158    ondansetron injection 4 mg, 4 mg, Intravenous, Q8H PRN, Freddie Huston MD, 4 mg at 09/18/18 1714    patiromer calcium sorbitex PwPk 8.4 g, 8.4 g, Oral, Once per day on Sun Mon Wed Fri, Bhargav Chacon MD, 8.4 g at 09/16/18 1135    polyethylene glycol packet 17 g, 17 g, Oral, Daily, Armando Madrid MD    prednisoLONE acetate 1 % ophthalmic suspension 1 drop, 1 drop, Right Eye, QID, Jose Hemphill MD, 1 drop at 09/17/18 1320    senna-docusate 8.6-50 mg per tablet 2 tablet, 2 tablet, Oral, BID, Armando Madrid MD, 2 tablet at 09/17/18 1700    sertraline tablet 25 mg, 25 mg, Oral, Daily, Freddie Huston MD, 25 mg at 09/18/18 0902    sodium chloride 0.9% flush 3 mL, 3 mL, Intravenous, PRN, Sixto Quinn Jr., MD      Review of patient's allergies indicates:   Allergen Reactions    Vancomycin analogues Rash     Red Man Syndrome    Penicillins          Social History     Tobacco Use    Smoking status: Never Smoker   Substance Use Topics    Alcohol use: No     Frequency: Never    Drug use: No         Base Eye Exam     Visual Acuity (Snellen - Linear)       Right Left    Dist sc CF     Dist ph sc NI           Tonometry (Palpation, 4:54  PM)       Right Left    Pressure 20           Pupils       APD    Right dilated pharmacologically    Left             Slit Lamp and Fundus Exam     Slit Lamp Exam       Right Left    Conjunctiva/Sclera Subconj heme and sutures     Cornea Clear     Anterior Chamber Deep and quiet     Iris dilated     Lens PCIOL     Vitreous Silicone oil           Fundus Exam       Right Left    Disc Normal     Macula flat, no fluid     Periphery Flat, good laser               Plan   A/P: Mickey Ross is a 53 y.o. 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 Antoni Bowers MD PGY-II    Case seen and discussed with Sal Villanueva DO Retina Fellow

## 2018-09-19 NOTE — PLAN OF CARE
Problem: Patient Care Overview  Goal: Plan of Care Review  Outcome: Ongoing (interventions implemented as appropriate)  Pt is ALISETH, Ox4. Calm, cooperative. Free of falls, trauma, and injuries. Wound to L foot. R eye redness s/p retinal repair. Pt educated on treatment plan, eye precautions. Pt demonstrates and verbalizes understanding. VSS. Pt to wear eye shield at night and not sleep flat on his back per Opthamology. Eye drops per MAR. Prosthesis to L eye. BG monitored ACHS. Dialysis done today. Pt on IV Dapto and PO Cipro. R chest central line CDI. Renal diet. Potassium monitored closely. Plan of care reviewed with pt.

## 2018-09-19 NOTE — PLAN OF CARE
Problem: Hemodialysis (Adult)  Intervention: Protect/Monitor Dialysis Access Site  Left upper arm a/v access with + thrill and bruit.  Good blood flow
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
## 2018-09-19 NOTE — SUBJECTIVE & OBJECTIVE
Interval History: NAEON. Patient initially refused dialysis this morning saying that it would take too much energy out of him. Patient attended HD. No further complaints.     Review of Systems   Constitutional: Negative for chills, fatigue and fever.   HENT: Negative.    Eyes: Positive for pain and visual disturbance.   Respiratory: Negative for cough, shortness of breath, wheezing and stridor.    Cardiovascular: Negative for chest pain, palpitations and leg swelling.   Gastrointestinal: Negative for abdominal pain.   Endocrine: Negative.    Genitourinary: Negative for dysuria and flank pain.   Musculoskeletal: Negative.    Allergic/Immunologic: Negative.    Neurological: Negative for dizziness and headaches.   Hematological: Negative.    Psychiatric/Behavioral: Negative.      Objective:     Vital Signs (Most Recent):  Temp: 97.6 °F (36.4 °C) (09/19/18 1642)  Pulse: 83 (09/19/18 1729)  Resp: 18 (09/19/18 1642)  BP: 135/63 (09/19/18 1729)  SpO2: (!) 94 % (09/19/18 1642) Vital Signs (24h Range):  Temp:  [97.6 °F (36.4 °C)-98.6 °F (37 °C)] 97.6 °F (36.4 °C)  Pulse:  [67-87] 83  Resp:  [10-20] 18  SpO2:  [90 %-100 %] 94 %  BP: (114-187)/(56-77) 135/63     Weight: 102.9 kg (226 lb 13.7 oz)  Body mass index is 31.64 kg/m².    Intake/Output Summary (Last 24 hours) at 9/19/2018 9801  Last data filed at 9/19/2018 1300  Gross per 24 hour   Intake 1140 ml   Output 3101 ml   Net -1961 ml      Physical Exam   Constitutional: He is oriented to person, place, and time. He appears well-developed and well-nourished. No distress.   HENT:   Head: Normocephalic and atraumatic.   Neck: Normal range of motion. Neck supple.   Cardiovascular: Normal rate, regular rhythm, normal heart sounds and intact distal pulses. Exam reveals no gallop and no friction rub.   No murmur heard.  Pulmonary/Chest: Effort normal and breath sounds normal. No stridor. No respiratory distress. He has no wheezes. He has no rales.   Abdominal: Soft. He exhibits no  distension.   Musculoskeletal: Normal range of motion.   Neurological: He is alert and oriented to person, place, and time.   Skin: Skin is warm and dry. He is not diaphoretic.   Psychiatric: He has a normal mood and affect. His behavior is normal. Judgment and thought content normal.   Nursing note and vitals reviewed.      Significant Labs:   Recent Lab Results       09/19/18  1409 09/19/18  1305 09/19/18  0858 09/19/18  0836 09/19/18  0530      Immature Granulocytes     0.9     Immature Grans (Abs)     0.07  Comment:  Mild elevation in immature granulocytes is non specific and   can be seen in a variety of conditions including stress response,   acute inflammation, trauma and pregnancy. Correlation with other   laboratory and clinical findings is essential.       Anion Gap     8     Baso #     0.06     Basophil%     0.8     BUN, Bld     55     Calcium     7.6     Chloride     106     CO2     23     Creatinine     7.5     Differential Method     Automated     eGFR if      8.7     eGFR if non      7.5  Comment:  Calculation used to obtain the estimated glomerular filtration  rate (eGFR) is the CKD-EPI equation.        Eos #     0.1     Eosinophil%     1.8     Glucose     380     Gran # (ANC)     5.7     Gran%     72.0     Hematocrit     26.5     Hemoglobin     7.5     Lymph #     1.5     Lymph%     19.1     Magnesium     2.2     MCH     30.0     MCHC     28.3     MCV     106     Mono #     0.4     Mono%     5.4     MPV     10.5     nRBC     0     Phosphorus     4.9     Platelets     222     POCT Glucose  363 327       Potassium 3.9   6.3  Comment:  *Critical value -   Results called to and read back by:ruth roth rn  *No Visible Hemolysis   7.1  Comment:  *Critical value -   Results called to and read back by:CALLED TO   LUPE ROTH RN.  *No Visible Hemolysis       RBC     2.50     RDW     16.5     Sodium     137     WBC     7.89                 09/19/18  0146 09/18/18  2140       Immature Granulocytes       Immature Grans (Abs)       Anion Gap       Baso #       Basophil%       BUN, Bld       Calcium       Chloride       CO2       Creatinine       Differential Method       eGFR if        eGFR if non        Eos #       Eosinophil%       Glucose       Gran # (ANC)       Gran%       Hematocrit       Hemoglobin       Lymph #       Lymph%       Magnesium       MCH       MCHC       MCV       Mono #       Mono%       MPV       nRBC       Phosphorus       Platelets       POCT Glucose 222 109     Potassium       RBC       RDW       Sodium       WBC             Significant Imaging: I have reviewed all pertinent imaging results/findings within the past 24 hours.

## 2018-09-19 NOTE — PROGRESS NOTES
Pt arrived via stretcher.  Placed on cardiac monitor and b/p check every 15 minutes.  Left upper arm Dialysis access prep with prevantic swab.  maintance  Hemodialysis started per orders.

## 2018-09-19 NOTE — ASSESSMENT & PLAN NOTE
- Likely seeded from MRSA bacteremia (source osteomyelitis)  - Received intravitreal vancomycin and ceftazidime at admit.  - Opthmology following, daily assessments ongoing - received 3rd dose of intravitreal vancomycin on 8/24  - Per Ophthalmology, lesion appears to be consolidating but no significant improvement in vision. Will need daily assessment for atleast 1 week from date of last intravitreal injection.  - Advised follow up twice weekly.  - Now with retinal detachment  - Surgery by opthalmology on 9/18

## 2018-09-19 NOTE — ANESTHESIA POSTPROCEDURE EVALUATION
"Anesthesia Post Evaluation    Patient: Mickey Ross    Procedure(s) Performed: Procedure(s) (LRB):  REPAIR, RETINAL DETACHMENT, WITH VITRECTOMY (Right)    Final Anesthesia Type: general  Patient location during evaluation: PACU  Patient participation: Yes- Able to Participate  Level of consciousness: awake and alert and oriented  Post-procedure vital signs: reviewed and stable  Pain management: adequate (having pain but very sleepy)  Airway patency: patent  PONV status at discharge: No PONV  Anesthetic complications: no      Cardiovascular status: stable  Respiratory status: unassisted, spontaneous ventilation and room air  Hydration status: euvolemic  Follow-up not needed.        Visit Vitals  /67   Pulse 67   Temp 37 °C (98.6 °F) (Temporal)   Resp 20   Ht 5' 11" (1.803 m)   Wt 102.9 kg (226 lb 13.7 oz)   SpO2 100%   BMI 31.64 kg/m²       Pain/Gemma Score: Pain Assessment Performed: Yes (9/18/2018  9:36 PM)  Presence of Pain: denies (9/18/2018  9:36 PM)  Pain Rating Prior to Med Admin: 10 (9/18/2018  9:56 PM)  Pain Rating Post Med Admin: 5 (9/18/2018 10:02 AM)  Gemma Score: 10 (9/18/2018  9:36 PM)        "
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
## 2018-09-19 NOTE — BRIEF OP NOTE
Pre-Op Dx: Rhegmatogenous retinal detachment, right eye    Post Op Dx: Same    Procedure Performed: Repair of retinal detachment by pars plana vitrectomy, injection of perfluorocarbon liquid, endolaser, injection of 5000cs silicone oil, right eye    Attending Surgeon: Antoni    Assistant Surgeon: Robinson Villanueva    Anesthesia: General LMA    Estimated blood loss: Minimal    Complication: None    Specimen: None    Disposition: Stable to recovery    Findings/Outcome: Retina attached    Date of Discharge: 09/18/2018    Discharge Disposition: To inpatient floor    F/U: 09/19/18 as inpatient

## 2018-09-19 NOTE — NURSING TRANSFER
Nursing Transfer Note      9/19/2018     Transfer From: dialysis    Transfer via stretcher    Transfer with cardiac monitoring    Transported by transport tech    Medicines sent: n/a    Chart send with patient: Yes    Patient reassessed at: 9/19/2018 1300  (date, time)    Upon arrival to floor: cardiac monitor applied, patient oriented to room, call bell in reach and bed in lowest position

## 2018-09-19 NOTE — TRANSFER OF CARE
"Anesthesia Transfer of Care Note    Patient: Mickey Ross    Procedure(s) Performed: Procedure(s) (LRB):  REPAIR, RETINAL DETACHMENT, WITH VITRECTOMY (Right)    Patient location: PACU    Anesthesia Type: general    Transport from OR: Transported from OR on 6-10 L/min O2 by face mask with adequate spontaneous ventilation    Post pain: adequate analgesia    Post assessment: no apparent anesthetic complications and tolerated procedure well    Post vital signs: stable    Level of consciousness: awake, alert and oriented    Nausea/Vomiting: no nausea/vomiting    Complications: none    Transfer of care protocol was followed      Last vitals:   Visit Vitals  /67 (BP Location: Right arm, Patient Position: Lying)   Pulse 67   Temp 37 °C (98.6 °F) (Temporal)   Resp 20   Ht 5' 11" (1.803 m)   Wt 102.9 kg (226 lb 13.7 oz)   SpO2 100%   BMI 31.64 kg/m²     "

## 2018-09-19 NOTE — ASSESSMENT & PLAN NOTE
iHD TTS    [facility] at [facility]   Followed by Dr. Cruz  Duration 3 hrs with 15 minutes  Accesss on MIESHA AVF  No residual renal function    - Will provide dialysis today for metabolic clearance and volume management.    - Target ultrafiltration ~2.5L  - Dialysate adjusted to current labs   - Avoid nephrotoxic medications  - Medications to renal parameters  - Strict Input and Output and chart  - Daily standing weights    Anemia of Chronic Kidney Disease   - Hb to target 10 gm/dL; Hgb 7.5 today.   - Will continue EPO during HD TiW    Mineral Bone Disease in CKD   - Renal Function Panel Daily for electrolytes monitoring  - Receiving Calcium Acetate TID WM    HTN   - Stable BP, will continue to monitor. Goal for BP is <140 mmHg SBP and BDP <90 mmHg, maintain MAP > 65.    Nutrition   - Continue Renal Diet, Low Potassium Diet

## 2018-09-19 NOTE — PLAN OF CARE
Problem: Patient Care Overview  Goal: Plan of Care Review  Outcome: Revised  Pt free of falls/trauma/injuries.  Denies c/o SOB, CP. Pt eye pain being treated with PO analgesics.   Generalized skin remains CDI; Mild edema noted. Pt tolerating plan of care.

## 2018-09-19 NOTE — OP NOTE
Pre-Op Dx: Rhegmatogenous retinal detachment, right eye  Post Op Dx: Same  Procedure Performed: Repair of retinal detachment by pars plana vitrectomy, injection of perfluorocarbon liquid, endolaser, injection of 5000cs silicone oil, right eye  Attending Surgeon: Antoni  Assistant Surgeon: Robinson Villanueva  Anesthesia: General LMA  Estimated blood loss: Minimal  Complication: None  Specimen: None  Disposition: Stable to recovery  Findings/Outcome: Retina attached  Date of Discharge: 09/18/2018  Discharge Disposition: To inpatient floor  F/U: 09/19/18 as inpatient         Informed consent was obtained and placed in the medical record. The patient was properly identified and taken to the operating room. General laryngeal mask anesthesia was begun by the anesthesia team. The right eye was prepped and draped in the standard ophthalmic fashion taking care to exclude the lashes from the operative field.    Standard 25 gauge vitrectomy setup was achieved with all ports 3.75 mm posterior to the limbus. The Keecker visualization system was used. Core vitrectomy was performed. The hyaloid membrane was laden with inflammatory debris and extremely adherent.  It was elevated using the cutter on suction mode.  It could not be safely elevated off of the superior break and the superior periphery.  The peripheral vitreous was shaved to the vitreous base using scleral depression. The retina was inspected for 360 degrees to the ora ziyad using scleral depression. There was a retinal detachment involving the macula with thin macular subretinal hemorrhage.  There was a break superiorly posterior to the equator at the edge of the scar from the prior subretinal abscess.  The retinal detachment extended superiorly and inferiorly into the periphery.  There were no other breaks.  The decision was made to flatten the macula with perfluorocarbon liquid because of the likely dense nature of some of the subretinal fluid.  Perfluorocarbon was  injected with a dual bore cannula over the macula.  The macula was flat.  Fluid/air exchange was performed draining through the superior break and removing all of the perfluorocarbon.  A BSS rinse was performed.  The retina was lying nicely flat under air.  Endolaser photocoagulation was placed around the breaks and into the superior periphery.  5000cs silicone oil was injected into the eye.    The ports were removed. The infusion cannula was removed. The sclerotomies were closed with 7-0 Vicryl suture. All wounds were checked and noted not to be leaking. The eye was normotensive. A subconjunctival injection of vancomycin and dexamethasone was placed.     The eye was patched. A Gu shield was placed. The patient was awakened from general laryngeal mask anesthesia by the anesthesia team having tolerated the procedure well.

## 2018-09-19 NOTE — ANESTHESIA RELEASE NOTE
"Anesthesia Release from PACU Note    Patient: Mickey Ross    Procedure(s) Performed: Procedure(s) (LRB):  REPAIR, RETINAL DETACHMENT, WITH VITRECTOMY (Right)    Anesthesia type: GEN    Post pain: having pain but very sleepy  Post assessment: no apparent anesthetic complications, tolerated procedure well and no evidence of recall    Post vital signs: /67   Pulse 67   Temp 37 °C (98.6 °F) (Temporal)   Resp 20   Ht 5' 11" (1.803 m)   Wt 102.9 kg (226 lb 13.7 oz)   SpO2 100%   BMI 31.64 kg/m²     Level of consciousness: awake, alert and oriented    Nausea/Vomiting: no nausea/no vomiting    Complications: none    Airway Patency: patent    Respiratory: unassisted, spontaneous ventilation, room air    Cardiovascular: stable and blood pressure at baseline    Hydration: euvolemic    "

## 2018-09-19 NOTE — ASSESSMENT & PLAN NOTE
- Followed by primary physician  - Continue IV antibiotics as prescribed per primary. Patient receiving Daptomycin IV and Ciprofloxacin orally.

## 2018-09-19 NOTE — NURSING
Transport is here to  the patient for dialysis and he is refusing to go. Paged Dr. Longoria's team ([facility] medicine team 2) to inform them of the patient's choice to not have dialysis on today.

## 2018-09-19 NOTE — NURSING TRANSFER
Nursing Transfer Note      9/19/2018     Transfer To: dialysis    Transfer via stretcher    Transfer with cardiac monitoring    Transported by transport tech    Medicines sent: insulin    Chart send with patient: Yes

## 2018-09-19 NOTE — NURSING TRANSFER
Nursing Transfer Note      9/18/2018     Transfer to [facility] from PACU     Transfer via stretcher    Transfer with  O2, cardiac monitoring    Transported by Pt escort    Medicines sent: Eye drops x 3 bottles, ophthalmic ointment      Chart send with patient: Yes    Notified: LYNDSAY Weston RN

## 2018-09-19 NOTE — SUBJECTIVE & OBJECTIVE
Interval History: Patient evaluated at bedside during HD treatment. Patient with complaints of R eye pain this morning. He had a repair of his right retinal on yesterday (9/18) which is causing his pain. Pain being managed with PRN medications per primary team. No complaints of shortness of breath, chest pain, muscle cramps, N/V, abdominal pain, RLS, pruritus, or fatigue. Patient did initially deny dialysis this AM, but was encouraged to come due to his elevated potassium of 7.1 this AM. Patient denied any acute events overnight. Target UF 2.5 L. Spoke to patient regarding the benefits and importance of complying with HD treatments for metabolic clearance and volume management.        Review of patient's allergies indicates:   Allergen Reactions    Vancomycin analogues Rash     Red Man Syndrome    Penicillins      Current Facility-Administered Medications   Medication Frequency    0.9%  NaCl infusion (for blood administration) Q24H PRN    0.9%  NaCl infusion PRN    acetaminophen tablet 650 mg Q4H PRN    albuterol-ipratropium 2.5 mg-0.5 mg/3 mL nebulizer solution 3 mL Q4H PRN    amLODIPine tablet 10 mg Daily    aspirin EC tablet 81 mg Daily    atorvastatin tablet 80 mg Daily    calcium acetate capsule 1,334 mg TID WM    carvedilol tablet 25 mg BID    cetirizine tablet 5 mg Daily    ciprofloxacin HCl tablet 500 mg Daily    clonazePAM tablet 0.5 mg BID PRN    DAPTOmycin (CUBICIN) 905 mg in sodium chloride 0.9% IVPB Q48H    dextrose 50% injection 12.5 g PRN    dextrose 50% injection 25 g PRN    epoetin umer injection 8,000 Units Every Tues, Thurs, Sat    fluticasone 50 mcg/actuation nasal spray 100 mcg Daily    glucagon (human recombinant) injection 1 mg PRN    glucose chewable tablet 16 g PRN    glucose chewable tablet 24 g PRN    homatropine 5 % ophthalmic solution 1 drop Daily    hydrALAZINE tablet 100 mg Q8H    hydrALAZINE tablet 50 mg Q8H PRN    HYDROcodone-acetaminophen (NORCO) 5-325 mg  per tablet     HYDROcodone-acetaminophen 5-325 mg per tablet 1 tablet Q4H PRN    HYDROcodone-acetaminophen 7.5-325 mg per tablet 1 tablet Q6H PRN    HYDROmorphone injection 1 mg Once    hydrOXYzine HCl tablet 25 mg TID PRN    insulin aspart U-100 pen 0-10 Units QID (AC + HS) PRN    insulin aspart U-100 pen 15 Units TIDWM    insulin detemir U-100 pen 20 Units BID    methadone tablet 10 mg BID    moxifloxacin 0.5 % ophthalmic solution 1 drop QID    naloxone 0.4 mg/mL injection 0.4 mg PRN    ondansetron disintegrating tablet 8 mg Q8H PRN    ondansetron injection 4 mg Q8H PRN    patiromer calcium sorbitex PwPk 8.4 g Once per day on Sun Mon Wed Fri    polyethylene glycol packet 17 g Daily    prednisoLONE acetate 1 % ophthalmic suspension 1 drop QID    senna-docusate 8.6-50 mg per tablet 2 tablet BID    sertraline tablet 25 mg Daily    sodium chloride 0.9% flush 3 mL PRN       Objective:     Vital Signs (Most Recent):  Temp: 97.7 °F (36.5 °C) (09/19/18 0531)  Pulse: 75 (09/19/18 0716)  Resp: 16 (09/18/18 2300)  BP: 122/60 (09/19/18 0531)  SpO2: (!) 94 % (09/19/18 0531)  O2 Device (Oxygen Therapy): nasal cannula (09/18/18 2300) Vital Signs (24h Range):  Temp:  [96.1 °F (35.6 °C)-98.6 °F (37 °C)] 97.7 °F (36.5 °C)  Pulse:  [67-85] 75  Resp:  [10-20] 16  SpO2:  [90 %-100 %] 94 %  BP: (114-158)/(54-78) 122/60     Weight: 102.9 kg (226 lb 13.7 oz) (09/18/18 1729)  Body mass index is 31.64 kg/m².  Body surface area is 2.27 meters squared.    I/O last 3 completed shifts:  In: 600 [P.O.:300; I.V.:300]  Out: 75 [Urine:75]    Physical Exam   Constitutional: He is oriented to person, place, and time. He appears well-developed and well-nourished. No distress.   HENT:   Head: Normocephalic.   Eyes: Right eye exhibits no discharge. Left eye exhibits no discharge.   Neck: Normal range of motion.   Cardiovascular: Normal rate and regular rhythm. Exam reveals no friction rub.   No murmur heard.  Pulmonary/Chest: Effort  normal and breath sounds normal. No stridor. No respiratory distress. He has no wheezes. He has no rales.   Abdominal: Soft. He exhibits no distension.   Musculoskeletal: Normal range of motion. He exhibits edema.   Ace bandage and kerlix dressing noted to L foot with dried drainage. Edema noted to LLE.   Partial amputation of L foot 1st, 2nd, 3rd, and 4th digits.    Neurological: He is alert and oriented to person, place, and time.   Skin: Skin is warm and dry. He is not diaphoretic.   Psychiatric: He has a normal mood and affect. His behavior is normal. Judgment and thought content normal.   Nursing note and vitals reviewed.      Significant Labs:  CBC:   Recent Labs   Lab  09/19/18   0530   WBC  7.89   RBC  2.50*   HGB  7.5*   HCT  26.5*   PLT  222   MCV  106*   MCH  30.0   MCHC  28.3*     CMP:   Recent Labs   Lab  09/19/18   0530   GLU  380*   CALCIUM  7.6*   NA  137   K  7.1*   CO2  23   CL  106   BUN  55*   CREATININE  7.5*

## 2018-09-19 NOTE — ASSESSMENT & PLAN NOTE
- Osteomyelitis of left 5th metatarsal taken for washout by Orthopedic surgery at [facility] on 08/15.  - X-ray left foot and ankle shows partial amputation the 1st, 2nd, 3rd, and 4th digits with fusion of the 2nd and 3rd MTP joints and throughout the midfoot, severe deformity and joint space loss the tibiotalar joint with a lapse of the talus likely 2/2 to chronic osteomyelitis, arthritis, or chronic Charcot foot.   - Patient being treated outside facility with Flagyl, linezolid, gentamicin with HD.  - Podiatry consulted, appreciate recs. Advised BKA but patient refused. Recommended CAM boot and abx per ID. D/tyler wound vac  - ESR elevated at 58 upon admission  - BCx2 from [facility] grew MRSA (last positive 8/15. Cultures from [facility] were NGTD.    PLAN:  - On Daptomycin 8mg/kg after HD per ID's recs. Planned for 6 weeks total, last dose 9/29  - ID follow up at 2 weeks.  - Wound culture by podiatry on 9/12 growing Klebsiella- started on 10 day course of ciprofloxacin starting 9/17.   - Podiatry consulted, appreciate recs. Recommend follow up with [facility] at discharge.

## 2018-09-19 NOTE — PROGRESS NOTES
Dialysis complete.  Blood returned.   Hemostasis complete.   Held pressure to  Left forearm    A/v site for   10 Minutes to each site.   Guaze and tape applied to needle site.   No active bleeding noted.   + thrill and bruit.    Pt tolerated hemodialysis without diff.   Pt ran   3 Hrs on hemodialysis machine.   Took off    2500 Net volume.   Used   [id number]    Dialyzer.

## 2018-09-20 ENCOUNTER — TELEPHONE (OUTPATIENT)
Dept: PODIATRY | Facility: CLINIC | Age: 53
End: 2018-09-20

## 2018-09-20 LAB
ANION GAP SERPL CALC-SCNC: 10 MMOL/L
ANION GAP SERPL CALC-SCNC: 6 MMOL/L
BASOPHILS # BLD AUTO: 0.04 K/UL
BASOPHILS NFR BLD: 0.7 %
BUN SERPL-MCNC: 34 MG/DL
BUN SERPL-MCNC: 37 MG/DL
CALCIUM SERPL-MCNC: 8.1 MG/DL
CALCIUM SERPL-MCNC: 8.4 MG/DL
CHLORIDE SERPL-SCNC: 104 MMOL/L
CHLORIDE SERPL-SCNC: 105 MMOL/L
CK SERPL-CCNC: 20 U/L
CO2 SERPL-SCNC: 21 MMOL/L
CO2 SERPL-SCNC: 26 MMOL/L
CREAT SERPL-MCNC: 5.8 MG/DL
CREAT SERPL-MCNC: 6.4 MG/DL
DIFFERENTIAL METHOD: ABNORMAL
EOSINOPHIL # BLD AUTO: 0.2 K/UL
EOSINOPHIL NFR BLD: 4 %
ERYTHROCYTE [DISTWIDTH] IN BLOOD BY AUTOMATED COUNT: 16.3 %
EST. GFR  (AFRICAN AMERICAN): 10.5 ML/MIN/1.73 M^2
EST. GFR  (AFRICAN AMERICAN): 11.8 ML/MIN/1.73 M^2
EST. GFR  (NON AFRICAN AMERICAN): 10.2 ML/MIN/1.73 M^2
EST. GFR  (NON AFRICAN AMERICAN): 9.1 ML/MIN/1.73 M^2
GLUCOSE SERPL-MCNC: 314 MG/DL
GLUCOSE SERPL-MCNC: 360 MG/DL
HCT VFR BLD AUTO: 24.9 %
HGB BLD-MCNC: 7.5 G/DL
IMM GRANULOCYTES # BLD AUTO: 0.04 K/UL
IMM GRANULOCYTES NFR BLD AUTO: 0.7 %
LYMPHOCYTES # BLD AUTO: 1.5 K/UL
LYMPHOCYTES NFR BLD: 24.3 %
MAGNESIUM SERPL-MCNC: 2 MG/DL
MCH RBC QN AUTO: 30.7 PG
MCHC RBC AUTO-ENTMCNC: 30.1 G/DL
MCV RBC AUTO: 102 FL
MONOCYTES # BLD AUTO: 0.6 K/UL
MONOCYTES NFR BLD: 9.7 %
NEUTROPHILS # BLD AUTO: 3.7 K/UL
NEUTROPHILS NFR BLD: 60.6 %
NRBC BLD-RTO: 0 /100 WBC
PHOSPHATE SERPL-MCNC: 3.9 MG/DL
PLATELET # BLD AUTO: 153 K/UL
PMV BLD AUTO: 11.3 FL
POCT GLUCOSE: 111 MG/DL (ref 70–110)
POCT GLUCOSE: 288 MG/DL (ref 70–110)
POCT GLUCOSE: 328 MG/DL (ref 70–110)
POCT GLUCOSE: 352 MG/DL (ref 70–110)
POCT GLUCOSE: 358 MG/DL (ref 70–110)
POTASSIUM SERPL-SCNC: 4.7 MMOL/L
POTASSIUM SERPL-SCNC: 5.2 MMOL/L
RBC # BLD AUTO: 2.44 M/UL
SODIUM SERPL-SCNC: 135 MMOL/L
SODIUM SERPL-SCNC: 137 MMOL/L
WBC # BLD AUTO: 6.01 K/UL

## 2018-09-20 PROCEDURE — 25000003 PHARM REV CODE 250: Performed by: INTERNAL MEDICINE

## 2018-09-20 PROCEDURE — 25000003 PHARM REV CODE 250: Performed by: STUDENT IN AN ORGANIZED HEALTH CARE EDUCATION/TRAINING PROGRAM

## 2018-09-20 PROCEDURE — 82550 ASSAY OF CK (CPK): CPT

## 2018-09-20 PROCEDURE — 20600001 HC STEP DOWN PRIVATE ROOM

## 2018-09-20 PROCEDURE — 63600175 PHARM REV CODE 636 W HCPCS: Mod: JG | Performed by: HOSPITALIST

## 2018-09-20 PROCEDURE — 25000003 PHARM REV CODE 250: Performed by: HOSPITALIST

## 2018-09-20 PROCEDURE — 25000003 PHARM REV CODE 250: Performed by: OPHTHALMOLOGY

## 2018-09-20 PROCEDURE — 83735 ASSAY OF MAGNESIUM: CPT

## 2018-09-20 PROCEDURE — 84100 ASSAY OF PHOSPHORUS: CPT

## 2018-09-20 PROCEDURE — 99232 SBSQ HOSP IP/OBS MODERATE 35: CPT | Mod: ,,, | Performed by: HOSPITALIST

## 2018-09-20 PROCEDURE — 80048 BASIC METABOLIC PNL TOTAL CA: CPT

## 2018-09-20 PROCEDURE — 63600175 PHARM REV CODE 636 W HCPCS: Performed by: STUDENT IN AN ORGANIZED HEALTH CARE EDUCATION/TRAINING PROGRAM

## 2018-09-20 PROCEDURE — 80048 BASIC METABOLIC PNL TOTAL CA: CPT | Mod: 91

## 2018-09-20 PROCEDURE — 85025 COMPLETE CBC W/AUTO DIFF WBC: CPT

## 2018-09-20 RX ORDER — SODIUM CHLORIDE 9 MG/ML
INJECTION, SOLUTION INTRAVENOUS
Status: DISCONTINUED | OUTPATIENT
Start: 2018-09-21 | End: 2018-09-21 | Stop reason: HOSPADM

## 2018-09-20 RX ORDER — SODIUM CHLORIDE 9 MG/ML
INJECTION, SOLUTION INTRAVENOUS ONCE
Status: COMPLETED | OUTPATIENT
Start: 2018-09-21 | End: 2018-09-21

## 2018-09-20 RX ORDER — OXYCODONE HYDROCHLORIDE 10 MG/1
10 TABLET ORAL ONCE
Status: COMPLETED | OUTPATIENT
Start: 2018-09-20 | End: 2018-09-20

## 2018-09-20 RX ADMIN — CARVEDILOL 25 MG: 25 TABLET, FILM COATED ORAL at 08:09

## 2018-09-20 RX ADMIN — CALCIUM ACETATE 1334 MG: 667 CAPSULE ORAL at 04:09

## 2018-09-20 RX ADMIN — INSULIN DETEMIR 20 UNITS: 100 INJECTION, SOLUTION SUBCUTANEOUS at 08:09

## 2018-09-20 RX ADMIN — ATORVASTATIN CALCIUM 80 MG: 20 TABLET, FILM COATED ORAL at 08:09

## 2018-09-20 RX ADMIN — CALCIUM ACETATE 1334 MG: 667 CAPSULE ORAL at 08:09

## 2018-09-20 RX ADMIN — ASPIRIN 81 MG: 81 TABLET, COATED ORAL at 08:09

## 2018-09-20 RX ADMIN — SERTRALINE HYDROCHLORIDE 25 MG: 25 TABLET ORAL at 08:09

## 2018-09-20 RX ADMIN — HYDRALAZINE HYDROCHLORIDE 100 MG: 50 TABLET ORAL at 05:09

## 2018-09-20 RX ADMIN — CIPROFLOXACIN 500 MG: 500 TABLET, FILM COATED ORAL at 08:09

## 2018-09-20 RX ADMIN — CARVEDILOL 25 MG: 25 TABLET, FILM COATED ORAL at 09:09

## 2018-09-20 RX ADMIN — AMLODIPINE BESYLATE 10 MG: 10 TABLET ORAL at 08:09

## 2018-09-20 RX ADMIN — INSULIN ASPART 3 UNITS: 100 INJECTION, SOLUTION INTRAVENOUS; SUBCUTANEOUS at 12:09

## 2018-09-20 RX ADMIN — MOXIFLOXACIN 1 DROP: 5 SOLUTION/ DROPS OPHTHALMIC at 04:09

## 2018-09-20 RX ADMIN — SENNOSIDES AND DOCUSATE SODIUM 2 TABLET: 8.6; 5 TABLET ORAL at 08:09

## 2018-09-20 RX ADMIN — OXYCODONE HYDROCHLORIDE 10 MG: 10 TABLET ORAL at 08:09

## 2018-09-20 RX ADMIN — HYDROCODONE BITARTRATE AND ACETAMINOPHEN 1 TABLET: 7.5; 325 TABLET ORAL at 02:09

## 2018-09-20 RX ADMIN — PREDNISOLONE ACETATE 1 DROP: 10 SUSPENSION/ DROPS OPHTHALMIC at 09:09

## 2018-09-20 RX ADMIN — INSULIN DETEMIR 20 UNITS: 100 INJECTION, SOLUTION SUBCUTANEOUS at 09:09

## 2018-09-20 RX ADMIN — MOXIFLOXACIN 1 DROP: 5 SOLUTION/ DROPS OPHTHALMIC at 09:09

## 2018-09-20 RX ADMIN — PREDNISOLONE ACETATE 1 DROP: 10 SUSPENSION/ DROPS OPHTHALMIC at 04:09

## 2018-09-20 RX ADMIN — MOXIFLOXACIN 1 DROP: 5 SOLUTION/ DROPS OPHTHALMIC at 08:09

## 2018-09-20 RX ADMIN — HYDRALAZINE HYDROCHLORIDE 100 MG: 50 TABLET ORAL at 09:09

## 2018-09-20 RX ADMIN — MOXIFLOXACIN 1 DROP: 5 SOLUTION/ DROPS OPHTHALMIC at 01:09

## 2018-09-20 RX ADMIN — PREDNISOLONE ACETATE 1 DROP: 10 SUSPENSION/ DROPS OPHTHALMIC at 01:09

## 2018-09-20 RX ADMIN — METHADONE HYDROCHLORIDE 10 MG: 5 TABLET ORAL at 08:09

## 2018-09-20 RX ADMIN — METHADONE HYDROCHLORIDE 10 MG: 5 TABLET ORAL at 09:09

## 2018-09-20 RX ADMIN — CALCIUM ACETATE 1334 MG: 667 CAPSULE ORAL at 12:09

## 2018-09-20 RX ADMIN — ONDANSETRON 8 MG: 8 TABLET, ORALLY DISINTEGRATING ORAL at 02:09

## 2018-09-20 RX ADMIN — PREDNISOLONE ACETATE 1 DROP: 10 SUSPENSION/ DROPS OPHTHALMIC at 08:09

## 2018-09-20 RX ADMIN — ERYTHROPOIETIN 8000 UNITS: 4000 INJECTION, SOLUTION INTRAVENOUS; SUBCUTANEOUS at 12:09

## 2018-09-20 RX ADMIN — HOMATROPINE HYDROBROMIDE 1 DROP: 50 SOLUTION OPHTHALMIC at 08:09

## 2018-09-20 RX ADMIN — INSULIN ASPART 15 UNITS: 100 INJECTION, SOLUTION INTRAVENOUS; SUBCUTANEOUS at 12:09

## 2018-09-20 RX ADMIN — INSULIN ASPART 15 UNITS: 100 INJECTION, SOLUTION INTRAVENOUS; SUBCUTANEOUS at 08:09

## 2018-09-20 RX ADMIN — INSULIN ASPART 5 UNITS: 100 INJECTION, SOLUTION INTRAVENOUS; SUBCUTANEOUS at 04:09

## 2018-09-20 RX ADMIN — INSULIN ASPART 5 UNITS: 100 INJECTION, SOLUTION INTRAVENOUS; SUBCUTANEOUS at 08:09

## 2018-09-20 RX ADMIN — CETIRIZINE HYDROCHLORIDE 5 MG: 5 TABLET ORAL at 08:09

## 2018-09-20 RX ADMIN — HYDROCODONE BITARTRATE AND ACETAMINOPHEN 1 TABLET: 7.5; 325 TABLET ORAL at 11:09

## 2018-09-20 RX ADMIN — CLONAZEPAM 0.5 MG: 0.5 TABLET ORAL at 09:09

## 2018-09-20 NOTE — PROGRESS NOTES
[facility]  Podiatry  Progress Note    Patient Name: Mickey Ross  MRN: [id number]  Admission Date: 8/17/2018  Hospital Length of Stay: 34 days  Attending Physician: Sarika Longoria MD  Primary Care Provider: Rosalina Moncada MD   Interval Hx:  Patient Seen at bedside for dressing change.  Patient denies F/C/N/V.  Dressings clean, dry, and intact. Patient states nursing didn't do his dressing change yesterday.    Scheduled Meds:   amLODIPine  10 mg Oral Daily    aspirin  81 mg Oral Daily    atorvastatin  80 mg Oral Daily    calcium acetate  1,334 mg Oral TID WM    carvedilol  25 mg Oral BID    cetirizine  5 mg Oral Daily    ciprofloxacin HCl  500 mg Oral Daily    DAPTOmycin (CUBICIN)  IV  8 mg/kg Intravenous Q48H    epoetin umer (PROCRIT) injection  8,000 Units Intravenous Every Tues, Thurs, Sat    fluticasone  2 spray Each Nare Daily    heparin (porcine)  5,000 Units Subcutaneous Q8H    homatropine  1 drop Right Eye Daily    hydrALAZINE  100 mg Oral Q8H    HYDROmorphone  1 mg Intravenous Once    insulin aspart U-100  15 Units Subcutaneous TIDWM    insulin detemir U-100  20 Units Subcutaneous BID    methadone  10 mg Oral BID    moxifloxacin  1 drop Right Eye QID    patiromer calcium sorbitex  8.4 g Oral Once per day on Sun Mon Wed Fri    polyethylene glycol  17 g Oral Daily    prednisoLONE acetate  1 drop Right Eye QID    senna-docusate 8.6-50 mg  2 tablet Oral BID    sertraline  25 mg Oral Daily     Continuous Infusions:    PRN Meds:sodium chloride, sodium chloride 0.9%, acetaminophen, albuterol-ipratropium, cadexomer iodine, clonazePAM, dextrose 50%, dextrose 50%, glucagon (human recombinant), glucose, glucose, hydrALAZINE, HYDROcodone-acetaminophen, HYDROcodone-acetaminophen, hydrOXYzine HCl, insulin aspart U-100, naloxone, ondansetron, ondansetron, sodium chloride 0.9%    Review of patient's allergies indicates:   Allergen Reactions    Vancomycin analogues Rash     Red  Man Syndrome    Penicillins         Past Medical History:   Diagnosis Date    Allergic drug rash - due to Vancomycin 8/19/2018    Allergic drug rash - due to Vancomycin 8/19/2018    Anxiety 8/17/2018    Arthritis     Blind left eye     Charcot's joint of foot     Chronic back pain 8/17/2018    Chronic, continuous use of opioids 8/17/2018    Debility 8/17/2018    Diabetes mellitus     GERD (gastroesophageal reflux disease)     Glaucoma     Hypertension     Methadone dependence 8/18/2018    MRSA (methicillin resistant staph aureus) culture positive     Osteomyelitis     Renal disorder     Sepsis due to methicillin resistant Staphylococcus aureus (MRSA) 8/17/2018    Subretinal abscess 8/17/2018     Past Surgical History:   Procedure Laterality Date    AVF left upper arm      INSERTION OF SANZ CATHETER Right 9/1/2018    Procedure: INSERTION, CATHETER, CENTRAL VENOUS, SANZ;  Surgeon: Rafi Churchill MD;  Location: [facility] OR [address] [place] [address];  Service: General;  Laterality: Right;    INSERTION, CATHETER, CENTRAL VENOUS, SANZ Right 9/1/2018    Performed by Rafi Churchill MD at [facility] OR [address] [place] [address]    left ankle surgery      lumbar back surgery      REPAIR OF RETINAL DETACHMENT WITH VITRECTOMY Right 9/18/2018    Procedure: REPAIR, RETINAL DETACHMENT, WITH VITRECTOMY;  Surgeon: Alexander Corrales MD;  Location: [facility] OR [address] [place] [address];  Service: Ophthalmology;  Laterality: Right;  request TF own last case    REPAIR, RETINAL DETACHMENT, WITH VITRECTOMY Right 9/18/2018    Performed by Alexander Corrales MD at [facility] OR [address] [place] [address]       Family History     Problem Relation (Age of Onset)    Pneumonia Mother        Tobacco Use    Smoking status: Never Smoker   Substance and Sexual Activity    Alcohol use: No     Frequency: Never    Drug use: No    Sexual activity: Not Currently     Review of Systems   Constitutional: Negative for chills and fever.   Gastrointestinal: Negative for nausea and vomiting.    Musculoskeletal:        Left ankle deformity.   Skin: Positive for color change and wound (Surgical incisions to the medial and lateral ankle).     Objective:     Vital Signs (Most Recent):  Temp: 97.8 °F (36.6 °C) (09/20/18 0755)  Pulse: 79 (09/20/18 0755)  Resp: 17 (09/20/18 0755)  BP: (!) 159/71 (09/20/18 0755)  SpO2: (!) 94 % (09/20/18 0755) Vital Signs (24h Range):  Temp:  [97.5 °F (36.4 °C)-98.4 °F (36.9 °C)] 97.8 °F (36.6 °C)  Pulse:  [72-87] 79  Resp:  [15-18] 17  SpO2:  [90 %-97 %] 94 %  BP: (127-161)/(60-77) 159/71     Weight: 102.9 kg (226 lb 13.7 oz)  Body mass index is 31.64 kg/m².    Foot Exam    General  General Appearance: appears stated age and healthy   Orientation: alert and oriented to person, place, and time       Left Foot/Ankle      Inspection and Palpation  Swelling: (Diffuse edema to the left LE, non pitting.)  Skin Exam: erythema (To the medial ankle incision); skin not intact (Surgical incisions to the medial and lateral ankle.)     Neurovascular  Dorsalis pedis: absent  Posterior tibial: absent  Saphenous nerve sensation: diminished  Tibial nerve sensation: diminished  Superficial peroneal nerve sensation: diminished  Deep peroneal nerve sensation: diminished  Sural nerve sensation: diminished    Comments  Ortho: Severe non reducible varus deformity of the left ankle.    Lateral Left ankle: Incision present with sutures in tact. No signs of acute infection. Skin edges well coapted.     Medial Left ankle: Surgical incision left open with granular wound beds to edges. Mild erythema, no drainage no purulence, no malodor, no streaking. Positive pain to palpation.     See clinic images below.      Laboratory:  A1C:   Recent Labs   Lab  08/17/18   2159   HGBA1C  8.2*     Blood Cultures:   No results for input(s): LABBLOO in the last 48 hours.  CBC:   Recent Labs   Lab  09/20/18   0540   WBC  6.01   RBC  2.44*   HGB  7.5*   HCT  24.9*   PLT  153   MCV  102*   MCH  30.7   MCHC  30.1*     CMP:    Recent Labs   Lab  09/19/18   0530   09/19/18   1409   GLU  380*   --    --    CALCIUM  7.6*   --    --    NA  137   --    --    K  7.1*   < >  3.9   CO2  23   --    --    CL  106   --    --    BUN  55*   --    --    CREATININE  7.5*   --    --     < > = values in this interval not displayed.     CRP:   No results for input(s): CRP in the last 168 hours.  ESR:   No results for input(s): SEDRATE in the last 168 hours.  Microbiology Results (last 7 days)     Procedure Component Value Units Date/Time    Culture, Anaerobe [[id number]] Collected:  09/12/18 1319    Order Status:  Completed Specimen:  Wound from Foot, Left Updated:  09/17/18 1300     Anaerobic Culture No anaerobes isolated    Aerobic culture [[id number]]  (Susceptibility) Collected:  09/12/18 1319    Order Status:  Completed Specimen:  Wound from Foot, Left Updated:  09/14/18 1128     Aerobic Bacterial Culture --     KLEBSIELLA OXYTOCA  Moderate          Specimen (12h ago, onward)    None          Diagnostic Results:  X-ray:  Bony destruction, impaction and probable osseous fusion involving the hindfoot and midfoot.  There appears to be medial angulation and displacement of the hindfoot with respect to the adjacent tibia and fibula.  Comparison to prior films and correlation with clinical findings will be needed.    Clinical findings  Wound on left medial ankle measures approximately 3.5 x 0.5  With granular base and viable margins.   Negative ptb, No purulence, edema, or malodor    Left medial anterior leg ulcer with granular base and mid periwound erythema.  No concepcion purulence, no maloder.    Mild dehisence of lateral ankle incision.                                                          Assessment/Plan:     * Acute hematogenous osteomyelitis of left foot    Mickey Ross is a 53 y.o. male with Left ankle wound, stable  -Ordered iodosorb, to be applied on wound by nurses daily  -Changed dressings with betadine 4x4's, kerlix and ace.  - Cultures from  abscess growing klebsiella, Continue 10 day course of cipro.  -recommend long term abx per ID recs, per Id Anticipate 6 weeks of IV antibiotics.  Estimated end date 9/28/18  -Patient to follow up with [facility] podiatry within 3-5 days of discharge, will need close follow up to monitor abscess.  Patient will need home health to do dressing changes as follows:  Pull packing from wound, rinse with saline, pat dry,  Pack medial ankle wound with betadine soaked packing,  Place iodosorb on incision sites, Dress with 4x4's, cast padding, kerlix, and coban or ace bandage.  Dressing changes to be done every 2-3 days.   -Podiatry will continue to follow.                  Left retinal detachment    Per opthomology        ESRD on hemodialysis    Per primary            Romeo Hoyt MD  Podiatry  [facility]

## 2018-09-20 NOTE — PLAN OF CARE
Problem: Patient Care Overview  Goal: Plan of Care Review    Recommendations     Recommendation/Intervention:   1. Add 2000 diabetic restriction to current renal diet due to elevated BG.   2. Discontinue Novasource.   3. RD following.     Goals: nutrient intake >/= 85% EEN/EPN   Nutrition Goal Status: goal met

## 2018-09-20 NOTE — PLAN OF CARE
Problem: Patient Care Overview  Goal: Plan of Care Review  Patient remains free of falls, injuries and traumas. VSS. CBG monitoring ongoing. Supplemental insulin given. Patient complains of left eye pain and back pain. PRN pain meds given. Eye drops given as scheduled. Plan of care reviewed with patient. Understanding verbalized. Will continue to monitor.

## 2018-09-20 NOTE — PROGRESS NOTES
Dwight Bowers MD   Resident   Ophthalmology   Progress Notes   Cosign Needed                      CC: Retinal detachment     HPI: Mickey oRss is a 53 y.o. male who is POD #2 s/p PPV with silicone oil OD. Patient reports having poor vision today but continue to see dark outlines of images. CF at bedside today. Denies pain, flashes, or floaters overnight.          Past Medical History:   Diagnosis Date    Allergic drug rash - due to Vancomycin 8/19/2018    Allergic drug rash - due to Vancomycin 8/19/2018    Anxiety 8/17/2018    Arthritis      Blind left eye      Charcot's joint of foot      Chronic back pain 8/17/2018    Chronic, continuous use of opioids 8/17/2018    Debility 8/17/2018    Diabetes mellitus      GERD (gastroesophageal reflux disease)      Glaucoma      Hypertension      Methadone dependence 8/18/2018    MRSA (methicillin resistant staph aureus) culture positive      Osteomyelitis      Renal disorder      Sepsis due to methicillin resistant Staphylococcus aureus (MRSA) 8/17/2018    Subretinal abscess 8/17/2018       Family History   Problem Relation Age of Onset    Pneumonia Mother        Current Facility-Administered Medications:     0.9%  NaCl infusion (for blood administration), , Intravenous, Q24H PRN, Ralph Tomas MD    0.9%  NaCl infusion, , Intravenous, PRN, Giovana Baron NP, Last Rate: 100 mL/hr at 09/19/18 0845    acetaminophen tablet 650 mg, 650 mg, Oral, Q4H PRN, Alexander Corrales MD, 650 mg at 09/19/18 0855    albuterol-ipratropium 2.5 mg-0.5 mg/3 mL nebulizer solution 3 mL, 3 mL, Nebulization, Q4H PRN, Freddie Huston MD    amLODIPine tablet 10 mg, 10 mg, Oral, Daily, Carrol García MD, 10 mg at 09/18/18 0902    aspirin EC tablet 81 mg, 81 mg, Oral, Daily, Ralph Tomas MD, 81 mg at 09/18/18 0902    atorvastatin tablet 80 mg, 80 mg, Oral, Daily, Carrol García MD, 80 mg at 09/18/18 0902    calcium acetate capsule 1,334 mg, 1,334 mg,  Oral, TID WM, Ralph Tomas MD, 1,334 mg at 09/19/18 0903    carvedilol tablet 25 mg, 25 mg, Oral, BID, Carrol García MD, 25 mg at 09/18/18 2353    cetirizine tablet 5 mg, 5 mg, Oral, Daily, Bhargav Chacon MD, 5 mg at 09/18/18 0902    ciprofloxacin HCl tablet 500 mg, 500 mg, Oral, Daily, Sarika Longoria MD, 500 mg at 09/18/18 0904    clonazePAM tablet 0.5 mg, 0.5 mg, Oral, BID PRN, Freddie Hsuton MD, 0.5 mg at 09/18/18 2354    DAPTOmycin (CUBICIN) 905 mg in sodium chloride 0.9% IVPB, 8 mg/kg, Intravenous, Q48H, Armando Madrid MD, Last Rate: 100 mL/hr at 09/17/18 2128, 905 mg at 09/17/18 2128    dextrose 50% injection 12.5 g, 12.5 g, Intravenous, PRN, Kyle Gracia MD    dextrose 50% injection 25 g, 25 g, Intravenous, PRN, Mukesh Foley MD    epoetin umer injection 8,000 Units, 8,000 Units, Intravenous, Every Tues, Thurs, Sat, Carrol García MD, 8,000 Units at 09/18/18 1105    fluticasone 50 mcg/actuation nasal spray 100 mcg, 2 spray, Each Nare, Daily, Carrol García MD, 100 mcg at 09/11/18 0856    glucagon (human recombinant) injection 1 mg, 1 mg, Intramuscular, PRN, Bhargav Chacon MD    glucose chewable tablet 16 g, 16 g, Oral, PRN, Kyle Gracia MD    glucose chewable tablet 24 g, 24 g, Oral, PRN, Kyle Gracia MD    heparin (porcine) injection 5,000 Units, 5,000 Units, Subcutaneous, Q8H, Sarika Longoria MD    homatropine 5 % ophthalmic solution 1 drop, 1 drop, Right Eye, Daily, Sal Villanueva MD    hydrALAZINE tablet 100 mg, 100 mg, Oral, Q8H, Armando Madrid MD, 100 mg at 09/19/18 0543    hydrALAZINE tablet 50 mg, 50 mg, Oral, Q8H PRN, Armando Madrid MD, 50 mg at 09/09/18 0845    HYDROcodone-acetaminophen 5-325 mg per tablet 1 tablet, 1 tablet, Oral, Q4H PRN, Alexander Corrales MD, 1 tablet at 09/18/18 2144    HYDROcodone-acetaminophen 7.5-325 mg per tablet 1 tablet, 1 tablet, Oral, Q6H PRN, Freddie Huston MD, 1 tablet at 09/19/18  0542    HYDROmorphone injection 1 mg, 1 mg, Intravenous, Once, Kyle Gracia MD    hydrOXYzine HCl tablet 25 mg, 25 mg, Oral, TID PRN, Osiris Canela MD, 25 mg at 08/21/18 1004    insulin aspart U-100 pen 0-10 Units, 0-10 Units, Subcutaneous, QID (AC + HS) PRN, Francisco J Jeong MD, 4 Units at 09/19/18 0934    insulin aspart U-100 pen 15 Units, 15 Units, Subcutaneous, TIDWM, Francisco J Jeong MD, Stopped at 09/18/18 0715    insulin detemir U-100 pen 20 Units, 20 Units, Subcutaneous, BID, Kyle Gracia MD, 20 Units at 09/19/18 0935    methadone tablet 10 mg, 10 mg, Oral, BID, Freddie Huston MD, 10 mg at 09/18/18 2354    moxifloxacin 0.5 % ophthalmic solution 1 drop, 1 drop, Right Eye, QID, Sal Villanueva MD    naloxone 0.4 mg/mL injection 0.4 mg, 0.4 mg, Intravenous, PRN, Freddie Huston MD    ondansetron disintegrating tablet 8 mg, 8 mg, Oral, Q8H PRN, Freddie Huston MD, 8 mg at 09/14/18 2158    ondansetron injection 4 mg, 4 mg, Intravenous, Q8H PRN, Freddie Huston MD, 4 mg at 09/18/18 1714    patiromer calcium sorbitex PwPk 8.4 g, 8.4 g, Oral, Once per day on Sun Mon Wed Fri, Bhargav Chacon MD, 8.4 g at 09/16/18 1135    polyethylene glycol packet 17 g, 17 g, Oral, Daily, Armando Madrid MD    prednisoLONE acetate 1 % ophthalmic suspension 1 drop, 1 drop, Right Eye, QID, Jose Hemphill MD, 1 drop at 09/17/18 1320    senna-docusate 8.6-50 mg per tablet 2 tablet, 2 tablet, Oral, BID, Armando Madrid MD, 2 tablet at 09/17/18 1700    sertraline tablet 25 mg, 25 mg, Oral, Daily, Freddie Huston MD, 25 mg at 09/18/18 0902    sodium chloride 0.9% flush 3 mL, 3 mL, Intravenous, PRN, Sixto Quinn Jr., MD    Review of patient's allergies indicates:   Allergen Reactions    Vancomycin analogues Rash       Red Man Syndrome    Penicillins        Social History      Tobacco Use    Smoking status: Never Smoker   Substance Use Topics    Alcohol use: No        Frequency: Never    Drug use: No      Base Eye Exam      Visual Acuity (Snellen - Linear)        Right Left      Dist sc CF               Tonometry (Palpation, 4:54 PM)        Right Left      Pressure STP               Pupils        APD      Right dilated pharmacologically      Left             Slit Lamp and Fundus Exam      Slit Lamp Exam        Right Left - Prosthesis      Conjunctiva/Sclera Subconj heme and sutures        Cornea Clear        Anterior Chamber Deep and quiet        Iris dilated        Lens PCIOL        Vitreous Silicone oil               Fundus Exam        Right Left      Disc Normal        Macula flat, no fluid        Periphery Flat, good laser            Plan   A/P: Mickey Ross is a 53 y.o. 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 Corrales as scheduled      Dwight Bowers MD PGY-II     Case seen and discussed with Sal Villanueva DO Retina Fellow

## 2018-09-20 NOTE — PROGRESS NOTES
" [facility]  Adult Nutrition  Progress Note    SUMMARY       Recommendations    Recommendation/Intervention:   1. Add 2000 diabetic restriction to current renal diet due to elevated BG.   2. Discontinue Novasource.   3. RD following.    Goals: nutrient intake >/= 85% EEN/EPN   Nutrition Goal Status: goal met  Communication of RD Recs: other (comment)(POC)    Reason for Assessment    Reason for Assessment: RD follow-up  Diagnosis: (osteomyelitis left 5th metatarsal)  Relevant Medical History: ESRD on HD, opioid dependent on methadone, DM2, GERD, HTN, left ankle osteomyelitis secondary to MRSA bacteremia    General Information Comments: Pt reports good appetite. Eating % of meals. States he occasionally drinks Novasource but agreeable to discontinuing to aid in prevent hyperkalemia. Noted K as high as 7.1 yesterday prior to HD. Denies N/V/D/C.    Nutrition Discharge Planning: Renal diet w/ po intake > 75%    Nutrition Risk Screen    Nutrition Risk Screen: no indicators present    Nutrition/Diet History    Patient Reported Diet/Restrictions/Preferences: diabetic diet, renal  Typical Food/Fluid Intake: adequate PTA  Do you have any cultural, spiritual, Taoist conflicts, given your current situation?: None stated  Food Allergies: NKFA  Factors Affecting Nutritional Intake: None identified at this time    Anthropometrics    Temp: 98.4 °F (36.9 °C)  Height Method: Stated  Height: 5' 11" (180.3 cm)  Height (inches): 71 in  Weight Method: Stated  Weight: 102.9 kg (226 lb 13.7 oz)  Weight (lb): 226.86 lb  Ideal Body Weight (IBW), Male: 172 lb  % Ideal Body Weight, Male (lb): 131.9 lb  BMI (Calculated): 31.7  BMI Grade: 30 - 34.9- obesity - grade I       Lab/Procedures/Meds    Pertinent Labs Reviewed: reviewed  Pertinent Labs Comments: K 5.2->4.7, BUN 37, Crt 6.4, Glu 314, POCT 278-363  Pertinent Medications Reviewed: reviewed  Pertinent Medications Comments: statin, Ca acetate, epoetin, insulin, " patiromer Ca sorbitex, polyethylene glycol, senna docusate    Physical Findings/Assessment    Overall Physical Appearance: obese, nourished  Oral/Mouth Cavity: WDL  Skin: non-healing wound(s), incision(s)(foot- osteomyelitis; eye)    Estimated/Assessed Needs    Weight Used For Calorie Calculations: 102.4 kg (225 lb 12 oz)  Energy Calorie Requirements (kcal): 2269  Energy Need Method: [place]pine-St Jeor(x 1.2)  Protein Requirements: 124-144 gm (1.2-1.4 gm/kg)  Weight Used For Protein Calculations: 102.9 kg (226 lb 13.7 oz)     Fluid Need Method: RDA Method(PER MD)  RDA Method (mL): 2269  CHO Requirement: 45-50% total intake    Nutrition Prescription Ordered    Current Diet Order: Renal, low potassium    Evaluation of Received Nutrient/Fluid Intake    I/O: -1.9L  Energy Calories Required: meeting needs  Protein Required: meeting needs  Fluid Required: meeting needs  Comments: LBM 9/19  Tolerance: tolerating  % Intake of Estimated Energy Needs: 75 - 100 %  % Meal Intake: 75 - 100 %    Nutrition Risk    Level of Risk/Frequency of Follow-up: low     Assessment and Plan    Nutrition Problem  Excessive potassium intake    Related to (etiology):   ESRD on HD    Signs and Symptoms (as evidenced by):   Pt eating 100% of meals with Novasource ordered     Interventions/Recommendations (treatment strategy):  See recs above.    Nutrition Diagnosis Status:   New      Monitor and Evaluation    Food and Nutrient Intake: energy intake, food and beverage intake  Food and Nutrient Adminstration: diet order  Physical Activity and Function: nutrition-related ADLs and IADLs  Anthropometric Measurements: weight, weight change  Biochemical Data, Medical Tests and Procedures: (All labs)  Nutrition-Focused Physical Findings: overall appearance     Nutrition Follow-Up    RD Follow-up?: Yes

## 2018-09-20 NOTE — SUBJECTIVE & OBJECTIVE
Interval History:NAEON. Patient is having extremely blurry  vision and pain in right eye right eye and thus cannot see.      Review of Systems   Constitutional: Negative for chills, fatigue and fever.   HENT: Negative.    Eyes: Positive for pain and visual disturbance.   Respiratory: Negative for cough, shortness of breath, wheezing and stridor.    Cardiovascular: Negative for chest pain, palpitations and leg swelling.   Gastrointestinal: Negative for abdominal pain, diarrhea and nausea.   Endocrine: Negative.    Genitourinary: Negative for dysuria and flank pain.   Musculoskeletal: Negative.    Skin: Positive for wound.   Allergic/Immunologic: Negative.    Neurological: Negative for dizziness and headaches.   Hematological: Negative.    Psychiatric/Behavioral: Negative.      Objective:     Vital Signs (Most Recent):  Temp: 98.4 °F (36.9 °C) (09/20/18 1231)  Pulse: 72 (09/20/18 1231)  Resp: 18 (09/20/18 1231)  BP: 139/70 (09/20/18 1231)  SpO2: (!) 91 % (09/20/18 1231) Vital Signs (24h Range):  Temp:  [97.5 °F (36.4 °C)-98.4 °F (36.9 °C)] 98.4 °F (36.9 °C)  Pulse:  [72-86] 72  Resp:  [15-18] 18  SpO2:  [91 %-97 %] 91 %  BP: (135-161)/(63-71) 139/70     Weight: 102.9 kg (226 lb 13.7 oz)  Body mass index is 31.64 kg/m².    Intake/Output Summary (Last 24 hours) at 9/20/2018 1439  Last data filed at 9/20/2018 0600  Gross per 24 hour   Intake 530 ml   Output 0 ml   Net 530 ml      Physical Exam   Constitutional: He is oriented to person, place, and time. He appears well-developed and well-nourished. No distress.   HENT:   Head: Normocephalic and atraumatic.   Neck: Normal range of motion. Neck supple.   Cardiovascular: Normal rate, regular rhythm, normal heart sounds and intact distal pulses. Exam reveals no gallop and no friction rub.   No murmur heard.  Pulmonary/Chest: Effort normal and breath sounds normal. No stridor. No respiratory distress. He has no wheezes. He has no rales.   Abdominal: Soft. He exhibits no  distension.   Musculoskeletal: Normal range of motion.   Neurological: He is alert and oriented to person, place, and time.   Skin: Skin is warm and dry. He is not diaphoretic.   Psychiatric: He has a normal mood and affect. His behavior is normal. Judgment and thought content normal.   Nursing note and vitals reviewed.      Significant Labs:   Recent Lab Results       09/20/18  1219 09/20/18  1208 09/20/18  0801 09/20/18  0540 09/20/18  0459      Immature Granulocytes    0.7      Immature Grans (Abs)    0.04  Comment:  Mild elevation in immature granulocytes is non specific and   can be seen in a variety of conditions including stress response,   acute inflammation, trauma and pregnancy. Correlation with other   laboratory and clinical findings is essential.        Anion Gap 6   10      Baso #    0.04      Basophil%    0.7      BUN, Bld 37   34      Calcium 8.4   8.1      Chloride 105   104      CO2 26   21      CPK 20         Creatinine 6.4   5.8      Differential Method    Automated      eGFR if  10.5   11.8      eGFR if non  9.1  Comment:  Calculation used to obtain the estimated glomerular filtration  rate (eGFR) is the CKD-EPI equation.      10.2  Comment:  Calculation used to obtain the estimated glomerular filtration  rate (eGFR) is the CKD-EPI equation.         Eos #    0.2      Eosinophil%    4.0      Glucose 314   360      Gran # (ANC)    3.7      Gran%    60.6      Hematocrit    24.9      Hemoglobin    7.5      Lymph #    1.5      Lymph%    24.3      Magnesium    2.0      MCH    30.7      MCHC    30.1      MCV    102      Mono #    0.6      Mono%    9.7      MPV    11.3      nRBC    0      Phosphorus    3.9      Platelets    153      POCT Glucose  288 352  328     Potassium 4.7   5.2      RBC    2.44      RDW    16.3      Sodium 137   135      WBC    6.01                  09/19/18  2059 09/19/18  1754      Immature Granulocytes       Immature Grans (Abs)       Anion Gap        Baso #       Basophil%       BUN, Bld       Calcium       Chloride       CO2       CPK       Creatinine       Differential Method       eGFR if        eGFR if non        Eos #       Eosinophil%       Glucose       Gran # (ANC)       Gran%       Hematocrit       Hemoglobin       Lymph #       Lymph%       Magnesium       MCH       MCHC       MCV       Mono #       Mono%       MPV       nRBC       Phosphorus       Platelets       POCT Glucose 345 278     Potassium       RBC       RDW       Sodium       WBC             Significant Imaging: I have reviewed all pertinent imaging results/findings within the past 24 hours.

## 2018-09-20 NOTE — PLAN OF CARE
Problem: Patient Care Overview  Goal: Plan of Care Review  Outcome: Ongoing (interventions implemented as appropriate)  Patient free of falls, traumas, and injuries. Skin remains clean dry and intact. Patient vital signs stable in no distress.Will continue to monitor. Patient sleeping with eyewear. Daptomycin given per MD order. CBG managed with diet and insulin. Reviewed care plan with patient; patient verbalized and understanding.

## 2018-09-20 NOTE — ASSESSMENT & PLAN NOTE
- Osteomyelitis of left 5th metatarsal taken for washout by Orthopedic surgery at [facility] on 08/15.  - X-ray left foot and ankle shows partial amputation the 1st, 2nd, 3rd, and 4th digits with fusion of the 2nd and 3rd MTP joints and throughout the midfoot, severe deformity and joint space loss the tibiotalar joint with a lapse of the talus likely 2/2 to chronic osteomyelitis, arthritis, or chronic Charcot foot.   - Patient being treated outside facility with Flagyl, linezolid, gentamicin with HD.  - Podiatry consulted, appreciate recs. Advised BKA but patient refused. Recommended CAM boot and abx per ID. D/tyler wound vac  - ESR elevated at 58 upon admission  - BCx2 from [facility] grew MRSA (last positive 8/15. Cultures from [facility] were NGTD.    PLAN:  - On Daptomycin 8mg/kg after HD per ID's recs. Planned for 6 weeks total, last dose 9/29  - ID follow up at 2 weeks.  - Wound culture by podiatry on 9/12 growing Klebsiella- started on 10 day course of ciprofloxacin starting 9/17.   - Podiatry consulted, appreciate recs. Recommend follow up with [facility] at discharge.
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
## 2018-09-20 NOTE — ASSESSMENT & PLAN NOTE
Mickey Ross is a 53 y.o. male with Left ankle wound, stable  -Ordered iodosorb, to be applied on wound by nurses daily  -Changed dressings with betadine 4x4's, kerlix and ace.  -Stab incision where prior abscess was drained draining only sanguinous discharge.  Cultures from abscess growing klebsiella, discussed with medicine team, will add 10 day course of cipro to abx regimen.  -recommend long term abx per ID recs, per Id Anticipate 6 weeks of IV antibiotics.  Estimated end date 9/28/18  -Patient to follow up with [facility] podiatry within 3-5 days of discharge, will need close follow up to monitor abscess.  Patient will need home health to do dressing changes as follows:  Pull packing from wound, rinse with saline, pat dry,  Pack medial ankle wound with betadine soaked packing,  Place iodosorb on incision sites, Dress with 4x4's, cast padding, kerlix, and coban or ace bandage.  Dressing changes to be done every 2-3 days.   -Podiatry will continue to follow.

## 2018-09-20 NOTE — SUBJECTIVE & OBJECTIVE
Interval Hx:  Patient Seen at bedside for dressing change.  Patient denies F/C/N/V.  Dressings clean, dry, and intact. Patient states nursing didn't do his dressing change yesterday.    Scheduled Meds:   amLODIPine  10 mg Oral Daily    aspirin  81 mg Oral Daily    atorvastatin  80 mg Oral Daily    calcium acetate  1,334 mg Oral TID WM    carvedilol  25 mg Oral BID    cetirizine  5 mg Oral Daily    ciprofloxacin HCl  500 mg Oral Daily    DAPTOmycin (CUBICIN)  IV  8 mg/kg Intravenous Q48H    epoetin umer (PROCRIT) injection  8,000 Units Intravenous Every Tues, Thurs, Sat    fluticasone  2 spray Each Nare Daily    heparin (porcine)  5,000 Units Subcutaneous Q8H    homatropine  1 drop Right Eye Daily    hydrALAZINE  100 mg Oral Q8H    HYDROmorphone  1 mg Intravenous Once    insulin aspart U-100  15 Units Subcutaneous TIDWM    insulin detemir U-100  20 Units Subcutaneous BID    methadone  10 mg Oral BID    moxifloxacin  1 drop Right Eye QID    patiromer calcium sorbitex  8.4 g Oral Once per day on Sun Mon Wed Fri    polyethylene glycol  17 g Oral Daily    prednisoLONE acetate  1 drop Right Eye QID    senna-docusate 8.6-50 mg  2 tablet Oral BID    sertraline  25 mg Oral Daily     Continuous Infusions:    PRN Meds:sodium chloride, sodium chloride 0.9%, acetaminophen, albuterol-ipratropium, cadexomer iodine, clonazePAM, dextrose 50%, dextrose 50%, glucagon (human recombinant), glucose, glucose, hydrALAZINE, HYDROcodone-acetaminophen, HYDROcodone-acetaminophen, hydrOXYzine HCl, insulin aspart U-100, naloxone, ondansetron, ondansetron, sodium chloride 0.9%    Review of patient's allergies indicates:   Allergen Reactions    Vancomycin analogues Rash     Red Man Syndrome    Penicillins         Past Medical History:   Diagnosis Date    Allergic drug rash - due to Vancomycin 8/19/2018    Allergic drug rash - due to Vancomycin 8/19/2018    Anxiety 8/17/2018    Arthritis     Blind left eye      Charcot's joint of foot     Chronic back pain 8/17/2018    Chronic, continuous use of opioids 8/17/2018    Debility 8/17/2018    Diabetes mellitus     GERD (gastroesophageal reflux disease)     Glaucoma     Hypertension     Methadone dependence 8/18/2018    MRSA (methicillin resistant staph aureus) culture positive     Osteomyelitis     Renal disorder     Sepsis due to methicillin resistant Staphylococcus aureus (MRSA) 8/17/2018    Subretinal abscess 8/17/2018     Past Surgical History:   Procedure Laterality Date    AVF left upper arm      INSERTION OF SANZ CATHETER Right 9/1/2018    Procedure: INSERTION, CATHETER, CENTRAL VENOUS, SANZ;  Surgeon: Rafi Churchill MD;  Location: [facility] OR [address] [place] [address];  Service: General;  Laterality: Right;    INSERTION, CATHETER, CENTRAL VENOUS, SANZ Right 9/1/2018    Performed by Rafi Churchill MD at [facility] OR [address] [place] [address]    left ankle surgery      lumbar back surgery      REPAIR OF RETINAL DETACHMENT WITH VITRECTOMY Right 9/18/2018    Procedure: REPAIR, RETINAL DETACHMENT, WITH VITRECTOMY;  Surgeon: Alexander Corrales MD;  Location: [facility] OR [address] [place] [address];  Service: Ophthalmology;  Laterality: Right;  request TF own last case    REPAIR, RETINAL DETACHMENT, WITH VITRECTOMY Right 9/18/2018    Performed by Alexander Corrales MD at [facility] OR [address] [place] [address]       Family History     Problem Relation (Age of Onset)    Pneumonia Mother        Tobacco Use    Smoking status: Never Smoker   Substance and Sexual Activity    Alcohol use: No     Frequency: Never    Drug use: No    Sexual activity: Not Currently     Review of Systems   Constitutional: Negative for chills and fever.   Gastrointestinal: Negative for nausea and vomiting.   Musculoskeletal:        Left ankle deformity.   Skin: Positive for color change and wound (Surgical incisions to the medial and lateral ankle).     Objective:     Vital Signs (Most Recent):  Temp: 97.8 °F (36.6 °C) (09/20/18 0755)  Pulse: 79  (09/20/18 0755)  Resp: 17 (09/20/18 0755)  BP: (!) 159/71 (09/20/18 0755)  SpO2: (!) 94 % (09/20/18 0755) Vital Signs (24h Range):  Temp:  [97.5 °F (36.4 °C)-98.4 °F (36.9 °C)] 97.8 °F (36.6 °C)  Pulse:  [72-87] 79  Resp:  [15-18] 17  SpO2:  [90 %-97 %] 94 %  BP: (127-161)/(60-77) 159/71     Weight: 102.9 kg (226 lb 13.7 oz)  Body mass index is 31.64 kg/m².    Foot Exam    General  General Appearance: appears stated age and healthy   Orientation: alert and oriented to person, place, and time       Left Foot/Ankle      Inspection and Palpation  Swelling: (Diffuse edema to the left LE, non pitting.)  Skin Exam: erythema (To the medial ankle incision); skin not intact (Surgical incisions to the medial and lateral ankle.)     Neurovascular  Dorsalis pedis: absent  Posterior tibial: absent  Saphenous nerve sensation: diminished  Tibial nerve sensation: diminished  Superficial peroneal nerve sensation: diminished  Deep peroneal nerve sensation: diminished  Sural nerve sensation: diminished    Comments  Ortho: Severe non reducible varus deformity of the left ankle.    Lateral Left ankle: Incision present with sutures in tact. No signs of acute infection. Skin edges well coapted.     Medial Left ankle: Surgical incision left open with granular wound beds to edges. Mild erythema, no drainage no purulence, no malodor, no streaking. Positive pain to palpation.     See clinic images below.      Laboratory:  A1C:   Recent Labs   Lab  08/17/18   2159   HGBA1C  8.2*     Blood Cultures:   No results for input(s): LABBLOO in the last 48 hours.  CBC:   Recent Labs   Lab  09/20/18   0540   WBC  6.01   RBC  2.44*   HGB  7.5*   HCT  24.9*   PLT  153   MCV  102*   MCH  30.7   MCHC  30.1*     CMP:   Recent Labs   Lab  09/19/18   0530   09/19/18   1409   GLU  380*   --    --    CALCIUM  7.6*   --    --    NA  137   --    --    K  7.1*   < >  3.9   CO2  23   --    --    CL  106   --    --    BUN  55*   --    --    CREATININE  7.5*   --     --     < > = values in this interval not displayed.     CRP:   No results for input(s): CRP in the last 168 hours.  ESR:   No results for input(s): SEDRATE in the last 168 hours.  Microbiology Results (last 7 days)     Procedure Component Value Units Date/Time    Culture, Anaerobe [[id number]] Collected:  09/12/18 1319    Order Status:  Completed Specimen:  Wound from Foot, Left Updated:  09/17/18 1300     Anaerobic Culture No anaerobes isolated    Aerobic culture [[id number]]  (Susceptibility) Collected:  09/12/18 1319    Order Status:  Completed Specimen:  Wound from Foot, Left Updated:  09/14/18 1128     Aerobic Bacterial Culture --     KLEBSIELLA OXYTOCA  Moderate          Specimen (12h ago, onward)    None          Diagnostic Results:  X-ray:  Bony destruction, impaction and probable osseous fusion involving the hindfoot and midfoot.  There appears to be medial angulation and displacement of the hindfoot with respect to the adjacent tibia and fibula.  Comparison to prior films and correlation with clinical findings will be needed.    Clinical findings  Wound on left medial ankle measures approximately 3.5 x 0.5  With granular base and viable margins.   Negative ptb, No purulence, edema, or malodor    Left medial anterior leg ulcer with granular base and mid periwound erythema.  No concepcion purulence, no maloder.    Mild dehisence of lateral ankle incision.

## 2018-09-20 NOTE — ASSESSMENT & PLAN NOTE
- Complaining of SOB past week   - EKG shows NSR  - Cardiology consulted given 2 prior episodes of hypotension and bradycardia with SOB. Troponin were elevated intially but then started to trend down. Recommended BP control and no further cardiac workup  - DDx likely non-cardiogenic pulmonary edema from ESRD vs prolonged hypertension causing pulmonary edema.  - CXR shows slight progression of pulmonary edema.  - CT chest consistent with pulmonary edema.   - Shortness of breath has improved and oxygen sats in mid to high 90's on room air.

## 2018-09-20 NOTE — TELEPHONE ENCOUNTER
----- Message from Romeo Moreira MD sent at 9/20/2018 11:19 AM CDT -----  Please note that the above patient is still in the hospital, and won't be able to make his appointment tomorrow.

## 2018-09-21 VITALS
DIASTOLIC BLOOD PRESSURE: 78 MMHG | BODY MASS INDEX: 31.76 KG/M2 | SYSTOLIC BLOOD PRESSURE: 165 MMHG | WEIGHT: 226.88 LBS | OXYGEN SATURATION: 94 % | HEIGHT: 71 IN | TEMPERATURE: 98 F | HEART RATE: 73 BPM | RESPIRATION RATE: 18 BRPM

## 2018-09-21 LAB
ALBUMIN SERPL BCP-MCNC: 2.7 G/DL
ANION GAP SERPL CALC-SCNC: 7 MMOL/L
BASOPHILS # BLD AUTO: 0.04 K/UL
BASOPHILS NFR BLD: 0.7 %
BUN SERPL-MCNC: 50 MG/DL
CALCIUM SERPL-MCNC: 8.1 MG/DL
CHLORIDE SERPL-SCNC: 104 MMOL/L
CO2 SERPL-SCNC: 25 MMOL/L
CREAT SERPL-MCNC: 7.9 MG/DL
DIFFERENTIAL METHOD: ABNORMAL
EOSINOPHIL # BLD AUTO: 0.3 K/UL
EOSINOPHIL NFR BLD: 4.6 %
ERYTHROCYTE [DISTWIDTH] IN BLOOD BY AUTOMATED COUNT: 16.4 %
EST. GFR  (AFRICAN AMERICAN): 8.1 ML/MIN/1.73 M^2
EST. GFR  (NON AFRICAN AMERICAN): 7 ML/MIN/1.73 M^2
GLUCOSE SERPL-MCNC: 316 MG/DL
HCT VFR BLD AUTO: 25 %
HGB BLD-MCNC: 7.3 G/DL
IMM GRANULOCYTES # BLD AUTO: 0.04 K/UL
IMM GRANULOCYTES NFR BLD AUTO: 0.7 %
LYMPHOCYTES # BLD AUTO: 1.7 K/UL
LYMPHOCYTES NFR BLD: 30.8 %
MAGNESIUM SERPL-MCNC: 2.3 MG/DL
MCH RBC QN AUTO: 29.9 PG
MCHC RBC AUTO-ENTMCNC: 29.2 G/DL
MCV RBC AUTO: 103 FL
MONOCYTES # BLD AUTO: 0.5 K/UL
MONOCYTES NFR BLD: 10 %
NEUTROPHILS # BLD AUTO: 2.9 K/UL
NEUTROPHILS NFR BLD: 53.2 %
NRBC BLD-RTO: 0 /100 WBC
PHOSPHATE SERPL-MCNC: 3.8 MG/DL
PLATELET # BLD AUTO: 170 K/UL
PMV BLD AUTO: 11.1 FL
POCT GLUCOSE: 208 MG/DL (ref 70–110)
POCT GLUCOSE: 281 MG/DL (ref 70–110)
POTASSIUM SERPL-SCNC: 5.4 MMOL/L
RBC # BLD AUTO: 2.44 M/UL
SODIUM SERPL-SCNC: 136 MMOL/L
WBC # BLD AUTO: 5.42 K/UL

## 2018-09-21 PROCEDURE — 25000003 PHARM REV CODE 250: Performed by: NURSE PRACTITIONER

## 2018-09-21 PROCEDURE — 25000003 PHARM REV CODE 250: Performed by: OPHTHALMOLOGY

## 2018-09-21 PROCEDURE — 99239 HOSP IP/OBS DSCHRG MGMT >30: CPT | Mod: ,,, | Performed by: HOSPITALIST

## 2018-09-21 PROCEDURE — 25000003 PHARM REV CODE 250: Performed by: STUDENT IN AN ORGANIZED HEALTH CARE EDUCATION/TRAINING PROGRAM

## 2018-09-21 PROCEDURE — 90935 HEMODIALYSIS ONE EVALUATION: CPT | Mod: ,,, | Performed by: NURSE PRACTITIONER

## 2018-09-21 PROCEDURE — 80048 BASIC METABOLIC PNL TOTAL CA: CPT

## 2018-09-21 PROCEDURE — 25000003 PHARM REV CODE 250: Performed by: HOSPITALIST

## 2018-09-21 PROCEDURE — 83735 ASSAY OF MAGNESIUM: CPT

## 2018-09-21 PROCEDURE — 82040 ASSAY OF SERUM ALBUMIN: CPT

## 2018-09-21 PROCEDURE — 84100 ASSAY OF PHOSPHORUS: CPT

## 2018-09-21 PROCEDURE — 90935 HEMODIALYSIS ONE EVALUATION: CPT

## 2018-09-21 PROCEDURE — 85025 COMPLETE CBC W/AUTO DIFF WBC: CPT

## 2018-09-21 PROCEDURE — 25000003 PHARM REV CODE 250: Performed by: INTERNAL MEDICINE

## 2018-09-21 RX ORDER — METHADONE HYDROCHLORIDE 10 MG/1
10 TABLET ORAL 2 TIMES DAILY
Qty: 28 TABLET | Refills: 0 | Status: SHIPPED | OUTPATIENT
Start: 2018-09-21

## 2018-09-21 RX ORDER — CARVEDILOL 12.5 MG/1
12.5 TABLET ORAL 2 TIMES DAILY
Qty: 60 TABLET | Refills: 11 | Status: SHIPPED | OUTPATIENT
Start: 2018-09-21 | End: 2019-09-21

## 2018-09-21 RX ORDER — INSULIN GLARGINE 100 [IU]/ML
25 INJECTION, SOLUTION SUBCUTANEOUS 2 TIMES DAILY
Qty: 15 ML | Refills: 1 | Status: SHIPPED | OUTPATIENT
Start: 2018-09-21 | End: 2018-12-10

## 2018-09-21 RX ORDER — CIPROFLOXACIN 500 MG/1
500 TABLET ORAL DAILY
Qty: 7 TABLET | Refills: 0 | Status: SHIPPED | OUTPATIENT
Start: 2018-09-21 | End: 2018-11-26

## 2018-09-21 RX ORDER — ATORVASTATIN CALCIUM 40 MG/1
40 TABLET, FILM COATED ORAL DAILY
Qty: 90 TABLET | Refills: 3 | Status: SHIPPED | OUTPATIENT
Start: 2018-09-21 | End: 2019-09-21

## 2018-09-21 RX ORDER — AMLODIPINE BESYLATE 10 MG/1
10 TABLET ORAL DAILY
Qty: 30 TABLET | Refills: 11 | Status: SHIPPED | OUTPATIENT
Start: 2018-09-21 | End: 2019-09-21

## 2018-09-21 RX ORDER — MOXIFLOXACIN 5 MG/ML
1 SOLUTION/ DROPS OPHTHALMIC 4 TIMES DAILY
Qty: 3 ML | Refills: 1 | Status: SHIPPED | OUTPATIENT
Start: 2018-09-21 | End: 2018-11-26 | Stop reason: ALTCHOICE

## 2018-09-21 RX ORDER — METHADONE HYDROCHLORIDE 10 MG/1
10 TABLET ORAL 2 TIMES DAILY
Qty: 28 TABLET | Refills: 0 | Status: SHIPPED | OUTPATIENT
Start: 2018-09-21 | End: 2018-09-21

## 2018-09-21 RX ORDER — HYDRALAZINE HYDROCHLORIDE 25 MG/1
25 TABLET, FILM COATED ORAL EVERY 8 HOURS
Qty: 90 TABLET | Refills: 11 | Status: SHIPPED | OUTPATIENT
Start: 2018-09-21 | End: 2019-09-21

## 2018-09-21 RX ORDER — ASPIRIN 81 MG/1
81 TABLET ORAL DAILY
Refills: 0 | COMMUNITY
Start: 2018-09-21 | End: 2019-09-21

## 2018-09-21 RX ADMIN — INSULIN ASPART 2 UNITS: 100 INJECTION, SOLUTION INTRAVENOUS; SUBCUTANEOUS at 01:09

## 2018-09-21 RX ADMIN — INSULIN ASPART 15 UNITS: 100 INJECTION, SOLUTION INTRAVENOUS; SUBCUTANEOUS at 01:09

## 2018-09-21 RX ADMIN — METHADONE HYDROCHLORIDE 10 MG: 5 TABLET ORAL at 01:09

## 2018-09-21 RX ADMIN — INSULIN DETEMIR 20 UNITS: 100 INJECTION, SOLUTION SUBCUTANEOUS at 08:09

## 2018-09-21 RX ADMIN — CARVEDILOL 25 MG: 25 TABLET, FILM COATED ORAL at 01:09

## 2018-09-21 RX ADMIN — ASPIRIN 81 MG: 81 TABLET, COATED ORAL at 01:09

## 2018-09-21 RX ADMIN — MOXIFLOXACIN 1 DROP: 5 SOLUTION/ DROPS OPHTHALMIC at 01:09

## 2018-09-21 RX ADMIN — CALCIUM ACETATE 1334 MG: 667 CAPSULE ORAL at 01:09

## 2018-09-21 RX ADMIN — SODIUM CHLORIDE: 0.9 INJECTION, SOLUTION INTRAVENOUS at 10:09

## 2018-09-21 RX ADMIN — CETIRIZINE HYDROCHLORIDE 5 MG: 5 TABLET ORAL at 01:09

## 2018-09-21 RX ADMIN — HYDROCODONE BITARTRATE AND ACETAMINOPHEN 1 TABLET: 5; 325 TABLET ORAL at 01:09

## 2018-09-21 RX ADMIN — AMLODIPINE BESYLATE 10 MG: 10 TABLET ORAL at 01:09

## 2018-09-21 RX ADMIN — MOXIFLOXACIN 1 DROP: 5 SOLUTION/ DROPS OPHTHALMIC at 06:09

## 2018-09-21 RX ADMIN — INSULIN ASPART 15 UNITS: 100 INJECTION, SOLUTION INTRAVENOUS; SUBCUTANEOUS at 08:09

## 2018-09-21 RX ADMIN — HYDROCODONE BITARTRATE AND ACETAMINOPHEN 1 TABLET: 7.5; 325 TABLET ORAL at 05:09

## 2018-09-21 RX ADMIN — HYDRALAZINE HYDROCHLORIDE 100 MG: 50 TABLET ORAL at 06:09

## 2018-09-21 RX ADMIN — SERTRALINE HYDROCHLORIDE 25 MG: 25 TABLET ORAL at 01:09

## 2018-09-21 RX ADMIN — HOMATROPINE HYDROBROMIDE 1 DROP: 50 SOLUTION OPHTHALMIC at 01:09

## 2018-09-21 RX ADMIN — CIPROFLOXACIN 500 MG: 500 TABLET, FILM COATED ORAL at 01:09

## 2018-09-21 RX ADMIN — PREDNISOLONE ACETATE 1 DROP: 10 SUSPENSION/ DROPS OPHTHALMIC at 06:09

## 2018-09-21 RX ADMIN — HYDRALAZINE HYDROCHLORIDE 100 MG: 50 TABLET ORAL at 05:09

## 2018-09-21 RX ADMIN — INSULIN ASPART 3 UNITS: 100 INJECTION, SOLUTION INTRAVENOUS; SUBCUTANEOUS at 08:09

## 2018-09-21 RX ADMIN — PREDNISOLONE ACETATE 1 DROP: 10 SUSPENSION/ DROPS OPHTHALMIC at 01:09

## 2018-09-21 RX ADMIN — ATORVASTATIN CALCIUM 80 MG: 20 TABLET, FILM COATED ORAL at 01:09

## 2018-09-21 NOTE — ASSESSMENT & PLAN NOTE
- Osteomyelitis of left 5th metatarsal taken for washout by Orthopedic surgery at [facility] on 08/15.  - X-ray left foot and ankle shows partial amputation the 1st, 2nd, 3rd, and 4th digits with fusion of the 2nd and 3rd MTP joints and throughout the midfoot, severe deformity and joint space loss the tibiotalar joint with a lapse of the talus likely 2/2 to chronic osteomyelitis, arthritis, or chronic Charcot foot.   - Patient being treated outside facility with Flagyl, linezolid, gentamicin with HD.  - Podiatry consulted, appreciate recs. Advised BKA but patient refused. Recommended CAM boot and abx per ID. D/tyler wound vac  - ESR elevated at 58 upon admission  - BCx2 from [facility] grew MRSA (last positive 8/15. Cultures from [facility] were NGTD.    PLAN:  - On Daptomycin 8mg/kg after HD per ID's recs. Planned for 6 weeks total, last dose 9/29  - ID follow up at 2 weeks.  - Wound culture by podiatry on 9/12 growing Klebsiella- started on 10 day course of ciprofloxacin starting 9/17.   - Podiatry consulted, appreciate recs. Recommend follow up with [facility] Podiatry at discharge.

## 2018-09-21 NOTE — ASSESSMENT & PLAN NOTE
- Complaining of SOB past week   - EKG shows NSR  - Cardiology consulted given 2 prior episodes of hypotension and bradycardia with SOB. Troponin were elevated intially but then started to trend down. Recommended BP control and no further cardiac workup  - DDx likely non-cardiogenic pulmonary edema from ESRD vs prolonged hypertension causing pulmonary edema.  - CXR shows slight progression of pulmonary edema.  - CT chest consistent with pulmonary edema.   - Shortness of breath has now resolved.

## 2018-09-21 NOTE — PLAN OF CARE
Problem: Patient Care Overview  Goal: Plan of Care Review  Outcome: Ongoing (interventions implemented as appropriate)  HD duration and amt of fluid to be removed

## 2018-09-21 NOTE — PLAN OF CARE
[facility] informed Pt to d.c today, hh orders needed to update [facility], [facility] to follow. Selma spoke with Stacey , [facility]ma informed of d/c, Stacey will follow once orders updated, Selma will contact [facility] and fax orders for d/c.

## 2018-09-21 NOTE — SUBJECTIVE & OBJECTIVE
Interval History:     Review of Systems   Constitutional: Negative for chills, fatigue and fever.   HENT: Negative.    Eyes: Positive for pain and visual disturbance.   Respiratory: Negative for cough, shortness of breath, wheezing and stridor.    Cardiovascular: Negative for chest pain, palpitations and leg swelling.   Gastrointestinal: Negative for abdominal pain, diarrhea and nausea.   Endocrine: Negative.    Genitourinary: Negative for dysuria and flank pain.   Musculoskeletal: Negative.    Skin: Positive for wound.   Allergic/Immunologic: Negative.    Neurological: Negative for dizziness and headaches.   Hematological: Negative.    Psychiatric/Behavioral: Negative.      Objective:     Vital Signs (Most Recent):  Temp: 98 °F (36.7 °C) (09/21/18 1507)  Pulse: 73 (09/21/18 1531)  Resp: 16 (09/21/18 1507)  BP: (!) 140/65 (09/21/18 1507)  SpO2: (!) 90 % (09/21/18 1507) Vital Signs (24h Range):  Temp:  [96.1 °F (35.6 °C)-98.4 °F (36.9 °C)] 98 °F (36.7 °C)  Pulse:  [68-97] 73  Resp:  [16-19] 16  SpO2:  [90 %-96 %] 90 %  BP: (119-161)/(57-77) 140/65     Weight: 102.9 kg (226 lb 13.7 oz)  Body mass index is 31.64 kg/m².    Intake/Output Summary (Last 24 hours) at 9/21/2018 1711  Last data filed at 9/21/2018 1218  Gross per 24 hour   Intake 0 ml   Output 1601 ml   Net -1601 ml      Physical Exam   Constitutional: He is oriented to person, place, and time. He appears well-developed and well-nourished. No distress.   HENT:   Head: Normocephalic and atraumatic.   Neck: Normal range of motion. Neck supple.   Cardiovascular: Normal rate, regular rhythm, normal heart sounds and intact distal pulses. Exam reveals no gallop and no friction rub.   No murmur heard.  Pulmonary/Chest: Effort normal and breath sounds normal. No stridor. No respiratory distress. He has no wheezes. He has no rales.   Abdominal: Soft. He exhibits no distension.   Musculoskeletal: Normal range of motion.   Neurological: He is alert and oriented to  person, place, and time.   Skin: Skin is warm and dry. He is not diaphoretic.   Psychiatric: He has a normal mood and affect. His behavior is normal. Judgment and thought content normal.   Nursing note and vitals reviewed.      Significant Labs:   Recent Lab Results       09/21/18  1321 09/21/18  0801 09/21/18  0333 09/21/18  0331 09/20/18  2136      Immature Granulocytes    0.7      Immature Grans (Abs)    0.04  Comment:  Mild elevation in immature granulocytes is non specific and   can be seen in a variety of conditions including stress response,   acute inflammation, trauma and pregnancy. Correlation with other   laboratory and clinical findings is essential.        Albumin   2.7       Anion Gap   7       Baso #    0.04      Basophil%    0.7      BUN, Bld   50       Calcium   8.1       Chloride   104       CO2   25       Creatinine   7.9       Differential Method    Automated      eGFR if    8.1       eGFR if non    7.0  Comment:  Calculation used to obtain the estimated glomerular filtration  rate (eGFR) is the CKD-EPI equation.          Eos #    0.3      Eosinophil%    4.6      Glucose   316       Gran # (ANC)    2.9      Gran%    53.2      Hematocrit    25.0      Hemoglobin    7.3      Lymph #    1.7      Lymph%    30.8      Magnesium   2.3       MCH    29.9      MCHC    29.2      MCV    103      Mono #    0.5      Mono%    10.0      MPV    11.1      nRBC    0      Phosphorus   3.8       Platelets    170      POCT Glucose 208 281   358     Potassium   5.4  Comment:  *No Visible Hemolysis       RBC    2.44      RDW    16.4      Sodium   136       WBC    5.42                      Significant Imaging: I have reviewed all pertinent imaging results/findings within the past 24 hours.

## 2018-09-21 NOTE — ASSESSMENT & PLAN NOTE
- Repaired with ophthalmology on 9/18.  - Will discharge once vision improves, per primary note.

## 2018-09-21 NOTE — NURSING
Pt discharged per MD orders.  Tele discontinued.  Medication list and prescriptions reviewed; prescriptions sent to pt preferred pharmacy and printed prescriptions provided.  Pt verbalizes understanding of all written and verbal discharge instructions. Pt awaiting escort arrival.  Will continue to monitor.

## 2018-09-21 NOTE — NURSING TRANSFER
Nursing Transfer Note      9/21/2018     Transfer From: HD    Transfer via stretcher    Transfer with cardiac monitoring    Transported by nurse    Medicines sent: no    Chart send with patient: Yes    Patient reassessed at: 9/21/17 1:21

## 2018-09-21 NOTE — SUBJECTIVE & OBJECTIVE
Interval History: Patient evaluated at bedside during HD treatment, tolerating treatment well. See HD flowsheet for vitals and assessments. No acute events overnight. Patient with continual complaints of blurry vision and pain to his R eye, being managed by primary and ophthalmology. Patient will be discharge once vision improves. Denies complaints of shortness of breath, chest pain, muscle cramps, nausea or vomiting, abdominal pain, RLS, pruritus, or fatigue. Patient currently on hemodialysis for removal of uremic toxins and volume. Target UF ~ 1L.       Review of patient's allergies indicates:   Allergen Reactions    Vancomycin analogues Rash     Red Man Syndrome    Penicillins      Current Facility-Administered Medications   Medication Frequency    0.9%  NaCl infusion (for blood administration) Q24H PRN    0.9%  NaCl infusion PRN    0.9%  NaCl infusion Once    acetaminophen tablet 650 mg Q4H PRN    albuterol-ipratropium 2.5 mg-0.5 mg/3 mL nebulizer solution 3 mL Q4H PRN    amLODIPine tablet 10 mg Daily    aspirin EC tablet 81 mg Daily    atorvastatin tablet 80 mg Daily    cadexomer iodine 0.9 % gel Daily PRN    calcium acetate capsule 1,334 mg TID WM    carvedilol tablet 25 mg BID    cetirizine tablet 5 mg Daily    ciprofloxacin HCl tablet 500 mg Daily    clonazePAM tablet 0.5 mg BID PRN    DAPTOmycin (CUBICIN) 905 mg in sodium chloride 0.9% IVPB Q48H    dextrose 50% injection 12.5 g PRN    dextrose 50% injection 25 g PRN    epoetin umer injection 8,000 Units Every Tues, Thurs, Sat    fluticasone 50 mcg/actuation nasal spray 100 mcg Daily    glucagon (human recombinant) injection 1 mg PRN    glucose chewable tablet 16 g PRN    glucose chewable tablet 24 g PRN    heparin (porcine) injection 5,000 Units Q8H    homatropine 5 % ophthalmic solution 1 drop Daily    hydrALAZINE tablet 100 mg Q8H    hydrALAZINE tablet 50 mg Q8H PRN    HYDROcodone-acetaminophen 5-325 mg per tablet 1 tablet Q4H  PRN    HYDROcodone-acetaminophen 7.5-325 mg per tablet 1 tablet Q6H PRN    HYDROmorphone injection 1 mg Once    hydrOXYzine HCl tablet 25 mg TID PRN    insulin aspart U-100 pen 0-10 Units QID (AC + HS) PRN    insulin aspart U-100 pen 15 Units TIDWM    insulin detemir U-100 pen 20 Units BID    methadone tablet 10 mg BID    moxifloxacin 0.5 % ophthalmic solution 1 drop QID    naloxone 0.4 mg/mL injection 0.4 mg PRN    ondansetron disintegrating tablet 8 mg Q8H PRN    ondansetron injection 4 mg Q8H PRN    patiromer calcium sorbitex PwPk 8.4 g Once per day on Sun Mon Wed Fri    polyethylene glycol packet 17 g Daily    prednisoLONE acetate 1 % ophthalmic suspension 1 drop QID    senna-docusate 8.6-50 mg per tablet 2 tablet BID    sertraline tablet 25 mg Daily    sodium chloride 0.9% flush 3 mL PRN       Objective:     Vital Signs (Most Recent):  Temp: 97.5 °F (36.4 °C) (09/21/18 0759)  Pulse: 71 (09/21/18 0759)  Resp: 18 (09/21/18 0759)  BP: (!) 145/69 (09/21/18 0759)  SpO2: (!) 91 % (09/21/18 0759)  O2 Device (Oxygen Therapy): room air (09/21/18 0759) Vital Signs (24h Range):  Temp:  [96.1 °F (35.6 °C)-98.4 °F (36.9 °C)] 97.5 °F (36.4 °C)  Pulse:  [68-97] 71  Resp:  [16-18] 18  SpO2:  [90 %-95 %] 91 %  BP: (123-148)/(57-70) 145/69     Weight: 102.9 kg (226 lb 13.7 oz) (09/18/18 1729)  Body mass index is 31.64 kg/m².  Body surface area is 2.27 meters squared.    I/O last 3 completed shifts:  In: 990 [P.O.:940; IV Piggyback:50]  Out: 1 [Blood:1]    Physical Exam   Constitutional: He is oriented to person, place, and time. He appears well-developed and well-nourished. No distress.   HENT:   Head: Normocephalic.   Eyes: Right eye exhibits no discharge. Left eye exhibits no discharge.   Neck: Normal range of motion.   Cardiovascular: Normal rate and regular rhythm. Exam reveals no friction rub.   No murmur heard.  Pulmonary/Chest: Effort normal and breath sounds normal. No stridor. No respiratory distress.  He has no wheezes. He has no rales.   Abdominal: Soft. He exhibits no distension.   Musculoskeletal: Normal range of motion. He exhibits edema.   Ace bandage and kerlix dressing noted to L foot with dried drainage. Edema noted to LLE.   Partial amputation of L foot 1st, 2nd, 3rd, and 4th digits.    Neurological: He is alert and oriented to person, place, and time.   Skin: Skin is warm and dry. He is not diaphoretic.   Psychiatric: He has a normal mood and affect. His behavior is normal. Judgment and thought content normal.   Nursing note and vitals reviewed.      Significant Labs:  CBC:   Recent Labs   Lab  09/21/18   0331   WBC  5.42   RBC  2.44*   HGB  7.3*   HCT  25.0*   PLT  170   MCV  103*   MCH  29.9   MCHC  29.2*     CMP:   Recent Labs   Lab  09/21/18   0333   GLU  316*   CALCIUM  8.1*   NA  136   K  5.4*   CO2  25   CL  104   BUN  50*   CREATININE  7.9*

## 2018-09-21 NOTE — NURSING TRANSFER
Nursing Transfer Note      9/21/2018     Transfer To: HD    Transfer via stretcher    Transfer with cardiac monitoring    Transported by transport    Medicines sent: no    Chart send with patient: Yes

## 2018-09-21 NOTE — ASSESSMENT & PLAN NOTE
- Likely seeded from MRSA bacteremia (source osteomyelitis)  - Received intravitreal vancomycin and ceftazidime at admit.  - Opthmology following, daily assessments ongoing - received 3rd dose of intravitreal vancomycin on 8/24  - Per Ophthalmology, lesion appears to be consolidating but no significant improvement in vision. Will need daily assessment for atleast 1 week from date of last intravitreal injection.  - Advised follow up twice weekly.  - Now with retinal detachment  - Surgery by opthalmology on 9/18  - Will follow up with ophthalmology

## 2018-09-21 NOTE — ASSESSMENT & PLAN NOTE
iHD TTS    [place] at [facility]   Followed by Dr. Cruz  Duration 3 hrs with 15 minutes  Accesss on MIESHA AVF  No residual renal function    - Will provide dialysis today for metabolic clearance and volume management.    - Target ultrafiltration ~1 L  - Dialysate adjusted to current labs   - Avoid nephrotoxic medications  - Medications to renal parameters  - Strict Input and Output and chart  - Daily standing weights    Anemia of Chronic Kidney Disease   - Hb to target 10 gm/dL; Hgb 7.3 today.   - Will continue EPO during HD TiW    Mineral Bone Disease in CKD   - Renal Function Panel Daily for electrolytes monitoring  - Receiving Calcium Acetate TID WM    HTN   - Stable BP, will continue to monitor. Goal for BP is <140 mmHg SBP and BDP <90 mmHg, maintain MAP > 65.    Nutrition   - Continue Renal Diet, Low Potassium Diet

## 2018-09-21 NOTE — PLAN OF CARE
Problem: Patient Care Overview  Goal: Plan of Care Review  Outcome: Ongoing (interventions implemented as appropriate)  Pt free of falls, trauma, injury. Pt had HD today. Pt diabetes managed with accu checks; insulin given per MD orders. Pt currently on PO antibiotics for infection. Pt tolerating plan of care.
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
## 2018-09-21 NOTE — PROGRESS NOTES
Maintenance HD completed blood returned pressure held to ANTELMO fistula until hemostasis achieved, gauze and paper tape applied to site JOHN +bruit and thrill. 1L of lfuid removed over 3hrs, pt tolerated well. Report called to nurse

## 2018-09-21 NOTE — PLAN OF CARE
Problem: Patient Care Overview  Goal: Plan of Care Review  Outcome: Ongoing (interventions implemented as appropriate)  Patient remains free from falls and injuries through out shift. Patient AAO and VSS. Patient denies chest pain and SOB. Will discharge once vision improves. HD schedule at T, TH, S. Recommend follow up with [facility] at discharge. Pt received PRN pain medication due to back, eye and foot pain.  Plan of care reviewed with patient. Patient verbalizes understanding of plan.  Will continue to monitor.
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
## 2018-09-24 ENCOUNTER — OFFICE VISIT (OUTPATIENT)
Dept: OPHTHALMOLOGY | Facility: CLINIC | Age: 53
End: 2018-09-24
Attending: OPHTHALMOLOGY
Payer: MEDICAID

## 2018-09-24 DIAGNOSIS — L02.91 ABSCESS: ICD-10-CM

## 2018-09-24 DIAGNOSIS — H33.011 RETINAL DETACHMENT OF RIGHT EYE WITH SINGLE RETINAL TEAR: Primary | ICD-10-CM

## 2018-09-24 PROCEDURE — 99999 PR PBB SHADOW E&M-EST. PATIENT-LVL III: CPT | Mod: PBBFAC,,, | Performed by: OPHTHALMOLOGY

## 2018-09-24 PROCEDURE — 92012 INTRM OPH EXAM EST PATIENT: CPT | Mod: S$PBB,,, | Performed by: OPHTHALMOLOGY

## 2018-09-24 PROCEDURE — 92134 CPTRZ OPH DX IMG PST SGM RTA: CPT | Mod: PBBFAC,PO | Performed by: OPHTHALMOLOGY

## 2018-09-24 PROCEDURE — 99213 OFFICE O/P EST LOW 20 MIN: CPT | Mod: PBBFAC,PO | Performed by: OPHTHALMOLOGY

## 2018-09-24 RX ORDER — CLONIDINE HYDROCHLORIDE 0.1 MG/1
TABLET ORAL
Refills: 5 | COMMUNITY
Start: 2018-07-09

## 2018-09-24 RX ORDER — ALBUTEROL SULFATE 90 UG/1
AEROSOL, METERED RESPIRATORY (INHALATION)
Refills: 5 | COMMUNITY
Start: 2018-07-09

## 2018-09-24 RX ORDER — TRIAMCINOLONE ACETONIDE 5 MG/G
OINTMENT TOPICAL
Refills: 1 | COMMUNITY
Start: 2018-07-10

## 2018-09-24 RX ORDER — CALCIUM CITRATE/VITAMIN D3 200MG-6.25
TABLET ORAL
Refills: 11 | COMMUNITY
Start: 2018-08-06

## 2018-09-24 NOTE — PHYSICIAN QUERY
"PT Name: Mickey Ross  MR #: [id number]     Physician Query Form - Documentation Clarification      CDS: Fe Suazo RN, CCDS         Contact information :[phone]  [email or web address]       This form is a permanent document in the medical record.     Query Date: September 24, 2018    By submitting this query, we are merely seeking further clarification of documentation. Please utilize your independent clinical judgment when addressing the question(s) below.    The Medical record reflects the following:    Supporting Clinical Findings Location in Medical Record     "PMHx of ESRD (HD MWF), IDDM, HTN, AFib (on Coreg),     "Junctional rhythm with occasional Premature ventricular complexes  Abnormal ECG"    "Normal sinus rhythm  Prolonged QT  Abnormal ECG  When compared with ECG of 06-SEP-2018 07:36,  No significant change was found"   Anesthesia pre-procedure eval 9/17/18      EKG 9/2/18        EKG 9/19/18                                                                                Doctor, Please specify diagnosis or diagnoses associated with above clinical findings.  please further specify the Atrial Fibrillation diagnosis.  Provider Use Only        [  ] Chronic  [X  ] Paroxysmal  [  ] Permanent  [  ] Persistent  [  ] Atrial fibrillation ruled out  [  ] Atrial fibrillation is past medical history only  [  ] Clinically insignificant   [  ] Other (please specify): ____________________________  [  ] Clinically Undetermined                                                                                                           [  ] Clinically undetermined            "

## 2018-09-24 NOTE — PROGRESS NOTES
HPI     Patient's age: 53 y.o. Here today for 1 week PO PPV/EL      Pt states that he is blind in the OS and had surgery last wk on the OD,   have a discomfort type feeling to the eye like a bruise.  No pain.  Va   blurry OD.  Sees TV OK with +300 glasses    Eye Med(s)  Pred QID - OD                      Vigamaox QID - OD                      Isoptohomatropine QD - OD                     NeoPolyDexa QHS - OD  Wasn't told to use    Last edited by Alexander Corrales MD on 9/24/2018  1:18 PM. (History)        Olean SDOCT:   OD: good quality, very thin SRF, good foveal contour, much improved from 9/17/18 scan      Assessment /Plan     For exam results, see Encounter Report.    Retinal detachment of right eye with single retinal tear  -     Posterior Segment OCT Retina-Both eyes    Subretinal abscess      Doing well POW #1  Cont PF 4/0  Vig 4/0 x 4 days and d/c  D/c HA, don't use maxitrol  Do not lie on back  Get magnifier, use reading glasses for distance  RD/Endophthalmitis precautions  RTC one week, sooner PRN  Will refer for refraction

## 2018-09-26 NOTE — PHYSICIAN QUERY
"PT Name: Mickey Ross  MR #: [id number]     Physician Query Form - Documentation Clarification      CDS: Fe Suazo RN, CCDS         Contact information :[phone]  [email or web address]       This form is a permanent document in the medical record.     Query Date: September 25, 2018    By submitting this query, we are merely seeking further clarification of documentation. Please utilize your independent clinical judgment when addressing the question(s) below.    The Medical record reflects the following:    Supporting Clinical Findings Location in Medical Record     "Left retinal detachment"    "Pre-Op Dx: Rhegmatogenous retinal detachment, right eye  Post Op Dx: Same  Procedure Performed: Repair of retinal detachment by pars plana vitrectomy, injection of perfluorocarbon liquid, endolaser, injection of 5000cs silicone oil, right eye'       H&P 9/18/18    Op Note 9/18/18                                                                                Doctor, Please specify diagnosis or diagnoses associated with above clinical findings.  Please clarify laterality of  Retinal detachment and repair.    Provider Use Only      [X ] right eye    [  ] Left eye                                                                                                             [  ] Clinically undetermined            "

## 2018-09-28 ENCOUNTER — TELEPHONE (OUTPATIENT)
Dept: INFECTIOUS DISEASES | Facility: CLINIC | Age: 53
End: 2018-09-28

## 2018-09-28 NOTE — TELEPHONE ENCOUNTER
Infectious Disease - Lab follow up     Dx: MRsA right retinal abscess   Antibiotics: Daptomycin 10mg/kg (~1g after dialysis MWF)  Estimated End Date: 9/28/18    WBC - 4.5  H/H - 7.7/23.6 (~baseline)   Platelets -  94  Creatinine - 6.8  ESR - 19  CRP -15   AST/ALT - 12/11  CPK 37    Repeat CBC and follow closely.   Completed IV abx today

## 2018-10-03 ENCOUNTER — OFFICE VISIT (OUTPATIENT)
Dept: PODIATRY | Facility: CLINIC | Age: 53
End: 2018-10-03
Payer: MEDICAID

## 2018-10-03 ENCOUNTER — DOCUMENTATION ONLY (OUTPATIENT)
Dept: INFECTIOUS DISEASES | Facility: HOSPITAL | Age: 53
End: 2018-10-03

## 2018-10-03 ENCOUNTER — OFFICE VISIT (OUTPATIENT)
Dept: OPHTHALMOLOGY | Facility: CLINIC | Age: 53
End: 2018-10-03
Attending: OPHTHALMOLOGY
Payer: MEDICAID

## 2018-10-03 ENCOUNTER — OFFICE VISIT (OUTPATIENT)
Dept: OPTOMETRY | Facility: CLINIC | Age: 53
End: 2018-10-03
Payer: MEDICAID

## 2018-10-03 VITALS — HEART RATE: 73 BPM | SYSTOLIC BLOOD PRESSURE: 150 MMHG | DIASTOLIC BLOOD PRESSURE: 72 MMHG

## 2018-10-03 VITALS — BODY MASS INDEX: 31.64 KG/M2 | WEIGHT: 226 LBS | HEIGHT: 71 IN

## 2018-10-03 DIAGNOSIS — E11.49 TYPE II DIABETES MELLITUS WITH NEUROLOGICAL MANIFESTATIONS: ICD-10-CM

## 2018-10-03 DIAGNOSIS — H33.011 RETINAL DETACHMENT OF RIGHT EYE WITH SINGLE RETINAL TEAR: Primary | ICD-10-CM

## 2018-10-03 DIAGNOSIS — L02.91 ABSCESS: ICD-10-CM

## 2018-10-03 DIAGNOSIS — H52.7 REFRACTIVE ERROR: Primary | ICD-10-CM

## 2018-10-03 DIAGNOSIS — M14.672 CHARCOT'S JOINT OF LEFT FOOT: ICD-10-CM

## 2018-10-03 DIAGNOSIS — L97.521 FOOT ULCERATION, LEFT, LIMITED TO BREAKDOWN OF SKIN: Primary | ICD-10-CM

## 2018-10-03 PROCEDURE — 99999 PR PBB SHADOW E&M-EST. PATIENT-LVL III: CPT | Mod: PBBFAC,,, | Performed by: OPHTHALMOLOGY

## 2018-10-03 PROCEDURE — 92250 FUNDUS PHOTOGRAPHY W/I&R: CPT | Mod: PBBFAC,PO | Performed by: OPHTHALMOLOGY

## 2018-10-03 PROCEDURE — 99214 OFFICE O/P EST MOD 30 MIN: CPT | Mod: PBBFAC,27,PO,25 | Performed by: PODIATRIST

## 2018-10-03 PROCEDURE — 99214 OFFICE O/P EST MOD 30 MIN: CPT | Mod: S$PBB,,, | Performed by: PODIATRIST

## 2018-10-03 PROCEDURE — 99024 POSTOP FOLLOW-UP VISIT: CPT | Mod: ,,, | Performed by: OPHTHALMOLOGY

## 2018-10-03 PROCEDURE — 99211 OFF/OP EST MAY X REQ PHY/QHP: CPT | Mod: PBBFAC,PO,25 | Performed by: OPTOMETRIST

## 2018-10-03 PROCEDURE — 99999 PR PBB SHADOW E&M-EST. PATIENT-LVL IV: CPT | Mod: PBBFAC,,, | Performed by: PODIATRIST

## 2018-10-03 PROCEDURE — 99999 PR PBB SHADOW E&M-EST. PATIENT-LVL I: CPT | Mod: PBBFAC,,, | Performed by: OPTOMETRIST

## 2018-10-03 PROCEDURE — 92015 DETERMINE REFRACTIVE STATE: CPT | Mod: ,,, | Performed by: OPTOMETRIST

## 2018-10-03 PROCEDURE — 92235 FLUORESCEIN ANGRPH MLTIFRAME: CPT | Mod: 50,PBBFAC,PO | Performed by: OPHTHALMOLOGY

## 2018-10-03 PROCEDURE — 99213 OFFICE O/P EST LOW 20 MIN: CPT | Mod: PBBFAC,27,PO,25 | Performed by: OPHTHALMOLOGY

## 2018-10-03 NOTE — PROGRESS NOTES
HPI     DLS: 9/24/18    Pt here for PO PPV/EL;  Pt states having some discomfort/pain in the top right corner of OD. Va is   improving OD.      Meds: Pred qid od    1. Retinal detachment of right eye with single retinal tear  2.Subretinal abscess      Last edited by Alexander Corrales MD on 10/3/2018  9:44 AM. (History)      Fundus photo OD-good quality  ONH s/p, retina attached, superior early laser scar around SR scar, inf mac/IT arcade preret white lesision with sharp edges, scattered ret heme. improved compared to 9/17/18 scan    FA--good quality  OD: brisk transit, diffuse patchy leakage, blockage by ret heme- no sig hyper or hypofl in outline of preret white lesion  No comparison study    OCT with att mac and preret density  Assessment /Plan     For exam results, see Encounter Report.    Retinal detachment of right eye with single retinal tear    Subretinal abscess    Other orders  -     Flourescein Angiography - OU - Both Eyes; Future; Expected date: 10/03/2018      Doing well   Retina attached  IOP good  Preret lesion appears to be on top of retina, not infectious process  Well observe closely  CPM with gtts  RTC 1 wk, sooner PRN  RD/Endophth precautions

## 2018-10-03 NOTE — PROGRESS NOTES
Spoke with Evette at [place].  Norbert has Jama in place for Daptomycin which he completed on 9/28.  He will continue cathter care until seen on 10/10/18 in ID clinic.  At that time it can be arranged for DC if appropriate.      Mukesh Moreland PA-C

## 2018-10-03 NOTE — PROGRESS NOTES
Subjective:       Patient ID: Mickey Ross is a 53 y.o. male      Chief Complaint   Patient presents with    Concerns About Ocular Health     History of Present Illness  DLS: 10/3/18 Dr. Corrales    54 y/o male presents today for refraction .     1. S/p PO PPV/EL for retinal detachment of right eye with single retinal tear   2.Subretinal abscess   3. Prosthesis OS        Assessment/Plan:     1. Refractive error  Educated patient on refractive error and discussed lens options. Trial framed Rx in office for pt. Dispensed updated spectacle Rx. Educated about adaptation period to new specs.    Eyeglass Final Rx     Eyeglass Final Rx       Sphere Cylinder Axis Add    Right +3.00 +1.00 020 +3.00    Left Balance   +3.00    Expiration Date:  10/4/2019                OMD as scheduled.

## 2018-10-03 NOTE — PATIENT INSTRUCTIONS
Fluorescein Angiogram procedure explained to pt. FA handout attached to AVS below.      Fluorescein Angiography  Fluorescein angiography is an eye test. It is done to look at the back of your eye, including:  · The blood vessels in your eye  · The layer of tissue at the back of your eye (the retina)  · The center of your retina (the macula)  · The optic nerve  This test can diagnose diseases found in these areas. It can also diagnose other conditions that affect these areas. To do this test, a dye called fluorescein is shot (injected) into your arm. The dye goes into your bloodstream and up into the blood vessels in your eyes. A special camera is then used to take images (angiograms) of your eyes.     A fluorescein angiogram of the retina   Getting ready for your test  Tell your healthcare provider if you:  · Are pregnant or think you may be pregnant  · Are breastfeeding  · Have a history of severe allergic reactions, including to X-ray dye or other medicines  · Have kidney problems  Tell your provider about any medicines you are taking. You may need to stop taking all or some of these before the test. This includes:  · All prescription medicines  · Over-the-counter medicines such as aspirin or ibuprofen  · Street drugs  · Herbs, vitamins, and other supplements  You should arrange for an adult family member or friend to drive you home after your test. Your vision will be blurry for up to 12 hours.  Follow any other instructions from your healthcare provider.  During your test  · You are given eye drops to enlarge (dilate) your pupils.  · You then sit in front of a special camera. You place your chin on the chin rest and look into the camera.  · Images are taken of your eyes, one eye at a time.  · Fluorescein dye is then injected into your arm. The lights in the room are turned off. You may have mild nausea. You may have a warm feeling in your arm or upper body. Tell your provider if your skin feels itchy or if you  are having trouble breathing. If so, you could be having an allergic reaction to the dye.  · More pictures of your eyes are taken over 15 to 30 minutes. The camera shines a bright light into your eyes. Try to keep your head still and your eyes open.  · When enough images have been taken, the test is over.  After your test  Your vision will be blurry for up to 4 to 12 hours. This is because of your dilated pupils. Your eye will be more sensitive to light for up to 12 hours. You may want to wear sunglasses during this time. Do not drive if your vision is very blurry. You may also find it uncomfortable to read. Your skin may look yellow for a few hours. This is from the dye. Your urine will be bright yellow or orange for 24 to 48 hours after the test.     Risks and possible complications  All procedures have some risks. Possible risks of fluorescein angiography include:  · Upset stomach (nausea) and vomiting  · Leaking dye around the injection site that causes pain and swelling  · Metallic taste in your mouth  · Infection at injection site  · Allergic reaction to the dye  · Dry mouth or too much saliva  · Faster heart rate  · Sweating  · Lower back pain   © 9239-8159 [employer]. [address] [place] [address]. All rights reserved. This information is not intended as a substitute for professional medical care. Always follow your healthcare professional's instructions.        Controlling High Blood Pressure  High blood pressure (hypertension) is often called the silent killer. This is because many people who have it dont know it. High blood pressure is defined as 140/90 mm Hg or higher. Know your blood pressure and remember to check it regularly. Doing so can save your life. Here are some things you can do to help control your blood pressure.    Choose heart-healthy foods  · Select low-salt, low-fat foods. Limit sodium intake to 2,400 mg per day or the amount suggested by your healthcare  provider.  · Limit canned, dried, cured, packaged, and fast foods. These can contain a lot of salt.  · Eat 8 to 10 servings of fruits and vegetables every day.  · Choose lean meats, fish, or chicken.  · Eat whole-grain pasta, brown rice, and beans.  · Eat 2 to 3 servings of low-fat or fat-free dairy products.  · Ask your doctor about the DASH eating plan. This plan helps reduce blood pressure.  · When you go to a restaurant, ask that your meal be prepared with no added salt.  Maintain a healthy weight  · Ask your healthcare provider how many calories to eat a day. Then stick to that number.  · Ask your healthcare provider what weight range is healthiest for you. If you are overweight, a weight loss of only 3% to 5% of your body weight can help lower blood pressure. Generally, a good weight loss goal is to lose 10% of your body weight in a year.  · Limit snacks and sweets.  · Get regular exercise.  Get up and get active  · Choose activities you enjoy. Find ones you can do with friends or family. This includes bicycling, dancing, walking, and jogging.  · Park farther away from building entrances.  · Use stairs instead of the elevator.  · When you can, walk or bike instead of driving.  · [place] leaves, garden, or do household repairs.  · Be active at a moderate to vigorous level of physical activity for at least 40 minutes for a minimum of 3 to 4 days a week.   Manage stress  · Make time to relax and enjoy life. Find time to laugh.  · Communicate your concerns with your loved ones and your healthcare provider.  · Visit with family and friends, and keep up with hobbies.  Limit alcohol and quit smoking  · Men should have no more than 2 drinks per day.  · Women should have no more than 1 drink per day.  · Talk with your healthcare provider about quitting smoking. Smoking significantly increases your risk for heart disease and stroke. Ask your healthcare provider about community smoking cessation programs and other  options.  Medicines  If lifestyle changes arent enough, your healthcare provider may prescribe high blood pressure medicine. Take all medicines as prescribed. If you have any questions about your medicines, ask your healthcare provider before stopping or changing them.   © 5474-1808 [employer]. [address] [place] [address], [place] [address]. All rights reserved. This information is not intended as a substitute for professional medical care. Always follow your healthcare professional's instructions.

## 2018-10-04 ENCOUNTER — TELEPHONE (OUTPATIENT)
Dept: CARDIOLOGY | Facility: HOSPITAL | Age: 53
End: 2018-10-04

## 2018-10-04 ENCOUNTER — TELEPHONE (OUTPATIENT)
Dept: INFECTIOUS DISEASES | Facility: CLINIC | Age: 53
End: 2018-10-04

## 2018-10-04 DIAGNOSIS — Z45.2 ENCOUNTER FOR CENTRAL LINE CARE: Primary | ICD-10-CM

## 2018-10-04 NOTE — TELEPHONE ENCOUNTER
Left message for Mr. Ross to contact me for scheduling removal of Jama catheter after follow-up visit with Inf Dz .

## 2018-10-04 NOTE — TELEPHONE ENCOUNTER
Spoke with patient and wife as patient has completed Daptomycin x 6 weeks on 9/28 and has  Jama which was placed by surgery team at last admission is still present and will need removal by General Surgery. Patient is to fu on 10/10/18 with Pasquale Rothman in ID.  They will continue line care and will be set up to see Gen Sx for remoaval after being seen in  ID.  They have transportation challenges so we will attempt to get appt the same days as seen in ID.    Meliton Moreland PA-C

## 2018-10-05 NOTE — PROGRESS NOTES
Subjective:      Patient ID: Mickey Ross is a 53 y.o. male.    Chief Complaint: Foot Pain (foot ulcer (PCP Dr Moncada)); Foot Problem; and Foot Ulcer    Presents for f/u s/p left ankle I&D done at OSH. Pt with PMH DM, Charcot, ESRD, recently [place] [facility]. Was initially admitted to [facility] where left ankle I&D was performed then subsequently [place] [facility] [place]. Left BKA recommended however patient declines. Reports HH dressing changes twice a week. Currently with hamm catheter for IV abx. Presents with family. Denies N/V/F/C. Ambulating in offloading pad on left.    Review of Systems   Constitution: Negative for chills, diaphoresis, fever and weakness.   Cardiovascular: Negative for claudication, cyanosis, leg swelling and syncope.   Respiratory: Negative for cough and shortness of breath.    Skin: Positive for poor wound healing and suspicious lesions. Negative for color change and nail changes.   Musculoskeletal: Negative for falls, joint pain, muscle cramps and muscle weakness.   Gastrointestinal: Negative for diarrhea, nausea and vomiting.   Neurological: Negative for disturbances in coordination, numbness, paresthesias, sensory change and tremors.   Psychiatric/Behavioral: Negative for altered mental status.           Objective:      Physical Exam   Constitutional: He appears well-developed. He is cooperative.   Oriented to time, place, and person.   Cardiovascular:   DP and PT pulses are palpable bilaterally. 3 sec capillary refill time and toes and feet are warm to touch proximally .  There is  hair growth on the feet and toes b/l. There is no edema b/l. No spider veins or varicosities present b/l.      Musculoskeletal:   Rocker bottom foot type left foot with medial deviation. Equinocavovarus contracture noted.      Feet:   Right Foot:   Protective Sensation: 10 sites tested. 7 sites sensed.   Skin Integrity: Positive for dry skin. Negative for callus.   Left Foot:    Protective Sensation: 10 sites tested. 9 sites sensed.   Skin Integrity: Positive for dry skin. Negative for callus.   Lymphadenopathy:   Negative lymphadenopathy bilateral popliteal fossa and tarsal tunnel.   Neurological: He is alert.   Light touch, proprioception, and sharp/dull sensation are all intact bilaterally. Protective threshold with the [place]ienstein monofilament is intact bilaterally.    Skin:     Wound dehiscence to left medial and lateral incision sites. Fibrogranular base. No purulent drainage noted. No erythema noted.    Psychiatric: He has a normal mood and affect.                       Assessment:       Encounter Diagnoses   Name Primary?    Foot ulceration, left, limited to breakdown of skin Yes    Type II diabetes mellitus with neurological manifestations     Charcot's joint of left foot          Plan:       Mickey was seen today for foot pain, foot problem and foot ulcer.    Diagnoses and all orders for this visit:    Foot ulceration, left, limited to breakdown of skin  -     SUBSEQUENT HOME HEALTH ORDERS    Type II diabetes mellitus with neurological manifestations    Charcot's joint of left foot    Other orders  -     cadexomer iodine (IODOSORB) 0.9 % gel; Apply topically daily as needed for Wound Care.      I counseled the patient on his conditions, their implications and medical management.    Recommend CAM boot, patient declines. Then recommended DARCO shoe. Patient declines as well. Explained to patient recommendation for immobilization of left foot to prevent further breakdown of joint.     Iodosorb applied to wounds followed by boyd foam wrapped with cast padding and ACE.     Home health orders updated.     Follow-up:Patient is to return to the clinic in two weekw for follow-up but should call [facility] immediately if any signs of infection, such as fever, chills, sweats, increased redness or pain.    Short-term goals include maintaining good offloading and minimizing bioburden,  promoting granulation and epithelialization to healing.  Long-term goals include keeping the wound healed by good offloading and medical management under the direction of internist.    F/u 2 weeks.     Barbara Means DPM

## 2018-10-10 ENCOUNTER — OFFICE VISIT (OUTPATIENT)
Dept: INFECTIOUS DISEASES | Facility: CLINIC | Age: 53
End: 2018-10-10
Payer: MEDICAID

## 2018-10-10 ENCOUNTER — TELEPHONE (OUTPATIENT)
Dept: FAMILY MEDICINE | Facility: CLINIC | Age: 53
End: 2018-10-10

## 2018-10-10 ENCOUNTER — HOSPITAL ENCOUNTER (OUTPATIENT)
Facility: HOSPITAL | Age: 53
Discharge: HOME | End: 2018-10-10
Attending: INTERNAL MEDICINE | Admitting: INTERNAL MEDICINE
Payer: MEDICAID

## 2018-10-10 VITALS
TEMPERATURE: 99 F | HEART RATE: 69 BPM | HEIGHT: 71 IN | SYSTOLIC BLOOD PRESSURE: 128 MMHG | DIASTOLIC BLOOD PRESSURE: 72 MMHG | BODY MASS INDEX: 31.52 KG/M2

## 2018-10-10 DIAGNOSIS — A49.02 MRSA INFECTION: Primary | ICD-10-CM

## 2018-10-10 DIAGNOSIS — M86.072 ACUTE HEMATOGENOUS OSTEOMYELITIS OF LEFT FOOT: Primary | Chronic | ICD-10-CM

## 2018-10-10 DIAGNOSIS — M86.9 OSTEOMYELITIS OF LEFT FOOT: ICD-10-CM

## 2018-10-10 PROCEDURE — 36589 REMOVAL TUNNELED CV CATH: CPT | Mod: ,,, | Performed by: INTERNAL MEDICINE

## 2018-10-10 PROCEDURE — 99999 PR PBB SHADOW E&M-EST. PATIENT-LVL IV: CPT | Mod: PBBFAC,,, | Performed by: PHYSICIAN ASSISTANT

## 2018-10-10 PROCEDURE — 25000003 PHARM REV CODE 250

## 2018-10-10 PROCEDURE — 99214 OFFICE O/P EST MOD 30 MIN: CPT | Mod: PBBFAC,25 | Performed by: PHYSICIAN ASSISTANT

## 2018-10-10 PROCEDURE — 99213 OFFICE O/P EST LOW 20 MIN: CPT | Mod: S$PBB,,, | Performed by: PHYSICIAN ASSISTANT

## 2018-10-10 PROCEDURE — 36589 REMOVAL TUNNELED CV CATH: CPT

## 2018-10-10 NOTE — PROCEDURES
DATE OF PROCEDURE: 10/10/18    PROCEDURE TYPE: Removal of right Internal Jugular Vein tunneled hamm catheter.     INDICATION: s/p IV abx     PROCEDURE NOTE: After informed consent was obtained, the area of Mr. Ross's catheter and right chest/neck were sterilely prepped and draped in usual fashion. Anesthesia was obtained with 2% lidocaine with epinephrine, and the subcutaneous cuff was blunt dissected free. The catheter was then removed in total, and a compression dressing was applied to the exit site. Mr. Ross tolerated the procedure well. There were no immediate complications. Estimated blood loss was less than 1 mL. No sedation was given.

## 2018-10-10 NOTE — TELEPHONE ENCOUNTER
----- Message from Airam Estrella sent at 10/10/2018 12:50 PM CDT -----  Contact: [phone]  Please call after seen Pt regarding  noris or remove it.

## 2018-10-10 NOTE — PROGRESS NOTES
Subjective:      Patient ID: Mickey Ross is a 53 y.o. male.    Chief Complaint:Follow-up      History of Present Illness    52 y/o male with chronic LLE wounds/osteo, ESRD ON HD MWF (LUE AVF),  prosthetic left eye, and DMII, who was transferred from [facility] for opthalmology evaluation for right retinal abscess.   He was first seen at [facility] for fevers and recurrent left ankle/5th metatarsal osteo (s/p I&D and wound vac placement by orthopedics) and MRSA bacteremia.   Per  records his blood cultures 8/10, 8/15 were positive with MRSA (daptomycin sensitive).   He was initially on vancomycin but developed significant rash.  On 8/13 he began to have acute visual changes of his right eye with pain. He was seen by opthalmology and suspected  to have large retinal abscess. He was transferred here for further evaluation by Ophthalmology.  His blood cultures here are NGTD.  2D echo negative for vegetation.  he was seen by ophthalmology and was given intravitreal ceftazidime and vancomycin. No culture data.     He presents today in clinic for hospital follow up. He is seen today with wife at bedside. He completed daptomycin on 9/29. He hash hamm placed that is scheduled to be removed today. He denies having any fever chills or night sweats. He has no complaints at this time. He follows with ophthalmology and podiatry closely.     Review of Systems   Constitution: Positive for chills. Negative for decreased appetite, fever, weakness, malaise/fatigue, night sweats, weight gain and weight loss.   HENT: Negative for congestion, ear pain, hearing loss, hoarse voice, sore throat and tinnitus.    Eyes: Negative for blurred vision, redness and visual disturbance.   Cardiovascular: Negative for chest pain, leg swelling and palpitations.   Respiratory: Negative for cough, hemoptysis, shortness of breath and sputum production.    Hematologic/Lymphatic: Negative for adenopathy. Does not bruise/bleed easily.   Skin: Positive for dry  skin. Negative for itching, rash and suspicious lesions.   Musculoskeletal: Positive for back pain. Negative for joint pain, myalgias and neck pain.   Gastrointestinal: Negative for abdominal pain, constipation, diarrhea, heartburn, nausea and vomiting.   Genitourinary: Negative for dysuria, flank pain, frequency, hematuria, hesitancy and urgency.   Neurological: Negative for dizziness, headaches, numbness and paresthesias.   Psychiatric/Behavioral: Negative for depression and memory loss. The patient has insomnia and is nervous/anxious.      Objective:   Physical Exam   Constitutional: He is oriented to person, place, and time. He appears well-developed and well-nourished. No distress.   HENT:   Head: Normocephalic and atraumatic.   Mouth/Throat: Oropharynx is clear and moist.   Eyes: Right eye exhibits no discharge.   Blindness of left eye  Right eye PERRL   Neck: Normal range of motion. Neck supple.   Cardiovascular: Normal rate, regular rhythm, normal heart sounds and intact distal pulses. Exam reveals no gallop and no friction rub.   No murmur heard.  Pulmonary/Chest: Effort normal and breath sounds normal. No respiratory distress. He has no wheezes. He has no rales. He exhibits no tenderness.   Abdominal: Bowel sounds are normal. He exhibits no distension and no mass. There is no tenderness. There is no guarding.   Musculoskeletal: Normal range of motion. He exhibits no edema or deformity.   Neurological: He is alert and oriented to person, place, and time.   Skin: Skin is warm and dry. No rash noted. He is not diaphoretic. No erythema.   Dry flaky skin  Jama c/d/i   Psychiatric: He has a normal mood and affect. His behavior is normal. Judgment and thought content normal.   Nursing note and vitals reviewed.    Assessment:       1. MRSA infection          Plan:       1. Follow up with opthalmology and podiatry as scheduled. Follow up with PCP  2. No further abx at this time.   3. Follow up as needed  4.  Jama removal this afternoon

## 2018-10-17 ENCOUNTER — OFFICE VISIT (OUTPATIENT)
Dept: OPHTHALMOLOGY | Facility: CLINIC | Age: 53
End: 2018-10-17
Attending: OPHTHALMOLOGY
Payer: MEDICAID

## 2018-10-17 DIAGNOSIS — L02.91 ABSCESS: ICD-10-CM

## 2018-10-17 DIAGNOSIS — H33.011 RETINAL DETACHMENT OF RIGHT EYE WITH SINGLE RETINAL TEAR: Primary | ICD-10-CM

## 2018-10-17 PROCEDURE — 99999 PR PBB SHADOW E&M-EST. PATIENT-LVL III: CPT | Mod: PBBFAC,,, | Performed by: OPHTHALMOLOGY

## 2018-10-17 PROCEDURE — 99024 POSTOP FOLLOW-UP VISIT: CPT | Mod: ,,, | Performed by: OPHTHALMOLOGY

## 2018-10-17 PROCEDURE — 99213 OFFICE O/P EST LOW 20 MIN: CPT | Mod: PBBFAC,PO | Performed by: OPHTHALMOLOGY

## 2018-10-17 RX ORDER — PREDNISOLONE ACETATE 10 MG/ML
1 SUSPENSION/ DROPS OPHTHALMIC 3 TIMES DAILY
Qty: 10 ML | Refills: 0 | Status: ON HOLD | OUTPATIENT
Start: 2018-10-17 | End: 2018-12-04 | Stop reason: HOSPADM

## 2018-10-17 NOTE — PROGRESS NOTES
HPI     DLS: 10/03/18   Dr. Antoni MD    52 Y/O M here today for post op RD   repair OD     PT says vision getting better OD.  No f/f/pain OD  Eye Meds:  PF OD QID (really getting in TID most days)      POHx  1. S/p PO PPV/EL for retinal detachment of right eye with single retinal   tear   2.Subretinal abscess   3. Prosthesis OS    Last edited by Alexander Corrales MD on 10/19/2018 12:02 AM. (History)            Assessment /Plan     For exam results, see Encounter Report.    Retinal detachment of right eye with single retinal tear    Subretinal abscess    Other orders  -     prednisoLONE acetate (PRED FORTE) 1 % DrpS; Place 1 drop into the right eye 3 (three) times daily.  Dispense: 10 mL; Refill: 0           Doing well post op.  Retina attached.  IOP good.  No infection  PF 3/0 x 1 wk then 2/0 until f/u  RTC 3 wks, sooner PRN  RD/endophth precautions

## 2018-10-18 ENCOUNTER — TELEPHONE (OUTPATIENT)
Dept: PODIATRY | Facility: CLINIC | Age: 53
End: 2018-10-18

## 2018-10-18 NOTE — TELEPHONE ENCOUNTER
----- Message from Arjun Lopez sent at 10/18/2018  8:47 AM CDT -----  Contact: Anjana Yeung called requesting status of pt's supplies for foot dressing. Contact anjana at [phone].    Thanks-

## 2018-10-18 NOTE — TELEPHONE ENCOUNTER
Spoke with Anjana. Requesting orders for wound care.      orders 10-3-18 faxed to Anjana at [phone]

## 2018-11-07 ENCOUNTER — OFFICE VISIT (OUTPATIENT)
Dept: OPHTHALMOLOGY | Facility: CLINIC | Age: 53
End: 2018-11-07
Attending: OPHTHALMOLOGY
Payer: MEDICAID

## 2018-11-07 DIAGNOSIS — H35.379 EPIRETINAL MEMBRANE, UNSPECIFIED LATERALITY: Primary | ICD-10-CM

## 2018-11-07 DIAGNOSIS — H33.20 RETINAL DETACHMENT, UNSPECIFIED LATERALITY: ICD-10-CM

## 2018-11-07 PROCEDURE — 99999 PR PBB SHADOW E&M-EST. PATIENT-LVL III: CPT | Mod: PBBFAC,,, | Performed by: OPHTHALMOLOGY

## 2018-11-07 PROCEDURE — 92134 CPTRZ OPH DX IMG PST SGM RTA: CPT | Mod: PBBFAC,PO | Performed by: OPHTHALMOLOGY

## 2018-11-07 PROCEDURE — 99213 OFFICE O/P EST LOW 20 MIN: CPT | Mod: PBBFAC,PO,25 | Performed by: OPHTHALMOLOGY

## 2018-11-07 PROCEDURE — 92014 COMPRE OPH EXAM EST PT 1/>: CPT | Mod: S$PBB,,, | Performed by: OPHTHALMOLOGY

## 2018-11-07 NOTE — PROGRESS NOTES
HPI     DLS: 10/17/18   Dr. Antoni MD       3 weeks ck    Pt states that VA in OD is good, the only problem is excessive tearing   ----no pain or flashes and floaters is resolved OD    Eye Meds:  PF OD BID    POHx  1. S/p PO PPV/EL for retinal detachment of right eye with single retinal   tear   2.Subretinal abscess   3. Prosthesis OS    Last edited by Alexander Corrales MD on 11/7/2018  4:54 PM. (History)        [place] SDOCT:   OD: good quality, thin ERM superior with traction on macula and SR bleb, new since 10/3/18 scan      Assessment /Plan     For exam results, see Encounter Report.    Epiretinal membrane, unspecified laterality  -     Case Request Operating Room: VITRECTOMY, PARS PLANA  APPROACH    PPV with SOR  -     Posterior Segment OCT Retina-Both eyes  -     Posterior Segment OCT Retina-Both eyes; Future    Retinal detachment, unspecified laterality  -     Case Request Operating Room: VITRECTOMY, PARS PLANA  APPROACH    PPV with SOR  -     Posterior Segment OCT Retina-Both eyes  -     Posterior Segment OCT Retina-Both eyes; Future      ERM OD now with early traction.  No sig vision change  7 wks s/p RD repair  SR abscess resolved  Discussed PPV/SOR/likely MP in near future and all RBA.  Will allow few more weeks with SO in place         Repeat OCT before surgery in 3 wks to reeval ERM and macula  Discussed PPV and SOR OD  RD precuations  RTC sooner PRN

## 2018-11-26 ENCOUNTER — OFFICE VISIT (OUTPATIENT)
Dept: OPHTHALMOLOGY | Facility: CLINIC | Age: 53
End: 2018-11-26
Attending: OPHTHALMOLOGY
Payer: MEDICAID

## 2018-11-26 VITALS — SYSTOLIC BLOOD PRESSURE: 194 MMHG | DIASTOLIC BLOOD PRESSURE: 88 MMHG

## 2018-11-26 DIAGNOSIS — H35.371 EPIRETINAL MEMBRANE (ERM) OF RIGHT EYE: ICD-10-CM

## 2018-11-26 DIAGNOSIS — H33.21 RIGHT RETINAL DETACHMENT: ICD-10-CM

## 2018-11-26 PROCEDURE — 99024 POSTOP FOLLOW-UP VISIT: CPT | Mod: ,,, | Performed by: OPHTHALMOLOGY

## 2018-11-26 PROCEDURE — 99213 OFFICE O/P EST LOW 20 MIN: CPT | Mod: PBBFAC,PO | Performed by: OPHTHALMOLOGY

## 2018-11-26 PROCEDURE — 92134 CPTRZ OPH DX IMG PST SGM RTA: CPT | Mod: PBBFAC,PO | Performed by: OPHTHALMOLOGY

## 2018-11-26 PROCEDURE — 99999 PR PBB SHADOW E&M-EST. PATIENT-LVL III: CPT | Mod: PBBFAC,,, | Performed by: OPHTHALMOLOGY

## 2018-11-26 NOTE — PROGRESS NOTES
HPI     DLS: 10/17/18   Dr. Antoni MD       3 weeks ck    Pt states that VA in OD is overall good.  No f/f/pain.  Tearing is   resolved    Meds:  PF OD BID    POHx  1. S/p PO PPV/EL for retinal detachment of right eye with single retinal   tear   2.Subretinal abscess   3. Prosthesis OS    Last edited by Alexander Corrales MD on 11/26/2018  8:59 AM. (History)        [place] SDOCT:   OD: good quality, thin ERM superior with traction on macula and SR bleb, bleb improved since 11/7/18 scan    Assessment /Plan     For exam results, see Encounter Report.    Epiretinal membrane, unspecified laterality  -     Posterior Segment OCT Retina-Both eyes    Retinal detachment, unspecified laterality  -     Posterior Segment OCT Retina-Both eyes      s/p RD repair 9/18/18 secondary to infectious process    Pt eager to have SOR.  Discussed ERM OD also  No sign of any activity of prior SR abscess--scarred now  May proceed with PPV/SOR/ERM and poss ILM peel OD for SO and ERM.  RBA discussed in detail.  Post op and visual expectations discussed.    RBA discussed in detail, inc surgical failure, need for more surgery, loss of vision/eye, no recovery of vision, surgical failure, bleeding, infection, high eye pressure (glaucoma), etc.  Pt wishes to proceed.    23 gauge, General Anesthesia

## 2018-11-27 ENCOUNTER — DOCUMENTATION ONLY (OUTPATIENT)
Dept: OPHTHALMOLOGY | Facility: CLINIC | Age: 53
End: 2018-11-27

## 2018-11-27 ENCOUNTER — HOSPITAL ENCOUNTER (OUTPATIENT)
Facility: HOSPITAL | Age: 53
Discharge: HOME | End: 2018-11-27
Attending: OPHTHALMOLOGY | Admitting: OPHTHALMOLOGY
Payer: MEDICAID

## 2018-11-27 VITALS
SYSTOLIC BLOOD PRESSURE: 178 MMHG | DIASTOLIC BLOOD PRESSURE: 86 MMHG | TEMPERATURE: 98 F | OXYGEN SATURATION: 94 % | HEART RATE: 68 BPM | WEIGHT: 228.19 LBS | HEIGHT: 71 IN | BODY MASS INDEX: 31.95 KG/M2 | RESPIRATION RATE: 16 BRPM

## 2018-11-27 DIAGNOSIS — H35.371 EPIRETINAL MEMBRANE, RIGHT: ICD-10-CM

## 2018-11-27 DIAGNOSIS — E87.5 HYPERKALEMIA: ICD-10-CM

## 2018-11-27 DIAGNOSIS — L02.91 ABSCESS: Primary | ICD-10-CM

## 2018-11-27 LAB — POCT GLUCOSE: 123 MG/DL (ref 70–110)

## 2018-11-27 PROCEDURE — 82962 GLUCOSE BLOOD TEST: CPT | Performed by: OPHTHALMOLOGY

## 2018-11-27 PROCEDURE — 25000003 PHARM REV CODE 250: Performed by: OPHTHALMOLOGY

## 2018-11-27 PROCEDURE — 93005 ELECTROCARDIOGRAM TRACING: CPT

## 2018-11-27 PROCEDURE — 93010 ELECTROCARDIOGRAM REPORT: CPT | Mod: ,,, | Performed by: INTERNAL MEDICINE

## 2018-11-27 RX ORDER — TROPICAMIDE 10 MG/ML
1 SOLUTION/ DROPS OPHTHALMIC
Status: DISCONTINUED | OUTPATIENT
Start: 2018-11-27 | End: 2018-11-27 | Stop reason: HOSPADM

## 2018-11-27 RX ORDER — DEXTROSE 50 % IN WATER (D50W) INTRAVENOUS SYRINGE
Status: DISCONTINUED
Start: 2018-11-27 | End: 2018-11-27 | Stop reason: WASHOUT

## 2018-11-27 RX ORDER — BUPIVACAINE HYDROCHLORIDE 7.5 MG/ML
INJECTION, SOLUTION EPIDURAL; RETROBULBAR
Status: DISCONTINUED
Start: 2018-11-27 | End: 2018-11-27 | Stop reason: HOSPADM

## 2018-11-27 RX ORDER — NEOMYCIN SULFATE, POLYMYXIN B SULFATE, AND DEXAMETHASONE 3.5; 10000; 1 MG/G; [USP'U]/G; MG/G
OINTMENT OPHTHALMIC
Status: DISCONTINUED
Start: 2018-11-27 | End: 2018-11-27 | Stop reason: HOSPADM

## 2018-11-27 RX ORDER — CYCLOPENTOLATE HYDROCHLORIDE 10 MG/ML
1 SOLUTION/ DROPS OPHTHALMIC
Status: DISCONTINUED | OUTPATIENT
Start: 2018-11-27 | End: 2018-11-27 | Stop reason: HOSPADM

## 2018-11-27 RX ORDER — EPINEPHRINE 1 MG/ML
INJECTION, SOLUTION INTRACARDIAC; INTRAMUSCULAR; INTRAVENOUS; SUBCUTANEOUS
Status: DISCONTINUED
Start: 2018-11-27 | End: 2018-11-27 | Stop reason: HOSPADM

## 2018-11-27 RX ORDER — TETRACAINE HYDROCHLORIDE 5 MG/ML
1 SOLUTION OPHTHALMIC
Status: DISCONTINUED | OUTPATIENT
Start: 2018-11-27 | End: 2018-11-27 | Stop reason: HOSPADM

## 2018-11-27 RX ORDER — LIDOCAINE HYDROCHLORIDE 20 MG/ML
INJECTION, SOLUTION EPIDURAL; INFILTRATION; INTRACAUDAL; PERINEURAL
Status: DISCONTINUED
Start: 2018-11-27 | End: 2018-11-27 | Stop reason: HOSPADM

## 2018-11-27 RX ORDER — INDOCYANINE GREEN AND WATER 25 MG
KIT INJECTION
Status: DISCONTINUED
Start: 2018-11-27 | End: 2018-11-27 | Stop reason: WASHOUT

## 2018-11-27 RX ORDER — VANCOMYCIN HYDROCHLORIDE 500 MG/10ML
INJECTION, POWDER, LYOPHILIZED, FOR SOLUTION INTRAVENOUS
Status: DISCONTINUED
Start: 2018-11-27 | End: 2018-11-27 | Stop reason: WASHOUT

## 2018-11-27 RX ORDER — LIDOCAINE HYDROCHLORIDE 10 MG/ML
INJECTION, SOLUTION EPIDURAL; INFILTRATION; INTRACAUDAL; PERINEURAL
Status: DISCONTINUED
Start: 2018-11-27 | End: 2018-11-27 | Stop reason: HOSPADM

## 2018-11-27 RX ORDER — PREDNISOLONE ACETATE 10 MG/ML
1 SUSPENSION/ DROPS OPHTHALMIC
Status: DISCONTINUED | OUTPATIENT
Start: 2018-11-27 | End: 2018-11-27 | Stop reason: HOSPADM

## 2018-11-27 RX ORDER — DEXAMETHASONE SODIUM PHOSPHATE 4 MG/ML
INJECTION, SOLUTION INTRA-ARTICULAR; INTRALESIONAL; INTRAMUSCULAR; INTRAVENOUS; SOFT TISSUE
Status: DISCONTINUED
Start: 2018-11-27 | End: 2018-11-27 | Stop reason: WASHOUT

## 2018-11-27 RX ORDER — MOXIFLOXACIN 5 MG/ML
1 SOLUTION/ DROPS OPHTHALMIC
Status: DISCONTINUED | OUTPATIENT
Start: 2018-11-27 | End: 2018-11-27 | Stop reason: HOSPADM

## 2018-11-27 RX ORDER — PHENYLEPHRINE HYDROCHLORIDE 25 MG/ML
1 SOLUTION/ DROPS OPHTHALMIC
Status: DISCONTINUED | OUTPATIENT
Start: 2018-11-27 | End: 2018-11-27 | Stop reason: HOSPADM

## 2018-11-27 RX ADMIN — TROPICAMIDE 1 DROP: 10 SOLUTION/ DROPS OPHTHALMIC at 06:11

## 2018-11-27 RX ADMIN — CYCLOPENTOLATE HYDROCHLORIDE 1 DROP: 10 SOLUTION/ DROPS OPHTHALMIC at 06:11

## 2018-11-27 RX ADMIN — MOXIFLOXACIN 1 DROP: 5 SOLUTION/ DROPS OPHTHALMIC at 06:11

## 2018-11-27 RX ADMIN — PREDNISOLONE ACETATE 1 DROP: 10 SUSPENSION/ DROPS OPHTHALMIC at 06:11

## 2018-11-27 RX ADMIN — PHENYLEPHRINE HYDROCHLORIDE 1 DROP: 25 SOLUTION/ DROPS OPHTHALMIC at 06:11

## 2018-11-27 RX ADMIN — HOMATROPINE HYDROBROMIDE 1 DROP: 50 SOLUTION OPHTHALMIC at 06:11

## 2018-11-27 RX ADMIN — TETRACAINE HYDROCHLORIDE 1 DROP: 5 SOLUTION OPHTHALMIC at 06:11

## 2018-11-27 NOTE — PROGRESS NOTES
Dr Bell at bs: talking to pt about why he needs to stay in pre op; EKG ordered and waiting on lab results.

## 2018-11-27 NOTE — PROGRESS NOTES
Case canceled because bedside K+= 8.8  Pt had dialysis 3 days ago and due today  Pt evaluated by anesthesia and EKG performed  Pt to be d/c'd to go directly to his dialysis center  Will reschedule case--not emergent

## 2018-11-27 NOTE — H&P
Pre-Operative History & Physical  Ophthalmology      SUBJECTIVE:     History of Present Illness:  Patient is a 53 y.o. male presents with Epiretinal membrane, unspecified laterality [H35.379]  Retinal detachment, unspecified laterality [H33.20].    MEDICATIONS:   No medications prior to admission.       ALLERGIES:   Review of patient's allergies indicates:   Allergen Reactions    Vancomycin analogues Rash     Red Man Syndrome    Penicillins        PAST MEDICAL HISTORY:   Past Medical History:   Diagnosis Date    Allergic drug rash - due to Vancomycin 8/19/2018    Allergic drug rash - due to Vancomycin 8/19/2018    Anxiety 8/17/2018    Arthritis     Blind left eye     Charcot's joint of foot     Chronic back pain 8/17/2018    Chronic, continuous use of opioids 8/17/2018    Debility 8/17/2018    Diabetes mellitus     GERD (gastroesophageal reflux disease)     Glaucoma     Hypertension     Methadone dependence 8/18/2018    MRSA (methicillin resistant staph aureus) culture positive     Osteomyelitis     Renal disorder     Sepsis due to methicillin resistant Staphylococcus aureus (MRSA) 8/17/2018    Subretinal abscess 8/17/2018     PAST SURGICAL HISTORY:   Past Surgical History:   Procedure Laterality Date    AVF left upper arm      INSERTION OF SANZ CATHETER Right 9/1/2018    Procedure: INSERTION, CATHETER, CENTRAL VENOUS, SANZ;  Surgeon: Rafi Churchill MD;  Location: [facility] OR [address] [place] [address];  Service: General;  Laterality: Right;    INSERTION, CATHETER, CENTRAL VENOUS, SANZ Right 9/1/2018    Performed by Rafi Churchill MD at [facility] OR [address] [place] [address]    left ankle surgery      lumbar back surgery      REPAIR OF RETINAL DETACHMENT WITH VITRECTOMY Right 9/18/2018    Procedure: REPAIR, RETINAL DETACHMENT, WITH VITRECTOMY;  Surgeon: Alexander Corrales MD;  Location: [facility] OR [address] [place] [address];  Service: Ophthalmology;  Laterality: Right;  request TF own last case    REPAIR, RETINAL DETACHMENT, WITH  VITRECTOMY Right 9/18/2018    Performed by Alexandre Corrales MD at [facility] OR [address] [place] [address]     PAST FAMILY HISTORY:   Family History   Problem Relation Age of Onset    Pneumonia Mother      SOCIAL HISTORY:   Social History     Tobacco Use    Smoking status: Never Smoker   Substance Use Topics    Alcohol use: No     Frequency: Never    Drug use: No        MENTAL STATUS: Alert    REVIEW OF SYSTEMS: Negative    OBJECTIVE:     Vital Signs (Most Recent)       Physical Exam:  General: NAD  HEENT: Atraumatic  Lungs: Adequate respirations, LCTAB  Heart: RRR, No murmur  Abdomen: Soft NT    ASSESSMENT/PLAN:     Patient is a 53 y.o. male with Epiretinal membrane, unspecified laterality [H35.379]  Retinal detachment, unspecified laterality [H33.20].     - Plan for surgical correction 25 PPV/SOR/ERM and poss ILM peel OD for SO and ERM  GETA  60 minutes OD   - Risks/benefits/alternatives of the procedure including, but not limited to scarring, bleeding, infection, loss or decreased vision, and/or need for possible repeat surgery discussed with the patient and family.   - Informed consent obtained prior to surgery and the patient/family voiced good understanding.    Sal Villanueva  11/26/2018  6:09 PM

## 2018-11-27 NOTE — INTERVAL H&P NOTE
H&P completed on 11/26/18 has been reviewed, the patient has been examined and:  I concur with the findings and no changes have occurred since H&P was written.    Active Hospital Problems    Diagnosis  POA    Epiretinal membrane, right [H35.371]  Yes      Resolved Hospital Problems   No resolved problems to display.

## 2018-11-28 ENCOUNTER — TELEPHONE (OUTPATIENT)
Dept: OPHTHALMOLOGY | Facility: CLINIC | Age: 53
End: 2018-11-28

## 2018-11-28 DIAGNOSIS — H33.20 RETINAL DETACHMENT, UNSPECIFIED LATERALITY: ICD-10-CM

## 2018-11-28 DIAGNOSIS — H35.379 EPIRETINAL MEMBRANE, UNSPECIFIED LATERALITY: Primary | ICD-10-CM

## 2018-12-03 ENCOUNTER — TELEPHONE (OUTPATIENT)
Dept: OPHTHALMOLOGY | Facility: CLINIC | Age: 53
End: 2018-12-03

## 2018-12-03 NOTE — PRE-PROCEDURE INSTRUCTIONS
PreOp Instructions given to pt and wife - Pat:     - Verbal medication information (what to hold and what to take)   - NPO guidelines   - Arrival place directions given;      - Bathing with antibacterial soap   - Don't wear any jewelry or bring any valuables AM of surgery   - No makeup or moisturizer to face   - No perfume/cologne, powder, lotions or aftershave     Pt. verbalized understanding.    Denies any history of side effects or issues with anesthesia or sedation.     BLIND IN LEFT EYE    SX CX 11/27/18 d/t elevated  K+     Recheck am prior to sx

## 2018-12-03 NOTE — H&P
Pre-Operative History & Physical  Ophthalmology      SUBJECTIVE:     History of Present Illness:  Patient is a 53 y.o. male presents with Epiretinal membrane, unspecified laterality [H35.379]  Retinal detachment, unspecified laterality [H33.20].    MEDICATIONS:   No medications prior to admission.       ALLERGIES:   Review of patient's allergies indicates:   Allergen Reactions    Vancomycin analogues Rash     Red Man Syndrome    Penicillins Itching       PAST MEDICAL HISTORY:   Past Medical History:   Diagnosis Date    Allergic drug rash - due to Vancomycin 8/19/2018    Allergic drug rash - due to Vancomycin 8/19/2018    Anxiety 8/17/2018    Arthritis     Blind left eye     Charcot's joint of foot     Chronic back pain 8/17/2018    Chronic, continuous use of opioids 8/17/2018    Debility 8/17/2018    Diabetes mellitus     GERD (gastroesophageal reflux disease)     Glaucoma     Hypertension     Methadone dependence 8/18/2018    MRSA (methicillin resistant staph aureus) culture positive     Osteomyelitis     Renal disorder     Sepsis due to methicillin resistant Staphylococcus aureus (MRSA) 8/17/2018    Subretinal abscess 8/17/2018     PAST SURGICAL HISTORY:   Past Surgical History:   Procedure Laterality Date    AVF left upper arm      INSERTION OF SANZ CATHETER Right 9/1/2018    Procedure: INSERTION, CATHETER, CENTRAL VENOUS, SANZ;  Surgeon: Rafi Churchill MD;  Location: [facility] OR [address] [place] [address];  Service: General;  Laterality: Right;    INSERTION, CATHETER, CENTRAL VENOUS, SANZ Right 9/1/2018    Performed by Rafi Churchill MD at [facility] OR [address] [place] [address]    left ankle surgery      lumbar back surgery      REPAIR OF RETINAL DETACHMENT WITH VITRECTOMY Right 9/18/2018    Procedure: REPAIR, RETINAL DETACHMENT, WITH VITRECTOMY;  Surgeon: Alexander Corrales MD;  Location: [facility] OR [address] [place] [address];  Service: Ophthalmology;  Laterality: Right;  request TF own last case    REPAIR, RETINAL DETACHMENT, WITH  VITRECTOMY Right 9/18/2018    Performed by Alexander Corrales MD at [facility] OR [address] [place] [address]     PAST FAMILY HISTORY:   Family History   Problem Relation Age of Onset    Pneumonia Mother      SOCIAL HISTORY:   Social History     Tobacco Use    Smoking status: Never Smoker   Substance Use Topics    Alcohol use: No     Frequency: Never    Drug use: No        MENTAL STATUS: Alert    REVIEW OF SYSTEMS: Negative    OBJECTIVE:     Vital Signs (Most Recent)       Physical Exam:  General: NAD  HEENT: Atraumatic  Lungs: Adequate respirations, LCTAB  Heart: RRR, No murmur  Abdomen: Soft NT    ASSESSMENT/PLAN:     Patient is a 53 y.o. male with Epiretinal membrane, unspecified laterality [H35.379]  Retinal detachment, unspecified laterality [H33.20].     - Plan for surgical correction 23G SOR/ILM peel/FAX OD   GETA  60 minutes     - Risks/benefits/alternatives of the procedure including, but not limited to scarring, bleeding, infection, loss or decreased vision, and/or need for possible repeat surgery discussed with the patient and family.   - Informed consent obtained prior to surgery and the patient/family voiced good understanding.    Sal Villanueva  12/3/2018  5:32 PM

## 2018-12-04 ENCOUNTER — ANESTHESIA EVENT (OUTPATIENT)
Dept: SURGERY | Facility: HOSPITAL | Age: 53
End: 2018-12-04
Payer: MEDICAID

## 2018-12-04 ENCOUNTER — ANESTHESIA (OUTPATIENT)
Dept: SURGERY | Facility: HOSPITAL | Age: 53
End: 2018-12-04
Payer: MEDICAID

## 2018-12-04 ENCOUNTER — HOSPITAL ENCOUNTER (OUTPATIENT)
Facility: HOSPITAL | Age: 53
Discharge: HOME | End: 2018-12-04
Attending: OPHTHALMOLOGY | Admitting: OPHTHALMOLOGY
Payer: MEDICAID

## 2018-12-04 VITALS
WEIGHT: 220 LBS | OXYGEN SATURATION: 95 % | HEART RATE: 62 BPM | TEMPERATURE: 98 F | BODY MASS INDEX: 30.8 KG/M2 | HEIGHT: 71 IN | SYSTOLIC BLOOD PRESSURE: 156 MMHG | DIASTOLIC BLOOD PRESSURE: 83 MMHG | RESPIRATION RATE: 16 BRPM

## 2018-12-04 DIAGNOSIS — H33.22 LEFT RETINAL DETACHMENT: ICD-10-CM

## 2018-12-04 DIAGNOSIS — H35.371 EPIRETINAL MEMBRANE, RIGHT: Primary | ICD-10-CM

## 2018-12-04 LAB
POCT GLUCOSE: 150 MG/DL (ref 70–110)
POCT GLUCOSE: 215 MG/DL (ref 70–110)
POTASSIUM SERPL-SCNC: 3.7 MMOL/L

## 2018-12-04 PROCEDURE — 25000003 PHARM REV CODE 250: Performed by: OPHTHALMOLOGY

## 2018-12-04 PROCEDURE — 67042 VIT FOR MACULAR HOLE: CPT | Mod: 78,RT,, | Performed by: OPHTHALMOLOGY

## 2018-12-04 PROCEDURE — 82962 GLUCOSE BLOOD TEST: CPT | Performed by: OPHTHALMOLOGY

## 2018-12-04 PROCEDURE — 71000044 HC DOSC ROUTINE RECOVERY FIRST HOUR: Performed by: OPHTHALMOLOGY

## 2018-12-04 PROCEDURE — 00145 ANES PX EYE VITREORTNL SURG: CPT | Performed by: OPHTHALMOLOGY

## 2018-12-04 PROCEDURE — 37000008 HC ANESTHESIA 1ST 15 MINUTES: Performed by: OPHTHALMOLOGY

## 2018-12-04 PROCEDURE — D9220A PRA ANESTHESIA: Mod: ANES,,, | Performed by: ANESTHESIOLOGY

## 2018-12-04 PROCEDURE — 37000009 HC ANESTHESIA EA ADD 15 MINS: Performed by: OPHTHALMOLOGY

## 2018-12-04 PROCEDURE — 71000015 HC POSTOP RECOV 1ST HR: Performed by: OPHTHALMOLOGY

## 2018-12-04 PROCEDURE — 36000706: Performed by: OPHTHALMOLOGY

## 2018-12-04 PROCEDURE — 63600175 PHARM REV CODE 636 W HCPCS: Performed by: NURSE ANESTHETIST, CERTIFIED REGISTERED

## 2018-12-04 PROCEDURE — 36000707: Performed by: OPHTHALMOLOGY

## 2018-12-04 PROCEDURE — 63600175 PHARM REV CODE 636 W HCPCS: Mod: JG | Performed by: OPHTHALMOLOGY

## 2018-12-04 PROCEDURE — 84132 ASSAY OF SERUM POTASSIUM: CPT

## 2018-12-04 PROCEDURE — D9220A PRA ANESTHESIA: Mod: CRNA,,, | Performed by: NURSE ANESTHETIST, CERTIFIED REGISTERED

## 2018-12-04 RX ORDER — ONDANSETRON 2 MG/ML
INJECTION INTRAMUSCULAR; INTRAVENOUS
Status: DISCONTINUED | OUTPATIENT
Start: 2018-12-04 | End: 2018-12-04

## 2018-12-04 RX ORDER — PREDNISOLONE ACETATE 10 MG/ML
1 SUSPENSION/ DROPS OPHTHALMIC
Status: DISPENSED | OUTPATIENT
Start: 2018-12-04

## 2018-12-04 RX ORDER — DEXAMETHASONE SODIUM PHOSPHATE 4 MG/ML
INJECTION, SOLUTION INTRA-ARTICULAR; INTRALESIONAL; INTRAMUSCULAR; INTRAVENOUS; SOFT TISSUE
Status: DISCONTINUED
Start: 2018-12-04 | End: 2018-12-04 | Stop reason: HOSPADM

## 2018-12-04 RX ORDER — TETRACAINE HYDROCHLORIDE 5 MG/ML
1 SOLUTION OPHTHALMIC
Status: DISPENSED | OUTPATIENT
Start: 2018-12-04

## 2018-12-04 RX ORDER — OXYCODONE HYDROCHLORIDE 5 MG/1
5 TABLET ORAL
Status: DISCONTINUED | OUTPATIENT
Start: 2018-12-04 | End: 2018-12-04 | Stop reason: HOSPADM

## 2018-12-04 RX ORDER — SODIUM CHLORIDE 9 MG/ML
INJECTION, SOLUTION INTRAVENOUS CONTINUOUS
Status: DISCONTINUED | OUTPATIENT
Start: 2018-12-04 | End: 2018-12-04 | Stop reason: HOSPADM

## 2018-12-04 RX ORDER — VANCOMYCIN HYDROCHLORIDE 500 MG/10ML
INJECTION, POWDER, LYOPHILIZED, FOR SOLUTION INTRAVENOUS
Status: DISCONTINUED
Start: 2018-12-04 | End: 2018-12-04 | Stop reason: HOSPADM

## 2018-12-04 RX ORDER — MOXIFLOXACIN 5 MG/ML
1 SOLUTION/ DROPS OPHTHALMIC
Status: DISPENSED | OUTPATIENT
Start: 2018-12-04

## 2018-12-04 RX ORDER — INDOCYANINE GREEN AND WATER 25 MG
KIT INJECTION
Status: DISCONTINUED
Start: 2018-12-04 | End: 2018-12-04 | Stop reason: HOSPADM

## 2018-12-04 RX ORDER — PROPOFOL 10 MG/ML
VIAL (ML) INTRAVENOUS
Status: DISCONTINUED | OUTPATIENT
Start: 2018-12-04 | End: 2018-12-04

## 2018-12-04 RX ORDER — CYCLOPENTOLATE HYDROCHLORIDE 10 MG/ML
1 SOLUTION/ DROPS OPHTHALMIC
Status: DISPENSED | OUTPATIENT
Start: 2018-12-04

## 2018-12-04 RX ORDER — ACETAMINOPHEN 325 MG/1
650 TABLET ORAL EVERY 4 HOURS PRN
Status: DISCONTINUED | OUTPATIENT
Start: 2018-12-04 | End: 2018-12-04 | Stop reason: HOSPADM

## 2018-12-04 RX ORDER — FENTANYL CITRATE 50 UG/ML
INJECTION, SOLUTION INTRAMUSCULAR; INTRAVENOUS
Status: DISCONTINUED | OUTPATIENT
Start: 2018-12-04 | End: 2018-12-04

## 2018-12-04 RX ORDER — LIDOCAINE HCL/PF 100 MG/5ML
SYRINGE (ML) INTRAVENOUS
Status: DISCONTINUED | OUTPATIENT
Start: 2018-12-04 | End: 2018-12-04

## 2018-12-04 RX ORDER — MOXIFLOXACIN 5 MG/ML
1 SOLUTION/ DROPS OPHTHALMIC 4 TIMES DAILY
Qty: 0.0667 ML | Refills: 0 | Status: SHIPPED | OUTPATIENT
Start: 2018-12-04 | End: 2019-01-28 | Stop reason: ALTCHOICE

## 2018-12-04 RX ORDER — NEOMYCIN SULFATE, POLYMYXIN B SULFATE, AND DEXAMETHASONE 3.5; 10000; 1 MG/G; [USP'U]/G; MG/G
OINTMENT OPHTHALMIC
Status: DISCONTINUED
Start: 2018-12-04 | End: 2018-12-04 | Stop reason: HOSPADM

## 2018-12-04 RX ORDER — NEOMYCIN SULFATE, POLYMYXIN B SULFATE, AND DEXAMETHASONE 3.5; 10000; 1 MG/G; [USP'U]/G; MG/G
OINTMENT OPHTHALMIC
Status: DISCONTINUED | OUTPATIENT
Start: 2018-12-04 | End: 2018-12-04 | Stop reason: HOSPADM

## 2018-12-04 RX ORDER — PHENYLEPHRINE HYDROCHLORIDE 25 MG/ML
1 SOLUTION/ DROPS OPHTHALMIC
Status: DISPENSED | OUTPATIENT
Start: 2018-12-04

## 2018-12-04 RX ORDER — EPINEPHRINE CONVENIENCE KIT 1 MG/ML(1)
KIT INTRAMUSCULAR; SUBCUTANEOUS
Status: DISCONTINUED | OUTPATIENT
Start: 2018-12-04 | End: 2018-12-04 | Stop reason: HOSPADM

## 2018-12-04 RX ORDER — EPINEPHRINE 1 MG/ML
INJECTION, SOLUTION INTRACARDIAC; INTRAMUSCULAR; INTRAVENOUS; SUBCUTANEOUS
Status: DISCONTINUED
Start: 2018-12-04 | End: 2018-12-04 | Stop reason: HOSPADM

## 2018-12-04 RX ORDER — INDOCYANINE GREEN AND WATER 25 MG
KIT INJECTION
Status: DISCONTINUED | OUTPATIENT
Start: 2018-12-04 | End: 2018-12-04 | Stop reason: HOSPADM

## 2018-12-04 RX ORDER — TROPICAMIDE 10 MG/ML
1 SOLUTION/ DROPS OPHTHALMIC
Status: DISPENSED | OUTPATIENT
Start: 2018-12-04

## 2018-12-04 RX ORDER — LIDOCAINE HYDROCHLORIDE 10 MG/ML
1 INJECTION, SOLUTION EPIDURAL; INFILTRATION; INTRACAUDAL; PERINEURAL ONCE
Status: DISCONTINUED | OUTPATIENT
Start: 2018-12-04 | End: 2018-12-04 | Stop reason: HOSPADM

## 2018-12-04 RX ORDER — ACETAMINOPHEN 325 MG/1
325 TABLET ORAL EVERY 6 HOURS PRN
Refills: 0 | COMMUNITY
Start: 2018-12-04

## 2018-12-04 RX ORDER — HYDROCODONE BITARTRATE AND ACETAMINOPHEN 5; 325 MG/1; MG/1
1 TABLET ORAL EVERY 4 HOURS PRN
Status: DISCONTINUED | OUTPATIENT
Start: 2018-12-04 | End: 2018-12-04 | Stop reason: HOSPADM

## 2018-12-04 RX ORDER — DEXAMETHASONE SODIUM PHOSPHATE 4 MG/ML
INJECTION, SOLUTION INTRA-ARTICULAR; INTRALESIONAL; INTRAMUSCULAR; INTRAVENOUS; SOFT TISSUE
Status: DISCONTINUED | OUTPATIENT
Start: 2018-12-04 | End: 2018-12-04 | Stop reason: HOSPADM

## 2018-12-04 RX ADMIN — FENTANYL CITRATE 50 MCG: 50 INJECTION, SOLUTION INTRAMUSCULAR; INTRAVENOUS at 12:12

## 2018-12-04 RX ADMIN — LIDOCAINE HYDROCHLORIDE 100 MG: 20 INJECTION, SOLUTION INTRAVENOUS at 12:12

## 2018-12-04 RX ADMIN — TROPICAMIDE 1 DROP: 10 SOLUTION/ DROPS OPHTHALMIC at 10:12

## 2018-12-04 RX ADMIN — SODIUM CHLORIDE: 0.9 INJECTION, SOLUTION INTRAVENOUS at 12:12

## 2018-12-04 RX ADMIN — MOXIFLOXACIN 1 DROP: 5 SOLUTION/ DROPS OPHTHALMIC at 10:12

## 2018-12-04 RX ADMIN — TETRACAINE HYDROCHLORIDE 1 DROP: 5 SOLUTION OPHTHALMIC at 10:12

## 2018-12-04 RX ADMIN — HOMATROPINE HYDROBROMIDE 1 DROP: 50 SOLUTION OPHTHALMIC at 10:12

## 2018-12-04 RX ADMIN — CYCLOPENTOLATE HYDROCHLORIDE 1 DROP: 10 SOLUTION/ DROPS OPHTHALMIC at 10:12

## 2018-12-04 RX ADMIN — PREDNISOLONE ACETATE 1 DROP: 10 SUSPENSION/ DROPS OPHTHALMIC at 10:12

## 2018-12-04 RX ADMIN — PHENYLEPHRINE HYDROCHLORIDE 1 DROP: 25 SOLUTION/ DROPS OPHTHALMIC at 10:12

## 2018-12-04 RX ADMIN — HYDROCODONE BITARTRATE AND ACETAMINOPHEN 1 TABLET: 5; 325 TABLET ORAL at 02:12

## 2018-12-04 RX ADMIN — PROPOFOL 200 MG: 10 INJECTION, EMULSION INTRAVENOUS at 12:12

## 2018-12-04 RX ADMIN — ONDANSETRON 4 MG: 2 INJECTION INTRAMUSCULAR; INTRAVENOUS at 01:12

## 2018-12-04 NOTE — INTERVAL H&P NOTE
H&P completed on 12/4/18 has been reviewed, the patient has been examined and:  I concur with the findings and no changes have occurred since H&P was written.    Active Hospital Problems    Diagnosis  POA    Epiretinal membrane, right [H35.371]  Yes      Resolved Hospital Problems   No resolved problems to display.
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
## 2018-12-04 NOTE — ANESTHESIA POSTPROCEDURE EVALUATION
"Anesthesia Post Evaluation    Patient: Mickey Ross    Procedure(s) Performed: Procedure(s) (LRB):  VITRECTOMY, PARS PLANA  APPROACH  with silicone removal and membrane peel (Right)    Final Anesthesia Type: general  Patient location during evaluation: PACU  Patient participation: Yes- Able to Participate  Level of consciousness: awake and alert  Post-procedure vital signs: reviewed and stable  Pain management: adequate  Airway patency: patent  PONV status at discharge: No PONV  Anesthetic complications: no      Cardiovascular status: blood pressure returned to baseline  Respiratory status: unassisted  Hydration status: euvolemic  Follow-up not needed.        Visit Vitals  BP (!) 156/83   Pulse 62   Temp 36.8 °C (98.2 °F) (Temporal)   Resp 16   Ht 5' 11" (1.803 m)   Wt 99.8 kg (220 lb)   SpO2 95%   BMI 30.68 kg/m²       Pain/Gemma Score: Pain Assessment Performed: Yes (12/4/2018  2:45 PM)  Presence of Pain: complains of pain/discomfort (12/4/2018  2:45 PM)  Pain Rating Prior to Med Admin: 7 (12/4/2018  2:45 PM)  Pain Rating Post Med Admin: 5 (12/4/2018  2:45 PM)  Gemma Score: 9 (12/4/2018  2:45 PM)        "

## 2018-12-04 NOTE — OP NOTE
Date of Service 12/4/18  Pre-Op Dx: Epiretinal membrane, retained silicone oil, right eye  Post Op Dx: Same and subretinal hemorrhage  Procedure Performed: Pars plana vitrectomy, removal of silicone oil, internal limiting membrane peel, intravitreal injection of Avastin, right eye  Attending Surgeon: Antoni  Assistant Surgeon: Beth Villanueva  Anesthesia: General  Estimated blood loss: Minimal  Complication: None  Specimen: None  Disposition: Stable to recovery  Findings: small subretinal hemorrhage at posterior edge of scar  Outcome: Stable        Informed consent was obtained and placed in the medical record. The patient was properly identified and taken to the operating room. General anesthesia was begun by the anesthesia team. The right eye was prepped and draped in the standard ophthalmic fashion taking care to exclude the lashes from the operative field.    Standard 23 gauge vitrectomy setup was achieved with all ports 3.75 mm posterior to the limbus. The youwho visualization system alternating with a high magnification contact lens were used. Silicone oil was removed with the viscous fluid extractor.  There was a small amount of residual vitreous and hyaloid in the scarred area of the previous abscess that was removed with the cutter.  There was an epiretinal membrane with a taut ILM and traction toward the superior scar.  There waere intraretinal and a small amount of subretinal hemorrhage at the posterior edge of the scar.  The ILM was highlighted with ICG dye and peeled from the macular surface along with the epiretinal membrane.  Two fluid/air exchanges were performed to ensure that the oil was removed.  The eye was then filled with fluid again.  The retina was inspected for 360 degrees to the ora ziyad using scleral depression. There were no breaks or detachments.  Because of the hemorrhage, 1.25 mg of Avastin in 0.05 cc was injected through one of the ports.    The ports were removed. The infusion  cannula was removed.  All wounds were sutured with 7-0 vicryl, were checked, and noted not to be leaking. The eye was normotensive. A subconjunctival injection of dexamethasone was placed.     The eye was patched. A Gu shield was placed. The patient was awakened from general anesthesia by the anesthesia team having tolerated the procedure well.

## 2018-12-04 NOTE — DISCHARGE INSTRUCTIONS
Having a Vitrectomy    A vitrectomy is a type of eye surgery to treat problems with the retina and vitreous. The vitreous is a gel-like substance that fills the middle portion of your eye. During the surgery, your surgeon removes the vitreous and replaces it with another solution.  What to tell your health care provider  Before your surgery, tell your health care provider:  · What medicines you take. This includes over-the-counter medicines such as ibuprofen. It also includes vitamins, herbs, and other supplements. You may need to stop taking some medicines before the procedure, such as blood thinners and aspirin.  · If you smoke. You may need to stop before your surgery. Smoking can delay healing. Talk with your healthcare provider if you need help to stop smoking.  · If youve had recent changes in your health. This includes an infection or fever.  · If you are sensitive or allergic to anything. This includes medicines, latex, tape, and anesthetic medicines.  · If you are pregnant. Also tell your healthcare provider if you think you may be pregnant.  Getting ready for your surgery  Make sure to:  · Ask a family member or friend to take you home from the hospital  · Make plans for some help at home while you recover  · Follow all other instructions from your health care provider  · Read the consent form and ask questions if something is not clear  · Not eat or drink after midnight before your surgery  Tests before your surgery  Before your surgery, your doctor may look at your retina. Special tools are used to shine a light in your eye and look at your retina. You may need to have your eyes dilated for this eye exam. You also may need to have an ultrasound of your eye. This helps your doctor view the retina. An ultrasound uses sound waves to create images on a computer screen.  On the day of your surgery  Talk with your doctor about what to expect during your surgery. The details of the surgery may differ  somewhat. A doctor specially trained in eye surgery (ophthalmologist) will do your operation. In general, you can expect the following:  · You may have general anesthesia. This will cause you to sleep through the surgery. Or you may be awake during the surgery. You will receive a medicine to help you relax. You also may be given anesthetic eye drops and injections. This is to make sure you dont feel anything.  · Your surgeon will make an incision in the outer layer of your eye.  · He or she will make a small cut in the white part of the eye (sclera).  · Your doctor will remove the vitreous and any scar tissue or other material.  · Your doctor will do other repairs to your eye as needed. For example, he or she might use a laser to fix a tear in your retina. In some cases, your doctor may inject a gas bubble into your eye. This is to help keep the retina in place.  · Your doctor will replace the vitreous with another type of fluid. It may be replaced with silicone oil or saline.  · Your doctor will close your incisions with stitches.  · Your doctor will put an antibiotic ointment on your eye to help prevent infection.  · Your eye will be covered with a patch.  After your surgery  Youll likely be able to go home the same day. Have someone drive you home.  Recovering at home  Follow all of your doctors instructions about eye care. Your eye may be a little sore after the procedure. You can take over-the-counter pain medicine as approved by your healthcare provider. You may need to use eye drops with antibiotics. This is to help prevent infection. You may need to wear an eye patch for a day or so.  If you had a gas bubble placed in your eye during your vitrectomy, you will need to follow specific instructions about positioning. You will also need to not travel on an airplane for a period of time after the surgery. Ask your doctor when it will be safe for you to fly again.  Follow-up care  You will need close follow-up  with your doctor to see how well the surgery worked. You may have an appointment the day after the surgery. You may need follow-up surgery in the future to remove the replacement fluid from your eye.  Your vision may not be completely normal after your vitrectomy, especially if you had permanent damage to your retina. Ask your doctor how much improvement you can expect.     When to call your health care provider  Call your health care provider right away if you have any of the below:  · Vision that gets worse  · Pain or swelling around your eye that gets worse   Date Last Reviewed: 6/16/2015  © 0339-1857 [employer]. [address] [place] [address]. All rights reserved. This information is not intended as a substitute for professional medical care. Always follow your healthcare professional's instructions.    PATIENT INSTRUCTIONS  POST-ANESTHESIA    IMMEDIATELY FOLLOWING SURGERY:  Do not drive or operate machinery for the first twenty four hours after surgery.  Do not make any important decisions for twenty four hours after surgery or while taking narcotic pain medications or sedatives.  If you develop intractable nausea and vomiting or a severe headache please notify your doctor immediately.    FOLLOW-UP:  Please make an appointment with your surgeon as instructed. You do not need to follow up with anesthesia unless specifically instructed to do so.    WOUND CARE INSTRUCTIONS (if applicable):  Keep a dry clean dressing on the anesthesia/puncture wound site if there is drainage.  Once the wound has quit draining you may leave it open to air.  Generally you should leave the bandage intact for twenty four hours unless there is drainage.  If the epidural site drains for more than 36-48 hours please call the anesthesia department.    QUESTIONS?:  Please feel free to call your physician or the hospital  if you have any questions, and they will be happy to assist you.       [email or web address]  [facility]  [address] [facility]  [phone]

## 2018-12-04 NOTE — TRANSFER OF CARE
"Anesthesia Transfer of Care Note    Patient: Mickey Ross    Procedure(s) Performed: Procedure(s) (LRB):  VITRECTOMY, PARS PLANA  APPROACH  with silicone removal and membrane peel (Right)    Patient location: [facility]    Anesthesia Type: general    Transport from OR: Transported from OR on room air with adequate spontaneous ventilation    Post pain: adequate analgesia    Post assessment: no apparent anesthetic complications and tolerated procedure well    Post vital signs: stable    Level of consciousness: sedated and responds to stimulation    Nausea/Vomiting: no nausea/vomiting    Complications: none    Transfer of care protocol was followed      Last vitals:   Visit Vitals  BP (!) 175/81 (BP Location: Right arm, Patient Position: Lying)   Pulse 65   Temp 36.5 °C (97.7 °F) (Oral)   Resp 18   Ht 5' 11" (1.803 m)   Wt 99.8 kg (220 lb)   SpO2 98%   BMI 30.68 kg/m²     "

## 2018-12-04 NOTE — PROGRESS NOTES
Pt met discharge criteria at 1515. I discussed with pt and wife he was able to go home when he was ready. Pt said he wanted to rest for a few minutes. Patient slept on and off and then pt got dressed at 1600 he left.

## 2018-12-04 NOTE — BRIEF OP NOTE
Pre-Op Dx: Epiretinal membrane, retained silicone oil, right eye    Post Op Dx: Same and subretinal hemorrhage    Procedure Performed: Pars plana vitrectomy, removal of silicone oil, membrane peel, intravitreal injection of Avastin, right eye    Attending Surgeon: Antoni    Assistant Surgeon: Beth Villanueva    Anesthesia: General    Estimated blood loss: Minimal    Complication: None    Specimen: None    Disposition: Stable to recovery    Findings: small subretinal hemorrhage at posterior edge of scar    Outcome: Stable    Date of Discharge: 12/04/2018    Discharge Disposition: Home     F/U: 12/05/18

## 2018-12-05 ENCOUNTER — OFFICE VISIT (OUTPATIENT)
Dept: OPHTHALMOLOGY | Facility: CLINIC | Age: 53
End: 2018-12-05
Attending: OPHTHALMOLOGY
Payer: MEDICAID

## 2018-12-05 DIAGNOSIS — H33.21 RIGHT RETINAL DETACHMENT: ICD-10-CM

## 2018-12-05 DIAGNOSIS — H35.379 EPIRETINAL MEMBRANE, UNSPECIFIED LATERALITY: Primary | ICD-10-CM

## 2018-12-05 LAB
GLUCOSE SERPL-MCNC: 112 MG/DL (ref 70–110)
HCO3 UR-SCNC: 33.3 MMOL/L (ref 24–28)
HCT VFR BLD CALC: 27 %PCV (ref 36–54)
PCO2 BLDA: 50.8 MMHG (ref 35–45)
PH SMN: 7.42 [PH] (ref 7.35–7.45)
PO2 BLDA: 41 MMHG (ref 40–60)
POC BE: 9 MMOL/L
POC IONIZED CALCIUM: 1.02 MMOL/L (ref 1.06–1.42)
POC SATURATED O2: 76 % (ref 95–100)
POC TCO2: 35 MMOL/L (ref 24–29)
POTASSIUM BLD-SCNC: 8.8 MMOL/L (ref 3.5–5.1)
SAMPLE: ABNORMAL
SODIUM BLD-SCNC: 136 MMOL/L (ref 136–145)

## 2018-12-05 PROCEDURE — 99999 PR PBB SHADOW E&M-EST. PATIENT-LVL II: CPT | Mod: PBBFAC,,, | Performed by: OPHTHALMOLOGY

## 2018-12-05 PROCEDURE — 99024 POSTOP FOLLOW-UP VISIT: CPT | Mod: ,,, | Performed by: OPHTHALMOLOGY

## 2018-12-05 PROCEDURE — 99212 OFFICE O/P EST SF 10 MIN: CPT | Mod: PBBFAC,PO | Performed by: OPHTHALMOLOGY

## 2018-12-05 RX ORDER — HYDROCODONE BITARTRATE AND ACETAMINOPHEN 7.5; 325 MG/1; MG/1
TABLET ORAL
Refills: 0 | COMMUNITY
Start: 2018-11-21

## 2018-12-05 RX ORDER — CETIRIZINE HYDROCHLORIDE 10 MG/1
TABLET ORAL
Refills: 3 | COMMUNITY
Start: 2018-10-25

## 2018-12-05 RX ORDER — INSULIN ASPART 100 [IU]/ML
INJECTION, SOLUTION INTRAVENOUS; SUBCUTANEOUS
Refills: 3 | COMMUNITY
Start: 2018-11-21

## 2018-12-05 RX ORDER — CLOTRIMAZOLE AND BETAMETHASONE DIPROPIONATE 10; .64 MG/G; MG/G
CREAM TOPICAL
Refills: 2 | COMMUNITY
Start: 2018-10-18

## 2018-12-05 NOTE — PROGRESS NOTES
HPI     1 Day PO PPV  OD 12/04/18    Pt states that he has slight pain which they told him maybe exspected.     Eye Med(s) - Pred Forte QID - OD  Vigamox QID - OD  Homatropine QD - OD  Evgeny Oint Apply QHS - OD    Last edited by Richa Ibarra MA on 12/5/2018  8:04 AM. (History)            Assessment /Plan     For exam results, see Encounter Report.    Epiretinal membrane, unspecified laterality    Right retinal detachment      Doing well POD#1  PF 4/0  Vig 4/0  HA 1/0  Maxitrol HS/0  No vigorous activity, no lifting, bending, etc.  Gu shield sleeping  Gu shield, glasses, or sunglasses all times for protection   No shower   RD/Endophthalmitis precautions   RTC 1 wk sooner PRN if any problems (nishi pain, redness, dimming or loss of vision)

## 2018-12-10 ENCOUNTER — OFFICE VISIT (OUTPATIENT)
Dept: OPHTHALMOLOGY | Facility: CLINIC | Age: 53
End: 2018-12-10
Attending: OPHTHALMOLOGY
Payer: MEDICAID

## 2018-12-10 DIAGNOSIS — L02.91 ABSCESS: ICD-10-CM

## 2018-12-10 DIAGNOSIS — H35.371 EPIRETINAL MEMBRANE (ERM) OF RIGHT EYE: ICD-10-CM

## 2018-12-10 DIAGNOSIS — H33.21 RIGHT RETINAL DETACHMENT: Primary | ICD-10-CM

## 2018-12-10 PROCEDURE — 99999 PR PBB SHADOW E&M-EST. PATIENT-LVL II: CPT | Mod: PBBFAC,,, | Performed by: OPHTHALMOLOGY

## 2018-12-10 PROCEDURE — 92134 CPTRZ OPH DX IMG PST SGM RTA: CPT | Mod: PBBFAC,PO | Performed by: OPHTHALMOLOGY

## 2018-12-10 PROCEDURE — 99212 OFFICE O/P EST SF 10 MIN: CPT | Mod: PBBFAC,PO,25 | Performed by: OPHTHALMOLOGY

## 2018-12-10 PROCEDURE — 99024 POSTOP FOLLOW-UP VISIT: CPT | Mod: ,,, | Performed by: OPHTHALMOLOGY

## 2018-12-10 NOTE — PROGRESS NOTES
"HPI     DLS 12/05/18  By Dr. Antoni MD   54 YO M here today for his 1 week PO   PPV  OD 12/04/18 for the treatment of ERM OD .   Pt states " things seem a little better and the oil bubble has dissipated   a lot. stj     Eye Med(s) - Pred Forte QID - OD                        Vigamox QID - OD                        Homatropine QD - OD                        Evgeny Oint Apply QHS - OD    POHx:   1. ERM OD  S/P Procedure Performed: Pars plana vitrectomy, removal of silicone oil,   internal limiting membrane peel, intravitreal injection of Avastin, right   eye (12/04/18).    Last edited by Maria De Jesus Healy MA on 12/10/2018  7:53 AM. (History)          [place] SDOCT:   OD: good quality, no ERM, some sup thickening, SR bleb, all improved since 11/7 and 11/26 scans      Assessment /Plan     For exam results, see Encounter Report.    Right retinal detachment  -     Cancel: OCT, Retina - OU - Both Eyes  -     Posterior Segment OCT Retina-Both eyes  -     Posterior Segment OCT Retina-Both eyes; Future  -     Flourescein Angiography - OU - Both Eyes; Future    Epiretinal membrane, unspecified laterality  -     Cancel: OCT, Retina - OU - Both Eyes  -     Posterior Segment OCT Retina-Both eyes  -     Posterior Segment OCT Retina-Both eyes; Future  -     Flourescein Angiography - OU - Both Eyes; Future      Doing well post op.      Doing well POD#6  PF 4/0  Vig 4/0 x 4 more days and d/c  HA d/c  Maxitrol d/c  No vigorous activity, no lifting, bending, etc.  Gu shield sleeping one more week  Monocular precautions  RD/Endophthalmitis precautions   RTC about 2 wks with exam, OCT and FA-- sooner PRN if any problems (nishi pain, redness, dimming or loss of vision)                     "

## 2019-01-14 ENCOUNTER — OFFICE VISIT (OUTPATIENT)
Dept: OPHTHALMOLOGY | Facility: CLINIC | Age: 54
End: 2019-01-14
Attending: OPHTHALMOLOGY
Payer: MEDICAID

## 2019-01-14 VITALS — SYSTOLIC BLOOD PRESSURE: 175 MMHG | DIASTOLIC BLOOD PRESSURE: 77 MMHG | HEART RATE: 74 BPM

## 2019-01-14 DIAGNOSIS — H35.61 RETINAL HEMORRHAGE OF RIGHT EYE: Primary | ICD-10-CM

## 2019-01-14 DIAGNOSIS — L02.91 ABSCESS: ICD-10-CM

## 2019-01-14 DIAGNOSIS — H33.011 RETINAL DETACHMENT OF RIGHT EYE WITH SINGLE RETINAL TEAR: ICD-10-CM

## 2019-01-14 DIAGNOSIS — H33.21 RIGHT RETINAL DETACHMENT: ICD-10-CM

## 2019-01-14 PROCEDURE — 92134 CPTRZ OPH DX IMG PST SGM RTA: CPT | Mod: 50,PBBFAC,PO | Performed by: OPHTHALMOLOGY

## 2019-01-14 PROCEDURE — 99999 PR PBB SHADOW E&M-EST. PATIENT-LVL III: ICD-10-PCS | Mod: PBBFAC,,, | Performed by: OPHTHALMOLOGY

## 2019-01-14 PROCEDURE — 92235 FLUORESCEIN ANGIOGRAPHY - OU - BOTH EYES: ICD-10-PCS | Mod: 26,S$PBB,, | Performed by: OPHTHALMOLOGY

## 2019-01-14 PROCEDURE — 99999 PR PBB SHADOW E&M-EST. PATIENT-LVL III: CPT | Mod: PBBFAC,,, | Performed by: OPHTHALMOLOGY

## 2019-01-14 PROCEDURE — 92134 CPTRZ OPH DX IMG PST SGM RTA: CPT | Mod: PBBFAC,PO | Performed by: OPHTHALMOLOGY

## 2019-01-14 PROCEDURE — 92134 OCT, RETINA - OU - BOTH EYES: ICD-10-PCS | Mod: 26,S$PBB,, | Performed by: OPHTHALMOLOGY

## 2019-01-14 PROCEDURE — 99024 PR POST-OP FOLLOW-UP VISIT: ICD-10-PCS | Mod: ,,, | Performed by: OPHTHALMOLOGY

## 2019-01-14 PROCEDURE — 92235 FLUORESCEIN ANGRPH MLTIFRAME: CPT | Mod: 50,PBBFAC,PO | Performed by: OPHTHALMOLOGY

## 2019-01-14 PROCEDURE — 99024 POSTOP FOLLOW-UP VISIT: CPT | Mod: ,,, | Performed by: OPHTHALMOLOGY

## 2019-01-14 PROCEDURE — 99213 OFFICE O/P EST LOW 20 MIN: CPT | Mod: PBBFAC,PO,25 | Performed by: OPHTHALMOLOGY

## 2019-01-14 RX ORDER — PREDNISOLONE ACETATE 10 MG/ML
1 SUSPENSION/ DROPS OPHTHALMIC 3 TIMES DAILY
Qty: 10 ML | Refills: 0 | Status: SHIPPED | OUTPATIENT
Start: 2019-01-14 | End: 2019-02-13

## 2019-01-14 RX ORDER — KETOROLAC TROMETHAMINE 5 MG/ML
1 SOLUTION OPHTHALMIC 3 TIMES DAILY
Qty: 10 ML | Refills: 0 | Status: SHIPPED | OUTPATIENT
Start: 2019-01-14 | End: 2019-01-28 | Stop reason: ALTCHOICE

## 2019-01-14 NOTE — PROGRESS NOTES
HPI     DLS 12/10/18  By Dr. Antoni MD       52 YO M here today for his  2wk RRD OD ck after review of OCT and IVFA   transit OD. PT feels vision is getting better since last visit.  Has one   circular floater that comes into view when looking straight down.  No   flashes.  No pain. Able to do everything on his own now     LBS was 46 at 5:00 a after waking up this morning and after I ate   breakfast it was 120 at 6:30a. stj         +floater OU  -eye pain       Eye Med(s) - Pred Forte tid - OD                        Vigamox QID - OD )( completed)                        Homatropine QD - OD (completed)                        Evgeny Oint Apply QHS - OD ( completed)     POHx:   1. ERM OD  S/P Procedure Performed: Pars plana vitrectomy, removal of silicone oil,   internal limiting membrane peel, intravitreal injection of Avastin, right   eye (12/04/18).    Last edited by Alexander Corrales MD on 1/16/2019 10:25 PM. (History)      [place] SDOCT:   OD: good quality, no ERM, some sup thickening, SR bleb, all improved since 11/7 and 11/26 and 12/10/18 scans    FA: good quality  OD: brisk transit, blockage by heme, staining of sup scar, late nasal mac hyperfl suggesting petaloid  Sl more heme but less overall leakage than 10/3/18    Assessment /Plan     For exam results, see Encounter Report.    Retinal hemorrhage of right eye  -     OCT, Retina - OU - Both Eyes  -     Fluorescein Angiography - OU - Both Eyes    Retinal detachment of right eye with single retinal tear  -     Cancel: Flourescein Angiography - OU - Both Eyes    Subretinal abscess  -     Cancel: Flourescein Angiography - OU - Both Eyes    Right retinal detachment  -     Cancel: Posterior Segment OCT Retina-Both eyes    Other orders  -     prednisoLONE acetate (PRED FORTE) 1 % DrpS; Place 1 drop into the right eye 3 (three) times daily.  Dispense: 10 mL; Refill: 0  -     ketorolac 0.5% (ACULAR) 0.5 % Drop; Place 1 drop into the right eye 3 (three) times  daily.  Dispense: 10 mL; Refill: 0      SR/IR heme sup near scar.  Has SRF which is improving.  Heme is overall improving.  Has been present since before first surgery    Discussed close obs vs Avastin.  RBA discussed    Pt wishes to observe and have inj only if abs necessary    RTC 2 wks, sooner PRN

## 2019-01-21 ENCOUNTER — OFFICE VISIT (OUTPATIENT)
Dept: PODIATRY | Facility: CLINIC | Age: 54
End: 2019-01-21
Payer: MEDICAID

## 2019-01-21 ENCOUNTER — TELEPHONE (OUTPATIENT)
Dept: OPHTHALMOLOGY | Facility: CLINIC | Age: 54
End: 2019-01-21

## 2019-01-21 VITALS — BODY MASS INDEX: 30.8 KG/M2 | HEIGHT: 71 IN | WEIGHT: 220 LBS

## 2019-01-21 DIAGNOSIS — M14.672 CHARCOT'S JOINT OF LEFT FOOT: ICD-10-CM

## 2019-01-21 DIAGNOSIS — E11.49 TYPE II DIABETES MELLITUS WITH NEUROLOGICAL MANIFESTATIONS: ICD-10-CM

## 2019-01-21 DIAGNOSIS — L97.522 FOOT ULCERATION, LEFT, WITH FAT LAYER EXPOSED: Primary | ICD-10-CM

## 2019-01-21 PROCEDURE — 99213 OFFICE O/P EST LOW 20 MIN: CPT | Mod: S$PBB,,, | Performed by: PODIATRIST

## 2019-01-21 PROCEDURE — 99999 PR PBB SHADOW E&M-EST. PATIENT-LVL IV: ICD-10-PCS | Mod: PBBFAC,,, | Performed by: PODIATRIST

## 2019-01-21 PROCEDURE — 99214 OFFICE O/P EST MOD 30 MIN: CPT | Mod: PBBFAC,PO | Performed by: PODIATRIST

## 2019-01-21 PROCEDURE — 99213 PR OFFICE/OUTPT VISIT, EST, LEVL III, 20-29 MIN: ICD-10-PCS | Mod: S$PBB,,, | Performed by: PODIATRIST

## 2019-01-21 PROCEDURE — 99999 PR PBB SHADOW E&M-EST. PATIENT-LVL IV: CPT | Mod: PBBFAC,,, | Performed by: PODIATRIST

## 2019-01-21 NOTE — TELEPHONE ENCOUNTER
Ms. Stewart called on behalf of pt stating that his surgery eye is watering on and off, is there something they could use for it.  Spoke with Dr. Corrales  Informed pt could use some OTC tear drops in eye 1 top 3 times a day as needed.  Pt has appt on Monday for FU.

## 2019-01-21 NOTE — PROGRESS NOTES
Subjective:      Patient ID: Mickey Ross is a 53 y.o. male.    Chief Complaint: Foot Pain (ankle pain (PCP Dr Moncada)) and Ankle Pain    Presents for f/u s/p left ankle I&D done at OSH. Pt with PMH DM, Charcot, ESRD, recently discharged from [facility] main. Pt. Was initially admitted to [facility] where left ankle I&D was performed then subsequently transferred to [facility] for further care. Left BKA recommended however patient declines. Reports HH dressing changes twice a week. Currently with hamm catheter for IV abx. Presents with family. Denies N/V/F/C. Ambulating in offloading pad on left.    1/21/19: F/u left ankle wound, Reports pain today to lateral ankle as he has been ambulating with CROW boot. Patient reports pain whenever he ambulates in the boot due to rubbing of the boot on the lateral ankle wound. Denies N/V/F/C. Receiving HH dressing change last  Dressing change Friday of last week. Requesting new  orders.     Review of Systems   Constitution: Negative for chills, diaphoresis, fever and weakness.   Cardiovascular: Negative for claudication, cyanosis, leg swelling and syncope.   Respiratory: Negative for cough and shortness of breath.    Skin: Positive for poor wound healing and suspicious lesions. Negative for color change and nail changes.   Musculoskeletal: Negative for falls, joint pain, muscle cramps and muscle weakness.   Gastrointestinal: Negative for diarrhea, nausea and vomiting.   Neurological: Negative for disturbances in coordination, numbness, paresthesias, sensory change and tremors.   Psychiatric/Behavioral: Negative for altered mental status.           Objective:      Physical Exam   Constitutional: He appears well-developed. He is cooperative.   Oriented to time, place, and person.   Cardiovascular:   DP and PT pulses are palpable bilaterally. 3 sec capillary refill time and toes and feet are warm to touch proximally .  There is  hair growth on the feet and toes b/l. There is no edema b/l. No  spider veins or varicosities present b/l.      Musculoskeletal:   Rocker bottom foot type left foot with medial deviation. Equinocavovarus contracture noted.      Feet:   Right Foot:   Protective Sensation: 10 sites tested. 7 sites sensed.   Skin Integrity: Positive for dry skin. Negative for callus.   Left Foot:   Protective Sensation: 10 sites tested. 9 sites sensed.   Skin Integrity: Positive for dry skin. Negative for callus.   Lymphadenopathy:   Negative lymphadenopathy bilateral popliteal fossa and tarsal tunnel.   Neurological: He is alert.   Light touch, proprioception, and sharp/dull sensation are all intact bilaterally. Protective threshold with the Ruthven-Wienstein monofilament is intact bilaterally.    Skin:     Wound dehiscence to left medial and lateral incision sites. Fibrogranular base. No purulent drainage noted. No erythema noted.    Psychiatric: He has a normal mood and affect.     1/21/19  Wound 1: Left lateral ankle   Measurement: 1.2cmx1.0cmx0.2cm  Base: granular base   Periwound skin: callus  Drainage: none  Erythema: mild  Probe: none, no bone exposed                  Healed left  medial ankle.                           Assessment:       Encounter Diagnoses   Name Primary?    Foot ulceration, left, with fat layer exposed Yes    Type II diabetes mellitus with neurological manifestations     Charcot's joint of left foot          Plan:       Mickey was seen today for foot pain and ankle pain.    Diagnoses and all orders for this visit:    Foot ulceration, left, with fat layer exposed  -     SUBSEQUENT HOME HEALTH ORDERS    Type II diabetes mellitus with neurological manifestations  -     SUBSEQUENT HOME HEALTH ORDERS    Charcot's joint of left foot  -     SUBSEQUENT HOME HEALTH ORDERS      I counseled the patient on his conditions, their implications and medical management.    Wound cleansed with saline. Whitney applied to left lateral ankle wound, covered with boyd foam, kerlix and ACE.  Patient declines football dressing. Has CROW walker today.     Home health orders updated.     Follow-up:Patient is to return to the clinic in two weekw for follow-up but should call [facility] immediately if any signs of infection, such as fever, chills, sweats, increased redness or pain.    Short-term goals include maintaining good offloading and minimizing bioburden, promoting granulation and epithelialization to healing.  Long-term goals include keeping the wound healed by good offloading and medical management under the direction of internist.    F/u 2 weeks.     Barbara Means DPM

## 2019-01-25 ENCOUNTER — TELEPHONE (OUTPATIENT)
Dept: PODIATRY | Facility: CLINIC | Age: 54
End: 2019-01-25

## 2019-01-25 NOTE — TELEPHONE ENCOUNTER
----- Message from Jane Terrell sent at 1/25/2019  9:02 AM CST -----  Contact: [facility]#[phone] fax#[phone]  Patient Requesting Order    Order Needed:Wound Care Orders    Communication Preference:    Additional Information:

## 2019-01-28 ENCOUNTER — PROCEDURE VISIT (OUTPATIENT)
Dept: OPHTHALMOLOGY | Facility: CLINIC | Age: 54
End: 2019-01-28
Attending: OPHTHALMOLOGY
Payer: MEDICAID

## 2019-01-28 DIAGNOSIS — H33.011 RETINAL DETACHMENT OF RIGHT EYE WITH SINGLE RETINAL TEAR: ICD-10-CM

## 2019-01-28 DIAGNOSIS — H35.371 EPIRETINAL MEMBRANE (ERM) OF RIGHT EYE: ICD-10-CM

## 2019-01-28 DIAGNOSIS — H35.61 RETINAL HEMORRHAGE OF RIGHT EYE: Primary | ICD-10-CM

## 2019-01-28 PROCEDURE — 92134 CPTRZ OPH DX IMG PST SGM RTA: CPT | Mod: PBBFAC,PO | Performed by: OPHTHALMOLOGY

## 2019-01-28 PROCEDURE — 92134 POSTERIOR SEGMENT OCT RETINA (OCULAR COHERENCE TOMOGRAPHY)-BOTH EYES: ICD-10-PCS | Mod: 26,S$PBB,, | Performed by: OPHTHALMOLOGY

## 2019-01-28 PROCEDURE — 99024 POSTOP FOLLOW-UP VISIT: CPT | Mod: ,,, | Performed by: OPHTHALMOLOGY

## 2019-01-28 PROCEDURE — 99024 PR POST-OP FOLLOW-UP VISIT: ICD-10-PCS | Mod: ,,, | Performed by: OPHTHALMOLOGY

## 2019-01-28 NOTE — PROGRESS NOTES
HPI     DLS 01/14/18  By Dr. Antoni MD       Pt states that seems as if vision has gotten just a little bit better. No   pains,flashes,floaters, or double vision.  Cloudiness is still at the   bottom of eye vision.    Eye Med(s) - Pred Forte TID - OD                          POHx:   1. ERM OD  S/P Procedure Performed: Pars plana vitrectomy, removal of silicone oil,   internal limiting membrane peel, intravitreal injection of Avastin, right   eye (12/04/18).    Last edited by Richa Ibarra MA on 1/28/2019  9:39 AM. (History)         [place] SDOCT:   OD: good quality, no ERM, min thickening, SR bleb, OCT improved from 1/14/19    Assessment /Plan     For exam results, see Encounter Report.    Retinal hemorrhage of right eye      Doing well.  SRF improving.  Mac contour improving.  Will watch closely       Taper PF to 2/0  Not taking Ketorolac--can stay off    RTC 3 wks, sooner PRN

## 2019-01-29 ENCOUNTER — TELEPHONE (OUTPATIENT)
Dept: PODIATRY | Facility: CLINIC | Age: 54
End: 2019-01-29

## 2019-01-29 NOTE — TELEPHONE ENCOUNTER
----- Message from Jane Terrell sent at 1/29/2019  8:36 AM CST -----  Contact: [email or web address]  Patient Requesting Order    Order Needed:patient called regarding home health orders being sent    Communication Preference:Home#    Additional Information:

## 2019-02-01 ENCOUNTER — TELEPHONE (OUTPATIENT)
Dept: PODIATRY | Facility: CLINIC | Age: 54
End: 2019-02-01

## 2019-02-01 DIAGNOSIS — L97.522 FOOT ULCERATION, LEFT, WITH FAT LAYER EXPOSED: Primary | ICD-10-CM

## 2019-02-01 NOTE — TELEPHONE ENCOUNTER
Spoke with Anjana with [facility]    She states that she needs orders for the wound care supplies for this patient    She states that patient's insurance is not reimbursing for wound care supplies. She states that she changes dressing once weekly and leaves supplies in case patient gets dressing wet and has to change it       Will need an order for saline, aquacel ag, kerlix and ACE     Please advise

## 2019-02-01 NOTE — TELEPHONE ENCOUNTER
----- Message from Sherman Jon sent at 2/1/2019  1:39 PM CST -----  Contact: Clint/[phone]  Anjana would like the RX for the patients wound supplies faxed over to [phone].          Thank you

## 2019-02-20 ENCOUNTER — OFFICE VISIT (OUTPATIENT)
Dept: OPHTHALMOLOGY | Facility: CLINIC | Age: 54
End: 2019-02-20
Attending: OPHTHALMOLOGY
Payer: MEDICAID

## 2019-02-20 DIAGNOSIS — H33.21 RIGHT RETINAL DETACHMENT: ICD-10-CM

## 2019-02-20 DIAGNOSIS — H35.61 RETINAL HEMORRHAGE OF RIGHT EYE: ICD-10-CM

## 2019-02-20 DIAGNOSIS — H35.371 EPIRETINAL MEMBRANE (ERM) OF RIGHT EYE: Primary | ICD-10-CM

## 2019-02-20 PROCEDURE — 92134 POSTERIOR SEGMENT OCT RETINA (OCULAR COHERENCE TOMOGRAPHY)-BOTH EYES: ICD-10-PCS | Mod: 26,S$PBB,, | Performed by: OPHTHALMOLOGY

## 2019-02-20 PROCEDURE — 99024 POSTOP FOLLOW-UP VISIT: CPT | Mod: ,,, | Performed by: OPHTHALMOLOGY

## 2019-02-20 PROCEDURE — 99999 PR PBB SHADOW E&M-EST. PATIENT-LVL II: ICD-10-PCS | Mod: PBBFAC,,, | Performed by: OPHTHALMOLOGY

## 2019-02-20 PROCEDURE — 99212 OFFICE O/P EST SF 10 MIN: CPT | Mod: PBBFAC,PO,25 | Performed by: OPHTHALMOLOGY

## 2019-02-20 PROCEDURE — 99024 PR POST-OP FOLLOW-UP VISIT: ICD-10-PCS | Mod: ,,, | Performed by: OPHTHALMOLOGY

## 2019-02-20 PROCEDURE — 92134 CPTRZ OPH DX IMG PST SGM RTA: CPT | Mod: PBBFAC,PO | Performed by: OPHTHALMOLOGY

## 2019-02-20 PROCEDURE — 99999 PR PBB SHADOW E&M-EST. PATIENT-LVL II: CPT | Mod: PBBFAC,,, | Performed by: OPHTHALMOLOGY

## 2019-03-18 ENCOUNTER — HOSPITAL ENCOUNTER (OUTPATIENT)
Dept: RADIOLOGY | Facility: HOSPITAL | Age: 54
Discharge: HOME | End: 2019-03-18
Attending: PODIATRIST
Payer: MEDICAID

## 2019-03-18 ENCOUNTER — OFFICE VISIT (OUTPATIENT)
Dept: PODIATRY | Facility: CLINIC | Age: 54
End: 2019-03-18
Payer: MEDICAID

## 2019-03-18 VITALS
DIASTOLIC BLOOD PRESSURE: 70 MMHG | WEIGHT: 220 LBS | SYSTOLIC BLOOD PRESSURE: 130 MMHG | BODY MASS INDEX: 30.8 KG/M2 | HEIGHT: 71 IN

## 2019-03-18 DIAGNOSIS — L97.509 ULCER OF FOOT, UNSPECIFIED LATERALITY, UNSPECIFIED ULCER STAGE: ICD-10-CM

## 2019-03-18 DIAGNOSIS — E11.49 TYPE II DIABETES MELLITUS WITH NEUROLOGICAL MANIFESTATIONS: ICD-10-CM

## 2019-03-18 DIAGNOSIS — L97.509 ULCER OF FOOT, UNSPECIFIED LATERALITY, UNSPECIFIED ULCER STAGE: Primary | ICD-10-CM

## 2019-03-18 DIAGNOSIS — M14.672 CHARCOT'S JOINT OF LEFT FOOT: ICD-10-CM

## 2019-03-18 PROCEDURE — 99213 OFFICE O/P EST LOW 20 MIN: CPT | Mod: S$PBB,,, | Performed by: PODIATRIST

## 2019-03-18 PROCEDURE — 99999 PR PBB SHADOW E&M-EST. PATIENT-LVL V: CPT | Mod: PBBFAC,,, | Performed by: PODIATRIST

## 2019-03-18 PROCEDURE — 99215 OFFICE O/P EST HI 40 MIN: CPT | Mod: PBBFAC,PO | Performed by: PODIATRIST

## 2019-03-18 PROCEDURE — 99213 PR OFFICE/OUTPT VISIT, EST, LEVL III, 20-29 MIN: ICD-10-PCS | Mod: S$PBB,,, | Performed by: PODIATRIST

## 2019-03-18 PROCEDURE — 87075 CULTR BACTERIA EXCEPT BLOOD: CPT

## 2019-03-18 PROCEDURE — 87077 CULTURE AEROBIC IDENTIFY: CPT

## 2019-03-18 PROCEDURE — 99999 PR PBB SHADOW E&M-EST. PATIENT-LVL V: ICD-10-PCS | Mod: PBBFAC,,, | Performed by: PODIATRIST

## 2019-03-18 PROCEDURE — 87186 SC STD MICRODIL/AGAR DIL: CPT

## 2019-03-18 PROCEDURE — 87070 CULTURE OTHR SPECIMN AEROBIC: CPT

## 2019-03-18 RX ORDER — CIPROFLOXACIN 500 MG/1
500 TABLET ORAL EVERY 12 HOURS
Qty: 20 TABLET | Refills: 0 | Status: SHIPPED | OUTPATIENT
Start: 2019-03-18

## 2019-03-19 NOTE — PROGRESS NOTES
Subjective:      Patient ID: Mickey Ross is a 54 y.o. male.    Chief Complaint: Wound Care (2 week follow up / Left foot ulcer )    Presents for f/u s/p left ankle I&D done at OSH. Pt with PMH DM, Charcot, ESRD, recently discharged from [facility]. Pt. Was initially admitted to [facility] where left ankle I&D was performed then subsequently transferred to [facility] for further care. Left BKA recommended however patient declines. Reports HH dressing changes twice a week. Currently with hamm catheter for IV abx. Presents with family. Denies N/V/F/C. Ambulating in offloading pad on left.    1/21/19: F/u left ankle wound, Reports pain today to lateral ankle as he has been ambulating with CROW boot. Patient reports pain whenever he ambulates in the boot due to rubbing of the boot on the lateral ankle wound. Denies N/V/F/C. Receiving HH dressing change last  Dressing change Friday of last week. Requesting new  orders.     3/19/10: F/u left ankle wound. Denies pain to area. Reports rubbing of CROW boot on lateral ankle. Requesting orders for wound care supplies.     Review of Systems   Constitution: Negative for chills, diaphoresis, fever and weakness.   Cardiovascular: Negative for claudication, cyanosis, leg swelling and syncope.   Respiratory: Negative for cough and shortness of breath.    Skin: Positive for poor wound healing and suspicious lesions. Negative for color change and nail changes.   Musculoskeletal: Negative for falls, joint pain, muscle cramps and muscle weakness.   Gastrointestinal: Negative for diarrhea, nausea and vomiting.   Neurological: Negative for disturbances in coordination, numbness, paresthesias, sensory change and tremors.   Psychiatric/Behavioral: Negative for altered mental status.           Objective:      Physical Exam   Constitutional: He appears well-developed. He is cooperative.   Oriented to time, place, and person.   Cardiovascular:   DP and PT pulses are palpable bilaterally. 3 sec  capillary refill time and toes and feet are warm to touch proximally .  There is  hair growth on the feet and toes b/l. There is no edema b/l. No spider veins or varicosities present b/l.      Musculoskeletal:   Rocker bottom foot type left foot with medial deviation. Equinocavovarus contracture noted.      Feet:   Right Foot:   Protective Sensation: 10 sites tested. 7 sites sensed.   Skin Integrity: Positive for dry skin. Negative for callus.   Left Foot:   Protective Sensation: 10 sites tested. 9 sites sensed.   Skin Integrity: Positive for dry skin. Negative for callus.   Lymphadenopathy:   Negative lymphadenopathy bilateral popliteal fossa and tarsal tunnel.   Neurological: He is alert.   Light touch, proprioception, and sharp/dull sensation are all intact bilaterally. Protective threshold with the [place]Wienstein monofilament is intact bilaterally.    Skin:     Wound dehiscence to left medial and lateral incision sites. Fibrogranular base. No purulent drainage noted. No erythema noted.    Psychiatric: He has a normal mood and affect.       3/19/19  Bullae formation noted to left lateral ankle.       S/p drainage of bullae formation, purulent drainage noted.           Medial ankle                    1/21/19  Wound 1: Left lateral ankle   Measurement: 1.2cmx1.0cmx0.2cm  Base: granular base   Periwound skin: callus  Drainage: none  Erythema: mild  Probe: none, no bone exposed                  Healed left  medial ankle.                           Assessment:       Encounter Diagnoses   Name Primary?    Ulcer of foot, unspecified laterality, unspecified ulcer stage Yes    Type II diabetes mellitus with neurological manifestations     Charcot's joint of left foot          Plan:       Mickey was seen today for wound care.    Diagnoses and all orders for this visit:    Ulcer of foot, unspecified laterality, unspecified ulcer stage  -     X-Ray Foot Complete Left; Future  -     X-Ray Ankle Complete Left; Future  -      Aerobic culture (Specify Source)  -     CULTURE, ANAEROBE  -     POCT Apply ace wrap  -     Ambulatory Referral to Wound Clinic  -     SUBSEQUENT HOME HEALTH ORDERS    Type II diabetes mellitus with neurological manifestations    Charcot's joint of left foot    Other orders  -     ciprofloxacin HCl (CIPRO) 500 MG tablet; Take 1 tablet (500 mg total) by mouth every 12 (twelve) hours.      I counseled the patient on his conditions, their implications and medical management.    Debridement: With patient's permission sterile scissors and forceps used to drain bullale formation left lateral ankle. Purulent drainage noted.   Dressings:Iodosorb   Offloading:Kerlix and ACE.    Cultures obtained.     Prescription sent for Cipro.    Wound care orders updated, recommend HH dressing change twice a week.Patient refusing twice a week dressing change requesting only once a week. Explained importance of continued monitoring to wound.     Xray left foot and ankle ordered    Referral placed to wound care.     Patient requesting orders for supplies, letter written to order supplies given to patient.     Follow-up:Patient is to return to the clinic in one week for follow-up but should call [facility] immediately if any signs of infection, such as fever, chills, sweats, increased redness or pain.    Short-term goals include maintaining good offloading and minimizing bioburden, promoting granulation and epithelialization to healing.  Long-term goals include keeping the wound healed by good offloading and medical management under the direction of internist.    F/u 1 week.     Barbara Means DPM

## 2019-03-20 ENCOUNTER — OFFICE VISIT (OUTPATIENT)
Dept: OPHTHALMOLOGY | Facility: CLINIC | Age: 54
End: 2019-03-20
Attending: OPHTHALMOLOGY
Payer: MEDICAID

## 2019-03-20 ENCOUNTER — HOSPITAL ENCOUNTER (OUTPATIENT)
Dept: RADIOLOGY | Facility: HOSPITAL | Age: 54
Discharge: HOME | End: 2019-03-20
Attending: PODIATRIST
Payer: MEDICAID

## 2019-03-20 DIAGNOSIS — L02.91 ABSCESS: ICD-10-CM

## 2019-03-20 DIAGNOSIS — H33.21 RIGHT RETINAL DETACHMENT: ICD-10-CM

## 2019-03-20 DIAGNOSIS — Z79.4 TYPE 2 DIABETES MELLITUS WITH RIGHT EYE AFFECTED BY MILD NONPROLIFERATIVE RETINOPATHY WITHOUT MACULAR EDEMA, WITH LONG-TERM CURRENT USE OF INSULIN: ICD-10-CM

## 2019-03-20 DIAGNOSIS — H35.61 RETINAL HEMORRHAGE OF RIGHT EYE: ICD-10-CM

## 2019-03-20 DIAGNOSIS — E11.3291 TYPE 2 DIABETES MELLITUS WITH RIGHT EYE AFFECTED BY MILD NONPROLIFERATIVE RETINOPATHY WITHOUT MACULAR EDEMA, WITH LONG-TERM CURRENT USE OF INSULIN: ICD-10-CM

## 2019-03-20 PROCEDURE — 92226 PR SPECIAL EYE EXAM, SUBSEQUENT: ICD-10-PCS | Mod: S$PBB,RT,, | Performed by: OPHTHALMOLOGY

## 2019-03-20 PROCEDURE — 73610 X-RAY EXAM OF ANKLE: CPT | Mod: 26,LT,, | Performed by: RADIOLOGY

## 2019-03-20 PROCEDURE — 92226 PR SPECIAL EYE EXAM, SUBSEQUENT: CPT | Mod: S$PBB,RT,, | Performed by: OPHTHALMOLOGY

## 2019-03-20 PROCEDURE — 73630 XR FOOT COMPLETE 3 VIEW LEFT: ICD-10-PCS | Mod: 26,LT,, | Performed by: RADIOLOGY

## 2019-03-20 PROCEDURE — 99213 OFFICE O/P EST LOW 20 MIN: CPT | Mod: PBBFAC,25,PO | Performed by: OPHTHALMOLOGY

## 2019-03-20 PROCEDURE — 92012 PR EYE EXAM, EST PATIENT,INTERMED: ICD-10-PCS | Mod: S$PBB,,, | Performed by: OPHTHALMOLOGY

## 2019-03-20 PROCEDURE — 73630 X-RAY EXAM OF FOOT: CPT | Mod: TC,FY,PO,LT

## 2019-03-20 PROCEDURE — 92134 CPTRZ OPH DX IMG PST SGM RTA: CPT | Mod: 50,PBBFAC,PO | Performed by: OPHTHALMOLOGY

## 2019-03-20 PROCEDURE — 73610 X-RAY EXAM OF ANKLE: CPT | Mod: TC,FY,PO,LT

## 2019-03-20 PROCEDURE — 92134 OCT, RETINA - OU - BOTH EYES: ICD-10-PCS | Mod: 26,S$PBB,, | Performed by: OPHTHALMOLOGY

## 2019-03-20 PROCEDURE — 73630 X-RAY EXAM OF FOOT: CPT | Mod: 26,LT,, | Performed by: RADIOLOGY

## 2019-03-20 PROCEDURE — 92012 INTRM OPH EXAM EST PATIENT: CPT | Mod: S$PBB,,, | Performed by: OPHTHALMOLOGY

## 2019-03-20 PROCEDURE — 92226 PR SPECIAL EYE EXAM, SUBSEQUENT: CPT | Mod: PBBFAC,PO,RT | Performed by: OPHTHALMOLOGY

## 2019-03-20 PROCEDURE — 99999 PR PBB SHADOW E&M-EST. PATIENT-LVL III: CPT | Mod: PBBFAC,,, | Performed by: OPHTHALMOLOGY

## 2019-03-20 PROCEDURE — 99999 PR PBB SHADOW E&M-EST. PATIENT-LVL III: ICD-10-PCS | Mod: PBBFAC,,, | Performed by: OPHTHALMOLOGY

## 2019-03-20 PROCEDURE — 73610 XR ANKLE COMPLETE 3 VIEW LEFT: ICD-10-PCS | Mod: 26,LT,, | Performed by: RADIOLOGY

## 2019-03-20 NOTE — PROGRESS NOTES
HPI     DLS 02/20/19 by Dr. Antoni MD    55 Y/O M here today for f/u after SOR.  F/u for retinal heme also.  Va   good OD.  Missing area inferiorly becoming less bothersome.  Overall very   happy with Va.  Has dry feeling OU.    Eye Med(s) - none                          POHx:   1. ERM OD   S/P Procedure Performed: Pars plana vitrectomy, removal of silicone oil,   internal limiting membrane peel, intravitreal injection of Avastin, right   eye (12/04/18).     2. Retinal Hem. OD  3. Retinal Detachment OD    Last edited by Alexander Corrales MD on 3/22/2019  8:46 AM. (History)      Dictation #1  MRN:[id number]  CSN:[id number]     [facility] SDOCT:   OD: good quality, no ERM, irreg contour, subfoveal fluid bleb gone, no superior macula fluid, OCT imp since 2/20/19    Assessment /Plan     For exam results, see Encounter Report.    Retinal hemorrhage of right eye  -     OCT, Retina - OU - Both Eyes    Right retinal detachment    Subretinal abscess    Type 2 diabetes mellitus with right eye affected by mild nonproliferative retinopathy without macular edema, with long-term current use of insulin      Doing well post op.  Attached, IOP good, off all gtts    RTC 3 months  Monocular precautions  BS/BP/Chol control    ATs up to 4/4  Refraction

## 2019-03-22 LAB
BACTERIA SPEC AEROBE CULT: NORMAL
BACTERIA SPEC ANAEROBE CULT: NORMAL

## 2019-03-25 ENCOUNTER — OFFICE VISIT (OUTPATIENT)
Dept: PODIATRY | Facility: CLINIC | Age: 54
End: 2019-03-25
Payer: MEDICAID

## 2019-03-25 VITALS — BODY MASS INDEX: 30.8 KG/M2 | WEIGHT: 220 LBS | HEIGHT: 71 IN

## 2019-03-25 DIAGNOSIS — L97.509 ULCER OF FOOT, UNSPECIFIED LATERALITY, UNSPECIFIED ULCER STAGE: Primary | ICD-10-CM

## 2019-03-25 DIAGNOSIS — M14.672 CHARCOT'S JOINT OF LEFT FOOT: ICD-10-CM

## 2019-03-25 DIAGNOSIS — E11.49 TYPE II DIABETES MELLITUS WITH NEUROLOGICAL MANIFESTATIONS: ICD-10-CM

## 2019-03-25 PROCEDURE — 99999 PR PBB SHADOW E&M-EST. PATIENT-LVL IV: ICD-10-PCS | Mod: PBBFAC,,, | Performed by: PODIATRIST

## 2019-03-25 PROCEDURE — 99999 PR PBB SHADOW E&M-EST. PATIENT-LVL IV: CPT | Mod: PBBFAC,,, | Performed by: PODIATRIST

## 2019-03-25 PROCEDURE — 99212 PR OFFICE/OUTPT VISIT, EST, LEVL II, 10-19 MIN: ICD-10-PCS | Mod: S$PBB,,, | Performed by: PODIATRIST

## 2019-03-25 PROCEDURE — 99212 OFFICE O/P EST SF 10 MIN: CPT | Mod: S$PBB,,, | Performed by: PODIATRIST

## 2019-03-25 PROCEDURE — 99214 OFFICE O/P EST MOD 30 MIN: CPT | Mod: PBBFAC,PO | Performed by: PODIATRIST

## 2019-03-25 RX ORDER — CLINDAMYCIN HYDROCHLORIDE 300 MG/1
300 CAPSULE ORAL EVERY 8 HOURS
Qty: 21 CAPSULE | Refills: 0 | Status: SHIPPED | OUTPATIENT
Start: 2019-03-25 | End: 2019-08-14 | Stop reason: ALTCHOICE

## 2019-03-27 NOTE — PROGRESS NOTES
Subjective:      Patient ID: Mickey Ross is a 54 y.o. male.    Chief Complaint: Wound Care (PCP Dr Moncada 03/2019 )    Presents for f/u s/p left ankle I&D done at OSH. Pt with PMH DM, Charcot, ESRD, recently discharged from [facility]. Pt. Was initially admitted to [facility] where left ankle I&D was performed then subsequently transferred to [facility] for further care. Left BKA recommended however patient declines. Reports HH dressing changes twice a week. Currently with hamm catheter for IV abx. Presents with family. Denies N/V/F/C. Ambulating in offloading pad on left.    1/21/19: F/u left ankle wound, Reports pain today to lateral ankle as he has been ambulating with CROW boot. Patient reports pain whenever he ambulates in the boot due to rubbing of the boot on the lateral ankle wound. Denies N/V/F/C. Receiving HH dressing change last  Dressing change Friday of last week. Requesting new  orders.     3/19/10: F/u left ankle wound. Denies pain to area. Reports rubbing of CROW boot on lateral ankle. Requesting orders for wound care supplies.     3/25/19: F/u left ankle wound. Reports HH coming to do dressing changes weekly. Reports chills however states he missed dialysis. No issues with abx.     Review of Systems   Constitution: Negative for chills, diaphoresis and fever.   Cardiovascular: Negative for claudication, cyanosis, leg swelling and syncope.   Respiratory: Negative for cough and shortness of breath.    Skin: Positive for poor wound healing and suspicious lesions. Negative for color change and nail changes.   Musculoskeletal: Negative for falls, joint pain, muscle cramps and muscle weakness.   Gastrointestinal: Negative for diarrhea, nausea and vomiting.   Neurological: Negative for disturbances in coordination, numbness, paresthesias, sensory change, tremors and weakness.   Psychiatric/Behavioral: Negative for altered mental status.           Objective:      Physical Exam   Constitutional: He appears  well-developed. He is cooperative.   Oriented to time, place, and person.   Cardiovascular:   DP and PT pulses are palpable bilaterally. 3 sec capillary refill time and toes and feet are warm to touch proximally .  There is  hair growth on the feet and toes b/l. There is no edema b/l. No spider veins or varicosities present b/l.      Musculoskeletal:   Rocker bottom foot type left foot with medial deviation. Equinocavovarus contracture noted.      Feet:   Right Foot:   Protective Sensation: 10 sites tested. 7 sites sensed.   Skin Integrity: Positive for dry skin. Negative for callus.   Left Foot:   Protective Sensation: 10 sites tested. 9 sites sensed.   Skin Integrity: Positive for dry skin. Negative for callus.   Lymphadenopathy:   Negative lymphadenopathy bilateral popliteal fossa and tarsal tunnel.   Neurological: He is alert.   Light touch, proprioception, and sharp/dull sensation are all intact bilaterally. Protective threshold with the [place]-Wienstein monofilament is intact bilaterally.    Skin:        Psychiatric: He has a normal mood and affect.       3/25/19  Multiple Pinpoint ulceration noted to left lateral ankle. Serosanguinous drainage noted.           3/19/19  Bullae formation noted to left lateral ankle.       S/p drainage of bullae formation, purulent drainage noted.           Medial ankle                    1/21/19  Wound 1: Left lateral ankle   Measurement: 1.2cmx1.0cmx0.2cm  Base: granular base   Periwound skin: callus  Drainage: none  Erythema: mild  Probe: none, no bone exposed                  Healed left  medial ankle.                           Assessment:       Encounter Diagnoses   Name Primary?    Ulcer of foot, unspecified laterality, unspecified ulcer stage Yes    Type II diabetes mellitus with neurological manifestations     Charcot's joint of left foot          Plan:       Mickey was seen today for wound care.    Diagnoses and all orders for this visit:    Ulcer of foot, unspecified  laterality, unspecified ulcer stage    Type II diabetes mellitus with neurological manifestations    Charcot's joint of left foot    Other orders  -     clindamycin (CLEOCIN) 300 MG capsule; Take 1 capsule (300 mg total) by mouth every 8 (eight) hours.      I counseled the patient on his conditions, their implications and medical management.  Ulceration improved this week.     Debridement: With patient's permission curette used to remove callus left lateral ankle, serosanguinous drainage noted.   Dressings:Iodosorb   Offloading:Kerlix and ACE.    Cultures reviewed, abx changed to clindamycin. Prescription sent.     Wound care orders updated, recommend HH dressing change twice a week.Patient refusing twice a week dressing change requesting only once a week. Explained importance of continued monitoring to wound.     Follow-up:Patient is to return to the clinic in one week for follow-up but should call [facility] immediately if any signs of infection, such as fever, chills, sweats, increased redness or pain.    Short-term goals include maintaining good offloading and minimizing bioburden, promoting granulation and epithelialization to healing.  Long-term goals include keeping the wound healed by good offloading and medical management under the direction of internist.    F/u 1 week.     Barbara Means DPM

## 2019-04-22 ENCOUNTER — OFFICE VISIT (OUTPATIENT)
Dept: PODIATRY | Facility: CLINIC | Age: 54
End: 2019-04-22
Payer: MEDICAID

## 2019-04-22 VITALS — BODY MASS INDEX: 30.8 KG/M2 | WEIGHT: 220 LBS | HEIGHT: 71 IN

## 2019-04-22 DIAGNOSIS — M14.672 CHARCOT'S JOINT OF LEFT FOOT: ICD-10-CM

## 2019-04-22 DIAGNOSIS — L97.509 ULCER OF FOOT, UNSPECIFIED LATERALITY, UNSPECIFIED ULCER STAGE: Primary | ICD-10-CM

## 2019-04-22 DIAGNOSIS — E11.49 TYPE II DIABETES MELLITUS WITH NEUROLOGICAL MANIFESTATIONS: ICD-10-CM

## 2019-04-22 PROCEDURE — 99999 PR PBB SHADOW E&M-EST. PATIENT-LVL III: ICD-10-PCS | Mod: PBBFAC,,, | Performed by: PODIATRIST

## 2019-04-22 PROCEDURE — 99213 OFFICE O/P EST LOW 20 MIN: CPT | Mod: PBBFAC,PO | Performed by: PODIATRIST

## 2019-04-22 PROCEDURE — 99212 OFFICE O/P EST SF 10 MIN: CPT | Mod: S$PBB,,, | Performed by: PODIATRIST

## 2019-04-22 PROCEDURE — 99999 PR PBB SHADOW E&M-EST. PATIENT-LVL III: CPT | Mod: PBBFAC,,, | Performed by: PODIATRIST

## 2019-04-22 PROCEDURE — 99212 PR OFFICE/OUTPT VISIT, EST, LEVL II, 10-19 MIN: ICD-10-PCS | Mod: S$PBB,,, | Performed by: PODIATRIST

## 2019-04-22 NOTE — PROGRESS NOTES
Subjective:      Patient ID: Mickey Ross is a 54 y.o. male.    Chief Complaint: Wound Care (left foot (PCP Dr Moncada 4/01/2019))    Presents for f/u s/p left ankle I&D done at OSH. Pt with PMH DM, Charcot, ESRD, [place] [facility]. Was initially admitted to [facility] where left ankle I&D was performed then subsequently [place] [facility] [place]. Left BKA recommended however patient declines. Reports HH dressing changes twice a week. Currently with hamm catheter for IV abx. Presents with family. Denies N/V/F/C. Ambulating in offloading pad on left.    1/21/19: F/u left ankle wound, Reports pain today to lateral ankle as he has been ambulating with CROW boot. Patient reports pain whenever he ambulates in the boot due to rubbing of the boot on the lateral ankle wound. Denies N/V/F/C. Receiving HH dressing change last  Dressing change Friday of last week. Requesting new  orders.     3/19/10: F/u left ankle wound. Denies pain to area. Reports rubbing of CROW boot on lateral ankle. Requesting orders for wound care supplies.     3/25/19: F/u left ankle wound. Reports HH coming to do dressing changes weekly. Reports chills however states he missed dialysis. No issues with abx.     4/22/19: F/u left ankle wound. HH doing dressing changes. Reports developing new wound to left dorsal foot after boot strap rubbed on this area.     Review of Systems   Constitution: Negative for chills, diaphoresis and fever.   Cardiovascular: Negative for claudication, cyanosis, leg swelling and syncope.   Respiratory: Negative for cough and shortness of breath.    Skin: Positive for poor wound healing and suspicious lesions. Negative for color change and nail changes.   Musculoskeletal: Negative for falls, joint pain, muscle cramps and muscle weakness.   Gastrointestinal: Negative for diarrhea, nausea and vomiting.   Neurological: Negative for disturbances in coordination, numbness, paresthesias, sensory change,  tremors and weakness.   Psychiatric/Behavioral: Negative for altered mental status.           Objective:      Physical Exam   Constitutional: He appears well-developed. He is cooperative.   Oriented to time, place, and person.   Cardiovascular:   DP and PT pulses are palpable bilaterally. 3 sec capillary refill time and toes and feet are warm to touch proximally .  There is  hair growth on the feet and toes b/l. There is no edema b/l. No spider veins or varicosities present b/l.      Musculoskeletal:   Rocker bottom foot type left foot with medial deviation. Equinocavovarus contracture noted.      Feet:   Right Foot:   Protective Sensation: 10 sites tested. 7 sites sensed.   Skin Integrity: Positive for dry skin. Negative for callus.   Left Foot:   Protective Sensation: 10 sites tested. 9 sites sensed.   Skin Integrity: Positive for dry skin. Negative for callus.   Lymphadenopathy:   Negative lymphadenopathy bilateral popliteal fossa and tarsal tunnel.   Neurological: He is alert.   Light touch, proprioception, and sharp/dull sensation are all intact bilaterally. Protective threshold with the [place]-Wienstein monofilament is intact bilaterally.    Skin:        Psychiatric: He has a normal mood and affect.     4/22/19  Left ankle- healed left lateral ankle ulceration.       Left dorsal foot- superficial abrasion noted no purulent drainage noted.               3/25/19  Multiple Pinpoint ulceration noted to left lateral ankle. Serosanguinous drainage noted.           3/19/19  Bullae formation noted to left lateral ankle.       S/p drainage of bullae formation, purulent drainage noted.           Medial ankle                    1/21/19  Wound 1: Left lateral ankle   Measurement: 1.2cmx1.0cmx0.2cm  Base: granular base   Periwound skin: callus  Drainage: none  Erythema: mild  Probe: none, no bone exposed                  Healed left  medial ankle.                           Assessment:       Encounter Diagnoses   Name  Primary?    Ulcer of foot, unspecified laterality, unspecified ulcer stage Yes    Type II diabetes mellitus with neurological manifestations     Charcot's joint of left foot          Plan:       Mickey was seen today for wound care.    Diagnoses and all orders for this visit:    Ulcer of foot, unspecified laterality, unspecified ulcer stage    Type II diabetes mellitus with neurological manifestations    Charcot's joint of left foot      I counseled the patient on his conditions, their implications and medical management.    Left lateral ankle wound healed. - superficial abrasion noted to left dorsal foot.   Debridement: moistened saline gauze left dorsal foot.   Dressings:triple abx   Offloading:Kerlix and ACE.      Continue current  orders.     Follow-up:Patient is to return to the clinic in one week for follow-up but should call [facility] immediately if any signs of infection, such as fever, chills, sweats, increased redness or pain.    Short-term goals include maintaining good offloading and minimizing bioburden, promoting granulation and epithelialization to healing.  Long-term goals include keeping the wound healed by good offloading and medical management under the direction of internist.    F/u 2 weeks.     Barbara Means DPM

## 2019-04-23 NOTE — ASSESSMENT & PLAN NOTE
- Patient reports having an epidural abscess several years ago status post surgical pain  - PT OT ordered however this is a chronic issue     Medication Reconciliation    List of medications patient is currently taking is complete.      Kena Thorne PharmD, BCPS  4/23/2019 11:09 AM

## 2019-05-22 ENCOUNTER — OFFICE VISIT (OUTPATIENT)
Dept: PODIATRY | Facility: CLINIC | Age: 54
End: 2019-05-22
Payer: MEDICAID

## 2019-05-22 ENCOUNTER — TELEPHONE (OUTPATIENT)
Dept: OPTOMETRY | Facility: CLINIC | Age: 54
End: 2019-05-22

## 2019-05-22 VITALS — HEIGHT: 71 IN | WEIGHT: 220 LBS | BODY MASS INDEX: 30.8 KG/M2

## 2019-05-22 DIAGNOSIS — E11.49 TYPE II DIABETES MELLITUS WITH NEUROLOGICAL MANIFESTATIONS: Primary | ICD-10-CM

## 2019-05-22 DIAGNOSIS — L97.511 FOOT ULCERATION, RIGHT, LIMITED TO BREAKDOWN OF SKIN: ICD-10-CM

## 2019-05-22 DIAGNOSIS — M14.672 CHARCOT'S JOINT OF LEFT FOOT: ICD-10-CM

## 2019-05-22 PROCEDURE — 99999 PR PBB SHADOW E&M-EST. PATIENT-LVL IV: ICD-10-PCS | Mod: PBBFAC,,, | Performed by: PODIATRIST

## 2019-05-22 PROCEDURE — 99213 PR OFFICE/OUTPT VISIT, EST, LEVL III, 20-29 MIN: ICD-10-PCS | Mod: S$PBB,,, | Performed by: PODIATRIST

## 2019-05-22 PROCEDURE — 99214 OFFICE O/P EST MOD 30 MIN: CPT | Mod: PBBFAC,PO | Performed by: PODIATRIST

## 2019-05-22 PROCEDURE — 99999 PR PBB SHADOW E&M-EST. PATIENT-LVL IV: CPT | Mod: PBBFAC,,, | Performed by: PODIATRIST

## 2019-05-22 PROCEDURE — 99213 OFFICE O/P EST LOW 20 MIN: CPT | Mod: S$PBB,,, | Performed by: PODIATRIST

## 2019-05-22 NOTE — PROGRESS NOTES
Subjective:      Patient ID: Mickey Ross is a 54 y.o. male.    Chief Complaint: Wound Care (left foot (PCP Dr Moncada 4/01/2019))    Presents for f/u s/p left ankle I&D done at OSH. Pt with PMH DM, Charcot, ESRD, [place] [facility]. Was initially admitted to [facility] where left ankle I&D was performed then subsequently [place] [facility] [place]. Left BKA recommended however patient declines. Reports HH dressing changes twice a week. Currently with hamm catheter for IV abx. Presents with family. Denies N/V/F/C. Ambulating in offloading pad on left.    1/21/19: F/u left ankle wound, Reports pain today to lateral ankle as he has been ambulating with CROW boot. Patient reports pain whenever he ambulates in the boot due to rubbing of the boot on the lateral ankle wound. Denies N/V/F/C. Receiving HH dressing change last  Dressing change Friday of last week. Requesting new  orders.     3/19/10: F/u left ankle wound. Denies pain to area. Reports rubbing of CROW boot on lateral ankle. Requesting orders for wound care supplies.     3/25/19: F/u left ankle wound. Reports HH coming to do dressing changes weekly. Reports chills however states he missed dialysis. No issues with abx.     4/22/19: F/u left ankle wound. HH doing dressing changes. Reports developing new wound to left dorsal foot after boot strap rubbed on this area.     5/22/19: F/u left ankle wound- healed. Has been applying security bandages to left foot every 2-3 days. Reports mild pain to callus left plantar foot.     Review of Systems   Constitution: Negative for chills, diaphoresis and fever.   Cardiovascular: Negative for claudication, cyanosis, leg swelling and syncope.   Respiratory: Negative for cough and shortness of breath.    Skin: Positive for poor wound healing and suspicious lesions. Negative for color change and nail changes.   Musculoskeletal: Negative for falls, joint pain, muscle cramps and muscle weakness.    Gastrointestinal: Negative for diarrhea, nausea and vomiting.   Neurological: Negative for disturbances in coordination, numbness, paresthesias, sensory change, tremors and weakness.   Psychiatric/Behavioral: Negative for altered mental status.           Objective:      Physical Exam   Constitutional: He appears well-developed. He is cooperative.   Oriented to time, place, and person.   Cardiovascular:   DP and PT pulses are palpable bilaterally. 3 sec capillary refill time and toes and feet are warm to touch proximally .  There is  hair growth on the feet and toes b/l. There is no edema b/l. No spider veins or varicosities present b/l.      Musculoskeletal:   Rocker bottom foot type left foot with medial deviation. Equinocavovarus contracture noted.      Feet:   Right Foot:   Protective Sensation: 10 sites tested. 7 sites sensed.   Skin Integrity: Positive for dry skin. Negative for callus.   Left Foot:   Protective Sensation: 10 sites tested. 9 sites sensed.   Skin Integrity: Positive for dry skin. Negative for callus.   Lymphadenopathy:   Negative lymphadenopathy bilateral popliteal fossa and tarsal tunnel.   Neurological: He is alert.   Light touch, proprioception, and sharp/dull sensation are all intact bilaterally. Protective threshold with the [place]-Wienstein monofilament is intact bilaterally.    Skin:     Healed left lateral ankle wound.     Focal hyperkeratotic lesion right  plantar forefoot. Callus with underlying superficial abrasion to area.      Psychiatric: He has a normal mood and affect.                             Assessment:       Encounter Diagnoses   Name Primary?    Type II diabetes mellitus with neurological manifestations Yes    Charcot's joint of left foot     Foot ulceration, right, limited to breakdown of skin          Plan:       Mickey was seen today for wound care.    Diagnoses and all orders for this visit:    Type II diabetes mellitus with neurological manifestations    Charcot's  joint of left foot    Foot ulceration, right, limited to breakdown of skin      I counseled the patient on his conditions, their implications and medical management.    Left lateral ankle wound healed. - superficial abrasion noted to right plantar foot   Debridement: moistened saline gauze left dorsal foot.   Dressings: right plantar foot betadine, offloading pad band aid.   Offloading:Kerlix and ACE- left- offloading pad band aid right.       Continue current HH orders.       Short-term goals include maintaining good offloading and minimizing bioburden, promoting granulation and epithelialization to healing.  Long-term goals include keeping the wound healed by good offloading and medical management under the direction of internist.    F/u 9 weeks.     Barbara Means DPM

## 2019-05-31 NOTE — ASSESSMENT & PLAN NOTE
Patient with episode of acute dyspnea in AM of 9/6 that resolved with episode of emesis and NC O2 after a few minutes. Patient reports similar previous episodes earlier in the week. Patient denies any chest pressure / tightness / pain with or without radiation. No previous cardiac hx. Troponin drawn during rapid response on 9/2 showed mildly elevated troponin, peak of 0.238 in setting of hypotension and bradycardia. Patient also found to be anemic yesterday and transfused 1 unit of pRBCs. Patient denies any chest pain at rest or with exertion recently. Troponin drawn during acute dyspneic event on 9/6 was 0.083. Also noted to have multiple episodes of uncontrolled HTN with SBPs in the 200s. Elevated troponin likely in setting of anemia and hypertensive episodes rather than true NSTE-ACS event as patient without anginal. Echo performed on 9/2 showed normal LVEF with diastolic dysfunction, normal RVEF with enlarged RV.     Plan  - No further cardiac work-up needed  - Recommend improved BP control as per primary team, continue to maintain euvolemia via HD  - Keep Hgb > 7   Mau: tatianna hospitalist. Mau: admitted for COPD exacerbation. Will hold off on antibiotics as afebrile with clear CXR.

## 2019-07-29 ENCOUNTER — TELEPHONE (OUTPATIENT)
Dept: FAMILY MEDICINE | Facility: CLINIC | Age: 54
End: 2019-07-29

## 2019-07-29 NOTE — TELEPHONE ENCOUNTER
----- Message from Ruth Ann Daly sent at 7/29/2019  9:40 AM CDT -----  Contact: self [phone]  .Type:  Sooner Appointment Request    Patient is requesting a sooner appointment.  Patient declined first available appointment listed as well as another facility and provider .  Patient will not accept being placed on the waitlist and is requesting a message be sent to doctor.    Name of Caller: self     When is the first available appointment?  08/06    Symptoms:  Pt is having trouble with his feet and needs to be seen by Dr. Astrid crutis. Pt can only be seen in the office on Mon wed, & Fri. He goes to dialysis on Tuesday     Would the patient rather a call back or a response via [facility]? Call back    Best Call Back Number: [phone]

## 2019-08-14 ENCOUNTER — HOSPITAL ENCOUNTER (OUTPATIENT)
Dept: RADIOLOGY | Facility: HOSPITAL | Age: 54
Discharge: HOME | End: 2019-08-14
Attending: PODIATRIST
Payer: MEDICAID

## 2019-08-14 ENCOUNTER — OFFICE VISIT (OUTPATIENT)
Dept: PODIATRY | Facility: CLINIC | Age: 54
End: 2019-08-14
Payer: MEDICAID

## 2019-08-14 VITALS — HEIGHT: 71 IN | BODY MASS INDEX: 30.8 KG/M2 | WEIGHT: 220 LBS

## 2019-08-14 DIAGNOSIS — L97.412 DIABETIC ULCER OF RIGHT MIDFOOT ASSOCIATED WITH TYPE 2 DIABETES MELLITUS, WITH FAT LAYER EXPOSED: ICD-10-CM

## 2019-08-14 DIAGNOSIS — E11.621 DIABETIC ULCER OF RIGHT MIDFOOT ASSOCIATED WITH TYPE 2 DIABETES MELLITUS, WITH FAT LAYER EXPOSED: ICD-10-CM

## 2019-08-14 DIAGNOSIS — E11.49 TYPE II DIABETES MELLITUS WITH NEUROLOGICAL MANIFESTATIONS: Primary | ICD-10-CM

## 2019-08-14 DIAGNOSIS — E11.49 TYPE II DIABETES MELLITUS WITH NEUROLOGICAL MANIFESTATIONS: ICD-10-CM

## 2019-08-14 DIAGNOSIS — M14.672 CHARCOT'S JOINT OF LEFT FOOT: ICD-10-CM

## 2019-08-14 DIAGNOSIS — L89.610 DECUBITUS ULCER OF HEEL, RIGHT, UNSTAGEABLE: ICD-10-CM

## 2019-08-14 PROCEDURE — 99999 PR PBB SHADOW E&M-EST. PATIENT-LVL III: CPT | Mod: PBBFAC,,, | Performed by: PODIATRIST

## 2019-08-14 PROCEDURE — 99213 OFFICE O/P EST LOW 20 MIN: CPT | Mod: PBBFAC,25,PO | Performed by: PODIATRIST

## 2019-08-14 PROCEDURE — 99214 OFFICE O/P EST MOD 30 MIN: CPT | Mod: S$PBB,,, | Performed by: PODIATRIST

## 2019-08-14 PROCEDURE — 87075 CULTR BACTERIA EXCEPT BLOOD: CPT

## 2019-08-14 PROCEDURE — 73630 X-RAY EXAM OF FOOT: CPT | Mod: TC,FY,PO,RT

## 2019-08-14 PROCEDURE — 87186 SC STD MICRODIL/AGAR DIL: CPT

## 2019-08-14 PROCEDURE — 87077 CULTURE AEROBIC IDENTIFY: CPT

## 2019-08-14 PROCEDURE — 99999 PR PBB SHADOW E&M-EST. PATIENT-LVL III: ICD-10-PCS | Mod: PBBFAC,,, | Performed by: PODIATRIST

## 2019-08-14 PROCEDURE — 99214 PR OFFICE/OUTPT VISIT, EST, LEVL IV, 30-39 MIN: ICD-10-PCS | Mod: S$PBB,,, | Performed by: PODIATRIST

## 2019-08-14 PROCEDURE — 73630 X-RAY EXAM OF FOOT: CPT | Mod: 26,RT,, | Performed by: RADIOLOGY

## 2019-08-14 PROCEDURE — 73630 XR FOOT COMPLETE 3 VIEW RIGHT: ICD-10-PCS | Mod: 26,RT,, | Performed by: RADIOLOGY

## 2019-08-14 PROCEDURE — 87070 CULTURE OTHR SPECIMN AEROBIC: CPT

## 2019-08-14 RX ORDER — CLINDAMYCIN HYDROCHLORIDE 300 MG/1
300 CAPSULE ORAL EVERY 8 HOURS
Qty: 30 CAPSULE | Refills: 0 | Status: SHIPPED | OUTPATIENT
Start: 2019-08-14 | End: 2019-08-24

## 2019-08-14 RX ORDER — CLINDAMYCIN HYDROCHLORIDE 300 MG/1
300 CAPSULE ORAL EVERY 8 HOURS
Qty: 30 CAPSULE | Refills: 0 | Status: SHIPPED | OUTPATIENT
Start: 2019-08-14 | End: 2019-08-14 | Stop reason: SDUPTHER

## 2019-08-14 NOTE — PROGRESS NOTES
Subjective:      Patient ID: Mickey Ross is a 54 y.o. male.    Chief Complaint: Wound Care (right heel pain and right ball of foot (PCP Dr Moncada 02/2019)) and Wound Check    Presents for f/u s/p left ankle I&D done at OSH. Pt with PMH DM, Charcot, ESRD, recently discharged from [facility]. Pt. Was initially admitted to [facility] where left ankle I&D was performed then subsequently transferred to [facility] for further care. Left BKA recommended however patient declines. Reports HH dressing changes twice a week. Currently with hmam catheter for IV abx. Presents with family. Denies N/V/F/C. Ambulating in offloading pad on left.    1/21/19: F/u left ankle wound, Reports pain today to lateral ankle as he has been ambulating with CROW boot. Patient reports pain whenever he ambulates in the boot due to rubbing of the boot on the lateral ankle wound. Denies N/V/F/C. Receiving HH dressing change last  Dressing change Friday of last week. Requesting new  orders.     3/19/10: F/u left ankle wound. Denies pain to area. Reports rubbing of CROW boot on lateral ankle. Requesting orders for wound care supplies.     3/25/19: F/u left ankle wound. Reports HH coming to do dressing changes weekly. Reports chills however states he missed dialysis. No issues with abx.     4/22/19: F/u left ankle wound. HH doing dressing changes. Reports developing new wound to left dorsal foot after boot strap rubbed on this area.     5/22/19: F/u left ankle wound- healed. Has been applying security bandages to left foot every 2-3 days. Reports mild pain to callus left plantar foot.     August 14, 2019:  Mickey Ross is a 54 y.o. male with  has a past medical history of Allergic drug rash - due to Vancomycin, Allergic drug rash - due to Vancomycin, Anxiety, Arthritis, Blind left eye, Charcot's joint of foot, Chronic back pain, Chronic, continuous use of opioids, Debility, Diabetes mellitus, GERD (gastroesophageal reflux disease), Glaucoma, Hypertension,  Methadone dependence, MRSA (methicillin resistant staph aureus) culture positive, Osteomyelitis, Renal disorder, Sepsis due to methicillin resistant Staphylococcus aureus (MRSA), and Subretinal abscess. presents to the podiatry clinic for care of  right foot ulcer.   Location: plantar, heel and forefoot Onset of the symptoms was several days ago. Precipitating event: none known.  History of injury: no Current symptoms include: ability to bear weight, but with some pain, bruising, redness, swelling and worsening symptoms after a period of activity. Signs of infection denies nausea, vomiting, fever.   Symptoms have gradually worsened. He relates he initially felt discomfort, then saw a color change, 2 days ago he began to note sonia to the heel. Patient has had prior foot problems. He has significant charcot deformity to the left foot and history of ulceration.  He also has multiple digital amputations bilaterally.Evaluation to date: none. Treatment to date: home wound care. Patients rates pain 8/10 on pain scale.    Previously seen by my colleague, new to me.    Shoe gear: flexible rx shoe right with AFO and rx shoe left    Chief Complaint   Patient presents with    Wound Care     right heel pain and right ball of foot (PCP Dr Moncada 02/2019)    Wound Check       Hemoglobin A1C   Date Value Ref Range Status   08/17/2018 8.2 (H) 4.0 - 5.6 % Final     Comment:     ADA Screening Guidelines:  5.7-6.4%  Consistent with prediabetes  >or=6.5%  Consistent with diabetes  High levels of fetal hemoglobin interfere with the HbA1C  assay. Heterozygous hemoglobin variants (HbS, HgC, etc)do  not significantly interfere with this assay.   However, presence of multiple variants may affect accuracy.           Review of Systems   Constitution: Negative for chills, diaphoresis and fever.   Cardiovascular: Negative for claudication, cyanosis, leg swelling and syncope.   Respiratory: Negative for cough and shortness of breath.    Skin:  "Positive for poor wound healing and suspicious lesions. Negative for color change and nail changes.   Musculoskeletal: Negative for falls, joint pain, muscle cramps and muscle weakness.   Gastrointestinal: Negative for diarrhea, nausea and vomiting.   Neurological: Negative for disturbances in coordination, numbness, paresthesias, sensory change, tremors and weakness.   Psychiatric/Behavioral: Negative for altered mental status.           Objective:       Vitals:    08/14/19 1004   Weight: 99.8 kg (220 lb)   Height: 5' 11" (1.803 m)   PainSc:   8       Physical Exam   Constitutional: He appears well-developed. He is cooperative.   Oriented to time, place, and person.   Cardiovascular:   DP and PT pulses are palpable bilaterally. 3 sec capillary refill time and toes and feet are warm to touch proximally .  There is  hair growth on the feet and toes b/l. There is no edema b/l. No spider veins or varicosities present b/l.      Musculoskeletal:   Rocker bottom foot type left foot with medial deviation. Equinocavovarus contracture noted.      Feet:   Right Foot:   Protective Sensation: 10 sites tested. 7 sites sensed.   Skin Integrity: Positive for dry skin. Negative for callus.   Left Foot:   Protective Sensation: 10 sites tested. 9 sites sensed.   Skin Integrity: Positive for dry skin. Negative for callus.   Lymphadenopathy:   Negative lymphadenopathy bilateral popliteal fossa and tarsal tunnel.   Neurological: He is alert.   Light touch, proprioception, and sharp/dull sensation are all intact bilaterally. Protective threshold with the [place]-Wienstein monofilament is intact bilaterally.    Skin: He is not diaphoretic. No pallor.   Healed left lateral ankle wound.     Ulcer location: sub 3rd MTPJ of the right foot  Measurements : 1.1x0.7x0.2 cm   Signs of infection: local edema  Drainage: Serous  Purulence: no  Crepitus/fluctuance: no  Periwound: Calloused  Base: Mixed Granular/Fibrotic  Depth: subcutaneous " tissue  Probe to bone: no    pressure like ulceration to plantar right heel with significant surrounding erythema.  No purulence nor fluctuance noted.    Psychiatric: He has a normal mood and affect.     right              Left                    Assessment:       Encounter Diagnoses   Name Primary?    Type II diabetes mellitus with neurological manifestations Yes    Charcot's joint of left foot     Decubitus ulcer of heel, right, unstageable     Diabetic ulcer of right midfoot associated with type 2 diabetes mellitus, with fat layer exposed          Plan:       Mickey was seen today for wound care and wound check.    Diagnoses and all orders for this visit:    Type II diabetes mellitus with neurological manifestations  -     X-Ray Foot Complete Right; Future  -     Aerobic culture  -     Culture, Anaerobic  -     Sedimentation rate, manual; Future  -     C-reactive protein; Future    Charcot's joint of left foot  -     X-Ray Foot Complete Right; Future    Decubitus ulcer of heel, right, unstageable  -     X-Ray Foot Complete Right; Future  -     Aerobic culture  -     Culture, Anaerobic  -     Sedimentation rate, manual; Future  -     C-reactive protein; Future    Diabetic ulcer of right midfoot associated with type 2 diabetes mellitus, with fat layer exposed  -     X-Ray Foot Complete Right; Future  -     Aerobic culture  -     Culture, Anaerobic  -     Sedimentation rate, manual; Future  -     C-reactive protein; Future    Other orders  -     clindamycin (CLEOCIN) 300 MG capsule; Take 1 capsule (300 mg total) by mouth every 8 (eight) hours. for 10 days      I counseled the patient on his conditions, their implications and medical management.      Greater than 50% of this visit spent on counseling and coordination of care.    Education about the prevention of limb loss.    Discussed wound healing cycle, skin integrity, ways to care for skin.Counseled patient on the effects ohigh blood glucose on healing. He  verbalizes understanding that it can increase the chances of delayed healing and this prolonged exposure leads to infection or progression of infection which subsequently can result in loss of limb.    Adequate vitamin supplementation, protein intake, and hydration - discussed with patient    Full workup to rule out OM including ESR, CRP, and imaging.      Imaging ordered to rule out bone involvement or gas in the soft tissues. None noted    The wound is cleansed of foreign material as much as possible and the base inspected for bone or abscess.  Base is fibrogranular and without bone nor joint exposure.  Aerobic and anerobic cultures swabs taken    Debridement: patient request I no debride  Dressings:iosodorb and mepilex border  Offloading: patient declines offloading dressing or device    He declines wheelchair  Patient dispensed detailed wound care instructions.    Follow-up: 1 week but should call [facility] immediately if any signs of infection, such as fever, chills, sweats, increased redness or pain.    Short-term goals include maintaining good offloading and minimizing bioburden, promoting granulation and epithelialization to healing.  Long-term goals include keeping the wound healed by good offloading and medical management under the direction of internist.     Shoe inspection. Diabetic Foot Education. Patient reminded of the importance of good nutrition and blood sugar control to help prevent podiatric complications of diabetes. Patient instructed on proper foot hygeine. We discussed wearing proper shoe gear, daily foot inspections, never walking without protective shoe gear, never putting sharp instruments to feet.

## 2019-08-17 LAB — BACTERIA SPEC AEROBE CULT: ABNORMAL

## 2019-08-19 LAB — BACTERIA SPEC ANAEROBE CULT: NORMAL

## 2019-11-06 ENCOUNTER — TELEPHONE (OUTPATIENT)
Dept: PODIATRY | Facility: CLINIC | Age: 54
End: 2019-11-06

## 2019-11-06 ENCOUNTER — HOSPITAL ENCOUNTER (OUTPATIENT)
Dept: RADIOLOGY | Facility: HOSPITAL | Age: 54
Discharge: HOME | End: 2019-11-06
Attending: PODIATRIST
Payer: MEDICAID

## 2019-11-06 ENCOUNTER — OFFICE VISIT (OUTPATIENT)
Dept: PODIATRY | Facility: CLINIC | Age: 54
End: 2019-11-06
Payer: MEDICAID

## 2019-11-06 VITALS
BODY MASS INDEX: 30.8 KG/M2 | DIASTOLIC BLOOD PRESSURE: 69 MMHG | SYSTOLIC BLOOD PRESSURE: 154 MMHG | HEIGHT: 71 IN | WEIGHT: 220 LBS

## 2019-11-06 DIAGNOSIS — L97.514: ICD-10-CM

## 2019-11-06 DIAGNOSIS — L97.522 FOOT ULCERATION, LEFT, WITH FAT LAYER EXPOSED: ICD-10-CM

## 2019-11-06 DIAGNOSIS — E11.49 TYPE II DIABETES MELLITUS WITH NEUROLOGICAL MANIFESTATIONS: ICD-10-CM

## 2019-11-06 DIAGNOSIS — M14.672 CHARCOT'S JOINT OF LEFT FOOT: ICD-10-CM

## 2019-11-06 DIAGNOSIS — L97.514: Primary | ICD-10-CM

## 2019-11-06 LAB
GRAM STN SPEC: NORMAL
GRAM STN SPEC: NORMAL

## 2019-11-06 PROCEDURE — 87015 SPECIMEN INFECT AGNT CONCNTJ: CPT

## 2019-11-06 PROCEDURE — 99999 PR PBB SHADOW E&M-EST. PATIENT-LVL IV: CPT | Mod: PBBFAC,,, | Performed by: PODIATRIST

## 2019-11-06 PROCEDURE — 87077 CULTURE AEROBIC IDENTIFY: CPT

## 2019-11-06 PROCEDURE — 88304 TISSUE EXAM BY PATHOLOGIST: CPT | Performed by: PATHOLOGY

## 2019-11-06 PROCEDURE — 99214 OFFICE O/P EST MOD 30 MIN: CPT | Mod: 25,S$PBB,, | Performed by: PODIATRIST

## 2019-11-06 PROCEDURE — 11044 PR DEBRIDEMENT, SKIN, SUB-Q TISSUE,MUSCLE,BONE,=<20 SQ CM: ICD-10-PCS | Mod: S$PBB,,, | Performed by: PODIATRIST

## 2019-11-06 PROCEDURE — 87186 SC STD MICRODIL/AGAR DIL: CPT

## 2019-11-06 PROCEDURE — 11044 DBRDMT BONE 1ST 20 SQ CM/<: CPT | Mod: S$PBB,,, | Performed by: PODIATRIST

## 2019-11-06 PROCEDURE — 11044 DBRDMT BONE 1ST 20 SQ CM/<: CPT | Mod: PBBFAC,PO | Performed by: PODIATRIST

## 2019-11-06 PROCEDURE — 87116 MYCOBACTERIA CULTURE: CPT

## 2019-11-06 PROCEDURE — 87075 CULTR BACTERIA EXCEPT BLOOD: CPT

## 2019-11-06 PROCEDURE — 88304 TISSUE EXAM BY PATHOLOGIST: CPT | Mod: 26,,, | Performed by: PATHOLOGY

## 2019-11-06 PROCEDURE — 87070 CULTURE OTHR SPECIMN AEROBIC: CPT | Mod: 59

## 2019-11-06 PROCEDURE — 87206 SMEAR FLUORESCENT/ACID STAI: CPT

## 2019-11-06 PROCEDURE — 99214 OFFICE O/P EST MOD 30 MIN: CPT | Mod: PBBFAC,PO,25 | Performed by: PODIATRIST

## 2019-11-06 PROCEDURE — 88304 TISSUE SPECIMEN TO PATHOLOGY, PODIATRY: ICD-10-PCS | Mod: 26,,, | Performed by: PATHOLOGY

## 2019-11-06 PROCEDURE — 87205 SMEAR GRAM STAIN: CPT

## 2019-11-06 PROCEDURE — 99999 PR PBB SHADOW E&M-EST. PATIENT-LVL IV: ICD-10-PCS | Mod: PBBFAC,,, | Performed by: PODIATRIST

## 2019-11-06 PROCEDURE — 99214 PR OFFICE/OUTPT VISIT, EST, LEVL IV, 30-39 MIN: ICD-10-PCS | Mod: 25,S$PBB,, | Performed by: PODIATRIST

## 2019-11-06 RX ORDER — CLINDAMYCIN HYDROCHLORIDE 300 MG/1
300 CAPSULE ORAL EVERY 8 HOURS
Qty: 21 CAPSULE | Refills: 0 | Status: SHIPPED | OUTPATIENT
Start: 2019-11-06

## 2019-11-06 NOTE — PROGRESS NOTES
Patient refused foot x-rays ordered per Dr. Means. Also refused to go to ER for iv abx per Dr. Means suggestion.

## 2019-11-07 NOTE — TELEPHONE ENCOUNTER
Spoke to chuy at pathology and dr dalton stated its bone culture and that new order needs to be put in for bone culture to microbiology

## 2019-11-07 NOTE — TELEPHONE ENCOUNTER
----- Message from Maia Marvin sent at 11/7/2019 11:42 AM CST -----  Contact: James ALLEN/[facility]            Name of Who is Calling: [facility]      What is the request in detail: James states she needs to confirm if the Physician wants a bone culture or bone biopsy. Please contact to further discuss and advise.        Can the clinic reply by [facility]: N      What Number to Call Back if not in [facility]: ext [phone]

## 2019-11-08 LAB — BACTERIA SPEC AEROBE CULT: ABNORMAL

## 2019-11-08 NOTE — PROGRESS NOTES
Subjective:      Patient ID: Mickey Ross is a 54 y.o. male.    Chief Complaint: Follow-up    Presents for f/u s/p left ankle I&D done at OSH. Pt with PMH DM, Charcot, ESRD, recently discharged from [facility]. Pt. Was initially admitted to [facility] where left ankle I&D was performed then subsequently transferred to [facility] for further care. Left BKA recommended however patient declines. Reports HH dressing changes twice a week. Currently with hamm catheter for IV abx. Presents with family. Denies N/V/F/C. Ambulating in offloading pad on left.    1/21/19: F/u left ankle wound, Reports pain today to lateral ankle as he has been ambulating with CROW boot. Patient reports pain whenever he ambulates in the boot due to rubbing of the boot on the lateral ankle wound. Denies N/V/F/C. Receiving HH dressing change last  Dressing change Friday of last week. Requesting new  orders.     3/19/10: F/u left ankle wound. Denies pain to area. Reports rubbing of CROW boot on lateral ankle. Requesting orders for wound care supplies.     3/25/19: F/u left ankle wound. Reports HH coming to do dressing changes weekly. Reports chills however states he missed dialysis. No issues with abx.     4/22/19: F/u left ankle wound. HH doing dressing changes. Reports developing new wound to left dorsal foot after boot strap rubbed on this area.     5/22/19: F/u left ankle wound- healed. Has been applying security bandages to left foot every 2-3 days. Reports mild pain to callus left plantar foot.     August 14, 2019:  Mickey Ross is a 54 y.o. male with  has a past medical history of Allergic drug rash - due to Vancomycin, Allergic drug rash - due to Vancomycin, Anxiety, Arthritis, Blind left eye, Charcot's joint of foot, Chronic back pain, Chronic, continuous use of opioids, Debility, Diabetes mellitus, GERD (gastroesophageal reflux disease), Glaucoma, Hypertension, Methadone dependence, MRSA (methicillin resistant staph aureus) culture positive,  Osteomyelitis, Renal disorder, Sepsis due to methicillin resistant Staphylococcus aureus (MRSA), and Subretinal abscess. presents to the podiatry clinic for care of  right foot ulcer.   Location: plantar, heel and forefoot Onset of the symptoms was several days ago. Precipitating event: none known.  History of injury: no Current symptoms include: ability to bear weight, but with some pain, bruising, redness, swelling and worsening symptoms after a period of activity. Signs of infection denies nausea, vomiting, fever.   Symptoms have gradually worsened. He relates he initially felt discomfort, then saw a color change, 2 days ago he began to note sonia to the heel. Patient has had prior foot problems. He has significant charcot deformity to the left foot and history of ulceration.  He also has multiple digital amputations bilaterally.Evaluation to date: none. Treatment to date: home wound care. Patients rates pain 8/10 on pain scale.    11/6/19: Presents for wound check. Patient has not returned for wound care for over two months since last visit on 8/14/19. At that time patient was instructed to return in one week for wound care. Patient also reports he does not have HH at home. States that he has been applying dressings the wound himself and has noticed increased blood drainage from the wound on the right foot.     Shoe gear: flexible rx shoe right with AFO and rx shoe left    Chief Complaint   Patient presents with    Follow-up       Hemoglobin A1C   Date Value Ref Range Status   08/17/2018 8.2 (H) 4.0 - 5.6 % Final     Comment:     ADA Screening Guidelines:  5.7-6.4%  Consistent with prediabetes  >or=6.5%  Consistent with diabetes  High levels of fetal hemoglobin interfere with the HbA1C  assay. Heterozygous hemoglobin variants (HbS, HgC, etc)do  not significantly interfere with this assay.   However, presence of multiple variants may affect accuracy.           Review of Systems   Constitution: Negative for  "chills, diaphoresis and fever.   Cardiovascular: Negative for claudication, cyanosis, leg swelling and syncope.   Respiratory: Negative for cough and shortness of breath.    Skin: Positive for poor wound healing and suspicious lesions. Negative for color change and nail changes.   Musculoskeletal: Negative for falls, joint pain, muscle cramps and muscle weakness.   Gastrointestinal: Negative for diarrhea, nausea and vomiting.   Neurological: Negative for disturbances in coordination, numbness, paresthesias, sensory change, tremors and weakness.   Psychiatric/Behavioral: Negative for altered mental status.           Objective:       Vitals:    11/06/19 1048   BP: (!) 154/69   Weight: 99.8 kg (220 lb)   Height: 5' 11" (1.803 m)   PainSc:   4       Physical Exam   Constitutional: He appears well-developed. He is cooperative.   Oriented to time, place, and person.   Cardiovascular:   DP and PT pulses are palpable bilaterally. 3 sec capillary refill time and toes and feet are warm to touch proximally .  There is  hair growth on the feet and toes b/l. There is no edema b/l. No spider veins or varicosities present b/l.      Musculoskeletal:   Rocker bottom foot type left foot with medial deviation. Equinocavovarus contracture noted.      Feet:   Right Foot:   Protective Sensation: 10 sites tested. 7 sites sensed.   Skin Integrity: Positive for dry skin. Negative for callus.   Left Foot:   Protective Sensation: 10 sites tested. 9 sites sensed.   Skin Integrity: Positive for dry skin. Negative for callus.   Lymphadenopathy:   Negative lymphadenopathy bilateral popliteal fossa and tarsal tunnel.   Neurological: He is alert.   Light touch, proprioception, and sharp/dull sensation are all intact bilaterally. Protective threshold with the [place]-Wienstein monofilament is intact bilaterally.    Skin: He is not diaphoretic. No pallor.   See wound description below   Psychiatric: He has a normal mood and affect. "       11/6/19  Wound 1: Right plantar foot  Measurement: 3.2cmx3.3cmx2.0cm pre debridement 3.5cmx3.5cmx2.0cm post debridement.  Base: fibrous base  Periwound skin: erythema  Drainage: purulent  Erythema: mild  Probe: exposed bone  Tracking from plantar to dorsal foot.         Pre debridement     Post debridement       Left medial ankle     Wound 1: Left medial ankle   Measurement:  1.0cmx1.0cmx0.3cm  Base: fibrous base  Periwound skin: mild erythema, maceration  Drainage: none  Erythema: mild  Probe: deep probe to periosteum      Wound 2: Left lateral ankle  Measurement: 3wyf4lww8.2cm post debridement.  Base: fibrogranular base  Periwound skin: rolled borders  Drainage: none  Erythema: mild  Probe: none, no bone exposed                      right              Left                    Assessment:       Encounter Diagnoses   Name Primary?    Type II diabetes mellitus with neurological manifestations     Foot ulceration, right, with necrosis of bone Yes    Charcot's joint of left foot     Foot ulceration, left, with fat layer exposed          Plan:       Mickey was seen today for follow-up.    Diagnoses and all orders for this visit:    Foot ulceration, right, with necrosis of bone  -     X-Ray Foot Complete 3 view Bilateral; Future  -     Aerobic culture (Specify Source)  -     CULTURE, ANAEROBE  -     Tissue Specimen To Pathology, Podiatry  -     Aerobic culture (Specify Source)  -     CULTURE, ANAEROBE  -     AFB CULTURE & SMEAR  -     GRAM STAIN    Type II diabetes mellitus with neurological manifestations    Charcot's joint of left foot    Foot ulceration, left, with fat layer exposed    Other orders  -     clindamycin (CLEOCIN) 300 MG capsule; Take 1 capsule (300 mg total) by mouth every 8 (eight) hours.      I counseled the patient on his conditions, their implications and medical management.      Instructed patient to report to ED for right foot infection with abscess, OM. I also offered to call ambulance.  Patient refuses to go to ED. I explained to patient he is at high risk for limb loss at this time due to right foot infection. Patient states he will go after dialysis tomorrow.     Bone debrided sent for culture, pathology see procedure note.     Xrays ordered B/L feet- Patient declined to get this completed.       Follow-up: 1 week but should call [facility] immediately if any signs of infection, such as fever, chills, sweats, increased redness or pain.    Short-term goals include maintaining good offloading and minimizing bioburden, promoting granulation and epithelialization to healing.  Long-term goals include keeping the wound healed by good offloading and medical management under the direction of internist.     Shoe inspection. Diabetic Foot Education. Patient reminded of the importance of good nutrition and blood sugar control to help prevent podiatric complications of diabetes. Patient instructed on proper foot hygeine. We discussed wearing proper shoe gear, daily foot inspections, never walking without protective shoe gear, never putting sharp instruments to feet.

## 2019-11-08 NOTE — PROCEDURES
Wound Debridement  Date/Time: 11/6/2019 10:45 AM  Performed by: Barbara Means DPM  Authorized by: Barbara Means DPM     Consent Done?:  Yes (Verbal)  Local anesthesia used?: No      Wound Details:    Location:  Right foot    Location:  Right Plantar    Type of Debridement:  Excisional       Length (cm):  3.5       Area (sq cm):  12.25       Width (cm):  3.5       Percent Debrided (%):  100       Depth (cm):  2       Total Area Debrided (sq cm):  12.25    Depth of debridement:  Subcutaneous tissue    Tissue debrided:  Bone    Devitalized tissue debrided:  Biofilm, Callus, Clots, Exudate and Fibrin    Instruments:  Rongeur and Blade    Bleeding:  Minimal  Hemostasis Achieved: Yes    Method Used:  Pressure  Patient tolerance:  Patient tolerated the procedure well with no immediate complications

## 2019-11-09 LAB — BACTERIA SPEC AEROBE CULT: ABNORMAL

## 2019-11-11 LAB
BACTERIA SPEC ANAEROBE CULT: NORMAL
BACTERIA SPEC ANAEROBE CULT: NORMAL

## 2019-11-14 ENCOUNTER — TELEPHONE (OUTPATIENT)
Dept: PODIATRY | Facility: CLINIC | Age: 54
End: 2019-11-14

## 2020-01-08 LAB
ACID FAST MOD KINY STN SPEC: NORMAL
MYCOBACTERIUM SPEC QL CULT: NORMAL

## 2021-01-11 NOTE — PROGRESS NOTES
HPI     DLS 01/28/19  By Dr. Antoni MD       Pt states that he is still having problem seeing inferior in the right   eye.  Seems to be getting a little lighter.  [facility] OD keeps   improving.  Has small [place] when looking directly down that is getting   smaller.  No pain/f/f.    Pt passed driving test.       Eye Med(s) - Pred Forte OD BID/PRN                          POHx:   1. ERM OD  S/P Procedure Performed: Pars plana vitrectomy, removal of silicone oil,   internal limiting membrane peel, intravitreal injection of Avastin, right   eye (12/04/18).    Last edited by Alexander Corrales MD on 2/20/2019 10:35 AM. (History)           [facility] SDOCT:   OD: good quality, no ERM, irreg contour, subfoveal fluid bleb smaller, no superior macula fluid, OCT improved from 1/28/19 scan    Assessment /Plan     For exam results, see Encounter Report.    Epiretinal membrane (ERM) of right eye    Right retinal detachment  -     Cancel: Flourescein Angiography - OU - Both Eyes    Retinal hemorrhage of right eye  -     Posterior Segment OCT Retina-Both eyes      Doing well post op  Sustained reattachment after SOR and ERM peel  Tiny subfoveal fluid bleb smaller  Recommend continue to observe.      Taper PF 1/0 x 2 wks then stop  RTC 1 month, sooner PRN    Recommend full time glasses wear now.  Recommend full time polycarbonite lenses.  Will refer to Dr Palmer for refraction if stable/improved next visit                Show Mentalis Units: No

## 2021-04-16 ENCOUNTER — PATIENT MESSAGE (OUTPATIENT)
Dept: RESEARCH | Facility: HOSPITAL | Age: 56
End: 2021-04-16

## 2021-06-17 NOTE — PLAN OF CARE
Problem: Patient Care Overview  Goal: Plan of Care Review  Outcome: Ongoing (interventions implemented as appropriate)  Patient hypertensive overnight. PRN hydralazine administered w/no relief. MD paged, advised to continue to monitor BP. Patient c/o pain, PRN analgesic administered w/relief.     Problem: Diabetes, Type 2 (Adult)  Goal: Signs and Symptoms of Listed Potential Problems Will be Absent, Minimized or Managed (Diabetes, Type 2)  Signs and symptoms of listed potential problems will be absent, minimized or managed by discharge/transition of care (reference Diabetes, Type 2 (Adult) CPG).  Outcome: Ongoing (interventions implemented as appropriate)  Patient blood glucose monitored and sliding scale dose administered per order.        Niacinamide Counseling: I recommended taking niacin or niacinamide, also know as vitamin B3, twice daily. Recent evidence suggests that taking vitamin B3 (500 mg twice daily) can reduce the risk of actinic keratoses and non-melanoma skin cancers. Side effects of vitamin B3 include flushing and headache.

## 2021-07-02 ENCOUNTER — TELEPHONE (OUTPATIENT)
Dept: PODIATRY | Facility: CLINIC | Age: 56
End: 2021-07-02

## 2021-07-02 ENCOUNTER — TELEPHONE (OUTPATIENT)
Dept: WOUND CARE | Facility: HOSPITAL | Age: 56
End: 2021-07-02

## 2021-07-06 ENCOUNTER — TELEPHONE (OUTPATIENT)
Dept: PODIATRY | Facility: CLINIC | Age: 56
End: 2021-07-06

## 2022-11-02 NOTE — ASSESSMENT & PLAN NOTE
- Patient reports having an epidural abscess several years ago status post surgical pain  - PT OT ordered however this is a chronic issue     Medication refill queued and pended. Prescriber please review, sign, and send if appropriate. Thank you.

## 2023-01-10 NOTE — PLAN OF CARE
"LILIA spoke w/ [facility] resident on pt possibly d/c'ing today or tmw w/ h/h and IV abx. LILIA contacted Clarice to discuss - Evette Oneil stated that when pt's benefits were ran, it was denied b/c pt has approved acute inpat days thru Monday 9/17. She stated that they could not work on the auth over the wknd b/c Medicaid is closed therefore Monday will be the first day it can be processed. Pt's h/h orders updated. Pt's will be followed by [facility] h/h.    Update: received callback from Evette Oneil - h/h orders state "after dialysis M/W/F" but pt goes to [facility] T/R/S. LILIA contacted MD and requested change.      " 15

## 2023-04-10 NOTE — ASSESSMENT & PLAN NOTE
- Patient reports having an epidural abscess several years ago status post surgical pain  - PT OT ordered however this is a chronic issue     Outgoing call to patient's mother to inform her of note from 04/05/23.  No answer and LVM.

## 2023-08-31 NOTE — ASSESSMENT & PLAN NOTE
- Patient was hypotensive overnight.,  - Diltiazem held  - Losartan and labetalol discontinued given in the setting of bradycardia and junctional rhythm  - Continue Norvasc 10mg qdaily, Coreg 25 mg BID and Hydralazine 50mg TID     PC:shortness of breath     Interval history:  Patient endorses improvement in breathing  No Acute events overnight       Past Medical History:   Diagnosis Date   • Acute on chronic diastolic (congestive) heart failure (CMD) 11/21/2021   • Acute on chronic respiratory failure with hypoxia (CMD) 11/21/2021   • Acute on chronic respiratory failure with hypoxia and hypercapnia (CMD) 01/28/2022   • Anemia    • ANEMIA IRON DEF, CHR BLOOD LOSS    • Anxiety    • Arthritis     rheumatoid arthritis   • Bacteremia due to Klebsiella pneumoniae 11/21/2021   • Benign neoplasm of adrenal gland     right   • Bilateral edema of lower extremity    • Chronic anticoagulation 06/2018    Takes clopidogrel - to stop pre-op on 6/24/18   • Chronic diarrhea    • Chronic pain    • Clostridium difficile infection 04/07/2017   • Congestive Heart Failure    • COPD (chronic obstructive pulmonary disease) (CMD)    • Coronary Artery Disease     MI 3/2010   • Depression    • Fibromyalgia    • Fracture, tibia 11/10/2017    nondisplaced right distal tibia. Due to fall   • Gastritis 08/2023    mild chronic gastritis   • Gastroesophageal reflux disease    • History of home oxygen therapy     2  L at night   • Hyperlipidemia    • Hypertension    • Lactic acidosis 01/28/2022   • Lumbar radiculopathy    • Mobility impaired     uses cane or wlaker with 4 wheels   • Myocardial infarction (CMD) 2009   • NSTEMI, initial episode of care (CMD)    • Osteoporosis    • Other intervertebral disc degeneration, lumbar region     Spondylosis w/o myelopathy or radiculopathy, lumbar region   • Panniculitis affecting regions of neck and back, lumbar region    • PNA (pneumonia) 08/15/2014   • Pneumonia    • Primary osteoarthritis of knee    • Requires assistance with activities of daily living (ADL) 06/2018   • Rheumatoid arthritis(714.0)    • S/P ORIF (open reduction internal fixation) sternal fracture 06/29/2018   • Sepsis due to Klebsiella pneumoniae (CMD) 11/21/2021    • Sinusitis, chronic    • Sleep apnea     cpap   • Spondylosis without myelopathy or radiculopathy, lumbar region    • Urinary incontinence    • Wears dentures    • Wears glasses        Past Surgical History:   Procedure Laterality Date   • Ankle scope,plantar fasciotomy Right    • Bronchoscopy  01/2019   • Colonoscopy  04/28/2015   • Colonoscopy diagnostic  01/01/2007    no significant findings   • Colonoscopy diagnostic  01/01/2015   • Colonoscopy diagnostic  10/03/2017    10yr recall    • Coronary angioplasty with stent placement  03/01/2010   • Correct bunion  2008    bilateral   • Esophagogastroduodenoscopy  10/03/2017    gastritis   • Esophagogastroduodenoscopy transoral flex diag  07/2023   • Fracture surgery  06/26/2018    ORIF sternal fx   • Hardware removal  01/2019    from sternum, Jan 2019   • Hysterectomy  1980   • Laparoscopy,tubal cautery      Tubal Ligation   • Paravertebral facet inj jnt lumbar sacral 2 Bilateral 08/01/2019    Diagnostic lumbar medial branch blocks   • Ptca     • Ptca with stent  07/13/2022   • Repair of sternum separation  06/29/2018    ORIF (open reduction internal fixation) sternal fracture with Dr. Cecil Obrien on 6/29/2018 at [facility].   • Right and left heart cath/possible ptca  06/03/2022   • Thoracoscopy surg wedg resec lung  08/14/2014    RVATS, extensive SKIP, wedge resection of upper and lower lobe   • Vaginal delivery      x3       Family History   Problem Relation Age of Onset   • Diabetes Mother    • Heart disease Mother    • Alcohol Abuse Father         alcoholism   • Rheumatoid Arthritis Father    • Diabetes Sister    • Heart disease Sister    • Allergic Rhinitis Sister    • Rheumatoid Arthritis Sister    • Hypertension Sister    • Diabetes Brother    • Hypertension Brother    • Hypertension Brother    • Diabetes Brother    • Kidney disease Brother    • Hypertension Brother    • Cirrhosis Brother        Social History     Socioeconomic  History   • Marital status:      Spouse name: Not on file   • Number of children: Not on file   • Years of education: Not on file   • Highest education level: Not on file   Occupational History   • Occupation: disability   Tobacco Use   • Smoking status: Every Day     Current packs/day: 0.25     Average packs/day: 0.3 packs/day for 52.3 years (13.1 ttl pk-yrs)     Types: Cigarettes     Start date: 5/17/1971   • Smokeless tobacco: Never   • Tobacco comments:     Now 2 cigs/day   Vaping Use   • Vaping Use: never used   Substance and Sexual Activity   • Alcohol use: Not Currently   • Drug use: No   • Sexual activity: Not Currently   Other Topics Concern   • Not on file   Social History Narrative    Aug 2019: lives with her sister, has had disability for 15-16 years from her RA. Has 3 children.     Social Determinants of Health     Financial Resource Strain: Low Risk  (8/20/2023)    Financial Resource Strain    • Social Determinants: Financial Resource Strain: None   Food Insecurity: Food Insecurity Present (8/20/2023)    Food Insecurity    • Social Determinants: Food Insecurity: Sometimes   Transportation Needs: No Transportation Needs (8/20/2023)    PRAPARE - Transportation    • Lack of Transportation (Medical): No    • Lack of Transportation (Non-Medical): No   Physical Activity: Unknown (2/10/2022)    Exercise Vital Sign    • Days of Exercise per Week: 0 days    • Minutes of Exercise per Session: Not on file   Stress: Low Risk  (2/10/2022)    Stress    • Social Determinants: Stress: A little bit   Social Connections: Socially Integrated (8/20/2023)    Social Connections    • Social Determinants: Social Connections: 5 or more times a week   Intimate Partner Violence: Not At Risk (8/20/2023)    Intimate Partner Violence    • Social Determinants: Intimate Partner Violence Past Fear: No    • Social Determinants: Intimate Partner Violence Current Fear: No       Eye Problem(s):negative  ENT  Problem(s):negative  Cardiovascular problem(s):as above  Respiratory problem(s):as above  Gastro-intestinal problem(s):negative GI  Genito-urinary problem(s):negative  Musculoskeletal problem(s):negative  Integumentary problem(s):negative  Neurological problem(s):negative  Psychiatric problem(s):negative  Endocrine problem(s):negative  Hematologic and/or Lymphatic problem(s):negative    Current Facility-Administered Medications   Medication   • pantoprazole (PROTONIX INJECT) injection 40 mg   • sodium chloride 0.9 % injector flush 50 mL   • dexAMETHasone (DECADRON) injection 4 mg   • azithromycin (ZITHROMAX) tablet 500 mg   • sodium chloride 0.9% infusion   • sodium chloride 0.9% infusion   • heparin (porcine) injection 5,000 Units   • propofol (DIPRIVAN) infusion   • chlorhexidine gluconate (PERIDEX) 0.12 % solution 15 mL    And   • chlorhexidine gluconate (PERIDEX) 0.12 % solution 15 mL   • fentaNYL (SUBLIMAZE) injection 25 mcg   • sodium chloride 0.9% infusion   • sodium chloride 0.9% infusion   • ipratropium-albuterol (DUONEB) 0.5-2.5 (3) MG/3ML nebulizer solution 3 mL   • NORepinephrine (LEVOPHED) 8 mg/250 mL in dextrose 5 % infusion   • dextrose 50 % injection 25 g   • dextrose 50 % injection 12.5 g   • glucagon (GLUCAGEN) injection 1 mg   • dextrose (GLUTOSE) 40 % gel 15 g   • dextrose (GLUTOSE) 40 % gel 30 g   • insulin lispro (ADMELOG,HumaLOG) - Correction Dose   • fentaNYL (SUBLIMAZE) 2,500 mcg/250 mL in sodium chloride 0.9 % infusion   • [Held by provider] guaiFENesin (MUCINEX) ER tablet 1,200 mg   • diclofenac (VOLTAREN) 1 % gel 4 g   • lidocaine (LIDOCARE) 4 % patch 2 patch   • HYDROcodone-acetaminophen (NORCO) 5-325 MG per tablet 2 tablet   • hydrALAZINE (APRESOLINE) injection 5 mg   • acetaminophen (TYLENOL) tablet 650 mg    Or   • acetaminophen (TYLENOL) suppository 650 mg   • sodium chloride (PF) 0.9 % injection 10 mL   • lidocaine (ANECREAM/LMX) 4 % cream 1 application.   • lidocaine 1 % injection  5-10 mg   • metoPROLOL tartrate (LOPRESSOR) tablet 25 mg   • dextrose (GLUTOSE) 40 % gel 15 g   • dextrose 50 % injection 25 g   • MIDazolam (VERSED) injection 2 mg   • sodium chloride 0.9 % flush bag 25 mL   • sodium chloride (PF) 0.9 % injection 2 mL   • lactated ringers infusion   • sodium chloride 0.9% infusion   • dextrose 5 % infusion   • cefTRIAXone (ROCEPHIN) syringe 2,000 mg   • DAPTOmycin (CUBICIN) injection 540 mg   • guaiFENesin 100 MG/5ML solution 200 mg   • benzonatate (TESSALON PERLES) capsule 100 mg   • [Held by provider] acetaminophen (TYLENOL) tablet 1,000 mg   • [Held by provider] ambrisentan (LETAIRIS) tablet 10 mg   • sodium chloride 0.9 % flush bag 25 mL   • sodium chloride (PF) 0.9 % injection 2 mL   • [Held by provider] aspirin (ECOTRIN) enteric coated tablet 81 mg   • atorvastatin (LIPITOR) tablet 40 mg   • [Held by provider] clopidogrel (PLAVIX) tablet 75 mg   • fluticasone (FLONASE) 50 MCG/ACT nasal spray 2 spray   • fluticasone-vilanterol (BREO ELLIPTA) 200-25 MCG/ACT inhaler 1 puff   • folic acid (FOLATE) tablet 1 mg   • leflunomide (ARAVA) tablet 10 mg   • melatonin tablet 9 mg   • [Held by provider] metoPROLOL succinate (TOPROL-XL) ER tablet 25 mg   • [Held by provider] mirtazapine (REMERON) tablet 15 mg   • [Held by provider] nicotine (NICODERM) 21 MG/24HR patch 1 patch   • [Held by provider] pramipexole (MIRAPEX) tablet 0.5 mg   • tiZANidine (ZANAFLEX) tablet 2 mg   • [Held by provider] venlafaxine XR (EFFEXOR XR) 24 hr capsule 37.5 mg   • sodium chloride (NORMAL SALINE) 0.9 % bolus 500 mL   • sodium chloride 0.9 % flush bag 25 mL   • Potassium Standard Replacement Protocol (Levels 3.5 and lower)   • Potassium Replacement (Levels 3.6 - 4)   • Magnesium Standard Replacement Protocol   • polyethylene glycol (MIRALAX) packet 17 g   • ondansetron (ZOFRAN) injection 4 mg   • sulfaSALAzine (AZULFIDINE) tablet 500 mg   • sulfaSALAzine (AZULFIDINE) tablet 1,000 mg   • [Held by provider]  gabapentin (NEURONTIN) capsule 400 mg   • [Held by provider] Treprostinil Powder 16 mcg       O/E:  Visit Vitals  /63 (BP Location: RUE - Right upper extremity, Patient Position: Supine)   Pulse 68   Temp 98.1 °F (36.7 °C) (Axillary)   Resp 19   Ht 5' 2\" (1.575 m)   Wt 64.5 kg (142 lb 3.2 oz)   SpO2 93%   BMI 26.01 kg/m²       Examination of the patient reveals:     GENERAL: alert, is in no apparent distress and is well developed and well nourished  LYMPH NODES: no cervical adenopathy, no supraclavicular adenopathy, no axillary adenopathy and no inguinal adenopathy  SKIN normal color, normal texture, normal turgor, no skin rashes, no atypical appearing skin lesions and no bruises  HEAD: normocephalic  EYES: pupils are equal and reactive to light and accomodation extraocular movements are full sclera and conjunctiva are normal lids and lashes are normal  EARS: pinna and external ear is normal bilaterally, external auditory canals are normal and auditory acuity is grossly normal  NOSE: external nose is normal to inspection and no septal deviation  MOUTH/THROAT: tongue is midline and appears normal, oropharynx appears normal, soft palate and uvula are normal and oral mucosa is normal  NECK: neck is supple, no thyromegaly, no anterior cervical adenopathy, no posterior cervical adenopathy and no supraclavicular adenopathy  CHEST: contour is normal with normal AP diameter, normal respiratory excursion and respiratory effort is not labored  LUNGS: expiratory wheezing, decreased air entry bilaterally.   HEART: normal PMI, normal rate and rhythm, no palpable heaves or thrills, S1 and S2 normal, no S3 or S4, no murmurs and no extra heart sounds  ABDOMEN: abdomen is soft, normal active bowel sounds, nontender, without masses, without hepatomegaly, without splenomegaly and no pulsatile masses  BACK: inspection shows no curvature, no discomfort to palpation of the midline and no costovertebral angle discomfort to  palpation  NEUROLOGIC: cranial nerves 2 through 12 normal, motor strength normal, gait and station normal, coordination normal, DTR's normal and symmetric and no tremor noted  EXTREMITIES: no clubbing, no cyanosis and no edema    WBC (K/mcL)   Date Value   08/31/2023 9.8     RBC (mil/mcL)   Date Value   08/31/2023 2.59 (L)     HCT (%)   Date Value   08/31/2023 24.8 (L)     HGB (g/dL)   Date Value   08/31/2023 7.2 (L)     PLT (K/mcL)   Date Value   08/31/2023 264       Sodium (mmol/L)   Date Value   08/31/2023 138     Potassium (mmol/L)   Date Value   08/31/2023 3.9     Chloride (mmol/L)   Date Value   08/31/2023 102     Glucose (mg/dL)   Date Value   08/31/2023 81     Calcium (mg/dL)   Date Value   08/31/2023 8.2 (L)     Carbon Dioxide (mmol/L)   Date Value   08/31/2023 31     BUN (mg/dL)   Date Value   08/31/2023 12     Creatinine (mg/dL)   Date Value   08/31/2023 0.70     XR CHEST AP OR PA    Result Date: 8/21/2023  Narrative: EXAM: XR CHEST AP OR PA INDICATION: reeval for pna, Chronic obstructive pulmonary disease with (acute) exacerbation (CMD), Acute respiratory failure with hypoxia (CMD). COMPARISON: 8/20/2023. FINDINGS:  The heart is normal in size. The pulmonary vasculature is within normal limits. There is diffuse prominence of the interstitium, perhaps slightly increased from the 8/20/2023 radiograph. Unchanged surgical material in the right midlung. There is no new consolidation. No pleural effusion or pneumothorax. Old right posterior lateral rib fracture deformities.     Impression: IMPRESSION: There is evidence of underlying emphysema with perhaps mildly increased interstitial thickening. This could reflect edema or atypical infection superimposed on top of emphysema. There is no dense consolidation. Electronically Signed by: Anisha Lerma MD Signed on: 8/21/2023 9:15 AM Workstation ID: [id number]    XR CHEST PA OR AP 1 VIEW    Result Date: 8/20/2023  Narrative: AP PORTABLE VIEW CHEST CLINICAL  HISTORY:  dyspnea. COMPARISONS: 7/23/2023.     Impression: IMPRESSION: Mild vascular congestion with mild diffuse interstitial prominence. No focal consolidation. No effusion. Borderline heart size is stable. Mild aortic tortuosity. No pneumothorax. Old right rib fractures. Degenerative changes of both shoulders.  Electronically Signed by: Jon Gilmore MD Signed on: 8/20/2023 9:17 AM Workstation ID: [id number]    MRI LIVER W WO CONTRAST    Result Date: 7/25/2023  Narrative: PROCEDURE: MRI LIVER W WO CONTRAST HISTORY: 67-year-old female with abdominal and back pain, questioned acute pancreatitis. TECHNIQUE:  MR images of the abdomen with axial and coronal T2 HASTE, axial diffusion and ADC, axial in and opposed phase T1, axial T2 fat suppressed, axial and coronal pre-contrast T1, and axial and coronal post-contrast T1 weighted images were obtained  per the \"Liver\" protocol. Image quality is mildly degraded by motion artifact. CONTRAST: 6 mL intravenous Gadavist. COMPARISON: CTA chest abdomen pelvis 7/23/2023. FINDINGS: Liver: The liver is normal in size and measures 13.8 cm in the craniocaudal dimension. The hepatic parenchyma demonstrates a smooth contour. There is no loss of signal on opposed phase sequences to suggest hepatic steatosis. An 11 mm segment 6 lesion (series 32 image 40) is faintly T1 hypointense/T2 hyperintense and demonstrates arterial hyperenhancement with persistent enhancement on portal venous and delayed sequences. There is no associated restricted diffusion. The hepatic parenchyma in the region of the other reported hypervascular lesion exhibits normal signal. Biliary system: Negative. Pancreas: Negative. Spleen: Negative. Adrenal glands: A right adrenal mildly heterogeneous 3.4 cm lesion with small areas of T2 hyperintensity cephalad and posteriorly demonstrates significant signal loss on opposed phase sequences. The lesion has mildly increased in size from 2.9 cm in 2010. There is nodular  thickening of the left adrenal gland. A 1.3 cm left adrenal lesion demonstrates loss of signal on opposed-phase sequences and is stable in size dating back to at least 2010. Kidneys: A few small foci of cortical T2 hyperintensity bilaterally are too small to definitively characterize, however likely represent small cysts. No perinephric fluid collection or hydronephrosis. The visualized portions of the ureters are normal in caliber. There are no solid or enhancing renal masses identified. Bowel: The distal esophagus and stomach are unremarkable. The visualized loops of small and large bowel are normal in caliber. There is no bowel obstruction. Vascular: The portal and hepatic veins are patent. Mildly tortuous abdominal aorta with focal ectasia of the infrarenal segment to 2.4 cm. Lymph nodes: No lymphadenopathy. Osseous structures: No enhancing osseous lesions are present. L2 vertebral body height loss related to compression deformity and vertebroplasty changes are unchanged from prior CT. Dextroconvex curvature of the thoracolumbar spine. Other: Mild dependent bibasilar opacities likely represent atelectasis. No pleural effusion is present. The visualized portions of the heart are normal in size. Small fat filled umbilical hernia is present. There our small areas of subcutaneous T2 hyperintensity in the lower ventral abdominal wall related to nonspecific edema.     Impression: IMPRESSION: 1. Hepatic segment 6 lesion demonstrates signal characteristics consistent with a benign flash filling hemangioma. 2. The other hypervascular lesion documented on recent CT exam is not identified. The hepatic parenchyma in this region exhibits normal signal. The finding is therefore attributable to a benign perfusion anomaly. 3. Bilateral adrenal lesions consistent with benign adenomas. I, Attending Radiologist Ric Peters MD, have reviewed the images and report and concur with these findings interpreted by Resident Radiologist,  Nhung Hackett MD. Electronically Signed by: Ric Peters MD Signed on: 7/25/2023 10:08 AM Workstation ID: [id number]    CTA CHEST ABDOMEN PELVIS    Result Date: 7/23/2023  Narrative: EXAM: CTA CHEST ABDOMEN PELVIS CLINICAL INDICATION:  lower chest upper abd pain that radiates to the back, double rule out PE as well. COMPARISON:  CT chest 5/23/2022, 1/23/2022 and 10/19/2021. CT abdomen pelvis CT abdomen pelvis 3/26/2010. TECHNIQUE: A noncontrast CT scan of the chest was performed. Subsequently, a CT angiogram of the chest, abdomen and pelvis was performed following the intravenous administration of 100 mL of Omnipaque 350.  3D reconstruction and 2D multiplanar reformations were also sent to PACS with the primary data set. FINDINGS:   VASCULAR: Aorta and supra-aortic branches: Precontrast images demonstrate no intramural high attenuation to suggest intramural hematoma. Within the limits of a nongated study, there is no evidence of thoracic aortic dissection or aneurysm. No abdominal aortic dissection or aneurysm. Bovine branching pattern of the aortic arch. Mild, less than 50% narrowing of the portion of left subclavian artery due to calcific atherosclerosis. Remaining arch vessels are widely patent. There are atherosclerotic calcifications of the aorta. Pulmonary arteries: Although not tailored for evaluation of the pulmonary arteries, no filling defects are seen in the central pulmonary arteries to suggest pulmonary embolism. The main pulmonary artery is normal in caliber, but the left pulmonary artery is dilated measuring up to approximately 2.7 cm. Visceral arteries: Calcific atherosclerosis of the abdominal artery origins without evidence for high-grade stenosis. The celiac axis, superior mesenteric artery, and inferior mesenteric artery are otherwise unremarkable. Renal arteries: The single bilateral renal arteries are patent with calcific atherosclerosis near the origins. Iliac & femoral arteries: Calcific  atherosclerosis of the iliac vessels with varying degrees of stenosis, no evidence for high-grade stenosis or occlusion. CHEST: Lungs and pleural spaces: Nonspecific patchy opacities within the right lower lobe base. There is a 4 mm right upper lobe pulmonary nodule, not definitively identified on prior examination (302:36). Mild centrilobular emphysematous changes within the bilateral upper lobe apices. Mild interstitial thickening within the bilateral lung bases. Mediastinum: Redemonstration of a 1.2 cm precarinal lymph node not substantially changed dating back to 5/23/2022 (303:39). No discrete hilar lymphadenopathy.. The heart is normal in size. No pericardial effusion.  ABDOMEN/PELVIS: Exam is performed during the arterial phase of enhancement, and therefore suboptimal for evaluation of hollow and solid viscera of the abdomen and pelvis. Scattered nonspecific areas of hyperattenuation/enhancement within the liver, largest measuring up to 1.1 cm within the right inferior hepatic lobe (306:57). These are not definitively identified on prior examinations. Redemonstration of 3.1 cm right adrenal nodule which demonstrates minimal interval change dating back 20510, likely compatible with a benign etiology given benign fat density on prior imaging. Mildly thickened appearance of left adrenal gland, similar compared to prior exams. Otherwise the arterial phase imaging of the spleen, pancreas, gallbladder and kidneys is within normal limits. The stomach is unremarkable. Visualized bowel loops are normal in caliber with no evidence of wall thickening.  No ascites. No discrete abdominal mesenteric or retroperitoneal adenopathy. The appendix within normal limits. No pelvic lymphadenopathy or free fluid. The ureters and urinary bladder are unremarkable. BONES: Mild superior endplate height loss at T6, may suggest an age-indeterminate compression deformity of approximately 30% height loss, this is new in comparison to prior  CT 5/23/2022. No additional osseous fractures are identified. Degenerative changes throughout the thoracolumbar spine. Kyphoplasty changes at L2 vertebral body.  There are multilevel degenerative changes of the spine. ---------------------------------- Fleischner Society Guidelines 2017 Solitary or Multiple Solid <6 mm   Low Risk:  No routine follow-up.   High Risk: Optional 12 month CT (suspicious morphology and/or upper lobe location). NOTES  - Does not apply to patients <34 yo, lung cancer screening CT, patients with immunosuppression, or patients with known primary cancer.  - Measurements refer to mean axial diameter, rounded to the nearest millimeter  - Subsolid nodule categories refer to mean diameter of the entire nodule, including both ground-glass and solid components Radiology. 2017 Feb 23:[id number] ----------------------------------     Impression: IMPRESSION: 1.  No evidence for aortic aneurysm or dissection. 2.  No evidence for pulmonary embolism. 3.  Intervally new slight height loss of T6 vertebral body comparison to prior 5/23/2022, may be suggestive of a mild compression deformity. 4.  Nonspecific right lower lobe opacities with mild interstitial thickening. Differential includes congestive changes or potentially a developing infectious/inflammatory process. 5.  Nonspecific areas of hyperattenuation/enhancement within the liver measuring up to 1.1 cm within the right inferior hepatic lobe. Differential includes flash filling hemangiomas or transient hepatic attenuation differences although underlying lesions are not entirely excluded. Recommend further characterization with a MRI liver. 6.  Intervally new 4 mm right upper lobe pulmonary nodule. Recommend follow-up per Fleischner Society guidelines as detailed above. 7.  Additional findings as above. Electronically Signed by: Tien Calhoun MD Signed on: 7/23/2023 6:24 PM Workstation ID: [id number]    XR CHEST AP OR PA    Result Date:  7/23/2023  Narrative: TECHNIQUE: XR CHEST AP OR PA HISTORY: epigastric pain that radiates to the back COMPARISON: Chest radiograph 5/16/2023 and 10/24/2022. CT chest 5/23/2022 FINDINGS: The cardiomediastinal silhouette is within normal limits. Mild pulmonary vascular prominence. Minimal hazy bibasilar opacities. No substantial focal consolidation. No pleural effusion or pneumothorax.     Impression: IMPRESSION:  1.  Mild pulmonary vasculature prominence. 2.  Subtle hazy bibasilar opacities, possibly sequela of congestive type changes, scarring or atelectasis. This is similar comparison to prior examinations. 3.  No substantial focal consolidation. Electronically Signed by: Tien Calhoun MD Signed on: 7/23/2023 3:18 PM Workstation ID: [id number]      Labs and imaging have been reviewed by me personally.      Assessment/Plan:  1. Acute on chronic hypoxic respiratory  2. COPD exacerbation  3. LILA  4. Pulmonary hypertension  5. Acute on chronic sinusitis  6. History of rheumatoid arthritis   7. DEANN    - will complete a course of azithromycin for anti-inflammatory effect  - CT chest with E 5-6 disc space active changes concerning for osteomyelitis, no acute airspace opacities, plan for IR guided biopsy today  -  Taper  down steroids, down to 4mg decadron   - continue Breo Ellipta, patient was on Advair prior to admission.  Cont Incruse Ellipta in view of exacerbation  - continue scheduled DuoNebs  - guaifenesin , inhaled mucomyst , flutter valve therapy to help expectorate   - will need updated home O2 eval prior to discharge  - patient was due to start Bactrim for acute on chronic sinusitis as an outpatient, will continue this  - continue inhaled Tyvaso during hospital stay.    Thank you for involving us in the patient care. Please do not hesitate to contact with any questions.     Note is dictated utilizing voice recognition software. This may lead to unintentional typographical errors.     Nilesh Guerrero,  MD  Pulmonary and Critical Care Medicine  Pager [phone]

## 2025-01-15 NOTE — ASSESSMENT & PLAN NOTE
SURGERY HISTORY AND PHYSICAL    PROCEDURE DATE:  01/15/25  Surgeon:Joseph Zhou DO MBA  PRIMARY CARE PHYSICIAN:  Al Pina MD    CHIEF COMPLAINT:  left knee stiffness     HISTORY OF PRESENT ILLNESS:    Jeffrey Matute is a 56 year old male presenting with left knee pain and stiffness, he states that a he can functionally do what he needs to do for work, but he has a lot of pain in the knees, particularly going downstairs. 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 He is much better now than what he was preoperatively and when he was a year ago, but he feels that she is not exactly where he needs to be. He is concerned about time from work.  Like to get the right done first which was done a few weeks ago and doing well he's now ready to proceed with the left. I recommended left  knee arthroscopy with partial meniscectomy and/or meniscus repair. Risk and benefits of the procedure were discussed with the patient at great length in the office, he made an informed decision to proceed with the above recommended procedure.    MEDICATIONS PRIOR TO ADMISSION:    No medications prior to admission.       ALLERGIES:    ALLERGIES:  No Known Allergies    PAST MEDICAL HISTORY:    Past Medical History:   Diagnosis Date    Anxiety     Complex tear of medial meniscus of right knee as current injury 12/29/2021    IBS (irritable bowel syndrome)     Primary osteoarthritis of right knee 12/29/2021       SURGICAL HISTORY:    Past Surgical History:   Procedure Laterality Date    Colon surgery  2013    Knee arthroscopy w/ meniscal repair Right 2020    Knee surgery Bilateral 2021    Total replacement    Stapedectomy         FAMILY HISTORY:    Family History   Problem Relation Age of Onset    Cancer Father        SOCIAL HISTORY:    Social History     Tobacco Use    Smoking status: Former     Types: Cigarettes    Smokeless  - Long term diabetic.. Takes Basglar 20 units BID and Novolog 10 units premeal  - A1C 8.2  - POC AM >330 and pre-meal accuchecks in the 100s  - Continue Lev 20 BID with Asp 14 TIDWM , LDSSI.   - diabetic and renal diet   tobacco: Never    Tobacco comments:     30 years ago   Vaping Use    Vaping status: never used   Substance Use Topics    Alcohol use: Yes     Comment: rarely    Drug use: Never       REVIEW OF SYSTEMS:    The patient denies symptoms of fever, chills, night sweats, fatigue, weight loss, weight gain, and decreased appetite.     OBJECTIVE:    VITAL SIGNS:    Vital Last Value 24 Hour Range   Temperature  98.3 No data recorded   Pulse 70 No data recorded   Respiratory 16 No data recorded   Non-Invasive  Blood Pressure  122/70 No data recorded   Pulse Oximetry  100% No data recorded     Vital Today Admitted   Weight 73 kg (161 lb) (01/07/25 0930) Weight: 73 kg (161 lb) (01/07/25 0930)   Height   5' 6\" (1.676 m)    Height: 5' 6\" (167.6 cm) (01/07/25 0930)   BMI   25.66 kg/m²    BMI (Calculated): 25.99 (01/07/25 0930)      PHYSICAL EXAMINATION:  Constitutional:  The patient is alert, oriented and cooperative.   Integument:  Warm. Dry. No erythema. No rash.    HENT:  Normocephalic. Atraumatic. Bilateral external ears normal.   Neck: Normal range of motion. No tenderness. Supple. No stridor.    Cardiovascular:  Normal heart rate. Normal rhythm. No murmurs. No rubs. No gallops.    Respiratory:  Normal breath sounds. No respiratory distress. No wheezing. No chest tenderness.  Gastrointestinal: Non tender, non distended, bowel sounds present, no organomegaly.  Genitourinary: Deferred to primary care physician.  Extremities:  Left Knee Exam      Muscle Strength   The patient has normal left knee strength.     Tenderness   The patient is experiencing tenderness in the patellar tendon.     Range of Motion   Extension:  0   Flexion:  120      Tests   Varus: negative Valgus: negative  Drawer:  Anterior - negative     Posterior - negative     Other   Erythema: absent  Scars: present  Sensation: normal  Pulse: present  Swelling: none  Effusion: no effusion present     Comments:  Incision: Well healed with no drainage, erythema or any  signs of infection.    LABORATORY DATA:    Lab Results   Component Value Date    SODIUM 140 12/06/2024    POTASSIUM 4.2 12/06/2024    CHLORIDE 107 12/06/2024    CO2 26 12/06/2024    BUN 15 12/06/2024    CREATININE 0.82 12/06/2024    GLUCOSE 76 12/06/2024    WBC 4.6 12/06/2024    HCT 48.2 12/06/2024    HGB 15.1 12/06/2024     12/06/2024    AST 17 12/06/2024    GPT 32 12/06/2024    ALKPT 106 12/06/2024    BILIRUBIN 0.4 12/06/2024      No results found for: \"INR\", \"PTINR\", \"TSH\"   Lab Results   Component Value Date    COL Straw 02/01/2023    UAPP Clear 02/01/2023    USPG 1.013 02/01/2023    UPH 6.0 02/01/2023    UPROT Negative 02/01/2023    UGLU Negative 02/01/2023    UKET Negative 02/01/2023    UBILI Negative 02/01/2023    URBC Negative 02/01/2023    UNITR Negative 02/01/2023    UROB 0.2 02/01/2023    UWBC Negative 02/01/2023        IMAGING STUDIES:    No results found.     ASSESSMENT:    Arthrofibrosis left knee  2. Intact bilateral total knee arthroplasties.    PLAN:    Plan for left knee arthroscopy with arthroscopic lysis of adhesions with manipulation under anesthesia. Postoperatively patient to be discharged home with crutches, weightbearing as tolerated. He will start physical therapy 1 week postoperatively, he will follow-up in 2 weeks at her already scheduled postoperative appointment. Ice and elevate as needed.     Joseph Zhou D.O. REG  [facility] [place]  [address] [facility] [address]  [place] [address]  [email or web address]  [phone] (office)  [phone] (fax)

## (undated) DEVICE — SOL BETADINE 5%

## (undated) DEVICE — PROBE ILLUM FLEX CURVE LASER

## (undated) DEVICE — DRAPE EYE

## (undated) DEVICE — SUT VICRYL 3-0 27 SH

## (undated) DEVICE — SEE MEDLINE ITEM 157131

## (undated) DEVICE — FORCEP GRIESHABER MAXGRIP 25G

## (undated) DEVICE — SHIELD FOX W/GARTER

## (undated) DEVICE — SUT 4-0 VICRYL / SH

## (undated) DEVICE — SOL BSS BALANCED SALT

## (undated) DEVICE — SOL GONAK

## (undated) DEVICE — CONTAINER SPECIMEN STRL 4OZ

## (undated) DEVICE — DRESSING TRANS 4X4 TEGADERM

## (undated) DEVICE — DRESSING EYE OVAL LF

## (undated) DEVICE — BLADE SURG CARBON STEEL SZ11

## (undated) DEVICE — SYR 10CC LUER LOCK

## (undated) DEVICE — OIL SILICONE

## (undated) DEVICE — NDL HYPO REG 25G X 1 1/2

## (undated) DEVICE — ELECTRODE REM PLYHSV RETURN 9

## (undated) DEVICE — DRAPE THYROID WITH ARMBOARD

## (undated) DEVICE — COVER MAYO STAND REINFRCD 30

## (undated) DEVICE — DRESSING ANTIMICROBIAL 1 INCH

## (undated) DEVICE — TRAY MUSCLE LID EYE

## (undated) DEVICE — TRAY MINOR GEN SURG

## (undated) DEVICE — SHIELD EYE METAL FOX 50/BX

## (undated) DEVICE — SOL BALANCED SALT 500ML

## (undated) DEVICE — SET DECANTER MEDICHOICE

## (undated) DEVICE — GOWN SURGICAL X-LARGE

## (undated) DEVICE — SOL NACL 0.9% INJ PF/50151

## (undated) DEVICE — SEE MEDLINE ITEM 157117

## (undated) DEVICE — SUT 7/0 18IN COATED VICRYL

## (undated) DEVICE — SOL WATER STRL IRR 1000ML

## (undated) DEVICE — KIT GREY EYE

## (undated) DEVICE — CORD FOR BIPOLAR FORCEPS 12

## (undated) DEVICE — FORCEP GRASPING 25GA SMOOTH

## (undated) DEVICE — SYR DISP LL 5CC

## (undated) DEVICE — LENS VITRCTMY OPHTH 30DEG 59DE

## (undated) DEVICE — DRAPE C ARM 42 X 120 10/BX